# Patient Record
Sex: FEMALE | Race: BLACK OR AFRICAN AMERICAN | NOT HISPANIC OR LATINO | Employment: UNEMPLOYED | URBAN - METROPOLITAN AREA
[De-identification: names, ages, dates, MRNs, and addresses within clinical notes are randomized per-mention and may not be internally consistent; named-entity substitution may affect disease eponyms.]

---

## 2017-08-09 ENCOUNTER — HOSPITAL ENCOUNTER (EMERGENCY)
Facility: HOSPITAL | Age: 37
Discharge: HOME | End: 2017-08-09
Attending: EMERGENCY MEDICINE
Payer: MEDICAID

## 2017-08-09 VITALS
WEIGHT: 190 LBS | SYSTOLIC BLOOD PRESSURE: 139 MMHG | OXYGEN SATURATION: 99 % | HEIGHT: 65 IN | DIASTOLIC BLOOD PRESSURE: 88 MMHG | BODY MASS INDEX: 31.65 KG/M2 | TEMPERATURE: 99 F | HEART RATE: 81 BPM | RESPIRATION RATE: 20 BRPM

## 2017-08-09 DIAGNOSIS — T65.91XA ACCIDENTAL INGESTION OF SUBSTANCE, INITIAL ENCOUNTER: Primary | ICD-10-CM

## 2017-08-09 PROCEDURE — 99283 EMERGENCY DEPT VISIT LOW MDM: CPT

## 2017-08-09 NOTE — ED PROVIDER NOTES
Encounter Date: 2017       History     Chief Complaint   Patient presents with    Medication Reaction     pt accidentally took 1 pill of grandmother's Topomax 200 mg by accident.      Patient accidentally took one of her family members Topamax.      The history is provided by the patient.   Ingestion    She came to the ER via by private vehicle. The current episode started just prior to arrival. Ingested substance: topamax. There are no indicators of a suicidal attempt. Context: accidental. The incident was reported. The incident was witnessed/reported by the patient. Pertinent negatives include no chest pain, no abdominal pain, no nausea, no vomiting, no headaches, no suicidal ideas, no cough and no difficulty breathing.     Review of patient's allergies indicates:   Allergen Reactions    Aspirin Other (See Comments)     Abdominal cramping     Past Medical History:   Diagnosis Date    IBS (irritable bowel syndrome)     Osteoarthritis     Sickle cell trait      Past Surgical History:   Procedure Laterality Date    ANKLE SURGERY      left     SECTION, CLASSIC      x3    CHOLECYSTECTOMY       No family history on file.  Social History   Substance Use Topics    Smoking status: Never Smoker    Smokeless tobacco: Never Used    Alcohol use No     Review of Systems   Respiratory: Negative for cough.    Cardiovascular: Negative for chest pain.   Gastrointestinal: Negative for abdominal pain, nausea and vomiting.   Neurological: Negative for headaches.   Psychiatric/Behavioral: Negative for suicidal ideas.   All other systems reviewed and are negative.      Physical Exam     Initial Vitals [17 0050]   BP Pulse Resp Temp SpO2   139/88 81 20 98.5 °F (36.9 °C) 99 %      MAP       105         Physical Exam    Nursing note and vitals reviewed.  Constitutional: She appears well-developed and well-nourished.   HENT:   Head: Normocephalic and atraumatic.   Eyes: EOM are normal.   Neck: Normal range  of motion. Neck supple.   Cardiovascular: Normal rate, regular rhythm, normal heart sounds and intact distal pulses.   Pulmonary/Chest: Breath sounds normal.   Abdominal: Soft.   Musculoskeletal: Normal range of motion.   Neurological: She is alert and oriented to person, place, and time.   Skin: Skin is warm and dry. Capillary refill takes less than 2 seconds.   Psychiatric: She has a normal mood and affect. Her behavior is normal. Judgment and thought content normal.         ED Course   Procedures  Labs Reviewed - No data to display                            ED Course     Clinical Impression:   The encounter diagnosis was Accidental ingestion of substance, initial encounter.    Disposition:   Disposition: Discharged  Condition: Stable                        Filomena Michele MD  08/09/17 0114

## 2018-02-20 DIAGNOSIS — M54.50 LOW BACK PAIN: Primary | ICD-10-CM

## 2018-05-24 ENCOUNTER — HOSPITAL ENCOUNTER (EMERGENCY)
Facility: HOSPITAL | Age: 38
Discharge: HOME | End: 2018-05-24
Payer: MEDICAID

## 2018-05-24 VITALS
TEMPERATURE: 98 F | BODY MASS INDEX: 31.65 KG/M2 | WEIGHT: 190 LBS | SYSTOLIC BLOOD PRESSURE: 154 MMHG | DIASTOLIC BLOOD PRESSURE: 88 MMHG | OXYGEN SATURATION: 100 % | HEART RATE: 53 BPM | HEIGHT: 65 IN | RESPIRATION RATE: 18 BRPM

## 2018-05-24 DIAGNOSIS — G89.29 CHRONIC BILATERAL LOW BACK PAIN WITHOUT SCIATICA: Primary | ICD-10-CM

## 2018-05-24 DIAGNOSIS — M54.50 CHRONIC BILATERAL LOW BACK PAIN WITHOUT SCIATICA: Primary | ICD-10-CM

## 2018-05-24 PROCEDURE — 25000003 PHARM REV CODE 250: Performed by: PHYSICIAN ASSISTANT

## 2018-05-24 PROCEDURE — 99283 EMERGENCY DEPT VISIT LOW MDM: CPT

## 2018-05-24 RX ORDER — OXYCODONE AND ACETAMINOPHEN 5; 325 MG/1; MG/1
2 TABLET ORAL
Status: COMPLETED | OUTPATIENT
Start: 2018-05-24 | End: 2018-05-24

## 2018-05-24 RX ADMIN — OXYCODONE AND ACETAMINOPHEN 2 TABLET: 5; 325 TABLET ORAL at 03:05

## 2018-05-24 NOTE — ED PROVIDER NOTES
"Encounter Date: 2018       History     Chief Complaint   Patient presents with    Joint Pain     Pt states has arthritis and  joint pains states "it hurts to reach and wipe my butt", states "my back feels crooked."  C/O pain to bilateral hips. Pt states has been seen by PCP and referred to Pain Management, states has not received call back for appt.      Patient presents with joint pain and arthritis pain which she states she has been dealing with "for about 10 years". Patient states she has seen her PCP and received 3 steroid injections in the past 5 months. She states she is currently waiting to hear from a pain management clinic for follow up. She reports pain to her hips and back which is her normal chronic pain. She denies recent injury or paresthesias.           Review of patient's allergies indicates:   Allergen Reactions    Aspirin Other (See Comments)     Abdominal cramping     Past Medical History:   Diagnosis Date    IBS (irritable bowel syndrome)     Osteoarthritis     Sickle cell trait      Past Surgical History:   Procedure Laterality Date    ANKLE SURGERY      left     SECTION, CLASSIC      x3    CHOLECYSTECTOMY       History reviewed. No pertinent family history.  Social History   Substance Use Topics    Smoking status: Never Smoker    Smokeless tobacco: Never Used    Alcohol use No     Review of Systems   Constitutional: Negative for chills and fever.   Respiratory: Negative for cough, chest tightness and shortness of breath.    Cardiovascular: Negative for chest pain.   Gastrointestinal: Negative for abdominal pain, nausea and vomiting.   Musculoskeletal: Positive for back pain and neck pain.   Neurological: Negative for dizziness, syncope, weakness, numbness and headaches.       Physical Exam     Initial Vitals [18 1506]   BP Pulse Resp Temp SpO2   (!) 154/88 (!) 53 18 97.6 °F (36.4 °C) 100 %      MAP       110         Physical Exam    Nursing note and vitals " "reviewed.  Constitutional: She appears well-developed and well-nourished.   HENT:   Head: Normocephalic and atraumatic.   Nose: Nose normal.   Mouth/Throat: Oropharynx is clear and moist.   Eyes: Conjunctivae and EOM are normal. Pupils are equal, round, and reactive to light.   Neck: Normal range of motion. Neck supple.       Cardiovascular: Normal rate, regular rhythm and normal heart sounds.   Pulmonary/Chest: Breath sounds normal. No respiratory distress.   Abdominal: Soft. Bowel sounds are normal. There is no tenderness.   Musculoskeletal: Normal range of motion.        Back:    No vertebral point tenderness noted. TTP to bilateral paraspinal areas.    Neurological: She is alert and oriented to person, place, and time. She has normal strength. No cranial nerve deficit or sensory deficit. GCS eye subscore is 4. GCS verbal subscore is 5. GCS motor subscore is 6.   Lower extremity strength 5/5   strength 5/5  NV intact   Skin: Skin is warm. Capillary refill takes less than 2 seconds.         ED Course   Procedures  Labs Reviewed - No data to display          Medical Decision Making:   Initial Assessment:   Patient presents with joint pain and arthritis pain which she states she has been dealing with "for about 10 years". Patient states she has seen her PCP and received 3 steroid injections in the past 5 months. She states she is currently waiting to hear from a pain management clinic for follow up. She reports pain to her hips and back which is her normal chronic pain. She denies recent injury or paresthesias. On exam, patient has TTP to paraspinal areas bilaterally without vertebral point tenderness noted. Lower extremity strength 5/5 and  strength 5/5. NV intact  Differential Diagnosis:   Chronic pain, back pain, neck pain  ED Management:  Informed patient will give pain medication here. Offered meloxicam and ibuprofen which she states, "doesn't work, I normally take percocet 10". Will give single dose. " Informed patient unable to write narcotics for chronic pain from the ED. Follow up with PCP and pain management. Patient is in NAD with VSS, non toxic in appearance.               Attending Attestation:     Physician Attestation Statement for NP/PA:   I reviewed the chart but I did not personally examine the patient. The face to face encounter was performed by the NP/PA.                     Clinical Impression:   The encounter diagnosis was Chronic bilateral low back pain without sciatica.                           Camille Reeder PA-C  05/24/18 1538       Guzman Siddiqui MD  05/24/18 1549

## 2018-07-03 ENCOUNTER — HOSPITAL ENCOUNTER (EMERGENCY)
Facility: HOSPITAL | Age: 38
Discharge: HOME | End: 2018-07-03
Attending: EMERGENCY MEDICINE
Payer: MEDICAID

## 2018-07-03 VITALS
TEMPERATURE: 98 F | DIASTOLIC BLOOD PRESSURE: 93 MMHG | WEIGHT: 190 LBS | HEIGHT: 65 IN | SYSTOLIC BLOOD PRESSURE: 190 MMHG | HEART RATE: 64 BPM | OXYGEN SATURATION: 97 % | RESPIRATION RATE: 18 BRPM | BODY MASS INDEX: 31.65 KG/M2

## 2018-07-03 DIAGNOSIS — R10.9 ACUTE LEFT FLANK PAIN: Primary | ICD-10-CM

## 2018-07-03 LAB
ALBUMIN SERPL BCP-MCNC: 4.4 G/DL
ALP SERPL-CCNC: 65 U/L
ALT SERPL W/O P-5'-P-CCNC: 16 U/L
ANION GAP SERPL CALC-SCNC: 10 MMOL/L
AST SERPL-CCNC: 16 U/L
B-HCG UR QL: NEGATIVE
BASOPHILS # BLD AUTO: 0.02 K/UL
BASOPHILS NFR BLD: 0.2 %
BILIRUB SERPL-MCNC: 0.2 MG/DL
BILIRUB UR QL STRIP: NEGATIVE
BUN SERPL-MCNC: 9 MG/DL
CALCIUM SERPL-MCNC: 9 MG/DL
CHLORIDE SERPL-SCNC: 101 MMOL/L
CLARITY UR REFRACT.AUTO: CLEAR
CO2 SERPL-SCNC: 27 MMOL/L
COLOR UR AUTO: YELLOW
CREAT SERPL-MCNC: 0.61 MG/DL
DIFFERENTIAL METHOD: ABNORMAL
EOSINOPHIL # BLD AUTO: 0.1 K/UL
EOSINOPHIL NFR BLD: 0.8 %
ERYTHROCYTE [DISTWIDTH] IN BLOOD BY AUTOMATED COUNT: 12.8 %
EST. GFR  (AFRICAN AMERICAN): >60 ML/MIN/1.73 M^2
EST. GFR  (NON AFRICAN AMERICAN): >60 ML/MIN/1.73 M^2
GLUCOSE SERPL-MCNC: 94 MG/DL
GLUCOSE UR QL STRIP: NEGATIVE
HCT VFR BLD AUTO: 36.5 %
HGB BLD-MCNC: 12.5 G/DL
HGB UR QL STRIP: ABNORMAL
KETONES UR QL STRIP: NEGATIVE
LEUKOCYTE ESTERASE UR QL STRIP: NEGATIVE
LYMPHOCYTES # BLD AUTO: 4.3 K/UL
LYMPHOCYTES NFR BLD: 51.6 %
MCH RBC QN AUTO: 27.9 PG
MCHC RBC AUTO-ENTMCNC: 34.2 G/DL
MCV RBC AUTO: 82 FL
MONOCYTES # BLD AUTO: 0.7 K/UL
MONOCYTES NFR BLD: 7.8 %
NEUTROPHILS # BLD AUTO: 3.3 K/UL
NEUTROPHILS NFR BLD: 39.5 %
NITRITE UR QL STRIP: NEGATIVE
PH UR STRIP: 5 [PH] (ref 5–8)
PLATELET # BLD AUTO: 382 K/UL
PMV BLD AUTO: 9.1 FL
POTASSIUM SERPL-SCNC: 3.7 MMOL/L
PROT SERPL-MCNC: 8.1 G/DL
PROT UR QL STRIP: ABNORMAL
RBC # BLD AUTO: 4.48 M/UL
SODIUM SERPL-SCNC: 138 MMOL/L
SP GR UR STRIP: 1.01 (ref 1–1.03)
URN SPEC COLLECT METH UR: ABNORMAL
UROBILINOGEN UR STRIP-ACNC: NEGATIVE EU/DL
WBC # BLD AUTO: 8.38 K/UL

## 2018-07-03 PROCEDURE — 25000003 PHARM REV CODE 250: Performed by: PHYSICIAN ASSISTANT

## 2018-07-03 PROCEDURE — 96375 TX/PRO/DX INJ NEW DRUG ADDON: CPT

## 2018-07-03 PROCEDURE — 96361 HYDRATE IV INFUSION ADD-ON: CPT

## 2018-07-03 PROCEDURE — 99284 EMERGENCY DEPT VISIT MOD MDM: CPT | Mod: 25

## 2018-07-03 PROCEDURE — 96374 THER/PROPH/DIAG INJ IV PUSH: CPT

## 2018-07-03 PROCEDURE — 81025 URINE PREGNANCY TEST: CPT

## 2018-07-03 PROCEDURE — 80053 COMPREHEN METABOLIC PANEL: CPT

## 2018-07-03 PROCEDURE — 85025 COMPLETE CBC W/AUTO DIFF WBC: CPT

## 2018-07-03 PROCEDURE — 63600175 PHARM REV CODE 636 W HCPCS: Performed by: PHYSICIAN ASSISTANT

## 2018-07-03 PROCEDURE — 81003 URINALYSIS AUTO W/O SCOPE: CPT

## 2018-07-03 RX ORDER — ONDANSETRON 2 MG/ML
4 INJECTION INTRAMUSCULAR; INTRAVENOUS
Status: COMPLETED | OUTPATIENT
Start: 2018-07-03 | End: 2018-07-03

## 2018-07-03 RX ORDER — MORPHINE SULFATE 4 MG/ML
2 INJECTION, SOLUTION INTRAMUSCULAR; INTRAVENOUS
Status: COMPLETED | OUTPATIENT
Start: 2018-07-03 | End: 2018-07-03

## 2018-07-03 RX ORDER — KETOROLAC TROMETHAMINE 30 MG/ML
15 INJECTION, SOLUTION INTRAMUSCULAR; INTRAVENOUS
Status: COMPLETED | OUTPATIENT
Start: 2018-07-03 | End: 2018-07-03

## 2018-07-03 RX ORDER — NAPROXEN 500 MG/1
500 TABLET ORAL 2 TIMES DAILY WITH MEALS
Qty: 12 TABLET | Refills: 0 | Status: SHIPPED | OUTPATIENT
Start: 2018-07-03 | End: 2018-09-17

## 2018-07-03 RX ORDER — METHOCARBAMOL 500 MG/1
1000 TABLET, FILM COATED ORAL 3 TIMES DAILY
Qty: 16 TABLET | Refills: 0 | Status: SHIPPED | OUTPATIENT
Start: 2018-07-03 | End: 2018-07-08

## 2018-07-03 RX ADMIN — MORPHINE SULFATE 2 MG: 4 INJECTION INTRAVENOUS at 11:07

## 2018-07-03 RX ADMIN — SODIUM CHLORIDE 1000 ML: 0.9 INJECTION, SOLUTION INTRAVENOUS at 10:07

## 2018-07-03 RX ADMIN — ONDANSETRON 4 MG: 2 INJECTION INTRAMUSCULAR; INTRAVENOUS at 11:07

## 2018-07-03 RX ADMIN — KETOROLAC TROMETHAMINE 15 MG: 30 INJECTION, SOLUTION INTRAMUSCULAR at 11:07

## 2018-07-04 NOTE — ED TRIAGE NOTES
"Pt presents to the ED with c/o left sided flank pain x 2 weeks. Pt was seen by her PCP but was unable to do follow-up labs. Pt states pain is worsening. Pain is rated 10/10, described as "sharp", intermittent. Pt denies hx of kidney stones but reports recent UTI. Pt reports taking tramadol with no relief. Denies urinary problems at present. Last BM was today and normal.   "

## 2018-07-04 NOTE — ED NOTES
Patient resting in bed. Socks and warm blanket provided. Patient updated on labs and CT scan request.

## 2018-07-04 NOTE — ED PROVIDER NOTES
Encounter Date: 7/3/2018       History     Chief Complaint   Patient presents with    Flank Pain     L flank pain that radiates to R side; reports recently diagnosed with UTI; denies N/V/D     38-year-old female presents to emergency department for evaluation of 5 day history of left-sided flank pain.  She reports that the pain has been severe and intermittent since onset.  She reports that it is a throbbing, pulling pain with intermittently sharp, stabbing pains that last for several minutes at a time before resolving.  She states that she was evaluated by her primary care provider Dr. Ortiz on  18 who sent her to get some blood work performed, but she was unable to get that performed.  She states that she was started on Macrobid for possible early urinary tract infection.  She states that over the last 2 days the intermittent flank pain has become worse, becoming a 10/10 pain that lasts for several minutes at a time.  She reports that it resolves without intervention.  She denies headache, fever, chills, body aches, nausea, vomiting or abdominal pain.  No treatment was attempted prior to arrival.           Review of patient's allergies indicates:   Allergen Reactions    Aspirin Other (See Comments)     Abdominal cramping     Past Medical History:   Diagnosis Date    IBS (irritable bowel syndrome)     Osteoarthritis     Sickle cell trait      Past Surgical History:   Procedure Laterality Date    ANKLE SURGERY      left     SECTION, CLASSIC      x3    CHOLECYSTECTOMY       History reviewed. No pertinent family history.  Social History   Substance Use Topics    Smoking status: Never Smoker    Smokeless tobacco: Never Used    Alcohol use No     Review of Systems   Constitutional: Negative for activity change, appetite change and fever.   HENT: Negative for congestion, ear discharge, facial swelling, mouth sores, rhinorrhea, sinus pain, sinus pressure, sore throat, trouble swallowing and  voice change.    Eyes: Negative for photophobia and visual disturbance.   Respiratory: Negative for cough and shortness of breath.    Cardiovascular: Negative for chest pain.   Gastrointestinal: Negative for abdominal pain, constipation, diarrhea, nausea and vomiting.   Genitourinary: Positive for flank pain. Negative for decreased urine volume, dysuria, genital sores, hematuria, pelvic pain, urgency, vaginal bleeding, vaginal discharge and vaginal pain.   Musculoskeletal: Negative for back pain, joint swelling, neck pain and neck stiffness.   Skin: Negative for rash.   Neurological: Negative for dizziness, seizures, syncope, weakness, light-headedness and headaches.       Physical Exam     Initial Vitals [07/03/18 2133]   BP Pulse Resp Temp SpO2   (!) 173/113 70 18 98.1 °F (36.7 °C) 99 %      MAP       --         Physical Exam    Nursing note and vitals reviewed.  Constitutional: She appears well-developed and well-nourished. She is not diaphoretic. No distress.   HENT:   Head: Normocephalic and atraumatic.   Right Ear: External ear normal.   Left Ear: External ear normal.   Nose: Nose normal.   Mouth/Throat: Oropharynx is clear and moist.   Eyes: Conjunctivae and EOM are normal. Pupils are equal, round, and reactive to light.   Neck: Normal range of motion. Neck supple.   Cardiovascular: Normal rate, regular rhythm and normal heart sounds. Exam reveals no gallop and no friction rub.    No murmur heard.  Pulmonary/Chest: Breath sounds normal. No respiratory distress. She has no wheezes. She has no rhonchi. She has no rales. She exhibits no tenderness.   Abdominal: Soft. Bowel sounds are normal. There is no tenderness.   Left-sided CVA tenderness noted.   Musculoskeletal:   No tenderness to palpation noted over the paraspinal muscles of the spinous processes of the cervical or thoracic spine.  Mild tenderness to palpation noted over the  sided paraspinal musculature of the lumbar spine.  No spinous process  tenderness noted.  Full range of motion, sensation and peripheral pulses in the lower extremities bilaterally.  Patient ambulates well without hesitation or gait abnormality.     Lymphadenopathy:     She has no cervical adenopathy.   Neurological: She is alert and oriented to person, place, and time. No cranial nerve deficit.   Skin: Skin is warm and dry.   Psychiatric: She has a normal mood and affect.         ED Course   Procedures  Labs Reviewed   URINALYSIS - Abnormal; Notable for the following:        Result Value    Protein, UA Trace (*)     Occult Blood UA Trace (*)     All other components within normal limits   CBC W/ AUTO DIFFERENTIAL - Abnormal; Notable for the following:     Hematocrit 36.5 (*)     Platelets 382 (*)     MPV 9.1 (*)     Lymph% 51.6 (*)     All other components within normal limits   PREGNANCY TEST, URINE RAPID   COMPREHENSIVE METABOLIC PANEL          Imaging Results          CT Renal Stone Study ABD Pelvis WO (Final result)  Result time 07/03/18 23:12:48    Final result by Guzman Crocker MD (07/03/18 23:12:48)                 Impression:      1. Negative for acute inflammatory process identified pelvis.  2. Probable small cyst superior pole left kidney.  No change since 07/05/2016.  3. 2 nonobstructing calculi within the right kidney.  Negative for obstruction.  4. Moderate stool throughout the colon.  All CT scans at [facility] use dose modulation, iterative reconstruction and/or weight based dosing when appropriate to reduce radiation dose to as low as reasonably achievable.      Electronically signed by: Guzman Crocker MD  Date:    07/03/2018  Time:    23:12             Narrative:    EXAMINATION:  CT RENAL STONE STUDY ABD PELVIS WO    CLINICAL HISTORY:  Flank pain, stone disease suspected;.  Nausea and vomiting.  History of recent sepsis.    TECHNIQUE:  Low dose axial images, sagittal and coronal reformations were obtained from the lung bases to the pubic  symphysis.    COMPARISON:  07/05/2000 60    FINDINGS:  Lung bases are clear.    The liver is normal.  Gallbladder is surgically absent.    The spleen is normal in size and appearance.  The pancreas is normal.  The adrenal glands are normal.  The aorta and IVC are normal.  No retroperitoneal adenopathy.    Subtle low-density focus identified posterior aspect superior pole left kidney likely represents a small cyst, measuring 1.1 cm.  Otherwise the left kidney is normal.  The left ureters normal.  Two punctate calculi identified within the right kidney.  The largest measures 0.4 cm.  Right ureter is normal.  No ureteral calculus present.    Stomach is normal.  The small intestine is normal.  The appendix is normal.  The colon is normal.  Moderate stool present.  The rectum is normal.    The pelvis demonstrates minimally distended, normal appearing bladder.  Uterus is unremarkable.  The adnexal regions are normal.    Regional bones demonstrate mild degenerative changes.  No suspicious osseous lesions.  Abdominal and pelvic wall soft tissues are normal.                                 Medical Decision Making:   Initial Assessment:   38-year-old female presents reevaluation of 5 day history of intermittent left flank pain.  Physical exam reveals a nontoxic-appearing female in no acute distress. patient is afebrile vital signs within normal limits.  Neurological exam reveals an alert and oriented patient.   Abdominal exam reveals a soft abdomen, nontender to palpation.  Mild left-sided CVA tenderness noted.  No peritoneal signs noted.  No tenderness to palpation noted over the paraspinal muscles of the spinous processes of the cervical or thoracic spine.  Mild tenderness to palpation noted over the  sided paraspinal musculature of the lumbar spine.  No spinous process tenderness noted.  Full range of motion, sensation and peripheral pulses in the lower extremities bilaterally.  Patient ambulates well without hesitation or  gait abnormality.    Differential Diagnosis:   CT of the abdomen and pelvis ordered to assess for possible serious etiology including renal calculi, acute diverticulitis and bowel obstruction.  ED Management:  UPT negative.  Patient given saline, Zofran and morphine for symptom control.  Urinalysis reveals no evidence of UTI or gross hematuria.    CBC reveals a acute leukocytosis or anemia.  CMP results within normal limits.  CT of the abdomen and pelvis reveals no evidence of obstructing renal calculi.  2 nonobstructing calculi noted in the right kidney.  Probable small cyst in the superior pole of the left kidney unchanged since previous CT.  No  acute inflammatory process identified in the pelvis.  These findings were discussed at length with the patient verbalizes understanding and agreement course of treatment.     Probable skeletal strain.  Patient given Toradol for symptom control.  I discussed these findings at length with the patient verbalizes understanding and agreement course of treatment.  Instructed the patient to follow-up with her primary care provider for reevaluation and to return to the emergency department immediately for any new or worsening symptoms.  I discussed this patient at length with Dr. Michele who is in agreement with the course of treatment.                      Clinical Impression:   The encounter diagnosis was Acute left flank pain.                             Anitra Kelly PA-C  07/04/18 [id number]

## 2018-07-04 NOTE — DISCHARGE INSTRUCTIONS
The CT of her abdomen and pelvis did not reveal any evidence of kidney stone or other acute findings.  You will be prescribed Robaxin and naproxen for symptom control.  You are advised to follow-up with her primary care provider for reevaluation within 3 days.  You are  instructed to return to the emergency department immediately for any new or worsening symptoms.

## 2018-09-17 ENCOUNTER — HOSPITAL ENCOUNTER (EMERGENCY)
Facility: HOSPITAL | Age: 38
Discharge: HOME | End: 2018-09-17
Attending: FAMILY MEDICINE
Payer: MEDICAID

## 2018-09-17 VITALS
HEIGHT: 65 IN | OXYGEN SATURATION: 100 % | WEIGHT: 191 LBS | HEART RATE: 86 BPM | TEMPERATURE: 98 F | SYSTOLIC BLOOD PRESSURE: 138 MMHG | DIASTOLIC BLOOD PRESSURE: 74 MMHG | BODY MASS INDEX: 31.82 KG/M2 | RESPIRATION RATE: 18 BRPM

## 2018-09-17 DIAGNOSIS — S39.012A LUMBOSACRAL STRAIN, INITIAL ENCOUNTER: Primary | ICD-10-CM

## 2018-09-17 DIAGNOSIS — M54.41 ACUTE BILATERAL LOW BACK PAIN WITH RIGHT-SIDED SCIATICA: ICD-10-CM

## 2018-09-17 PROCEDURE — 99283 EMERGENCY DEPT VISIT LOW MDM: CPT | Mod: 25

## 2018-09-17 PROCEDURE — 25000003 PHARM REV CODE 250: Performed by: NURSE PRACTITIONER

## 2018-09-17 PROCEDURE — 63600175 PHARM REV CODE 636 W HCPCS: Performed by: NURSE PRACTITIONER

## 2018-09-17 PROCEDURE — 96372 THER/PROPH/DIAG INJ SC/IM: CPT

## 2018-09-17 RX ORDER — METHOCARBAMOL 500 MG/1
1000 TABLET, FILM COATED ORAL 3 TIMES DAILY
Qty: 30 TABLET | Refills: 0 | Status: SHIPPED | OUTPATIENT
Start: 2018-09-17 | End: 2018-09-17 | Stop reason: SDUPTHER

## 2018-09-17 RX ORDER — METHOCARBAMOL 500 MG/1
1000 TABLET, FILM COATED ORAL 3 TIMES DAILY
Qty: 30 TABLET | Refills: 0 | Status: SHIPPED | OUTPATIENT
Start: 2018-09-17 | End: 2018-09-22

## 2018-09-17 RX ORDER — DICLOFENAC SODIUM 10 MG/G
2 GEL TOPICAL 2 TIMES DAILY
Qty: 100 G | Refills: 0 | Status: ON HOLD | OUTPATIENT
Start: 2018-09-17 | End: 2019-05-15 | Stop reason: HOSPADM

## 2018-09-17 RX ORDER — HYDROCODONE BITARTRATE AND ACETAMINOPHEN 10; 325 MG/1; MG/1
1 TABLET ORAL
Status: COMPLETED | OUTPATIENT
Start: 2018-09-17 | End: 2018-09-17

## 2018-09-17 RX ORDER — ORPHENADRINE CITRATE 30 MG/ML
60 INJECTION INTRAMUSCULAR; INTRAVENOUS
Status: COMPLETED | OUTPATIENT
Start: 2018-09-17 | End: 2018-09-17

## 2018-09-17 RX ORDER — KETOROLAC TROMETHAMINE 30 MG/ML
60 INJECTION, SOLUTION INTRAMUSCULAR; INTRAVENOUS
Status: COMPLETED | OUTPATIENT
Start: 2018-09-17 | End: 2018-09-17

## 2018-09-17 RX ORDER — HYDROCODONE BITARTRATE AND ACETAMINOPHEN 5; 325 MG/1; MG/1
1 TABLET ORAL EVERY 8 HOURS PRN
Qty: 13 TABLET | Refills: 0 | Status: ON HOLD | OUTPATIENT
Start: 2018-09-17 | End: 2019-05-15

## 2018-09-17 RX ADMIN — HYDROCODONE BITARTRATE AND ACETAMINOPHEN 1 TABLET: 10; 325 TABLET ORAL at 10:09

## 2018-09-17 RX ADMIN — ORPHENADRINE CITRATE 60 MG: 30 INJECTION INTRAMUSCULAR; INTRAVENOUS at 10:09

## 2018-09-17 RX ADMIN — KETOROLAC TROMETHAMINE 60 MG: 30 INJECTION, SOLUTION INTRAMUSCULAR at 10:09

## 2018-09-18 ENCOUNTER — HOSPITAL ENCOUNTER (EMERGENCY)
Facility: HOSPITAL | Age: 38
Discharge: HOME | End: 2018-09-18
Attending: EMERGENCY MEDICINE
Payer: MEDICAID

## 2018-09-18 VITALS
HEIGHT: 65 IN | HEART RATE: 60 BPM | DIASTOLIC BLOOD PRESSURE: 85 MMHG | BODY MASS INDEX: 31.82 KG/M2 | RESPIRATION RATE: 19 BRPM | WEIGHT: 191 LBS | OXYGEN SATURATION: 100 % | SYSTOLIC BLOOD PRESSURE: 163 MMHG | TEMPERATURE: 99 F

## 2018-09-18 DIAGNOSIS — G89.29 CHRONIC BILATERAL LOW BACK PAIN WITH RIGHT-SIDED SCIATICA: Primary | ICD-10-CM

## 2018-09-18 DIAGNOSIS — M54.41 CHRONIC BILATERAL LOW BACK PAIN WITH RIGHT-SIDED SCIATICA: Primary | ICD-10-CM

## 2018-09-18 LAB
B-HCG UR QL: NEGATIVE
CTP QC/QA: YES

## 2018-09-18 PROCEDURE — 96374 THER/PROPH/DIAG INJ IV PUSH: CPT

## 2018-09-18 PROCEDURE — 81025 URINE PREGNANCY TEST: CPT | Performed by: EMERGENCY MEDICINE

## 2018-09-18 PROCEDURE — 96372 THER/PROPH/DIAG INJ SC/IM: CPT | Mod: 59

## 2018-09-18 PROCEDURE — 63600175 PHARM REV CODE 636 W HCPCS: Performed by: NURSE PRACTITIONER

## 2018-09-18 PROCEDURE — 99283 EMERGENCY DEPT VISIT LOW MDM: CPT | Mod: 25

## 2018-09-18 RX ORDER — NAPROXEN 500 MG/1
500 TABLET ORAL 2 TIMES DAILY WITH MEALS
Qty: 20 TABLET | Refills: 0 | OUTPATIENT
Start: 2018-09-18 | End: 2018-10-23

## 2018-09-18 RX ORDER — CYCLOBENZAPRINE HCL 10 MG
10 TABLET ORAL 3 TIMES DAILY PRN
Qty: 15 TABLET | Refills: 0 | Status: SHIPPED | OUTPATIENT
Start: 2018-09-18 | End: 2018-09-23

## 2018-09-18 RX ORDER — DEXAMETHASONE SODIUM PHOSPHATE 4 MG/ML
8 INJECTION, SOLUTION INTRA-ARTICULAR; INTRALESIONAL; INTRAMUSCULAR; INTRAVENOUS; SOFT TISSUE
Status: COMPLETED | OUTPATIENT
Start: 2018-09-18 | End: 2018-09-18

## 2018-09-18 RX ORDER — ORPHENADRINE CITRATE 30 MG/ML
30 INJECTION INTRAMUSCULAR; INTRAVENOUS
Status: COMPLETED | OUTPATIENT
Start: 2018-09-18 | End: 2018-09-18

## 2018-09-18 RX ADMIN — ORPHENADRINE CITRATE 30 MG: 30 INJECTION INTRAMUSCULAR; INTRAVENOUS at 05:09

## 2018-09-18 RX ADMIN — DEXAMETHASONE SODIUM PHOSPHATE 8 MG: 4 INJECTION, SOLUTION INTRAMUSCULAR; INTRAVENOUS at 05:09

## 2018-09-18 NOTE — ED NOTES
"Pt stated, " I have arthritis in my hipps and I suffer with siactic nerver, and what im feeling right now is unbareable.  "

## 2018-09-18 NOTE — ED NOTES
"Pt stated she has not gotten any relief, the pain on her right sight is so severe, plans to go to [facility] ER once discharged if no relief. Kelley MESSINA NP notified. Offered pt a Norco on top on the injections. Pt stated " I will try anything to help relieve the pain".  "

## 2018-09-18 NOTE — ED PROVIDER NOTES
Encounter Date: 9/18/2018       History     Chief Complaint   Patient presents with    Back Pain     Pt to lower back, worse to R side. Has choinic pain issues to same area. States was seen last night for same and was given Toradol but reports pain not improved.     Pt presents for chronic low back pain that radiates to R hip and leg that has worsened over the past few days. Pt denies any trauma. Pt has no numbness, tingling or loss of bowel or bladder. Pt states this is about here fifth flare up this year. Pt was seen at [facility] yesterday, did not fill prescriptions yet. Pharmacy was out of [facility], pt is asking for something different. Pt states she was told by nurse at [facility] ED to come here for injections for pain that would last longer. Explained to pt that we do not do spinal injections and we have similar medications as [facility]. Pt ambulated into ED room.      The history is provided by the patient.   Back Pain    This is a recurrent problem. The current episode started two days ago. The problem occurs every few hours. The problem has been unchanged. The pain is associated with no known injury. The pain is present in the lumbar spine. The quality of the pain is described as stabbing and burning. The pain radiates to the right thigh and right leg. The symptoms are aggravated by certain positions. The pain is the same all the time. Associated symptoms include leg pain (R leg). Pertinent negatives include no fever, no numbness, no abdominal pain, no bowel incontinence, no perianal numbness, no bladder incontinence, no dysuria, no pelvic pain, no paresthesias, no paresis, no tingling and no weakness.     Review of patient's allergies indicates:   Allergen Reactions    Aspirin Other (See Comments)     Abdominal cramping     Past Medical History:   Diagnosis Date    IBS (irritable bowel syndrome)     Osteoarthritis     Sickle cell trait      Past Surgical History:   Procedure Laterality Date     ANKLE SURGERY  2011    left     SECTION, CLASSIC      x3    CHOLECYSTECTOMY       History reviewed. No pertinent family history.  Social History     Tobacco Use    Smoking status: Never Smoker    Smokeless tobacco: Never Used   Substance Use Topics    Alcohol use: No    Drug use: No     Review of Systems   Constitutional: Negative for fever.   Gastrointestinal: Negative for abdominal pain and bowel incontinence.   Genitourinary: Negative for bladder incontinence, difficulty urinating, dysuria and pelvic pain.   Musculoskeletal: Positive for arthralgias (states she has OA) and back pain. Negative for gait problem, joint swelling, myalgias, neck pain and neck stiffness.   Skin: Negative for rash and wound.   Allergic/Immunologic: Negative for immunocompromised state.   Neurological: Negative for tingling, weakness, numbness and paresthesias.   All other systems reviewed and are negative.      Physical Exam     Initial Vitals [18 1601]   BP Pulse Resp Temp SpO2   (!) 163/85 60 19 98.6 °F (37 °C) 100 %      MAP       --         Physical Exam    Nursing note and vitals reviewed.  Constitutional: Vital signs are normal. She appears well-developed. She is cooperative.  Non-toxic appearance. She does not appear ill.   HENT:   Head: Normocephalic and atraumatic.   Nose: Nose normal.   Eyes: Conjunctivae, EOM and lids are normal.   Neck: Trachea normal and full passive range of motion without pain.   Cardiovascular: Normal rate, regular rhythm and normal heart sounds.   Pulses:       Dorsalis pedis pulses are 2+ on the right side, and 2+ on the left side.   Pulmonary/Chest: Effort normal and breath sounds normal.   Abdominal: Soft. Normal appearance and bowel sounds are normal. There is no tenderness.   Musculoskeletal: Normal range of motion.        Right hip: She exhibits tenderness. She exhibits normal range of motion, normal strength, no bony tenderness, no swelling, no crepitus, no deformity and no  laceration.        Right knee: Normal.        Thoracic back: Normal.        Lumbar back: She exhibits tenderness and pain. She exhibits normal range of motion, no bony tenderness, no swelling, no edema, no deformity, no laceration, no spasm and normal pulse.        Right upper leg: She exhibits tenderness (sciatic pain). She exhibits no bony tenderness, no swelling, no edema, no deformity and no laceration.        Left upper leg: Normal.        Right lower leg: Normal.        Legs:       Right foot: Normal.   Neurological: She is alert and oriented to person, place, and time. She has normal strength. She displays no atrophy. No cranial nerve deficit or sensory deficit. She exhibits normal muscle tone. Coordination and gait normal.   Reflex Scores:       Patellar reflexes are 2+ on the right side and 2+ on the left side.  Skin: Skin is warm, dry and intact. Capillary refill takes less than 2 seconds. No rash noted.   Psychiatric: She has a normal mood and affect. Her speech is normal and behavior is normal.         ED Course   Procedures  Labs Reviewed   POCT URINE PREGNANCY          Imaging Results    None          Medical Decision Making:   Initial Assessment:   Chronic low back pain that radiates to R hip and leg that has worsened over the past few days. Pt denies any trauma. Pt denies numbness, tingling, loss of bowel or bladder, no s/s cauda eqina.   Pt was seen at [facility] yesterday, did not fill prescriptions yet. Pharmacy was out of [facility], pt is asking for something different. Pt states she was told by nurse at [facility] ED to come here for injections for pain that would last longer. Explained to pt that we do not do spinal injections and we have similar medications as [facility]. Pt ambulated into ED room, is depressed d/t the chronic pain and has medicaid so finding pain management is difficult. Pt states she has an appt Oct. 12 with pain management    Differential Diagnosis:   Lumbar radiculopathy,  MSK spasm, strain  ED Management:  Chronic lumbar back pain with sciatica,without trauma. Pt given naproxen and flexeril per request d/t pharmacy out of robaxin. Pt given steriod injection in ED today. Pt given copy of low back exercises to relieve sciatica pain. Pt has appt with her PCP Monday for referral, and has pain management appt Oct. 12. Pt encouraged to keep and attend those appointments. Pt to return to ER for any s/s of cauda equina.                      Clinical Impression:   The encounter diagnosis was Chronic bilateral low back pain with right-sided sciatica.                             Rome Paul NP  09/18/18 [id number]

## 2018-09-18 NOTE — ED PROVIDER NOTES
.      This SmartLink is deprecated. Use AVSMEDLIST instead to display the medication list for a patient. eMERGENCY dEPARTMENT eNCOUnter    CHIEF COMPLAINT    Chief Complaint   Patient presents with    Back Pain     Right hip and lower back pain radiating to left hip; pt states she has arthritis and sciatica problems but this pain has gotten much worse in the last 3 days without any recent hx of injury       HPI    Sherri Machado is a 38 y.o. female who presents to the ED with low back pain. States she has had low back pain off unknown over the years.  She states it flares up from time to time.  She states last week she had done her usual housekeeping and Saturday morning the back pain just hip.  She states it goes across her lower back and into both hips.  She states the right sided hip pain is the worst.  She denies injury. She denies numbness tingling or radiation to the legs or feet.  She denies incontinence.  She states it hurts to move or bend.  She states she does have a history of  osteoarthritis and she has an appointment with a rheumatologist in November.    CURRENT MEDICATIONS    No current facility-administered medications on file prior to encounter.      Current Outpatient Medications on File Prior to Encounter   Medication Sig Dispense Refill    [DISCONTINUED] alprazolam (XANAX) 2 MG Tab Take by mouth nightly as needed.      [DISCONTINUED] naproxen (NAPROSYN) 500 MG tablet Take 1 tablet (500 mg total) by mouth 2 (two) times daily with meals. 12 tablet 0         ALLERGIES    Review of patient's allergies indicates:   Allergen Reactions    Aspirin Other (See Comments)     Abdominal cramping       PAST MEDICAL HISTORY  Past Medical History:   Diagnosis Date    IBS (irritable bowel syndrome)     Osteoarthritis     Sickle cell trait        SURGICAL HISTORY    Past Surgical History:   Procedure Laterality Date    ANKLE SURGERY      left     SECTION, CLASSIC      x3    CHOLECYSTECTOMY    "      SOCIAL HISTORY    Social History     Socioeconomic History    Marital status:      Spouse name: None    Number of children: None    Years of education: None    Highest education level: None   Social Needs    Financial resource strain: None    Food insecurity - worry: None    Food insecurity - inability: None    Transportation needs - medical: None    Transportation needs - non-medical: None   Occupational History    None   Tobacco Use    Smoking status: Never Smoker    Smokeless tobacco: Never Used   Substance and Sexual Activity    Alcohol use: No    Drug use: No    Sexual activity: None   Other Topics Concern    None   Social History Narrative    None       FAMILY HISTORY    History reviewed. No pertinent family history.    REVIEW OF SYSTEMS   ROS  Constitutional:  No fever, chills, weight loss or weakness.   Eyes:  No  Photophobia, blurred vision or discharge.   HENT:  No ear pain, nasal congestion or sore throat..  Respiratory:  No cough, shortness of breath or wheezing.   Cardiovascular:  No chest pain, palpitations or swelling.   GI:  No abdominal pain, nausea, vomiting, or diarrhea.  : No dysuria, frequency   Musculoskeletal:  Reports low back pain. Denies neck pain.   Skin:  No reported rashes or infected lesions.   Neurologic:  No reported headache. Denies numbness, tingling or incontinence.  All Systems otherwise negative except as noted in the History of Present Illness.        PHYSICAL EXAM    Reviewed Triage Note  VITAL SIGNS: /85 (BP Location: Left arm, Patient Position: Sitting)   Pulse 88   Temp 98.2 °F (36.8 °C) (Oral)   Resp 20   Ht 5' 5" (1.651 m)   Wt 86.6 kg (191 lb)   LMP 09/03/2018   SpO2 100%   Breastfeeding? No   BMI 31.78 kg/m²    Vitals:    09/17/18 2112   BP: 134/85   Pulse: 88   Resp: 20   Temp: 98.2 °F (36.8 °C)       Physical Exam  Nursing Notes and Vital Signs Reviewed  Constitutional:  Well-developed, well-nourished, nontoxic-appearing, " obese 38-year-old female in NAD.   .  HENT:  Normocephalic, atraumatic. Bilateral external EACs normal. Nose normal, no rhinorrhea. Mouth mucus membranes P & M.   Eyes:  PERRL EOMI. Conjunctiva normal without discharge.   Neck: Normal range of motion. No midline tenderness or vertebral step-off. No stridor. No meningismus. No lymphadenopathy.   Respiratory:  Normal breath sounds bilaterally.  No respiratory distress, retractions, or conversational dyspnea. No wheezing. No rhonchi. No rales.   Cardiovascular:  Normal heart rate. Normal rhythm. No pitting lower extremity edema.   Musculoskeletal:  Lumbosacral no gross deformity.  Pain with bending at waist or rotation of trunk.  No palpable bony deformity. No tenderness to palpation.  Integument:  Warm and dry. No rash. No petechiae  Neurologic:   Alert and Interactive.  Antalgic gait.  No clonus or foot drop.  Psychiatric:  Affect normal. Mood normal.         LABS  Pertinent labs reviewed. (See chart for details)           RADIOLOGY    Imaging Results    None         PROCEDURES    Procedures      EKG         ED COURSE & MEDICAL DECISION MAKING    Pertinent & Imaging studies reviewed. (See chart for details and specific orders.)  38-year-old female with 2 day exacerbation of low back pain that is radiating to both hips.  Denies numbness, tingling or incontinence.  Has been taking Tylenol at home without relief.  Has history of osteoarthritis.  She was given injections of Toradol and Norflex in the ED.  She reported no relief.  She was given Norco 10.  She was given a prescription for Robaxin, Voltaren gel and a very small amount of Norco.  She was advised to follow up with Orthopedic, pain management or Rheumatology.  Return if worsening or concerns.      Medications   ketorolac injection 60 mg (60 mg Intramuscular Given 9/17/18 2206)   orphenadrine injection 60 mg (60 mg Intramuscular Given 9/17/18 2206)           FINAL IMPRESSION    1. Lumbosacral strain, initial  encounter    2. Acute bilateral low back pain with right-sided sciatica        Differential Diagnosis:  Cauda equina, lumbar compression fracture, lumbar radiculitis.    Patient advised to follow-up with PCP for re-check                   Kelley Vides NP  09/17/18 9696

## 2018-10-23 ENCOUNTER — HOSPITAL ENCOUNTER (EMERGENCY)
Facility: HOSPITAL | Age: 38
Discharge: HOME | End: 2018-10-23
Attending: EMERGENCY MEDICINE
Payer: MEDICAID

## 2018-10-23 VITALS
DIASTOLIC BLOOD PRESSURE: 81 MMHG | HEART RATE: 67 BPM | RESPIRATION RATE: 18 BRPM | WEIGHT: 191 LBS | OXYGEN SATURATION: 97 % | HEIGHT: 65 IN | SYSTOLIC BLOOD PRESSURE: 156 MMHG | BODY MASS INDEX: 31.82 KG/M2 | TEMPERATURE: 99 F

## 2018-10-23 DIAGNOSIS — M54.42 CHRONIC LEFT-SIDED LOW BACK PAIN WITH LEFT-SIDED SCIATICA: Primary | ICD-10-CM

## 2018-10-23 DIAGNOSIS — G89.29 CHRONIC LEFT-SIDED LOW BACK PAIN WITH LEFT-SIDED SCIATICA: Primary | ICD-10-CM

## 2018-10-23 PROCEDURE — 99284 EMERGENCY DEPT VISIT MOD MDM: CPT

## 2018-10-23 RX ORDER — NAPROXEN 500 MG/1
500 TABLET ORAL 2 TIMES DAILY WITH MEALS
Qty: 14 TABLET | Refills: 0 | Status: SHIPPED | OUTPATIENT
Start: 2018-10-23 | End: 2018-10-30

## 2018-10-23 RX ORDER — OMEPRAZOLE 20 MG/1
20 CAPSULE, DELAYED RELEASE ORAL DAILY
Qty: 15 CAPSULE | Refills: 0 | Status: ON HOLD | OUTPATIENT
Start: 2018-10-23 | End: 2019-05-15 | Stop reason: HOSPADM

## 2018-10-23 NOTE — ED NOTES
"Pt has chronic lower back pain with sciatica. She states the pain from her lower back is radiating down to bilateral hips and legs. Pt stated, "I know something is wrong with me. I need an MRI ordered because I don't want to go this long and have cancer or be crippled because the doctors won't order an MRI on me. My [facility] doctor told me I have to go to physical therapy and exercise but that has nothing to do with it".    Neuro: AAOx3  HEENT: WDL  Resp: Airway patent, respirations even/unlabored. No SOB noted.  Cardiac: Skin pink/warm/dry, pulses intact. Pt denies any chest pain  Abdomen: Soft, non-tender to palpation, denies N/V/D  : No signs or symptoms of urinary disturbances  Skin: Skin is dry and intact.  "

## 2018-10-23 NOTE — ED NOTES
Dr. Felipe examined patient and explained that see would be given medication for the pain and she would need to follow up with PCP for chronic pain. The patient left without allowing vitals signs to be taken and also without her prescriptions. Dr. Felipe notifed.

## 2018-10-23 NOTE — ED PROVIDER NOTES
Encounter Date: 10/23/2018    SCRIBE #1 NOTE: I, Hermann Mota, am scribing for, and in the presence of,  . I have scribed the entire note.       History     Chief Complaint   Patient presents with    Back Pain     radiation to bilateral hips     Sherri Machado is a 38 y.o. female who  has a past medical history of IBS (irritable bowel syndrome), Osteoarthritis, and Sickle cell trait.    The patient presents to the ED with a chief complaint chronic back pain. She reports that she has sciatic nerve pain for the past 8-10 years, but states that it has recently gotten more painful over the last 3 months. She describes a stabbing pain that has emerged on the right side over the past 5 years, and states the pain prevents her from bending over to tie shoes or walk up steps. Patient denies any recent trauma, injury, or falls, shortness of breath, dysuria/hematuria, fever, chills, palpitations, fever, urinary/bowel incontinence, focal weakness/tingling, or numbness in groin area. Patient states she has been prescribed both pain medication and muscle relaxers without any significant relief, but adds she only takes them occasionally as needed. She states she has an upcoming appointment with an orthopedist next month.      The history is provided by the patient.     Review of patient's allergies indicates:   Allergen Reactions    Aspirin Other (See Comments)     Abdominal cramping     Past Medical History:   Diagnosis Date    IBS (irritable bowel syndrome)     Osteoarthritis     Sickle cell trait      Past Surgical History:   Procedure Laterality Date    ANKLE SURGERY  2011    left     SECTION, CLASSIC      x3    CHOLECYSTECTOMY       No family history on file.  Social History     Tobacco Use    Smoking status: Never Smoker    Smokeless tobacco: Never Used   Substance Use Topics    Alcohol use: No    Drug use: No     Review of Systems   Constitutional: Negative for chills and fever.   HENT: Negative  for sore throat.    Respiratory: Negative for cough and shortness of breath.    Cardiovascular: Negative for chest pain.   Gastrointestinal: Negative for nausea and vomiting.   Genitourinary: Negative for dysuria, frequency and urgency.   Musculoskeletal: Positive for back pain. Negative for neck pain and neck stiffness.   Skin: Negative for rash and wound.   Neurological: Negative for dizziness, syncope, weakness, light-headedness, numbness and headaches.   Hematological: Does not bruise/bleed easily.   Psychiatric/Behavioral: Negative for agitation, behavioral problems and confusion.     Physical Exam     Initial Vitals [10/23/18 1221]   BP Pulse Resp Temp SpO2   (!) 156/81 67 18 98.6 °F (37 °C) 97 %      MAP       --         Physical Exam    Nursing note and vitals reviewed.  Constitutional: She appears well-developed and well-nourished. She is not diaphoretic. No distress.   HENT:   Head: Normocephalic and atraumatic.   Mouth/Throat: Oropharynx is clear and moist.   Eyes: EOM are normal. Pupils are equal, round, and reactive to light.   Neck: No tracheal deviation present.   Cardiovascular: Normal rate, regular rhythm, normal heart sounds and intact distal pulses.   Pulmonary/Chest: Breath sounds normal. No stridor. No respiratory distress. She has no wheezes.   Abdominal: Soft. Bowel sounds are normal. She exhibits no distension and no mass. There is no tenderness.   Musculoskeletal: Normal range of motion. She exhibits tenderness (paraspinus muscle tenderness). She exhibits no edema.   No midline bony tenderness   Neurological: She is alert and oriented to person, place, and time. She has normal strength. No cranial nerve deficit or sensory deficit. Gait normal. GCS eye subscore is 4. GCS verbal subscore is 5. GCS motor subscore is 6.   No focal weakness.  Full strength to bilateral LE.  Peripheral pulses intact   Skin: Skin is warm and dry. Capillary refill takes less than 2 seconds. No pallor.   Psychiatric:  She has a normal mood and affect. Her behavior is normal. Thought content normal.       ED Course   Procedures  Labs Reviewed - No data to display       Imaging Results    None            Medical Decision Making:   Initial Assessment:   This is a 39 yo female, with hx of chronic lower back pain and sciatica, presenting to ED with worsening back pain over the last 3 months. She is scheduled to see orthopedics next week but presents due to worsening pain today. On arrival, vitals unremarkable, exam without weakness; no acute focal neuro deficits or other acute findings. No fever, weakness, sensory deficits, no other red flags of back pain. Patent was counseled extensively on symptomatic and supportive care of chronic back pain and sciatica. Will prescribe scheduled NSAIDS in addition to patient's previously prescribed muscle relaxer and pain medication. Instructed patient follow up with PCP as needed or orthopedics as scheduled.   Differential Diagnosis:   Differential Diagnosis includes, but is not limited to:  Cauda equina syndrome, diskitis/osteomyelitis, epidural/paraspinal abscess, AAA, aortic dissection, post-op/hardware infection, trauma/vertebral fracture, spinal cord injury, disc herniation, spinal stenosis, sciatica, radiculopathy, neoplasm, lumbar muscle strain, muscle spasm, neuropathic pain, UTI/pyelonephritis, nephrolithiasis.    ED Management:  After complete evaluation, including thorough history and physical exam, the patient's symptoms are most likely due to chronic low back pain. There are no concerning features of bilateral weakness, significant new motor/sensory deficits, saddle anesthesia, or bowel/bladder incontinence to suggest acute cauda equina syndrome. On physical exam, there is no focal midline bony tenderness or evidence of significant trauma to suggest acute fracture or hematoma. There is no fever, history of recent surgery, or erythema/fluctuance to suggest acute diskitis, infection, or  abscess. Will provide short course RX of scheduled NSAIDs upon D/C. Discussed symptomatic and supportive care instructions, including use of heating pads, stretching and ROM exercises, improving posture, and slowly resuming activity as tolerated. Counseled on need to follow-up with PCP for further evaluation if symptoms persist, including further imaging as an outpatient if symptoms persist.    Upon re-evaluation, the patient's status has remained stable.  After complete ED evaluation, clinical impression is most consistent with chronic back pain.  At this time, I feel there is no emergent condition requiring further evaluation or admission. I believe the patient is stable for discharge from the ED. The patient and any additional family present were updated with test results, overall clinical impression, and recommended further plan of care. All questions were answered. The patient expressed understanding and agreed with current plan for discharge with PCP/Orthopedics follow-up within 1 week. Strict return precautions were provided, including return/worsening of current symptoms or any other concerns.                         Clinical Impression:     1. Chronic left-sided low back pain with left-sided sciatica            Disposition:   Disposition: Discharged  Condition: Stable      I, Dr. Luis Felipe, personally performed the services described in this documentation. All medical record entries made by the scribe were at my direction and in my presence.  I have reviewed the chart and agree that the record reflects my personal performance and is accurate and complete.     Luis Felipe MD.     Luis Felipe MD  10/29/18 [id number]

## 2019-05-14 ENCOUNTER — HOSPITAL ENCOUNTER (INPATIENT)
Facility: HOSPITAL | Age: 39
LOS: 1 days | Discharge: HOME | DRG: 897 | End: 2019-05-15
Attending: PSYCHIATRY & NEUROLOGY | Admitting: PSYCHIATRY & NEUROLOGY
Payer: MEDICAID

## 2019-05-14 DIAGNOSIS — F32.A DEPRESSION WITH SUICIDAL IDEATION: ICD-10-CM

## 2019-05-14 DIAGNOSIS — F11.23 OPIOID DEPENDENCE WITH WITHDRAWAL: Primary | ICD-10-CM

## 2019-05-14 DIAGNOSIS — R45.851 DEPRESSION WITH SUICIDAL IDEATION: ICD-10-CM

## 2019-05-14 PROCEDURE — 90833 PSYTX W PT W E/M 30 MIN: CPT | Mod: AF,HB,, | Performed by: PSYCHIATRY & NEUROLOGY

## 2019-05-14 PROCEDURE — 11400000 HC PSYCH PRIVATE ROOM

## 2019-05-14 PROCEDURE — 25000003 PHARM REV CODE 250: Performed by: PSYCHIATRY & NEUROLOGY

## 2019-05-14 PROCEDURE — 99233 PR SUBSEQUENT HOSPITAL CARE,LEVL III: ICD-10-PCS | Mod: AF,HB,, | Performed by: PSYCHIATRY & NEUROLOGY

## 2019-05-14 PROCEDURE — 99233 SBSQ HOSP IP/OBS HIGH 50: CPT | Mod: AF,HB,, | Performed by: PSYCHIATRY & NEUROLOGY

## 2019-05-14 PROCEDURE — 90833 PR PSYCHOTHERAPY W/PATIENT W/E&M, 30 MIN (ADD ON): ICD-10-PCS | Mod: AF,HB,, | Performed by: PSYCHIATRY & NEUROLOGY

## 2019-05-14 RX ORDER — FOLIC ACID 1 MG/1
1 TABLET ORAL DAILY
Status: DISCONTINUED | OUTPATIENT
Start: 2019-05-15 | End: 2019-05-15

## 2019-05-14 RX ORDER — DICYCLOMINE HYDROCHLORIDE 10 MG/1
10 CAPSULE ORAL EVERY 6 HOURS PRN
Status: DISCONTINUED | OUTPATIENT
Start: 2019-05-14 | End: 2019-05-15 | Stop reason: HOSPADM

## 2019-05-14 RX ORDER — OLANZAPINE 10 MG/1
10 TABLET ORAL EVERY 4 HOURS PRN
Status: DISCONTINUED | OUTPATIENT
Start: 2019-05-14 | End: 2019-05-15 | Stop reason: HOSPADM

## 2019-05-14 RX ORDER — DIAZEPAM 5 MG/1
5 TABLET ORAL 3 TIMES DAILY
Status: DISCONTINUED | OUTPATIENT
Start: 2019-05-14 | End: 2019-05-15

## 2019-05-14 RX ORDER — MAG HYDROX/ALUMINUM HYD/SIMETH 200-200-20
30 SUSPENSION, ORAL (FINAL DOSE FORM) ORAL EVERY 6 HOURS PRN
Status: DISCONTINUED | OUTPATIENT
Start: 2019-05-14 | End: 2019-05-15 | Stop reason: HOSPADM

## 2019-05-14 RX ORDER — TRAMADOL HYDROCHLORIDE 50 MG/1
50 TABLET ORAL 3 TIMES DAILY
Status: DISCONTINUED | OUTPATIENT
Start: 2019-05-14 | End: 2019-05-15

## 2019-05-14 RX ORDER — DOCUSATE SODIUM 100 MG/1
100 CAPSULE, LIQUID FILLED ORAL DAILY PRN
Status: DISCONTINUED | OUTPATIENT
Start: 2019-05-14 | End: 2019-05-15 | Stop reason: HOSPADM

## 2019-05-14 RX ORDER — CYCLOBENZAPRINE HCL 5 MG
5 TABLET ORAL EVERY 8 HOURS PRN
Status: DISCONTINUED | OUTPATIENT
Start: 2019-05-14 | End: 2019-05-15 | Stop reason: HOSPADM

## 2019-05-14 RX ORDER — ACETAMINOPHEN 325 MG/1
650 TABLET ORAL EVERY 6 HOURS PRN
Status: DISCONTINUED | OUTPATIENT
Start: 2019-05-14 | End: 2019-05-15 | Stop reason: HOSPADM

## 2019-05-14 RX ORDER — OLANZAPINE 10 MG/2ML
10 INJECTION, POWDER, FOR SOLUTION INTRAMUSCULAR EVERY 4 HOURS PRN
Status: DISCONTINUED | OUTPATIENT
Start: 2019-05-14 | End: 2019-05-15 | Stop reason: HOSPADM

## 2019-05-14 RX ORDER — PROMETHAZINE HYDROCHLORIDE 25 MG/1
25 TABLET ORAL EVERY 6 HOURS PRN
Status: DISCONTINUED | OUTPATIENT
Start: 2019-05-14 | End: 2019-05-15 | Stop reason: HOSPADM

## 2019-05-14 RX ORDER — HYDROXYZINE PAMOATE 50 MG/1
50 CAPSULE ORAL EVERY 6 HOURS PRN
Status: DISCONTINUED | OUTPATIENT
Start: 2019-05-14 | End: 2019-05-15 | Stop reason: HOSPADM

## 2019-05-14 RX ORDER — LOPERAMIDE HYDROCHLORIDE 2 MG/1
2 CAPSULE ORAL
Status: DISCONTINUED | OUTPATIENT
Start: 2019-05-14 | End: 2019-05-15 | Stop reason: HOSPADM

## 2019-05-14 RX ADMIN — DICYCLOMINE HYDROCHLORIDE 10 MG: 10 CAPSULE ORAL at 10:05

## 2019-05-14 RX ADMIN — DIAZEPAM 5 MG: 5 TABLET ORAL at 10:05

## 2019-05-14 RX ADMIN — CYCLOBENZAPRINE HYDROCHLORIDE 5 MG: 5 TABLET, FILM COATED ORAL at 10:05

## 2019-05-14 RX ADMIN — HYDROXYZINE PAMOATE 50 MG: 50 CAPSULE ORAL at 10:05

## 2019-05-14 RX ADMIN — LOPERAMIDE HYDROCHLORIDE 2 MG: 2 CAPSULE ORAL at 10:05

## 2019-05-14 RX ADMIN — TRAMADOL HYDROCHLORIDE 50 MG: 50 TABLET, FILM COATED ORAL at 10:05

## 2019-05-15 VITALS
HEIGHT: 65 IN | DIASTOLIC BLOOD PRESSURE: 90 MMHG | SYSTOLIC BLOOD PRESSURE: 141 MMHG | TEMPERATURE: 98 F | RESPIRATION RATE: 18 BRPM | BODY MASS INDEX: 31.72 KG/M2 | HEART RATE: 82 BPM | WEIGHT: 190.38 LBS

## 2019-05-15 PROBLEM — F11.23 OPIOID DEPENDENCE WITH WITHDRAWAL: Status: ACTIVE | Noted: 2019-05-14

## 2019-05-15 PROBLEM — R03.0 ELEVATED BP WITHOUT DIAGNOSIS OF HYPERTENSION: Status: ACTIVE | Noted: 2019-05-15

## 2019-05-15 LAB
CHOLEST SERPL-MCNC: 209 MG/DL (ref 120–199)
CHOLEST/HDLC SERPL: 4.6 {RATIO} (ref 2–5)
ESTIMATED AVG GLUCOSE: 114 MG/DL (ref 68–131)
HBA1C MFR BLD HPLC: 5.6 % (ref 4–5.6)
HDLC SERPL-MCNC: 45 MG/DL (ref 40–75)
HDLC SERPL: 21.5 % (ref 20–50)
LDLC SERPL CALC-MCNC: 138.2 MG/DL (ref 63–159)
NONHDLC SERPL-MCNC: 164 MG/DL
TRIGL SERPL-MCNC: 129 MG/DL (ref 30–150)

## 2019-05-15 PROCEDURE — 99239 PR HOSPITAL DISCHARGE DAY,>30 MIN: ICD-10-PCS | Mod: AF,HB,, | Performed by: PSYCHIATRY & NEUROLOGY

## 2019-05-15 PROCEDURE — 36415 COLL VENOUS BLD VENIPUNCTURE: CPT

## 2019-05-15 PROCEDURE — 80061 LIPID PANEL: CPT

## 2019-05-15 PROCEDURE — 99239 HOSP IP/OBS DSCHRG MGMT >30: CPT | Mod: AF,HB,, | Performed by: PSYCHIATRY & NEUROLOGY

## 2019-05-15 PROCEDURE — 83036 HEMOGLOBIN GLYCOSYLATED A1C: CPT

## 2019-05-15 NOTE — NURSING
Discharge note : patient is cooperative . avs reviewed with patient and she expresses understanding . She denies suicidal , homicidal ideations and is not gravely disabled . Has all belongings . Family will pick her up and bring her home .

## 2019-05-15 NOTE — PLAN OF CARE
"Problem: Adult Behavioral Health Plan of Care  Goal: Rounds/Family Conference  Outcome: Ongoing (interventions implemented as appropriate)  TREATMENT TEAM UPDATE      Chief Complaint:    Patient stated that going through withdrawals from the percocet contributed to her making a statement about wanting to harm herself. Patient stated that the percocet was prescribed to her and she decided on her own to withdrawal. Patient stated that the has three children and would never kill herself; does not understand why she stated that she wanted to kill herself because she has no desire to want to harm herself.       Current:    Patient stated that she does not feel she needs to be in this behavioral health unit. Patient stated she was looking for a place to detoxify and did not know where to go and felt she came to the wrong hospital.     Patient would like help in how to stay off the opiates. Patient implied that she can use her willpower to remain off the opiates after she undergoes a detoxification.     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   Patient does not "want to throw my career away" and worked very hard to initiate detoxifying herself before coming to this behavioral health unit.       Plan:    Patient will be encouraged, while receiving treatment on this behavioral health unit, to attend group psychotherapy and be introduced to the stages of change model.     Patient will be prepared for discharge.       "

## 2019-05-15 NOTE — HPI
39 yo female patient withsickle cell trait, IBS and OA. C/o suicidal thoughts. Admitted to [facility] and medicine consulted for H/P. Bp slightly elevated 140-158/90-92. Not on any bp meds here

## 2019-05-15 NOTE — CONSULTS
[facility] Medicine  Consult Note    Patient Name: Sherri Machado  MRN: [id number]  Admission Date: 2019  Hospital Length of Stay: 1 days  Attending Physician: Junior Burr MD   Primary Care Provider: Sunny Ortiz MD           Patient information was obtained from patient and ER records.     Inpatient consult to [facility] for History and Physical  Consult performed by: Arianna Florez NP  Consult ordered by: Junior Burr MD        Subjective:     Principal Problem: <principal problem not specified>    Chief Complaint: No chief complaint on file.       HPI: 37 yo female patient withsickle cell trait, IBS and OA. C/o suicidal thoughts. Admitted to [facility] and medicine consulted for H/P. Bp slightly elevated 140-158/90-92. Not on any bp meds here    Past Medical History:   Diagnosis Date    IBS (irritable bowel syndrome)     Osteoarthritis     Sickle cell trait        Past Surgical History:   Procedure Laterality Date    ANKLE SURGERY      left     SECTION, CLASSIC      x3    CHOLECYSTECTOMY         Review of patient's allergies indicates:   Allergen Reactions    Aspirin Other (See Comments)     Abdominal cramping       Current Facility-Administered Medications on File Prior to Encounter   Medication    [COMPLETED] ondansetron disintegrating tablet 8 mg    [DISCONTINUED] cloNIDine tablet 0.1 mg    [DISCONTINUED] diphenhydrAMINE capsule 25 mg    [DISCONTINUED] ibuprofen tablet 600 mg     Current Outpatient Medications on File Prior to Encounter   Medication Sig    oxyCODONE-acetaminophen (PERCOCET)  mg per tablet Percocet 10 mg-325 mg tablet   Take 1 tablet every 6 hours by oral route.    diclofenac sodium (VOLTAREN) 1 % Gel Apply 2 g topically 2 (two) times daily. for 10 days    HYDROcodone-acetaminophen (NORCO) 5-325 mg per tablet Take 1 tablet by mouth every 8 (eight) hours as needed for Pain.    omeprazole (PRILOSEC) 20 MG  capsule Take 1 capsule (20 mg total) by mouth once daily. While taking Naproxen for 15 days     Family History     None        Tobacco Use    Smoking status: Never Smoker    Smokeless tobacco: Never Used   Substance and Sexual Activity    Alcohol use: No    Drug use: No    Sexual activity: Not on file     Review of Systems   Constitutional: Negative for chills, fatigue, fever and unexpected weight change.   HENT: Negative for congestion, ear pain, sore throat and trouble swallowing.    Eyes: Negative for pain and visual disturbance.   Respiratory: Negative for cough, chest tightness and shortness of breath.    Cardiovascular: Negative for chest pain, palpitations and leg swelling.   Gastrointestinal: Negative for abdominal distention, abdominal pain, constipation, diarrhea and vomiting.   Genitourinary: Negative for difficulty urinating, dysuria, flank pain, frequency and hematuria.   Musculoskeletal: Negative for back pain, gait problem, joint swelling, neck pain and neck stiffness.   Skin: Negative for rash and wound.   Neurological: Negative for dizziness, seizures, speech difficulty, light-headedness and headaches.     Objective:     Vital Signs (Most Recent):  Temp: 97.8 °F (36.6 °C) (05/15/19 0723)  Pulse: 82 (05/15/19 0723)  Resp: 18 (05/15/19 0723)  BP: (!) 141/90 (05/15/19 0723) Vital Signs (24h Range):  Temp:  [97.5 °F (36.4 °C)-99.4 °F (37.4 °C)] 97.8 °F (36.6 °C)  Pulse:  [80-97] 82  Resp:  [16-18] 18  SpO2:  [100 %] 100 %  BP: (141-170)/(90-92) 141/90     Weight: 86.4 kg (190 lb 5.9 oz)  Body mass index is 31.68 kg/m².    Physical Exam   Constitutional: She is oriented to person, place, and time. She appears well-developed and well-nourished.   HENT:   Head: Normocephalic and atraumatic.   Neck: No thyromegaly present.   Cardiovascular: Normal rate, regular rhythm, normal heart sounds and intact distal pulses.   No murmur heard.  Pulmonary/Chest: Effort normal and breath sounds normal. No  respiratory distress. She has no wheezes. She has no rales.   Abdominal: Soft. Bowel sounds are normal. She exhibits no distension and no mass. There is no tenderness.   Musculoskeletal: Normal range of motion. She exhibits no edema.   Lymphadenopathy:     She has no cervical adenopathy.   Neurological: She is alert and oriented to person, place, and time.     Neuro: Cranial nerves:  CN II Visual fields full to confrontation.   CN III, IV, VI Pupils are equal, round, and reactive to light.  CN III: no palsy  Nystagmus: none   Diplopia: none  Ophthalmoparesis: none  CN V Facial sensation intact.   CN VII Facial expression full, symmetric.   CN VIII normal.   CN IX normal.   CN X normal.   CN XI normal.   CN XII normal.         Skin: Skin is warm and dry. No erythema.   Vitals reviewed.      Significant Labs:   UPT  Results for orders placed or performed during the hospital encounter of 09/18/18   POCT urine pregnancy   Result Value Ref Range    POC Preg Test, Ur Negative Negative     Acceptable Yes      U/A  Results for orders placed or performed during the hospital encounter of 07/03/18   Urinalysis Clean Catch   Result Value Ref Range    Specimen UA Urine, Clean Catch     Color, UA Yellow Yellow, Straw, Lizeth    Appearance, UA Clear Clear    pH, UA 5.0 5.0 - 8.0    Specific Gravity, UA 1.015 1.005 - 1.030    Protein, UA Trace (A) Negative    Glucose, UA Negative Negative    Ketones, UA Negative Negative    Bilirubin (UA) Negative Negative    Occult Blood UA Trace (A) Negative    Nitrite, UA Negative Negative    Urobilinogen, UA Negative <2.0 EU/dL    Leukocytes, UA Negative Negative     UDS  Results for orders placed or performed during the hospital encounter of 05/14/19   Drug screen panel, emergency   Result Value Ref Range    Benzodiazepines Negative     Methadone metabolites Negative     Cocaine (Metab.) Negative     Opiate Scrn, Ur Negative     Barbiturate Screen, Ur Negative     Amphetamine  Screen, Ur Negative     THC Negative     Phencyclidine Negative     Creatinine, Random Ur 63.8 15.0 - 325.0 mg/dL    Toxicology Information SEE COMMENT      CBC  Results for orders placed or performed during the hospital encounter of 05/14/19   CBC auto differential   Result Value Ref Range    WBC 5.77 3.90 - 12.70 K/uL    RBC 4.56 4.00 - 5.40 M/uL    Hemoglobin 12.8 12.0 - 16.0 g/dL    Hematocrit 37.5 37.0 - 48.5 %    Mean Corpuscular Volume 82 82 - 98 fL    Mean Corpuscular Hemoglobin 28.1 27.0 - 31.0 pg    Mean Corpuscular Hemoglobin Conc 34.1 32.0 - 36.0 g/dL    RDW 13.0 11.5 - 14.5 %    Platelets 370 (H) 150 - 350 K/uL    MPV 9.2 9.2 - 12.9 fL    Gran # (ANC) 3.3 1.8 - 7.7 K/uL    Lymph # 2.1 1.0 - 4.8 K/uL    Mono # 0.3 0.3 - 1.0 K/uL    Eos # 0.0 0.0 - 0.5 K/uL    Baso # 0.03 0.00 - 0.20 K/uL    Gran% 56.4 38.0 - 73.0 %    Lymph% 36.9 18.0 - 48.0 %    Mono% 5.7 4.0 - 15.0 %    Eosinophil% 0.5 0.0 - 8.0 %    Basophil% 0.5 0.0 - 1.9 %    Differential Method Automated      CMP  Results for orders placed or performed during the hospital encounter of 05/14/19   Comprehensive metabolic panel   Result Value Ref Range    Sodium 143 136 - 145 mmol/L    Potassium 4.1 3.5 - 5.1 mmol/L    Chloride 106 95 - 110 mmol/L    CO2 27 23 - 29 mmol/L    Glucose 110 70 - 110 mg/dL    BUN, Bld 10 7 - 17 mg/dL    Creatinine 0.62 0.50 - 1.40 mg/dL    Calcium 9.8 8.7 - 10.5 mg/dL    Total Protein 8.5 (H) 6.0 - 8.4 g/dL    Albumin 4.6 3.5 - 5.2 g/dL    Total Bilirubin 0.3 0.1 - 1.0 mg/dL    Alkaline Phosphatase 65 38 - 126 U/L    AST 18 15 - 46 U/L    ALT 18 10 - 44 U/L    Anion Gap 10 8 - 16 mmol/L    eGFR if African American >60.0 >60 mL/min/1.73 m^2    eGFR if non African American >60.0 >60 mL/min/1.73 m^2     TSH  Results for orders placed or performed during the hospital encounter of 05/14/19   TSH   Result Value Ref Range    TSH 0.209 (L) 0.400 - 4.000 uIU/mL     ETOH  Results for orders placed or performed during the hospital  encounter of 05/14/19   Ethanol   Result Value Ref Range    Alcohol, Medical, Serum <10 <10 mg/dL       Acetaminophen  Results for orders placed or performed during the hospital encounter of 05/14/19   Acetaminophen level   Result Value Ref Range    Acetaminophen (Tylenol), Serum <10.0 10.0 - 20.0 ug/mL             Assessment/Plan:     Elevated BP without diagnosis of hypertension  Watch bp, change diet to low NA      Depression with suicidal ideation  Further orders per psych        VTE Risk Mitigation (From admission, onward)    None              Thank you for your consult. I will sign off. Please contact us if you have any additional questions.    Arianna Florez NP  Department of Hospital Medicine   [facility]

## 2019-05-15 NOTE — PSYCH
"Collateral Contact Note      Patient's  was contacted and his name is Melissa Machado. He was reached at [phone]. Patient was brought to the hospital because she was "simply not feeling good." Patient was going through withdrawals from the Percocet.     Patient has no history of violence in the past and no suicide attempts.     Patient would never harm herself or  stated.       Patient's  stated he will  the patient when she gets discharged today.   "

## 2019-05-15 NOTE — H&P
"PSYCHIATRY INPATIENT ADMISSION NOTE - H & P      5/15/2019 8:13 AM   Sherri Machado   1980   [id number]           DATE OF ADMISSION: 5/14/2019 10:11 PM    SITE: [facility]    CURRENT LEGAL STATUS: PEC and/or CEC      HISTORY    CHIEF COMPLAINT   Sherri Machado is a 38 y.o. female with no past psychiatric history, currently admitted to the inpatient unit with the following chief complaint: depression/SI/opiate withdrawal, "I'm not a mental patient.. I just need help detoxing."    HPI   (Elements: Location, Quality, Severity, Duration, Timing, Content, Modifying Factors, Associated Signs & Symptoms)    The patient was seen and examined. The chart was reviewed.    The patient presented to the ER on 5/14/19 with complaints of depression, SI and substance use/withdrawal. Per the ER and staff notes:  -Pt to ed with c/o of feeling depressed. Pt reports does want to harm self but does not have a plan. Pt reports has been really depressed due to being addicted to percocet. Pt also reports having withdrawal symptoms of chills and GI issues due to not taking the drug. Pt instructed significant other to take to ed to get resources and help. Pt currently crying but cooperating. Pt has nothing in room that could inflict self harm.  -Pt stated that she feels that she needs to drug rehab to deal with withdrawals and not BHU  -Patient is a 38-year-old  female past medical history of depression presenting with complaint of suicidal ideation.  She states she feels like she is at the end of her rope because she has become addicted to Percocet and feels like she is letting her family down.  Patient told her  she was going to kill herself.  She denies a plan, homicidal ideation, audiovisual hallucinations.  -" I am not suicidal,  I am  , have 3 children and have a good job. I just wanted to get help with my withdrawals." Pt.. States she has been taking opiates for the past 4 to 5 yrs due to Arthritis and " "Sciatica pain. Pt. Ran out last night , last dose at approx. 1800.and is tired of depending on them. Current symptoms, diarrhea, body, joint pain, sweats, restlessness, " I feel my bones are aching"   Reassurance and support provided.  Notified of w/d sxs and received orders. Pt. Denies psych hx., this is pts. First psych admission. Mood and affect approp. No acute problems noted at this time.    The patient was medically cleared and admitted to the U.     The patient reports that she has no psychiatric symptoms and denied all psychiatric symptoms as documented below, aside form mild opiate withdrawal symptoms. She reports that she had been prescribed opiates for her sciatic pain, and gradually developed physiological dependence. As she became aware of this, she decided to stop using them (last used about 2-3 days ago). She developed withdrawal symptoms. Yesterday, she was trying to get into a rehab/detox center, got upset secondary to symptoms of withdrawal with depressive symptoms.     She reports that she may have said that she felt like she would die from the withdrawals; however, she denied that she was ever suicidal. "I was never suicidal.. I would never kill myself.. I love my  and kids.. I have a good job.. I came to get help, not to kill myself."     Denied Symptoms of Depression: no diminished mood or loss of interest/anhedonia; no irritability, diminished energy, change in sleep, change in appetite, diminished concentration or cognition or indecisiveness, PMA/R, excessive guilt or hopelessness or worthlessness, or suicidal ideations    Denied changes in Sleep: no trouble with initiation, maintenance, or early morning awakening with inability to return to sleep    Denied Suicidal/Homicidal ideations: no active/passive ideations, organized plans, future intentions; she is future oriented and goal directed; she is hopeful; she plans to spend time with her kids and resume work.     Denied Symptoms " of psychosis: no hallucinations, delusions, disorganized thinking, disorganized behavior or abnormal motor behavior, or negative symptoms (diminshed emotional expression, avolition, anhedonia, alogia, asociality     Denied Symptoms of oscar or hypomania: no elevated, expansive, or irritable mood with no increased energy or activity; with no inflated self-esteem or grandiosity, decreased need for sleep, increased rate of speech, FOI or racing thoughts, distractibility, increased goal directed activity or PMA, or risky/disinhibited behavior    Denied Symptoms of CYN: no excessive anxiety/worry/fear, with no restlessness, fatigue, poor concentration, irritability, muscle tension, or sleep disturbance    Denied Symptoms of Panic Disorder: no recurrent panic attacks; without agoraphobia    Denied Symptoms of PTSD: no h/o trauma; no re-experiencing/intrusive symptoms, avoidant behavior, negative alterations in cognition or mood, or hyperarousal symptoms;  without dissociative symptoms     Denied Symptoms of OCD: no obsessions or compulsions     Denied Symptoms of Eating Disorders: no anorexia, bulimia or binging    +Substance Use: denied intoxication; +withdrawal, +tolerance; denied the following symptoms: no used in larger amounts or duration than intended, unsuccessful attempts to limit or quit, increased time engaging in or seeking out, cravings or strong desire to use, failure to fulfill obligations, negative consequences in social/interpersonal/occupational,/recreational areas, use in dangerous situations, or medical or psychological consequences   +Mild opiate withdrawal- pt reports decreasing symptoms of myalgias, diarrhea and malaise- pt currently on day 3/4 of withdrawals; she does not want rehab or outpatient treatment or inpatient detox.     PSYCHOTHERAPY ADD-ON +[id number]   30 (16-37*) minutes    Time: 16 minutes  Participants: Met with patient    Therapeutic Intervention Type: behavior modifying psychotherapy,  supportive psychotherapy  Why chosen therapy is appropriate versus another modality: relevant to diagnosis, patient responds to this modality, evidence based practice    Target symptoms: depression, anxiety , substance abuse  Primary focus: substance use  Psychotherapeutic techniques: supportive and motivational techniques; psycho-education     Outcome monitoring methods: self-report, observation    Patient's response to intervention:  The patient's response to intervention is accepting.    Progress toward goals:  The patient's progress toward goals is fair .            PAST PSYCHIATRIC HISTORY  Previous Psychiatric Hospitalizations: denied   Previous SI/HI: denied  Previous Suicide Attempts: denied   Previous Medication Trials: denied  Psychiatric Care (current & past): denied  History of Psychotherapy: denied  History of Violence: denied      SUBSTANCE ABUSE HISTORY   Tobacco: denied  Alcohol: denied  Illicit Substances: denied  Misuse of Prescription Medications: narcotics  Detoxes: denied  Rehabs: denied  12 Step Meetings: deneid  Periods of Sobriety: n/a  Withdrawal: denied        PAST MEDICAL & SURGICAL HISTORY   Past Medical History:   Diagnosis Date    IBS (irritable bowel syndrome)     Osteoarthritis     Sickle cell trait      Past Surgical History:   Procedure Laterality Date    ANKLE SURGERY  2011    left     SECTION, CLASSIC      x3    CHOLECYSTECTOMY           CURRENT MEDICATION REGIMEN   Home Meds:   Prior to Admission medications    Medication Sig Start Date End Date Taking? Authorizing Provider   oxyCODONE-acetaminophen (PERCOCET)  mg per tablet Percocet 10 mg-325 mg tablet   Take 1 tablet every 6 hours by oral route.   Yes Historical Provider, MD   diclofenac sodium (VOLTAREN) 1 % Gel Apply 2 g topically 2 (two) times daily. for 10 days 18  Kelley Vides, PAMELLA   HYDROcodone-acetaminophen (NORCO) 5-325 mg per tablet Take 1 tablet by mouth every 8 (eight) hours as  needed for Pain. 9/17/18   Kelley Vides NP   omeprazole (PRILOSEC) 20 MG capsule Take 1 capsule (20 mg total) by mouth once daily. While taking Naproxen for 15 days 10/23/18 11/7/18  Luis Felipe MD         OTC Meds: none    Scheduled Meds:    diazePAM  5 mg Oral TID    folic acid  1 mg Oral Daily    multivitamin  1 tablet Oral Daily    traMADol  50 mg Oral TID      PRN Meds: acetaminophen, aluminum-magnesium hydroxide-simethicone, cyclobenzaprine, dicyclomine, docusate sodium, hydrOXYzine pamoate, loperamide, OLANZapine **AND** OLANZapine, promethazine   Psychotherapeutics (From admission, onward)    Start     Stop Route Frequency Ordered    05/14/19 2300  diazePAM tablet 5 mg      -- Oral 3 times daily 05/14/19 2249 05/14/19 2231  OLANZapine tablet 10 mg  (Olanzapine)      -- Oral Every 4 hours PRN 05/14/19 2231 05/14/19 2231  OLANZapine injection 10 mg  (Olanzapine)      -- IM Every 4 hours PRN 05/14/19 2231            ALLERGIES   Review of patient's allergies indicates:   Allergen Reactions    Aspirin Other (See Comments)     Abdominal cramping         NEUROLOGIC HISTORY  Seizures: denied   Head trauma: denied       FAMILY PSYCHIATRIC HISTORY   History reviewed. No pertinent family history.         SOCIAL HISTORY  Developmental/Childhood: met milestines  History of Physical/Sexual Abuse: denied  Education: graduated HS, with several certifications    Employment: employed as a    Financial: stable   Relationship Status/Sexual Orientation:   Children: 3   Housing Status: lives with family    Sabianist: Confucianist   History: denied   Recreational Activities: time with family  Access to Gun: denied       LEGAL HISTORY   Past Charges/Incarcerations: once for trespassing   Pending Charges: denied      ROS  General ROS: negative  Ophthalmic ROS: negative  ENT ROS: negative  Allergy and Immunology ROS: negative  Hematological and Lymphatic ROS: negative  Endocrine ROS:  negative  Respiratory ROS: no cough, shortness of breath, or wheezing  Cardiovascular ROS: no chest pain or dyspnea on exertion  Gastrointestinal ROS: no abdominal pain, change in bowel habits, or black or bloody stools  Genito-Urinary ROS: no dysuria, trouble voiding, or hematuria  Musculoskeletal ROS: negative  Neurological ROS: no TIA or stroke symptoms  Dermatological ROS: negative      EXAMINATION      PHYSICAL EXAM  Reviewed note/exam by Dr. Alfaor from 5/14/19 at 4:51 PM    VITALS   Vitals:    05/15/19 0723   BP: (!) 141/90   Pulse: 82   Resp: 18   Temp: 97.8 °F (36.6 °C)      Body mass index is 31.68 kg/m².      PAIN  0/10  Subjective report of pain matches objective signs and symptoms: Yes      LABORATORY DATA   Recent Results (from the past 72 hour(s))   CBC auto differential    Collection Time: 05/14/19  1:32 PM   Result Value Ref Range    WBC 5.77 3.90 - 12.70 K/uL    RBC 4.56 4.00 - 5.40 M/uL    Hemoglobin 12.8 12.0 - 16.0 g/dL    Hematocrit 37.5 37.0 - 48.5 %    Mean Corpuscular Volume 82 82 - 98 fL    Mean Corpuscular Hemoglobin 28.1 27.0 - 31.0 pg    Mean Corpuscular Hemoglobin Conc 34.1 32.0 - 36.0 g/dL    RDW 13.0 11.5 - 14.5 %    Platelets 370 (H) 150 - 350 K/uL    MPV 9.2 9.2 - 12.9 fL    Gran # (ANC) 3.3 1.8 - 7.7 K/uL    Lymph # 2.1 1.0 - 4.8 K/uL    Mono # 0.3 0.3 - 1.0 K/uL    Eos # 0.0 0.0 - 0.5 K/uL    Baso # 0.03 0.00 - 0.20 K/uL    Gran% 56.4 38.0 - 73.0 %    Lymph% 36.9 18.0 - 48.0 %    Mono% 5.7 4.0 - 15.0 %    Eosinophil% 0.5 0.0 - 8.0 %    Basophil% 0.5 0.0 - 1.9 %    Differential Method Automated    Comprehensive metabolic panel    Collection Time: 05/14/19  1:32 PM   Result Value Ref Range    Sodium 143 136 - 145 mmol/L    Potassium 4.1 3.5 - 5.1 mmol/L    Chloride 106 95 - 110 mmol/L    CO2 27 23 - 29 mmol/L    Glucose 110 70 - 110 mg/dL    BUN, Bld 10 7 - 17 mg/dL    Creatinine 0.62 0.50 - 1.40 mg/dL    Calcium 9.8 8.7 - 10.5 mg/dL    Total Protein 8.5 (H) 6.0 - 8.4 g/dL     Albumin 4.6 3.5 - 5.2 g/dL    Total Bilirubin 0.3 0.1 - 1.0 mg/dL    Alkaline Phosphatase 65 38 - 126 U/L    AST 18 15 - 46 U/L    ALT 18 10 - 44 U/L    Anion Gap 10 8 - 16 mmol/L    eGFR if African American >60.0 >60 mL/min/1.73 m^2    eGFR if non African American >60.0 >60 mL/min/1.73 m^2   TSH    Collection Time: 05/14/19  1:32 PM   Result Value Ref Range    TSH 0.209 (L) 0.400 - 4.000 uIU/mL   Ethanol    Collection Time: 05/14/19  1:32 PM   Result Value Ref Range    Alcohol, Medical, Serum <10 <10 mg/dL   Acetaminophen level    Collection Time: 05/14/19  1:32 PM   Result Value Ref Range    Acetaminophen (Tylenol), Serum <10.0 10.0 - 20.0 ug/mL   T4, free    Collection Time: 05/14/19  1:32 PM   Result Value Ref Range    Free T4 0.96 0.71 - 1.51 ng/dL   Urinalysis, Reflex to Urine Culture Urine, Clean Catch    Collection Time: 05/14/19  1:54 PM   Result Value Ref Range    Specimen UA Urine, Clean Catch     Color, UA Yellow Yellow, Straw, Lizeth    Appearance, UA Clear Clear    pH, UA 8.0 5.0 - 8.0    Specific Gravity, UA 1.015 1.005 - 1.030    Protein, UA Negative Negative    Glucose, UA Negative Negative    Ketones, UA Negative Negative    Bilirubin (UA) Negative Negative    Occult Blood UA Negative Negative    Nitrite, UA Negative Negative    Urobilinogen, UA Negative <2.0 EU/dL    Leukocytes, UA Negative Negative   Drug screen panel, emergency    Collection Time: 05/14/19  1:54 PM   Result Value Ref Range    Benzodiazepines Negative     Methadone metabolites Negative     Cocaine (Metab.) Negative     Opiate Scrn, Ur Negative     Barbiturate Screen, Ur Negative     Amphetamine Screen, Ur Negative     THC Negative     Phencyclidine Negative     Creatinine, Random Ur 63.8 15.0 - 325.0 mg/dL    Toxicology Information SEE COMMENT    UPT (Pregnancy, urine rapid)    Collection Time: 05/14/19  1:54 PM   Result Value Ref Range    Preg Test, Ur Negative    Lipid panel    Collection Time: 05/15/19  6:19 AM   Result Value  "Ref Range    Cholesterol 209 (H) 120 - 199 mg/dL    Triglycerides 129 30 - 150 mg/dL    HDL 45 40 - 75 mg/dL    LDL Cholesterol 138.2 63.0 - 159.0 mg/dL    Hdl/Cholesterol Ratio 21.5 20.0 - 50.0 %    Total Cholesterol/HDL Ratio 4.6 2.0 - 5.0    Non-HDL Cholesterol 164 mg/dL      No results found for: PHENYTOIN, PHENOBARB, VALPROATE, CBMZ        CONSTITUTIONAL  General Appearance: AAF, in casual attire; NAD    MUSCULOSKELETAL  Muscle Strength and Tone:  normal  Abnormal Involuntary Movements:  none  Gait and Station:  normal; non-ataxic    PSYCHIATRIC   Level of Consciousness: awake, alert  Orientation: p/p/t/s  Grooming:  adequate to circumstances  Psychomotor Behavior: no PMA/R  Speech: nl r/t/v/s  Language:  English fluent  Mood: "fine.. I'm ready to go home with my family"  Affect: full range, euthymic  Thought Process:  linear and organized  Associations:  intact; no ALEXANDRO  Thought Content:  denied AVH/delusions; denied HI/SI  Memory:  intact to recent and remote events  Attention:  intact to conversation; not distractible   Fund of Knowledge:  age and education appropriate  Estimate if Intelligence:  average based on work/education history, vocabulary and mental status exam  Insight:  good- seeks help, understands/accepts illness  Judgment:   good- no bx issues, compliant and cooperative        PSYCHOSOCIAL      PSYCHOSOCIAL STRESSORS   addiction    FUNCTIONING RELATIONSHIPS   good support system and good relationship with spouse or significant other      STRENGTHS AND LIABILITIES   Strength: Patient accepts guidance/feedback, Strength: Patient is expressive/articulate., Liability: chronic pain; Liability: opiate dependence      Is the patient aware of the biomedical complications associated with substance abuse and mental illness? yes    Does the patient have an Advance Directive for Mental Health treatment? no  (If yes, inform patient to bring copy.)        ASSESSMENT     IMPRESSION   Opiate Induced mood " Disorder- resolved    Opiate withdrawal  Opiate Dependence (physiological)    Low TSH  Chronic Pain    MEDICAL DECISION MAKING        PROBLEM LIST AND MANAGEMENT PLANS; PRESCRIPTION DRUG MANAGEMENT  Compliance: yes  Side Effects: no  Regimen Adjustments:     Mood: pt counseled; symptoms have resolved and were secondary to opiate withdrawal; refer for outpatient therapy    Opiate withdrawal: symptoms are mild and improving; she does not want inpatient detox and has the capacity to decline treatment; pt counseled of r/b and declined treatment    Opiate Dependence (physiological): pt counseled; refer outpatient therapy- pt declined treatment     Low TSH: pt counseled; recheck with PCP as an outpatient    Chronic Pain: pt counseled; f/u with PCP as outpatient    DIAGNOSTIC TESTING  Labs reviewed with patient     Disposition:  -SW assisting with aftercare planning and collateral  -Pt is stable for discharge home with outpatient follow up care; she does not want rehab  -Pt does not meet criteria for inpatient treatment under a PEC and/or CEC as she is not currently a danger to self, a danger to others, or grave disabled. She does not want inpatient treatment and does not meet criteria for involuntary admission; will discharge per her request.     Junior Burr MD  Psychiatry

## 2019-05-15 NOTE — PROGRESS NOTES
05/15/19 1035   [facility] Group Therapy   Group Name Therapeutic Recreation   Specific Interventions Coping Skills Training   Participation Level None   Patient seeing the nurse practitioner, after returned to her assigned room to take a wipe off and preparing for discharge with the counselor.

## 2019-05-15 NOTE — NURSING
"Patient arrived to [facility] per gume with hospital security and [facility] staff. Patient is alert, calm, cooperative, ambulatory. Personal property inventoried and placed in appropriate area. Patient's notification of rights, mental health advocacy information, unit rules, schedules, policies and general information reviewed with patient. Skin Assessment done per protocol. Handouts given and paperwork signed.    " I am not suicidal,  I am  , have 3 children and have a good job. I just wanted to get help with my withdrawals." Pt.. States she has been taking opiates for the past 4 to 5 yrs due to Arthritis and Sciatica pain. Pt. Ran out last night , last dose at approx. 1800.and is tired of depending on them. Current symptoms, diarrhea, body, joint pain, sweats, restlessness, " I feel my bones are aching"   Reassurance and support provided.  Notified of w/d sxs and received orders. Pt. Denies psych hx., this is pts. First psych admission. Mood and affect approp. No acute problems noted at this time.  "

## 2019-05-15 NOTE — PLAN OF CARE
Problem: Adult Behavioral Health Plan of Care  Goal: Plan of Care Review  Outcome: Ongoing (interventions implemented as appropriate)  Pt has slept 4 hours so far.  NAD.  Resp even & unlabored.  Pathways clear and bed is low.  Q 15 minute safety checks ongoing.  All precautions maintained.

## 2019-05-15 NOTE — DISCHARGE SUMMARY
"Discharge Summary  Psychiatry    Admit Date: 5/14/2019    Discharge Date and Time:  05/15/2019 11:11 AM    Attending Physician: Junior Burr MD     Discharge Provider: Junior Burr MD    Reason for Admission:  depression/SI/opiate withdrawal, "I'm not a mental patient.. I just need help detoxing."        History of Present Illness:   The patient presented to the ER on 5/14/19 with complaints of depression, SI and substance use/withdrawal. Per the ER and staff notes:  -Pt to ed with c/o of feeling depressed. Pt reports does want to harm self but does not have a plan. Pt reports has been really depressed due to being addicted to percocet. Pt also reports having withdrawal symptoms of chills and GI issues due to not taking the drug. Pt instructed significant other to take to ed to get resources and help. Pt currently crying but cooperating. Pt has nothing in room that could inflict self harm.  -Pt stated that she feels that she needs to drug rehab to deal with withdrawals and not [facility]  -Patient is a 38-year-old  female past medical history of depression presenting with complaint of suicidal ideation.  She states she feels like she is at the end of her rope because she has become addicted to Percocet and feels like she is letting her family down.  Patient told her  she was going to kill herself.  She denies a plan, homicidal ideation, audiovisual hallucinations.  -" I am not suicidal,  I am  , have 3 children and have a good job. I just wanted to get help with my withdrawals." Pt.. States she has been taking opiates for the past 4 to 5 yrs due to Arthritis and Sciatica pain. Pt. Ran out last night , last dose at approx. 1800.and is tired of depending on them. Current symptoms, diarrhea, body, joint pain, sweats, restlessness, " I feel my bones are aching"   Reassurance and support provided.  Notified of w/d sxs and received orders. Pt. Denies psych hx., this is pts. First psych " "admission. Mood and affect approp. No acute problems noted at this time.     The patient was medically cleared and admitted to the [facility].      The patient reports that she has no psychiatric symptoms and denied all psychiatric symptoms as documented below, aside form mild opiate withdrawal symptoms. She reports that she had been prescribed opiates for her sciatic pain, and gradually developed physiological dependence. As she became aware of this, she decided to stop using them (last used about 2-3 days ago). She developed withdrawal symptoms. Yesterday, she was trying to get into a rehab/detox center, got upset secondary to symptoms of withdrawal with depressive symptoms.      She reports that she may have said that she felt like she would die from the withdrawals; however, she denied that she was ever suicidal. "I was never suicidal.. I would never kill myself.. I love my  and kids.. I have a good job.. I came to get help, not to kill myself."      Denied Symptoms of Depression: no diminished mood or loss of interest/anhedonia; no irritability, diminished energy, change in sleep, change in appetite, diminished concentration or cognition or indecisiveness, PMA/R, excessive guilt or hopelessness or worthlessness, or suicidal ideations     Denied changes in Sleep: no trouble with initiation, maintenance, or early morning awakening with inability to return to sleep     Denied Suicidal/Homicidal ideations: no active/passive ideations, organized plans, future intentions; she is future oriented and goal directed; she is hopeful; she plans to spend time with her kids and resume work.      Denied Symptoms of psychosis: no hallucinations, delusions, disorganized thinking, disorganized behavior or abnormal motor behavior, or negative symptoms (diminshed emotional expression, avolition, anhedonia, alogia, asociality      Denied Symptoms of oscar or hypomania: no elevated, expansive, or irritable mood with no increased " energy or activity; with no inflated self-esteem or grandiosity, decreased need for sleep, increased rate of speech, FOI or racing thoughts, distractibility, increased goal directed activity or PMA, or risky/disinhibited behavior     Denied Symptoms of CYN: no excessive anxiety/worry/fear, with no restlessness, fatigue, poor concentration, irritability, muscle tension, or sleep disturbance     Denied Symptoms of Panic Disorder: no recurrent panic attacks; without agoraphobia     Denied Symptoms of PTSD: no h/o trauma; no re-experiencing/intrusive symptoms, avoidant behavior, negative alterations in cognition or mood, or hyperarousal symptoms;  without dissociative symptoms      Denied Symptoms of OCD: no obsessions or compulsions      Denied Symptoms of Eating Disorders: no anorexia, bulimia or binging     +Substance Use: denied intoxication; +withdrawal, +tolerance; denied the following symptoms: no used in larger amounts or duration than intended, unsuccessful attempts to limit or quit, increased time engaging in or seeking out, cravings or strong desire to use, failure to fulfill obligations, negative consequences in social/interpersonal/occupational,/recreational areas, use in dangerous situations, or medical or psychological consequences   +Mild opiate withdrawal- pt reports decreasing symptoms of myalgias, diarrhea and malaise- pt currently on day 3/4 of withdrawals; she does not want rehab or outpatient treatment or inpatient detox.         Procedures Performed: * No surgery found *    Hospital Course (synopsis of major diagnoses, care, treatment, and services provided during the course of the hospital stay):   The patient was stabilized and discharged on the following medications:  None    Mood: pt counseled; symptoms have resolved and were secondary to opiate withdrawal; refer for outpatient therapy     Opiate withdrawal: symptoms are mild and improving; she does not want inpatient detox and has the  "capacity to decline treatment; pt counseled of r/b and declined treatment     Opiate Dependence (physiological): pt counseled; refer outpatient therapy- pt declined treatment      Low TSH: pt counseled; recheck with PCP as an outpatient     Chronic Pain: pt counseled; f/u with PCP as outpatient    The patient was compliant with treatment. The patient denied any side effects.     Denied all psychiatric symptoms as above.    Collateral form her  reports: Patient's  was contacted and his name is Melissa Machado. He was reached at [phone]. Patient was brought to the hospital because she was "simply not feeling good." Patient was going through withdrawals from the Percocet. Patient has no history of violence in the past and no suicide attempts. Patient would never harm herself or  stated. Patient's  stated he will  the patient when she gets discharged today.     The patient declined inpatient treatment for opiate withdrawals.     Discussed diagnosis, risks and benefits of proposed treatment vs alternative treatments vs no treatment, and potential side effects of these treatments.  The patient expresses understanding of the above and displays the capacity to agree or disagree with this treatment given said understanding.  Patient currently reports that the risks outweigh the benefits and would NOT like to pursue treatment at this time.    MSE: stated age, casually dressed, well groomed.  No psychomotor agitation or retardation.  No abnormal involuntary movements.  Gait normal.  Speech normal, conversational.  Language fluent English. Mood fine.  Affect normal range, pleasant, euthymic.  Thought process linear.  Associations intact.  Denies suicidal or homicidal ideation.  Denies auditory hallucinations, paranoid ideation, ideas of reference.  Memory intact.  Attention intact.  Fund of knowledge intact.  Insight intact.  Judgment intact.  Alert and oriented to person, place, " time.      Tobacco Usage:  Is patient a smoker? No  Does patient want prescription for Tobacco Cessation? No  Does patient want counseling for Tobacco Cessation? No    If patient would like to quit, then over the counter nicotine patch could be used. The patient could also follow up with his PCP or psychiatric provider for other alternatives.     Final Diagnoses:    Principal Problem: Opiate Induced mood Disorder- resolved       Secondary Diagnoses:   Opiate withdrawal  Opiate Dependence (physiological)     Low TSH  Chronic Pain    Labs:  Admission on 05/14/2019   Component Date Value Ref Range Status    Cholesterol 05/15/2019 209* 120 - 199 mg/dL Final    Triglycerides 05/15/2019 129  30 - 150 mg/dL Final    HDL 05/15/2019 45  40 - 75 mg/dL Final    LDL Cholesterol 05/15/2019 138.2  63.0 - 159.0 mg/dL Final    Hdl/Cholesterol Ratio 05/15/2019 21.5  20.0 - 50.0 % Final    Total Cholesterol/HDL Ratio 05/15/2019 4.6  2.0 - 5.0 Final    Non-HDL Cholesterol 05/15/2019 164  mg/dL Final   Admission on 05/14/2019, Discharged on 05/14/2019   Component Date Value Ref Range Status    WBC 05/14/2019 5.77  3.90 - 12.70 K/uL Final    RBC 05/14/2019 4.56  4.00 - 5.40 M/uL Final    Hemoglobin 05/14/2019 12.8  12.0 - 16.0 g/dL Final    Hematocrit 05/14/2019 37.5  37.0 - 48.5 % Final    Mean Corpuscular Volume 05/14/2019 82  82 - 98 fL Final    Mean Corpuscular Hemoglobin 05/14/2019 28.1  27.0 - 31.0 pg Final    Mean Corpuscular Hemoglobin Conc 05/14/2019 34.1  32.0 - 36.0 g/dL Final    RDW 05/14/2019 13.0  11.5 - 14.5 % Final    Platelets 05/14/2019 370* 150 - 350 K/uL Final    MPV 05/14/2019 9.2  9.2 - 12.9 fL Final    Gran # (ANC) 05/14/2019 3.3  1.8 - 7.7 K/uL Final    Lymph # 05/14/2019 2.1  1.0 - 4.8 K/uL Final    Mono # 05/14/2019 0.3  0.3 - 1.0 K/uL Final    Eos # 05/14/2019 0.0  0.0 - 0.5 K/uL Final    Baso # 05/14/2019 0.03  0.00 - 0.20 K/uL Final    Gran% 05/14/2019 56.4  38.0 - 73.0 % Final     Lymph% 05/14/2019 36.9  18.0 - 48.0 % Final    Mono% 05/14/2019 5.7  4.0 - 15.0 % Final    Eosinophil% 05/14/2019 0.5  0.0 - 8.0 % Final    Basophil% 05/14/2019 0.5  0.0 - 1.9 % Final    Differential Method 05/14/2019 Automated   Final    Sodium 05/14/2019 143  136 - 145 mmol/L Final    Potassium 05/14/2019 4.1  3.5 - 5.1 mmol/L Final    Chloride 05/14/2019 106  95 - 110 mmol/L Final    CO2 05/14/2019 27  23 - 29 mmol/L Final    Glucose 05/14/2019 110  70 - 110 mg/dL Final    BUN, Bld 05/14/2019 10  7 - 17 mg/dL Final    Creatinine 05/14/2019 0.62  0.50 - 1.40 mg/dL Final    Calcium 05/14/2019 9.8  8.7 - 10.5 mg/dL Final    Total Protein 05/14/2019 8.5* 6.0 - 8.4 g/dL Final    Albumin 05/14/2019 4.6  3.5 - 5.2 g/dL Final    Total Bilirubin 05/14/2019 0.3  0.1 - 1.0 mg/dL Final    Alkaline Phosphatase 05/14/2019 65  38 - 126 U/L Final    AST 05/14/2019 18  15 - 46 U/L Final    ALT 05/14/2019 18  10 - 44 U/L Final    Anion Gap 05/14/2019 10  8 - 16 mmol/L Final    eGFR if African American 05/14/2019 >60.0  >60 mL/min/1.73 m^2 Final    eGFR if non African American 05/14/2019 >60.0  >60 mL/min/1.73 m^2 Final    TSH 05/14/2019 0.209* 0.400 - 4.000 uIU/mL Final    Specimen UA 05/14/2019 Urine, Clean Catch   Final    Color, UA 05/14/2019 Yellow  Yellow, Straw, Lizeth Final    Appearance, UA 05/14/2019 Clear  Clear Final    pH, UA 05/14/2019 8.0  5.0 - 8.0 Final    Specific Gravity, UA 05/14/2019 1.015  1.005 - 1.030 Final    Protein, UA 05/14/2019 Negative  Negative Final    Glucose, UA 05/14/2019 Negative  Negative Final    Ketones, UA 05/14/2019 Negative  Negative Final    Bilirubin (UA) 05/14/2019 Negative  Negative Final    Occult Blood UA 05/14/2019 Negative  Negative Final    Nitrite, UA 05/14/2019 Negative  Negative Final    Urobilinogen, UA 05/14/2019 Negative  <2.0 EU/dL Final    Leukocytes, UA 05/14/2019 Negative  Negative Final    Benzodiazepines 05/14/2019 Negative   Final     Methadone metabolites 05/14/2019 Negative   Final    Cocaine (Metab.) 05/14/2019 Negative   Final    Opiate Scrn, Ur 05/14/2019 Negative   Final    Barbiturate Screen, Ur 05/14/2019 Negative   Final    Amphetamine Screen, Ur 05/14/2019 Negative   Final    THC 05/14/2019 Negative   Final    Phencyclidine 05/14/2019 Negative   Final    Creatinine, Random Ur 05/14/2019 63.8  15.0 - 325.0 mg/dL Final    Toxicology Information 05/14/2019 SEE COMMENT   Final    Alcohol, Medical, Serum 05/14/2019 <10  <10 mg/dL Final    Acetaminophen (Tylenol), Serum 05/14/2019 <10.0  10.0 - 20.0 ug/mL Final    Preg Test, Ur 05/14/2019 Negative   Final    Free T4 05/14/2019 0.96  0.71 - 1.51 ng/dL Final         Discharged Condition: stable and improved; not currently a danger to self/others or gravely disabled    Disposition: Home or Self Care    Is patient being discharged on multiple neuroleptics? No    Follow Up/Patient Instructions:     Medications:  Reconciled Home Medications:      Medication List      STOP taking these medications    diclofenac sodium 1 % Gel  Commonly known as:  VOLTAREN     HYDROcodone-acetaminophen 5-325 mg per tablet  Commonly known as:  NORCO     omeprazole 20 MG capsule  Commonly known as:  PRILOSEC     PERCOCET  mg per tablet  Generic drug:  oxyCODONE-acetaminophen          No discharge procedures on file.  Follow-up Information     Please follow up.    Why:  psych medications reviewed with patient and list sent to next level of care. smoking cessation to be done at the mental health clinic.               [place] [facility] [place]- see SW/discharge notes      Diet: regular     Activity as tolerated    Total time spent discharging patient: 32 minutes    Junior Burr MD  Psychiatry

## 2019-05-15 NOTE — SUBJECTIVE & OBJECTIVE
Past Medical History:   Diagnosis Date    IBS (irritable bowel syndrome)     Osteoarthritis     Sickle cell trait        Past Surgical History:   Procedure Laterality Date    ANKLE SURGERY      left     SECTION, CLASSIC      x3    CHOLECYSTECTOMY         Review of patient's allergies indicates:   Allergen Reactions    Aspirin Other (See Comments)     Abdominal cramping       Current Facility-Administered Medications on File Prior to Encounter   Medication    [COMPLETED] ondansetron disintegrating tablet 8 mg    [DISCONTINUED] cloNIDine tablet 0.1 mg    [DISCONTINUED] diphenhydrAMINE capsule 25 mg    [DISCONTINUED] ibuprofen tablet 600 mg     Current Outpatient Medications on File Prior to Encounter   Medication Sig    oxyCODONE-acetaminophen (PERCOCET)  mg per tablet Percocet 10 mg-325 mg tablet   Take 1 tablet every 6 hours by oral route.    diclofenac sodium (VOLTAREN) 1 % Gel Apply 2 g topically 2 (two) times daily. for 10 days    HYDROcodone-acetaminophen (NORCO) 5-325 mg per tablet Take 1 tablet by mouth every 8 (eight) hours as needed for Pain.    omeprazole (PRILOSEC) 20 MG capsule Take 1 capsule (20 mg total) by mouth once daily. While taking Naproxen for 15 days     Family History     None        Tobacco Use    Smoking status: Never Smoker    Smokeless tobacco: Never Used   Substance and Sexual Activity    Alcohol use: No    Drug use: No    Sexual activity: Not on file     Review of Systems   Constitutional: Negative for chills, fatigue, fever and unexpected weight change.   HENT: Negative for congestion, ear pain, sore throat and trouble swallowing.    Eyes: Negative for pain and visual disturbance.   Respiratory: Negative for cough, chest tightness and shortness of breath.    Cardiovascular: Negative for chest pain, palpitations and leg swelling.   Gastrointestinal: Negative for abdominal distention, abdominal pain, constipation, diarrhea and vomiting.    Genitourinary: Negative for difficulty urinating, dysuria, flank pain, frequency and hematuria.   Musculoskeletal: Negative for back pain, gait problem, joint swelling, neck pain and neck stiffness.   Skin: Negative for rash and wound.   Neurological: Negative for dizziness, seizures, speech difficulty, light-headedness and headaches.     Objective:     Vital Signs (Most Recent):  Temp: 97.8 °F (36.6 °C) (05/15/19 0723)  Pulse: 82 (05/15/19 0723)  Resp: 18 (05/15/19 0723)  BP: (!) 141/90 (05/15/19 0723) Vital Signs (24h Range):  Temp:  [97.5 °F (36.4 °C)-99.4 °F (37.4 °C)] 97.8 °F (36.6 °C)  Pulse:  [80-97] 82  Resp:  [16-18] 18  SpO2:  [100 %] 100 %  BP: (141-170)/(90-92) 141/90     Weight: 86.4 kg (190 lb 5.9 oz)  Body mass index is 31.68 kg/m².    Physical Exam   Constitutional: She is oriented to person, place, and time. She appears well-developed and well-nourished.   HENT:   Head: Normocephalic and atraumatic.   Neck: No thyromegaly present.   Cardiovascular: Normal rate, regular rhythm, normal heart sounds and intact distal pulses.   No murmur heard.  Pulmonary/Chest: Effort normal and breath sounds normal. No respiratory distress. She has no wheezes. She has no rales.   Abdominal: Soft. Bowel sounds are normal. She exhibits no distension and no mass. There is no tenderness.   Musculoskeletal: Normal range of motion. She exhibits no edema.   Lymphadenopathy:     She has no cervical adenopathy.   Neurological: She is alert and oriented to person, place, and time.     Neuro: Cranial nerves:  CN II Visual fields full to confrontation.   CN III, IV, VI Pupils are equal, round, and reactive to light.  CN III: no palsy  Nystagmus: none   Diplopia: none  Ophthalmoparesis: none  CN V Facial sensation intact.   CN VII Facial expression full, symmetric.   CN VIII normal.   CN IX normal.   CN X normal.   CN XI normal.   CN XII normal.         Skin: Skin is warm and dry. No erythema.   Vitals reviewed.      Significant  Labs:   UPT  Results for orders placed or performed during the hospital encounter of 09/18/18   POCT urine pregnancy   Result Value Ref Range    POC Preg Test, Ur Negative Negative     Acceptable Yes      U/A  Results for orders placed or performed during the hospital encounter of 07/03/18   Urinalysis Clean Catch   Result Value Ref Range    Specimen UA Urine, Clean Catch     Color, UA Yellow Yellow, Straw, Lizeth    Appearance, UA Clear Clear    pH, UA 5.0 5.0 - 8.0    Specific Gravity, UA 1.015 1.005 - 1.030    Protein, UA Trace (A) Negative    Glucose, UA Negative Negative    Ketones, UA Negative Negative    Bilirubin (UA) Negative Negative    Occult Blood UA Trace (A) Negative    Nitrite, UA Negative Negative    Urobilinogen, UA Negative <2.0 EU/dL    Leukocytes, UA Negative Negative     UDS  Results for orders placed or performed during the hospital encounter of 05/14/19   Drug screen panel, emergency   Result Value Ref Range    Benzodiazepines Negative     Methadone metabolites Negative     Cocaine (Metab.) Negative     Opiate Scrn, Ur Negative     Barbiturate Screen, Ur Negative     Amphetamine Screen, Ur Negative     THC Negative     Phencyclidine Negative     Creatinine, Random Ur 63.8 15.0 - 325.0 mg/dL    Toxicology Information SEE COMMENT      CBC  Results for orders placed or performed during the hospital encounter of 05/14/19   CBC auto differential   Result Value Ref Range    WBC 5.77 3.90 - 12.70 K/uL    RBC 4.56 4.00 - 5.40 M/uL    Hemoglobin 12.8 12.0 - 16.0 g/dL    Hematocrit 37.5 37.0 - 48.5 %    Mean Corpuscular Volume 82 82 - 98 fL    Mean Corpuscular Hemoglobin 28.1 27.0 - 31.0 pg    Mean Corpuscular Hemoglobin Conc 34.1 32.0 - 36.0 g/dL    RDW 13.0 11.5 - 14.5 %    Platelets 370 (H) 150 - 350 K/uL    MPV 9.2 9.2 - 12.9 fL    Gran # (ANC) 3.3 1.8 - 7.7 K/uL    Lymph # 2.1 1.0 - 4.8 K/uL    Mono # 0.3 0.3 - 1.0 K/uL    Eos # 0.0 0.0 - 0.5 K/uL    Baso # 0.03 0.00 - 0.20 K/uL     Gran% 56.4 38.0 - 73.0 %    Lymph% 36.9 18.0 - 48.0 %    Mono% 5.7 4.0 - 15.0 %    Eosinophil% 0.5 0.0 - 8.0 %    Basophil% 0.5 0.0 - 1.9 %    Differential Method Automated      CMP  Results for orders placed or performed during the hospital encounter of 05/14/19   Comprehensive metabolic panel   Result Value Ref Range    Sodium 143 136 - 145 mmol/L    Potassium 4.1 3.5 - 5.1 mmol/L    Chloride 106 95 - 110 mmol/L    CO2 27 23 - 29 mmol/L    Glucose 110 70 - 110 mg/dL    BUN, Bld 10 7 - 17 mg/dL    Creatinine 0.62 0.50 - 1.40 mg/dL    Calcium 9.8 8.7 - 10.5 mg/dL    Total Protein 8.5 (H) 6.0 - 8.4 g/dL    Albumin 4.6 3.5 - 5.2 g/dL    Total Bilirubin 0.3 0.1 - 1.0 mg/dL    Alkaline Phosphatase 65 38 - 126 U/L    AST 18 15 - 46 U/L    ALT 18 10 - 44 U/L    Anion Gap 10 8 - 16 mmol/L    eGFR if African American >60.0 >60 mL/min/1.73 m^2    eGFR if non African American >60.0 >60 mL/min/1.73 m^2     TSH  Results for orders placed or performed during the hospital encounter of 05/14/19   TSH   Result Value Ref Range    TSH 0.209 (L) 0.400 - 4.000 uIU/mL     ETOH  Results for orders placed or performed during the hospital encounter of 05/14/19   Ethanol   Result Value Ref Range    Alcohol, Medical, Serum <10 <10 mg/dL       Acetaminophen  Results for orders placed or performed during the hospital encounter of 05/14/19   Acetaminophen level   Result Value Ref Range    Acetaminophen (Tylenol), Serum <10.0 10.0 - 20.0 ug/mL

## 2019-05-15 NOTE — PSYCH
Discharge Note:      Patient was given the following information:      Date, Time and Place for her next appointment:      Monday, May 20, 2019   For 1:15 pm  [address] [address] [phone]      Patient thanked this counselor for making the arrangements for her; no safety plan was needed at this time.

## 2019-05-15 NOTE — PLAN OF CARE
Problem: Adult Behavioral Health Plan of Care  Goal: Plan of Care Review  Outcome: Ongoing (interventions implemented as appropriate)  Shift note : patient is cooperative . She is being discharged today as long as the collateral information obtained is good . She is eating all meals and is taking all ordered medications .

## 2020-01-18 ENCOUNTER — HOSPITAL ENCOUNTER (EMERGENCY)
Facility: HOSPITAL | Age: 40
Discharge: HOME | End: 2020-01-18
Attending: EMERGENCY MEDICINE
Payer: MEDICAID

## 2020-01-18 VITALS
SYSTOLIC BLOOD PRESSURE: 140 MMHG | HEIGHT: 65 IN | HEART RATE: 67 BPM | OXYGEN SATURATION: 100 % | TEMPERATURE: 99 F | DIASTOLIC BLOOD PRESSURE: 83 MMHG | BODY MASS INDEX: 31.65 KG/M2 | WEIGHT: 190 LBS | RESPIRATION RATE: 18 BRPM

## 2020-01-18 DIAGNOSIS — S46.812A STRAIN OF LEFT TRAPEZIUS MUSCLE, INITIAL ENCOUNTER: Primary | ICD-10-CM

## 2020-01-18 DIAGNOSIS — R03.0 ELEVATED BLOOD PRESSURE READING: ICD-10-CM

## 2020-01-18 PROCEDURE — 25000003 PHARM REV CODE 250: Performed by: EMERGENCY MEDICINE

## 2020-01-18 PROCEDURE — 99284 EMERGENCY DEPT VISIT MOD MDM: CPT | Mod: 25

## 2020-01-18 PROCEDURE — 93010 EKG 12-LEAD: ICD-10-PCS | Mod: ,,, | Performed by: INTERNAL MEDICINE

## 2020-01-18 PROCEDURE — S0020 INJECTION, BUPIVICAINE HYDRO: HCPCS | Performed by: EMERGENCY MEDICINE

## 2020-01-18 PROCEDURE — 93010 ELECTROCARDIOGRAM REPORT: CPT | Mod: ,,, | Performed by: INTERNAL MEDICINE

## 2020-01-18 PROCEDURE — 63600175 PHARM REV CODE 636 W HCPCS: Performed by: EMERGENCY MEDICINE

## 2020-01-18 PROCEDURE — 93005 ELECTROCARDIOGRAM TRACING: CPT

## 2020-01-18 PROCEDURE — 96372 THER/PROPH/DIAG INJ SC/IM: CPT

## 2020-01-18 RX ORDER — BUPIVACAINE HYDROCHLORIDE 5 MG/ML
5 INJECTION, SOLUTION EPIDURAL; INTRACAUDAL
Status: COMPLETED | OUTPATIENT
Start: 2020-01-18 | End: 2020-01-18

## 2020-01-18 RX ORDER — KETOROLAC TROMETHAMINE 30 MG/ML
15 INJECTION, SOLUTION INTRAMUSCULAR; INTRAVENOUS
Status: COMPLETED | OUTPATIENT
Start: 2020-01-18 | End: 2020-01-18

## 2020-01-18 RX ORDER — LIDOCAINE HYDROCHLORIDE 20 MG/ML
5 INJECTION, SOLUTION INTRAVENOUS
Status: COMPLETED | OUTPATIENT
Start: 2020-01-18 | End: 2020-01-18

## 2020-01-18 RX ADMIN — KETOROLAC TROMETHAMINE 15 MG: 30 INJECTION, SOLUTION INTRAMUSCULAR at 04:01

## 2020-01-18 RX ADMIN — BUPIVACAINE HYDROCHLORIDE 25 MG: 5 INJECTION, SOLUTION EPIDURAL; INTRACAUDAL; PERINEURAL at 05:01

## 2020-01-18 RX ADMIN — LIDOCAINE HYDROCHLORIDE 5 ML: 20 INJECTION, SOLUTION EPIDURAL; INFILTRATION; INTRACAUDAL; PERINEURAL at 05:01

## 2020-01-18 NOTE — ED PROVIDER NOTES
Sort note: Ihsia N Carter nontoxic/afebrile 39 y.o.  presented to the ED with c/o left sided neck pain that radiates to left shoulder.  Blurred vision.  BP elevated- no pmhx of HTN.     Patient seen and medically screened by Nurse practitioner in Sort process due to ED crowding.  Appropriate tests and/or medications ordered.  Care transferred to an alternate provider when patient was placed in an Exam Room from the [facility] for physical exam, additional orders, and disposition. 3:06 PM. PETER Conrad, ECTOR  01/18/20 1508

## 2020-01-18 NOTE — ED NOTES
Pt c/o neck pain that radiates to left side of neck that began 3 hours ago while driving. Pt states left arm feels weak. Bilateral  strength equal and strong.

## 2020-01-18 NOTE — ED PROVIDER NOTES
"Encounter Date: 2020    SCRIBE #1 NOTE: I, Pamela Farfan, am scribing for, and in the presence of,  Dr. Frye . I have scribed the entire note.       History     Chief Complaint   Patient presents with    Neck Pain     Pt having stabbing pains to left side of neck that radiates to left shoulder. Also having numbness at the site of the pain.     Blurred Vision     Pt states she also has blurred vision while she was driving.       Pt is a 39 y.o. female w/h/o osteoarthritis, who presents for left sided neck pain that started 2 hours prior to arrival. Pt reports sudden onset of symptoms while driving this afternoon. Pain is described as a sharp stabbing sensation, which she now reports is dull and feels similar to something "sitting on her neck".  Pain starts at the left side of the back of her neck and radiates to the left top side of her shoulder. Sx's are aggravated with movement. Pt admits to taking Percocet two hours prior to arrival with no relief. She endorses associated blurry vision, and numbness/tingling to the left hand that is brief lasts a few seconds. No weakness or change in color or temperature of skin. Denies any CP, SOB, cough, abdominal pain, nausea, emesis, ataxia, change in speech / cognition / hearing, recent cold symptoms, recent trauma or injury.     The history is provided by the patient.     Review of patient's allergies indicates:   Allergen Reactions    Aspirin Other (See Comments)     Abdominal cramping     Past Medical History:   Diagnosis Date    IBS (irritable bowel syndrome)     Osteoarthritis     Sickle cell trait      Past Surgical History:   Procedure Laterality Date    ANKLE SURGERY  2011    left     SECTION, CLASSIC      x3    CHOLECYSTECTOMY       No family history on file.  Social History     Tobacco Use    Smoking status: Never Smoker    Smokeless tobacco: Never Used   Substance Use Topics    Alcohol use: No    Drug use: No     Review of " Systems  General: No fever.  No chills.  Eyes: No visual changes.  Head: No headache.    Integument: No rashes or lesions.  Chest: No shortness of breath.  Cardiovascular: No chest pain.  Abdomen: No abdominal pain.  No nausea or vomiting.  Urinary: No abnormal urination.  Neurologic: No focal weakness.  No numbness.  Hematologic: No easy bruising.  Endocrine: No excessive thirst or urination.   Musculoskeletal: Positive for neck pain.   Physical Exam     Initial Vitals [01/18/20 1504]   BP Pulse Resp Temp SpO2   (!) 173/106 61 20 99 °F (37.2 °C) 100 %      MAP       --         Physical Exam  Appearance: No acute distress.  HEENT: Normocephalic. Atraumatic. No conjunctival injection. EOMI. PERRL. Oropharynx clear.    Neck: No LN. No carotid bruit. +TTP and spasm of left kareen mm    Chest: Non-tender. Clear to auscultation bilaterally.  Good air movement.  No wheezes.  No rhonchi.  Cardiovascular: Regular rate and rhythm. No murmurs. No gallops. No rubs. +2 radial/DP/DT pulses bilaterally.  Abdomen: Soft. Not distended. Nontender. No guarding. No rebound. No Masses  Musculoskeletal: Good range of motion all joints. No deformities.    Neurologic: Alert and oriented x 3.  Equal strength in upper and lower extremities bilaterally. Normal sensation. No facial droop. Normal speech. Neg Kernigs and Brudzinski.   Psych:  Appropriate, conversant.    Integumentary: No rashes seen.  Good turgor.  No abrasions.  No ecchymoses.     ED Course   Procedures  Labs Reviewed - No data to display       Imaging Results    None          Medical Decision Making:   Clinical Tests:   Lab Tests: Ordered and Reviewed                                 Clinical Impression:       ICD-10-CM ICD-9-CM   1. Strain of left trapezius muscle, initial encounter S46.812A 840.8   2. Elevated blood pressure reading R03.0 796.2     38 yo female presents for sudden onset left neck pain while driving. Reports few seconds of tingling of left hand, otherwise no  neuro symptoms. No trauma. No fever, or other red flags. Exam shows VSS, afeb. Neg Kernigs and Brudzinski. Neuro exam non-focal . +TTP left trap w/spasm. Trigger point injection with relief.     Discussed results, diagnosis, and treatment plan with pt; advised close follow-up with PCP. Reviewed strict return precautions. Pt confirms understanding and ability to comply.     I, Dr. Tiffany Frye, personally performed the services described in this documentation. All medical record entries made by the scribe were at my direction and in my presence.  I have reviewed the chart and agree that the record reflects my personal performance and is accurate and complete. Tiffany Frye MD.  3:20 PM 02/04/2020                          Tiffany Frye MD  02/04/20 152

## 2021-01-05 ENCOUNTER — HOSPITAL ENCOUNTER (EMERGENCY)
Facility: HOSPITAL | Age: 41
Discharge: HOME | End: 2021-01-05
Attending: EMERGENCY MEDICINE
Payer: MEDICAID

## 2021-01-05 VITALS
SYSTOLIC BLOOD PRESSURE: 188 MMHG | WEIGHT: 195 LBS | TEMPERATURE: 98 F | BODY MASS INDEX: 29.55 KG/M2 | HEIGHT: 68 IN | RESPIRATION RATE: 17 BRPM | OXYGEN SATURATION: 100 % | DIASTOLIC BLOOD PRESSURE: 110 MMHG | HEART RATE: 84 BPM

## 2021-01-05 DIAGNOSIS — M79.10 MYALGIA: ICD-10-CM

## 2021-01-05 DIAGNOSIS — R59.1 LYMPHADENOPATHY: ICD-10-CM

## 2021-01-05 DIAGNOSIS — B34.9 VIRAL SYNDROME: Primary | ICD-10-CM

## 2021-01-05 DIAGNOSIS — R03.0 ELEVATED BLOOD PRESSURE READING: ICD-10-CM

## 2021-01-05 LAB
GROUP A STREP, MOLECULAR: NEGATIVE
INFLUENZA A, MOLECULAR: NEGATIVE
INFLUENZA B, MOLECULAR: NEGATIVE
SARS-COV-2 RDRP RESP QL NAA+PROBE: NEGATIVE
SPECIMEN SOURCE: NORMAL

## 2021-01-05 PROCEDURE — 87651 STREP A DNA AMP PROBE: CPT | Mod: ER

## 2021-01-05 PROCEDURE — 96372 THER/PROPH/DIAG INJ SC/IM: CPT | Mod: ER

## 2021-01-05 PROCEDURE — 87502 INFLUENZA DNA AMP PROBE: CPT | Mod: ER

## 2021-01-05 PROCEDURE — U0002 COVID-19 LAB TEST NON-CDC: HCPCS | Mod: ER

## 2021-01-05 PROCEDURE — 63600175 PHARM REV CODE 636 W HCPCS: Mod: ER | Performed by: PHYSICIAN ASSISTANT

## 2021-01-05 PROCEDURE — 99284 EMERGENCY DEPT VISIT MOD MDM: CPT | Mod: 25,ER

## 2021-01-05 RX ORDER — KETOROLAC TROMETHAMINE 30 MG/ML
30 INJECTION, SOLUTION INTRAMUSCULAR; INTRAVENOUS
Status: COMPLETED | OUTPATIENT
Start: 2021-01-05 | End: 2021-01-05

## 2021-01-05 RX ADMIN — KETOROLAC TROMETHAMINE 30 MG: 30 INJECTION, SOLUTION INTRAMUSCULAR at 06:01

## 2021-03-24 ENCOUNTER — HOSPITAL ENCOUNTER (EMERGENCY)
Facility: HOSPITAL | Age: 41
Discharge: HOME | End: 2021-03-24
Attending: EMERGENCY MEDICINE
Payer: MEDICAID

## 2021-03-24 VITALS
HEART RATE: 68 BPM | HEIGHT: 65 IN | DIASTOLIC BLOOD PRESSURE: 94 MMHG | RESPIRATION RATE: 18 BRPM | OXYGEN SATURATION: 100 % | BODY MASS INDEX: 32.49 KG/M2 | WEIGHT: 195 LBS | SYSTOLIC BLOOD PRESSURE: 152 MMHG | TEMPERATURE: 99 F

## 2021-03-24 DIAGNOSIS — S63.502A SPRAIN OF LEFT WRIST, INITIAL ENCOUNTER: Primary | ICD-10-CM

## 2021-03-24 DIAGNOSIS — M25.532 WRIST PAIN, ACUTE, LEFT: ICD-10-CM

## 2021-03-24 PROCEDURE — 99283 EMERGENCY DEPT VISIT LOW MDM: CPT | Mod: 25

## 2021-05-04 ENCOUNTER — PATIENT MESSAGE (OUTPATIENT)
Dept: RESEARCH | Facility: HOSPITAL | Age: 41
End: 2021-05-04

## 2021-12-22 ENCOUNTER — HOSPITAL ENCOUNTER (EMERGENCY)
Facility: HOSPITAL | Age: 41
Discharge: HOME | End: 2021-12-22
Attending: EMERGENCY MEDICINE
Payer: MEDICAID

## 2021-12-22 VITALS
TEMPERATURE: 98 F | WEIGHT: 195 LBS | DIASTOLIC BLOOD PRESSURE: 84 MMHG | BODY MASS INDEX: 32.49 KG/M2 | HEIGHT: 65 IN | RESPIRATION RATE: 18 BRPM | HEART RATE: 72 BPM | OXYGEN SATURATION: 98 % | SYSTOLIC BLOOD PRESSURE: 153 MMHG

## 2021-12-22 DIAGNOSIS — Z20.822 CLOSE EXPOSURE TO COVID-19 VIRUS: Primary | ICD-10-CM

## 2021-12-22 LAB — SARS-COV-2 RDRP RESP QL NAA+PROBE: NEGATIVE

## 2021-12-22 PROCEDURE — 99283 EMERGENCY DEPT VISIT LOW MDM: CPT | Mod: ER

## 2021-12-22 PROCEDURE — U0002 COVID-19 LAB TEST NON-CDC: HCPCS | Mod: ER | Performed by: EMERGENCY MEDICINE

## 2021-12-22 RX ORDER — ONDANSETRON 4 MG/1
4 TABLET, ORALLY DISINTEGRATING ORAL EVERY 6 HOURS PRN
Qty: 30 TABLET | Refills: 0 | Status: SHIPPED | OUTPATIENT
Start: 2021-12-22 | End: 2023-06-18

## 2021-12-22 NOTE — ED NOTES
LOC: The patient is awake, alert and aware of environment with an appropriate affect, the patient is oriented x 3 and speaking appropriately.     Psych: Patient is calm and cooperative with good eye contact.    APPEARANCE: Patient is clean and non toxic appearing    NEUROLOGIC:  TAYLER, Follows commands without difficulty. Speech is clear. No neuro deficits observed.    HEENT: Denies HEENT complaint or injury, moist mucus membranes     RESPIRATORY: Airway is open and patent, respirations are spontaneous; patient has a normal effort and rate. Bilateral breath sounds are clear. Pink nailbeds.     CARDIAC: Patient has a normal rate and rhythm, no peripheral edema noted, capillary refill < 2 seconds. PULSES are symmetrical in all extremities    GI/ : Soft and non tender to palpation, no distention noted. Reports nausea and diarrhea.    MUSCULOSKELETAL:  Normal range of motion noted. Moves all extremities well, No swelling, deformity or tenderness noted.    SKIN: The skin is warm, dry and intact. Patient has normal skin turgor and moist mucus membranes, no rashes or lesions. No Breakdown noted.

## 2021-12-22 NOTE — ED PROVIDER NOTES
Encounter Date: 2021       History     Chief Complaint   Patient presents with    COVID-19 Concerns     Reports daughter tested positive for covid. Wants to be tested. Reports nausea and diarrhea that started yesterday.     HPI: Sherri Machado, a 41 y.o. female  has a past medical history of IBS (irritable bowel syndrome), Osteoarthritis, and Sickle cell trait.     She presents to the ED evaluation after covid exposure from daughter.  Received 1st dose of covid vaccine.  Attests to nausea and diarrhea w/o abdominal pain that started yesterday.  No fevers.  She is otherwise healthy and denies h/o abdominal surgeries.        The history is provided by the patient.     Review of patient's allergies indicates:   Allergen Reactions    Aspirin Other (See Comments)     Abdominal cramping     Past Medical History:   Diagnosis Date    IBS (irritable bowel syndrome)     Osteoarthritis     Sickle cell trait      Past Surgical History:   Procedure Laterality Date    ANKLE SURGERY      left     SECTION, CLASSIC      x3    CHOLECYSTECTOMY       History reviewed. No pertinent family history.  Social History     Tobacco Use    Smoking status: Never Smoker    Smokeless tobacco: Never Used   Substance Use Topics    Alcohol use: No    Drug use: No     Review of Systems   Constitutional: Negative for fever.   HENT: Negative for congestion.    Respiratory: Negative for cough and shortness of breath.    Gastrointestinal: Positive for diarrhea and nausea. Negative for abdominal pain and vomiting.   Neurological: Negative for weakness.   All other systems reviewed and are negative.      Physical Exam     Initial Vitals [21 1505]   BP Pulse Resp Temp SpO2   (!) 153/84 72 18 98 °F (36.7 °C) 98 %      MAP       --         Physical Exam    Nursing note and vitals reviewed.  Constitutional: She appears well-developed and well-nourished. She is not diaphoretic. No distress.   HENT:   Head: Normocephalic and  atraumatic.   Right Ear: External ear normal.   Left Ear: External ear normal.   Nose: Nose normal.   Eyes: Conjunctivae and EOM are normal.   Neck:   Normal range of motion.  Cardiovascular: Normal rate and regular rhythm.   Pulmonary/Chest: Breath sounds normal. No respiratory distress. She has no wheezes. She has no rhonchi. She has no rales.   Abdominal: Abdomen is soft. Bowel sounds are normal. She exhibits no distension. There is no abdominal tenderness. There is no rebound and no guarding.   Musculoskeletal:         General: Normal range of motion.      Cervical back: Normal range of motion.     Neurological: She is alert and oriented to person, place, and time.   Skin: Skin is warm. Capillary refill takes less than 2 seconds. No rash noted.   Psychiatric: She has a normal mood and affect. Thought content normal.         ED Course   Procedures  Labs Reviewed   SARS-COV-2 RNA AMPLIFICATION, QUAL    Narrative:     Is the patient symptomatic?->Yes          Imaging Results    None          Medications - No data to display  Medical Decision Making:   Initial Assessment:   Covid exposure, diarrhea  ED Management:  Covid negative.  Vital signs do not indicate sepsis, hypoxia nor respiratory distress, and in my professional opinion the patient is well enough for discharge home. The patient was provided with discharge instructions on self-care and how to quarantine at home. I reinforced this advice and the dangers to family and public with failure to comply. We will proceed with symptomatic treatment. The patient was also given a return to work note, if applicable. Return precautions discussed with the patient. The patient expressed understanding to my instructions.                         Clinical Impression:   Final diagnoses:  [Z20.822] Close exposure to COVID-19 virus (Primary)          ED Disposition Condition    Discharge Stable        ED Prescriptions     Medication Sig Dispense Start Date End Date Auth.  Provider    ondansetron (ZOFRAN-ODT) 4 MG TbDL Take 1 tablet (4 mg total) by mouth every 6 (six) hours as needed (nausea/vomiting). 30 tablet 12/22/2021  Pamela Soares PA-C        Follow-up Information     Follow up With Specialties Details Why Contact Info    Sunny Ortiz MD Gastroenterology   [id number] [facility] [address]  [phone]             Pamela Soares PA-C  12/22/21 1602

## 2023-06-18 ENCOUNTER — HOSPITAL ENCOUNTER (EMERGENCY)
Facility: HOSPITAL | Age: 43
Discharge: HOME | End: 2023-06-18
Attending: EMERGENCY MEDICINE
Payer: MEDICAID

## 2023-06-18 VITALS
RESPIRATION RATE: 16 BRPM | HEART RATE: 87 BPM | SYSTOLIC BLOOD PRESSURE: 148 MMHG | BODY MASS INDEX: 33.32 KG/M2 | DIASTOLIC BLOOD PRESSURE: 74 MMHG | HEIGHT: 65 IN | OXYGEN SATURATION: 99 % | WEIGHT: 200 LBS | TEMPERATURE: 98 F

## 2023-06-18 DIAGNOSIS — N20.1 CALCULUS OF URETEROVESICAL JUNCTION (UVJ): Primary | ICD-10-CM

## 2023-06-18 DIAGNOSIS — R11.2 NAUSEA AND VOMITING, UNSPECIFIED VOMITING TYPE: ICD-10-CM

## 2023-06-18 DIAGNOSIS — N20.0 KIDNEY STONE ON RIGHT SIDE: ICD-10-CM

## 2023-06-18 DIAGNOSIS — R10.31 RLQ ABDOMINAL PAIN: ICD-10-CM

## 2023-06-18 LAB
ALBUMIN SERPL BCP-MCNC: 4.1 G/DL (ref 3.5–5.2)
ALP SERPL-CCNC: 68 U/L (ref 55–135)
ALT SERPL W/O P-5'-P-CCNC: 17 U/L (ref 10–44)
ANION GAP SERPL CALC-SCNC: 8 MMOL/L (ref 8–16)
AST SERPL-CCNC: 16 U/L (ref 10–40)
BASOPHILS # BLD AUTO: 0.06 K/UL (ref 0–0.2)
BASOPHILS NFR BLD: 0.5 % (ref 0–1.9)
BILIRUB SERPL-MCNC: 0.3 MG/DL (ref 0.1–1)
BILIRUB UR QL STRIP: NEGATIVE
BUN SERPL-MCNC: 12 MG/DL (ref 6–20)
CALCIUM SERPL-MCNC: 9.8 MG/DL (ref 8.7–10.5)
CHLORIDE SERPL-SCNC: 103 MMOL/L (ref 95–110)
CLARITY UR: CLEAR
CO2 SERPL-SCNC: 27 MMOL/L (ref 23–29)
COLOR UR: YELLOW
CREAT SERPL-MCNC: 1.2 MG/DL (ref 0.5–1.4)
DIFFERENTIAL METHOD: ABNORMAL
EOSINOPHIL # BLD AUTO: 0.1 K/UL (ref 0–0.5)
EOSINOPHIL NFR BLD: 1.2 % (ref 0–8)
ERYTHROCYTE [DISTWIDTH] IN BLOOD BY AUTOMATED COUNT: 12.7 % (ref 11.5–14.5)
EST. GFR  (NO RACE VARIABLE): 57 ML/MIN/1.73 M^2
GLUCOSE SERPL-MCNC: 118 MG/DL (ref 70–110)
GLUCOSE UR QL STRIP: NEGATIVE
HCT VFR BLD AUTO: 37.8 % (ref 37–48.5)
HGB BLD-MCNC: 12.5 G/DL (ref 12–16)
HGB UR QL STRIP: NEGATIVE
IMM GRANULOCYTES # BLD AUTO: 0.04 K/UL (ref 0–0.04)
IMM GRANULOCYTES NFR BLD AUTO: 0.3 % (ref 0–0.5)
KETONES UR QL STRIP: NEGATIVE
LEUKOCYTE ESTERASE UR QL STRIP: NEGATIVE
LIPASE SERPL-CCNC: 17 U/L (ref 4–60)
LYMPHOCYTES # BLD AUTO: 3.4 K/UL (ref 1–4.8)
LYMPHOCYTES NFR BLD: 28.5 % (ref 18–48)
MCH RBC QN AUTO: 27.8 PG (ref 27–31)
MCHC RBC AUTO-ENTMCNC: 33.1 G/DL (ref 32–36)
MCV RBC AUTO: 84 FL (ref 82–98)
MONOCYTES # BLD AUTO: 0.3 K/UL (ref 0.3–1)
MONOCYTES NFR BLD: 2.4 % (ref 4–15)
NEUTROPHILS # BLD AUTO: 8 K/UL (ref 1.8–7.7)
NEUTROPHILS NFR BLD: 67.1 % (ref 38–73)
NITRITE UR QL STRIP: NEGATIVE
NRBC BLD-RTO: 0 /100 WBC
PH UR STRIP: 6 [PH] (ref 5–8)
PLATELET # BLD AUTO: 387 K/UL (ref 150–450)
PMV BLD AUTO: 9.3 FL (ref 9.2–12.9)
POTASSIUM SERPL-SCNC: 3.9 MMOL/L (ref 3.5–5.1)
PROT SERPL-MCNC: 8.2 G/DL (ref 6–8.4)
PROT UR QL STRIP: ABNORMAL
RBC # BLD AUTO: 4.49 M/UL (ref 4–5.4)
SODIUM SERPL-SCNC: 138 MMOL/L (ref 136–145)
SP GR UR STRIP: 1.02 (ref 1–1.03)
URN SPEC COLLECT METH UR: ABNORMAL
UROBILINOGEN UR STRIP-ACNC: NEGATIVE EU/DL
WBC # BLD AUTO: 11.88 K/UL (ref 3.9–12.7)

## 2023-06-18 PROCEDURE — 96374 THER/PROPH/DIAG INJ IV PUSH: CPT

## 2023-06-18 PROCEDURE — 80053 COMPREHEN METABOLIC PANEL: CPT

## 2023-06-18 PROCEDURE — 96375 TX/PRO/DX INJ NEW DRUG ADDON: CPT

## 2023-06-18 PROCEDURE — 83690 ASSAY OF LIPASE: CPT

## 2023-06-18 PROCEDURE — 85025 COMPLETE CBC W/AUTO DIFF WBC: CPT

## 2023-06-18 PROCEDURE — 81003 URINALYSIS AUTO W/O SCOPE: CPT

## 2023-06-18 PROCEDURE — 25000003 PHARM REV CODE 250

## 2023-06-18 PROCEDURE — 25500020 PHARM REV CODE 255: Performed by: EMERGENCY MEDICINE

## 2023-06-18 PROCEDURE — 63600175 PHARM REV CODE 636 W HCPCS

## 2023-06-18 PROCEDURE — 96361 HYDRATE IV INFUSION ADD-ON: CPT

## 2023-06-18 PROCEDURE — 99285 EMERGENCY DEPT VISIT HI MDM: CPT | Mod: 25

## 2023-06-18 RX ORDER — METOCLOPRAMIDE 10 MG/1
10 TABLET ORAL EVERY 6 HOURS PRN
Qty: 20 TABLET | Refills: 0 | Status: ON HOLD | OUTPATIENT
Start: 2023-06-18 | End: 2023-06-24 | Stop reason: HOSPADM

## 2023-06-18 RX ORDER — METOCLOPRAMIDE HYDROCHLORIDE 5 MG/ML
10 INJECTION INTRAMUSCULAR; INTRAVENOUS
Status: COMPLETED | OUTPATIENT
Start: 2023-06-18 | End: 2023-06-18

## 2023-06-18 RX ORDER — KETOROLAC TROMETHAMINE 10 MG/1
10 TABLET, FILM COATED ORAL EVERY 6 HOURS
Qty: 20 TABLET | Refills: 0 | Status: SHIPPED | OUTPATIENT
Start: 2023-06-18 | End: 2023-06-18 | Stop reason: SDUPTHER

## 2023-06-18 RX ORDER — OXYCODONE AND ACETAMINOPHEN 10; 325 MG/1; MG/1
1 TABLET ORAL EVERY 6 HOURS PRN
Qty: 12 TABLET | Refills: 0 | Status: ON HOLD | OUTPATIENT
Start: 2023-06-18 | End: 2023-06-24 | Stop reason: HOSPADM

## 2023-06-18 RX ORDER — KETOROLAC TROMETHAMINE 10 MG/1
10 TABLET, FILM COATED ORAL EVERY 6 HOURS PRN
Qty: 20 TABLET | Refills: 0 | Status: ON HOLD | OUTPATIENT
Start: 2023-06-18 | End: 2023-06-24 | Stop reason: HOSPADM

## 2023-06-18 RX ORDER — FLUCONAZOLE 200 MG/1
200 TABLET ORAL DAILY
COMMUNITY
Start: 2023-06-14 | End: 2023-06-30

## 2023-06-18 RX ORDER — TAMSULOSIN HYDROCHLORIDE 0.4 MG/1
0.4 CAPSULE ORAL
Status: COMPLETED | OUTPATIENT
Start: 2023-06-18 | End: 2023-06-18

## 2023-06-18 RX ORDER — METOCLOPRAMIDE 10 MG/1
10 TABLET ORAL EVERY 6 HOURS PRN
Qty: 20 TABLET | Refills: 0 | Status: SHIPPED | OUTPATIENT
Start: 2023-06-18 | End: 2023-06-18 | Stop reason: SDUPTHER

## 2023-06-18 RX ORDER — HYDROCODONE BITARTRATE AND ACETAMINOPHEN 5; 325 MG/1; MG/1
1 TABLET ORAL EVERY 6 HOURS PRN
Qty: 16 TABLET | Refills: 0 | Status: SHIPPED | OUTPATIENT
Start: 2023-06-18 | End: 2023-06-18 | Stop reason: CLARIF

## 2023-06-18 RX ORDER — TAMSULOSIN HYDROCHLORIDE 0.4 MG/1
0.4 CAPSULE ORAL DAILY
Qty: 15 CAPSULE | Refills: 0 | Status: SHIPPED | OUTPATIENT
Start: 2023-06-18 | End: 2023-07-12

## 2023-06-18 RX ORDER — MORPHINE SULFATE 4 MG/ML
4 INJECTION, SOLUTION INTRAMUSCULAR; INTRAVENOUS
Status: COMPLETED | OUTPATIENT
Start: 2023-06-18 | End: 2023-06-18

## 2023-06-18 RX ORDER — TAMSULOSIN HYDROCHLORIDE 0.4 MG/1
0.4 CAPSULE ORAL DAILY
Qty: 15 CAPSULE | Refills: 0 | Status: SHIPPED | OUTPATIENT
Start: 2023-06-18 | End: 2023-06-18 | Stop reason: SDUPTHER

## 2023-06-18 RX ORDER — PREDNISONE 20 MG/1
60 TABLET ORAL
COMMUNITY
Start: 2023-01-19 | End: 2023-06-18

## 2023-06-18 RX ORDER — ONDANSETRON 2 MG/ML
4 INJECTION INTRAMUSCULAR; INTRAVENOUS
Status: COMPLETED | OUTPATIENT
Start: 2023-06-18 | End: 2023-06-18

## 2023-06-18 RX ORDER — KETOROLAC TROMETHAMINE 30 MG/ML
15 INJECTION, SOLUTION INTRAMUSCULAR; INTRAVENOUS
Status: COMPLETED | OUTPATIENT
Start: 2023-06-18 | End: 2023-06-18

## 2023-06-18 RX ORDER — HYDROMORPHONE HYDROCHLORIDE 1 MG/ML
1 INJECTION, SOLUTION INTRAMUSCULAR; INTRAVENOUS; SUBCUTANEOUS
Status: COMPLETED | OUTPATIENT
Start: 2023-06-18 | End: 2023-06-18

## 2023-06-18 RX ADMIN — IOHEXOL 100 ML: 350 INJECTION, SOLUTION INTRAVENOUS at 07:06

## 2023-06-18 RX ADMIN — MORPHINE SULFATE 4 MG: 4 INJECTION INTRAVENOUS at 06:06

## 2023-06-18 RX ADMIN — HYDROMORPHONE HYDROCHLORIDE 1 MG: 1 INJECTION, SOLUTION INTRAMUSCULAR; INTRAVENOUS; SUBCUTANEOUS at 07:06

## 2023-06-18 RX ADMIN — METOCLOPRAMIDE 10 MG: 5 INJECTION, SOLUTION INTRAMUSCULAR; INTRAVENOUS at 07:06

## 2023-06-18 RX ADMIN — TAMSULOSIN HYDROCHLORIDE 0.4 MG: 0.4 CAPSULE ORAL at 08:06

## 2023-06-18 RX ADMIN — ONDANSETRON 4 MG: 2 INJECTION INTRAMUSCULAR; INTRAVENOUS at 06:06

## 2023-06-18 RX ADMIN — KETOROLAC TROMETHAMINE 15 MG: 30 INJECTION INTRAMUSCULAR; INTRAVENOUS at 08:06

## 2023-06-18 RX ADMIN — SODIUM CHLORIDE 1000 ML: 9 INJECTION, SOLUTION INTRAVENOUS at 06:06

## 2023-06-18 NOTE — ED PROVIDER NOTES
Encounter Date: 2023       History     Chief Complaint   Patient presents with    Abdominal Pain     Lower right quadrant abdominal pain that started approx 1 hour ago. Is feeling faint. Is nauseated but has not vomited. Denies diarrhea.      43 year old female with PMHx IBS, OA, sickle cell trait, presents to the ED with RLQ abdominal pain that started about an hour prior to arrival. Pain 10/10, constant, radiates to back, patient more comfortable with legs bent to chest. Not maximum at onset. Gradual worsening of symptoms. Nausea, one episode of nonbloody vomiting. No diarrhea. Urinary frequency, no dysuria or hematuria. No medications tried for pain. No fever, CP, SOB.  History 3  sections and cholecystectomy.    The history is provided by the patient.   Review of patient's allergies indicates:   Allergen Reactions    Aspirin Other (See Comments)     Abdominal cramping     Past Medical History:   Diagnosis Date    IBS (irritable bowel syndrome)     Osteoarthritis     Sickle cell trait      Past Surgical History:   Procedure Laterality Date    ANKLE SURGERY      left     SECTION, CLASSIC      x3    CHOLECYSTECTOMY       History reviewed. No pertinent family history.  Social History     Tobacco Use    Smoking status: Never    Smokeless tobacco: Never   Substance Use Topics    Alcohol use: No    Drug use: No     Review of Systems   Constitutional:  Negative for chills and fever.   Respiratory:  Negative for shortness of breath.    Cardiovascular:  Negative for chest pain.   Gastrointestinal:  Positive for abdominal pain, nausea and vomiting. Negative for diarrhea.   Genitourinary:  Negative for dysuria, hematuria and vaginal bleeding.   Musculoskeletal:  Positive for back pain.   Skin:  Negative for color change.   Psychiatric/Behavioral:  Negative for agitation and confusion.      Physical Exam     Initial Vitals [23 1758]   BP Pulse Resp Temp SpO2   (!) 197/98 62 16 97.8 °F (36.6 °C)  100 %      MAP       --         Physical Exam    Nursing note and vitals reviewed.  Constitutional: She appears well-developed and well-nourished. She is not diaphoretic.  Non-toxic appearance. She appears ill. She appears distressed.   HENT:   Head: Normocephalic and atraumatic.   Right Ear: Hearing and external ear normal.   Left Ear: Hearing and external ear normal.   Mouth/Throat: Oropharynx is clear and moist.   Eyes: EOM are normal.   Neck: Neck supple.   Normal range of motion.  Cardiovascular:  Normal rate and regular rhythm.           Pulmonary/Chest: Breath sounds normal. No respiratory distress. She has no wheezes.   Abdominal: Abdomen is soft. She exhibits no distension. There is abdominal tenderness in the right lower quadrant. There is guarding.   Musculoskeletal:         General: Normal range of motion.      Cervical back: Normal range of motion and neck supple. Normal range of motion.     Neurological: She is alert and oriented to person, place, and time. GCS score is 15. GCS eye subscore is 4. GCS verbal subscore is 5. GCS motor subscore is 6.   Skin: Skin is warm and dry.   Psychiatric: She has a normal mood and affect. Her behavior is normal. Judgment and thought content normal.       ED Course   Procedures  Labs Reviewed   CBC W/ AUTO DIFFERENTIAL - Abnormal; Notable for the following components:       Result Value    Gran # (ANC) 8.0 (*)     Mono % 2.4 (*)     All other components within normal limits   COMPREHENSIVE METABOLIC PANEL - Abnormal; Notable for the following components:    Glucose 118 (*)     eGFR 57 (*)     All other components within normal limits   URINALYSIS, REFLEX TO URINE CULTURE - Abnormal; Notable for the following components:    Protein, UA Trace (*)     All other components within normal limits    Narrative:     Specimen Source->Urine   LIPASE          Imaging Results              CT Abdomen Pelvis With Contrast (Final result)  Result time 06/18/23 19:55:23      Final  result by Jaylon Rucker DO (06/18/23 19:55:23)                   Impression:      1. Moderate right hydroureteronephrosis secondary to a 5 mm calculus at the UVJ.  2. Additional nonobstructing right renal calculus.      Electronically signed by: Jaylon Rucker  Date:    06/18/2023  Time:    19:55               Narrative:    EXAMINATION:  CT ABDOMEN PELVIS WITH CONTRAST    CLINICAL HISTORY:  RLQ abdominal pain (Age >= 14y);    TECHNIQUE:  Axial CT images with sagittal and coronal reformats were obtained of the abdomen and pelvis from the hemidiaphragms through the symphysis pubis after the administration of 100mL Omnipaque 350.    COMPARISON:  CT of the abdomen and pelvis from 07/03/2018.    FINDINGS:  Lung Bases: Clear.    Heart: Heart size is normal.  No pericardial effusion.    Liver: The liver is enlarged.  No focal hepatic lesions are seen.  The portal vasculature is patent.    Biliary tract: Stable dilation of the common bile duct, likely related to cholecystectomy changes.  There is no intrahepatic biliary ductal dilation.    Gallbladder: Surgically absent.    Pancreas: Normal. No pancreatic ductal dilatation.    Spleen: Normal size without focal lesion.    Adrenals: Unremarkable.    Kidneys and urinary collecting systems: There is a 5 mm calculus at the right ureterovesicular junction resulting in moderate hydroureteronephrosis and right perinephric edema and fat stranding.  Additional right-sided nonobstructing renal calculus measures 4 mm.  The left kidney is unremarkable without hydronephrosis or nephrolithiasis.    Lymph nodes: None enlarged.    Stomach and bowel: The stomach is normal.  Loops of small and large bowel are normal in caliber without evidence for inflammation or obstruction.  Moderate volume of stool noted.  The appendix is normal.    Peritoneum and mesentery: No ascites or free intraperitoneal air.  No abdominal fluid collection.    Vasculature: No aneurysm or significant  atherosclerosis.    Urinary bladder: No wall thickening.    Reproductive organs: The uterus and adnexae are unremarkable.  There is trace free fluid in pelvis which may be physiologic or reactive.    Body wall: No abnormality.    Musculoskeletal: No aggressive osseous lesion.  There are multilevel degenerative changes of the visualized spine.                                       Medications   ketorolac injection 15 mg (has no administration in time range)   tamsulosin 24 hr capsule 0.4 mg (has no administration in time range)   sodium chloride 0.9% bolus 1,000 mL 1,000 mL (1,000 mLs Intravenous New Bag 6/18/23 1852)   ondansetron injection 4 mg (4 mg Intravenous Given 6/18/23 1851)   morphine injection 4 mg (4 mg Intravenous Given 6/18/23 1850)   iohexoL (OMNIPAQUE 350) injection 100 mL (100 mLs Intravenous Given 6/18/23 1935)   HYDROmorphone injection 1 mg (1 mg Intravenous Given 6/18/23 1952)   metoclopramide HCl injection 10 mg (10 mg Intravenous Given 6/18/23 1952)     Medical Decision Making:   Initial Assessment:   43 year old female. One hour of RLQ pain with N/V. 10/10 pain. Initial concern for appendicitis.   CBC, CMP, Lipase, IVF, zofran, morphine, CT abd  Differential Diagnosis:   Appendicitis, cholecystitis, cholangitis, pancreatitis, hepatitis, abdominal aortic aneurysm, diabetic ketoacidosis, bowel obstruction, intra-abdominal perforation, intra-abdominal infection vs. abscess, nephrolithiasis, pyelonephritis, urinary tract infection, electrolyte and/or metabolic abnormality, testicular/ovarian torsion             ED Course as of 06/18/23 2018   Sun Jun 18, 2023 1921 Comprehensive metabolic panel(!)  No significant abnormalities.  [CS]   1921 Lipase: 17  WNL [CS]   1927 WBC: 11.88    No leukocytosis [CS]   1927 Hemoglobin: 12.5 [CS]   1927 Hematocrit: 37.8 [CS]   1931 Pt to CT now. No improvement of pain.  [CS]   1940 Urinalysis, Reflex to Urine Culture Urine, Clean Catch(!)  Trace protein,  otherwise WNL. No evidence of infection.  [CS]   1948 Patient vomiting. Reglan ordered. [CS]   1958 CT Abdomen Pelvis With Contrast  1. Moderate right hydroureteronephrosis secondary to a 5 mm calculus at the UVJ.  2. Additional nonobstructing right renal calculus. [CS]   2008 Discussed results with patient. Patient still nauseated and experiencing 8/10 pain. No evidence of infected stone. Patient now admitting urinary frequency. Dr. Stewart will assume care from this point at end of my shift. Goal at this time to get patient's pain and nausea under control for outpatient management.  [CS]      ED Course User Index  [CS] Jania Garces PA-C                   Clinical Impression:   Final diagnoses:  [R10.31] RLQ abdominal pain  [R11.2] Nausea and vomiting, unspecified vomiting type  [N20.0] Kidney stone on right side (Primary)               Jania Garces PA-C  06/18/23 2018

## 2023-06-18 NOTE — ED NOTES
Pt informed cannot eat or drink; lights dimmed for calming; Pt provided warm blankets, SR x 2, spouse at bs, monitoring devices in place, call light remains in reach and instructed how to use.

## 2023-06-19 NOTE — PHARMACY MED REC
"    [facility]           Pharmacy  Admission Medication History     The home medication history was taken by Aydee Marcelino.      Medication history obtained from Medications listed below were obtained from: Patient/family.Patient states she is not taking any medications    Based on information gathered for medication list, you may go to "Admission" then "Reconcile Home Medications" tabs to review and/or act upon those items.     The home medication list has been updated by the Pharmacy department.   Please read ALL comments highlighted in yellow.   Please address this information as you see fit.    Feel free to contact us if you have any questions or require assistance.    The medications listed below were removed from the home medication list.  Please reorder if appropriate:    Patient reports NOT TAKING the following medication(s):  Prednisone 20mg  Zofran odt 4mg      No current facility-administered medications on file prior to encounter.     Current Outpatient Medications on File Prior to Encounter   Medication Sig Dispense Refill    fluconazole (DIFLUCAN) 200 MG Tab Take by mouth.      oxyCODONE-acetaminophen (PERCOCET)  mg per tablet Take 1 tablet by mouth every 4 (four) hours as needed for Pain.         Please address this information as you see fit.  Feel free to contact us if you have any questions or require assistance.    Aydee Marcelino  [phone]              .        "

## 2023-06-19 NOTE — DISCHARGE INSTRUCTIONS
A referral has been placed to urology. They will call you with an appointment time. You can also call the number on your discharge paperwork to schedule an appointment. You may return to the ER at any time for any new or concerning symptoms, worsening condition, or failure to improve, especially if you develop fever, severe pain, vomiting >3 times.    Our goal in the ER is to always give you outstanding care and exceptional service. You may receive a survey by mail or email in the next week about your experience in our ED. We would greatly appreciate you completing and returning the survey. Your feedback provides us with a way to recognize our staff who give very good care and it helps us learn how to improve when your experience was below our aspiration of excellence.     Sincerely,     Jania Garces PA-C  Emergency Room Physician Assistant  [facility]

## 2023-06-20 ENCOUNTER — NURSE TRIAGE (OUTPATIENT)
Dept: ADMINISTRATIVE | Facility: CLINIC | Age: 43
End: 2023-06-20
Payer: MEDICAID

## 2023-06-20 NOTE — TELEPHONE ENCOUNTER
Patient recently diagnosed with kidney stones in the ED. Currently c/o weakness, poor fluid intake, vomiting, headaches, and pain. Reports fever but did not measure. Vomited once today and is afraid to increase fluids due to the potential of vomiting. Urinated x 3 today. Her pain is controlled with Toradol and percocet. Also taking Reglan and Flomax to help with symptoms. Advised per protocol to continue to monitor symptoms at home. Encouraged patient to schedule f/u with urology. Will also send urology a message to reach out to the patient. Advised the patient to call back with any further questions or if symptoms worsen.        Reason for Disposition   Kidney stones, questions about    Additional Information   Negative: Shock suspected (e.g., cold/pale/clammy skin, too weak to stand, low BP, rapid pulse)   Negative: Sounds like a life-threatening emergency to the triager   Negative: [1] Unable to urinate (or only a few drops) > 4 hours AND [2] bladder feels very full (e.g., palpable bladder or strong urge to urinate)   Negative: [1] SEVERE pain (e.g., excruciating, scale 8-10) AND [2] not improved after pain medicine   Negative: SEVERE vomiting   Negative: Fever > 103 F (39.4 C)   Negative: Severe chills (i.e., feeling extremely cold WITH shaking chills)   Negative: Patient sounds very sick or weak to the triager   Negative: [1] MODERATE pain (e.g., interferes with normal activities) AND [2] constant AND [3] lasting longer than 4 hours AND [4] NOT improved with pain medicine   Negative: Passing blood or large blood clots (i.e., size > a dime)  (Exception: Pink or tea-colored urine, flecks or small strands of blood.)   Negative: Fever > 100.4 F (38.0 C)   Negative: [1] Caller has URGENT question (includes prescribed medication questions) AND [2] triager unable to answer question   Negative: SEVERE burning or pain with passing urine (urination)   Negative: [1] MODERATE pain (e.g., interferes with normal activities)  AND [2] pain comes and goes AND [3] present > 24 hours   Negative: Pregnant   Negative: Ureteral stent accidentally came out   Negative: [1] Caller has NON-URGENT question (includes prescribed medication questions) AND [2] triager unable to answer   Negative: Stone has not passed after 4 weeks   Negative: [1] Blood in urine (red or tea-colored) AND [2] lasts > 3 days  (Exception: Has a ureteral stent in place.)   Negative: [1] Pain persists AND [2] stone passed > 3 days ago   Negative: [1] MILD pain (e.g., does not interfere with normal activities) AND [2] pain comes and goes (cramps) AND [3] present > 7 days   Negative: [1] Kidney stone diagnosed by doctor (or NP/PA) AND [2] normal symptoms (e.g., nausea, pain) AND [3] no complications   Negative: [1] Recent ureteral STENT for kidney stone AND [2] normal post-op symptoms (e.g., blood in urine, nausea, pain) after ureteroscopy or ureteral STENT placement   Negative: [1] Recent shock wave LITHOTRIPSY for kidney stone AND [2] normal post-op symptoms (e.g., blood in urine, nausea, pain)    Protocols used: Kidney Stone Follow-up Call-A-

## 2023-06-21 ENCOUNTER — HOSPITAL ENCOUNTER (INPATIENT)
Facility: HOSPITAL | Age: 43
LOS: 3 days | Discharge: HOME | DRG: 661 | End: 2023-06-24
Attending: EMERGENCY MEDICINE | Admitting: STUDENT IN AN ORGANIZED HEALTH CARE EDUCATION/TRAINING PROGRAM
Payer: MEDICAID

## 2023-06-21 DIAGNOSIS — N20.1 CALCULUS OF URETEROVESICAL JUNCTION (UVJ): ICD-10-CM

## 2023-06-21 DIAGNOSIS — N20.0 NEPHROLITHIASIS: Primary | ICD-10-CM

## 2023-06-21 DIAGNOSIS — N39.0 URINARY TRACT INFECTION WITHOUT HEMATURIA, SITE UNSPECIFIED: ICD-10-CM

## 2023-06-21 DIAGNOSIS — N17.9 AKI (ACUTE KIDNEY INJURY): ICD-10-CM

## 2023-06-21 DIAGNOSIS — R10.9 FLANK PAIN: ICD-10-CM

## 2023-06-21 DIAGNOSIS — E80.6 HYPERBILIRUBINEMIA: ICD-10-CM

## 2023-06-21 PROBLEM — N12 PYELONEPHRITIS: Status: ACTIVE | Noted: 2023-06-21

## 2023-06-21 LAB
ALBUMIN SERPL BCP-MCNC: 2.9 G/DL (ref 3.5–5.2)
ALP SERPL-CCNC: 138 U/L (ref 55–135)
ALT SERPL W/O P-5'-P-CCNC: 29 U/L (ref 10–44)
ANION GAP SERPL CALC-SCNC: 15 MMOL/L (ref 8–16)
AST SERPL-CCNC: 38 U/L (ref 10–40)
B-HCG UR QL: NEGATIVE
BACTERIA #/AREA URNS HPF: ABNORMAL /HPF
BASOPHILS # BLD AUTO: 0.08 K/UL (ref 0–0.2)
BASOPHILS NFR BLD: 1 % (ref 0–1.9)
BILIRUB DIRECT SERPL-MCNC: 0.6 MG/DL (ref 0.1–0.3)
BILIRUB SERPL-MCNC: 1.1 MG/DL (ref 0.1–1)
BILIRUB UR QL STRIP: NEGATIVE
BUN SERPL-MCNC: 33 MG/DL (ref 6–20)
CALCIUM SERPL-MCNC: 10 MG/DL (ref 8.7–10.5)
CHLORIDE SERPL-SCNC: 95 MMOL/L (ref 95–110)
CK SERPL-CCNC: 44 U/L (ref 20–180)
CLARITY UR: ABNORMAL
CO2 SERPL-SCNC: 21 MMOL/L (ref 23–29)
COLOR UR: YELLOW
CREAT SERPL-MCNC: 2.1 MG/DL (ref 0.5–1.4)
CREAT UR-MCNC: 83.9 MG/DL (ref 15–325)
CTP QC/QA: YES
DIFFERENTIAL METHOD: ABNORMAL
EOSINOPHIL # BLD AUTO: 0 K/UL (ref 0–0.5)
EOSINOPHIL NFR BLD: 0.1 % (ref 0–8)
ERYTHROCYTE [DISTWIDTH] IN BLOOD BY AUTOMATED COUNT: 13.4 % (ref 11.5–14.5)
EST. GFR  (NO RACE VARIABLE): 29 ML/MIN/1.73 M^2
GLUCOSE SERPL-MCNC: 103 MG/DL (ref 70–110)
GLUCOSE UR QL STRIP: NEGATIVE
HCT VFR BLD AUTO: 37.2 % (ref 37–48.5)
HGB BLD-MCNC: 12.6 G/DL (ref 12–16)
HGB UR QL STRIP: ABNORMAL
HYALINE CASTS #/AREA URNS LPF: 0 /LPF
IMM GRANULOCYTES # BLD AUTO: 0.02 K/UL (ref 0–0.04)
IMM GRANULOCYTES NFR BLD AUTO: 0.2 % (ref 0–0.5)
KETONES UR QL STRIP: NEGATIVE
LACTATE SERPL-SCNC: 1.9 MMOL/L (ref 0.5–2.2)
LEUKOCYTE ESTERASE UR QL STRIP: ABNORMAL
LIPASE SERPL-CCNC: 7 U/L (ref 4–60)
LYMPHOCYTES # BLD AUTO: 0.6 K/UL (ref 1–4.8)
LYMPHOCYTES NFR BLD: 7.5 % (ref 18–48)
MCH RBC QN AUTO: 27.2 PG (ref 27–31)
MCHC RBC AUTO-ENTMCNC: 33.9 G/DL (ref 32–36)
MCV RBC AUTO: 80 FL (ref 82–98)
MICROSCOPIC COMMENT: ABNORMAL
MONOCYTES # BLD AUTO: 0.2 K/UL (ref 0.3–1)
MONOCYTES NFR BLD: 2.6 % (ref 4–15)
NEUTROPHILS # BLD AUTO: 7.3 K/UL (ref 1.8–7.7)
NEUTROPHILS NFR BLD: 88.6 % (ref 38–73)
NITRITE UR QL STRIP: NEGATIVE
NRBC BLD-RTO: 0 /100 WBC
PH UR STRIP: 6 [PH] (ref 5–8)
PLATELET # BLD AUTO: 202 K/UL (ref 150–450)
PLATELET BLD QL SMEAR: ABNORMAL
PMV BLD AUTO: 9.4 FL (ref 9.2–12.9)
POCT GLUCOSE: 97 MG/DL (ref 70–110)
POCT GLUCOSE: 99 MG/DL (ref 70–110)
POTASSIUM SERPL-SCNC: 3.5 MMOL/L (ref 3.5–5.1)
PROT SERPL-MCNC: 8.5 G/DL (ref 6–8.4)
PROT UR QL STRIP: ABNORMAL
RBC # BLD AUTO: 4.64 M/UL (ref 4–5.4)
RBC #/AREA URNS HPF: 5 /HPF (ref 0–4)
SODIUM SERPL-SCNC: 131 MMOL/L (ref 136–145)
SODIUM UR-SCNC: 24 MMOL/L (ref 20–250)
SP GR UR STRIP: 1.01 (ref 1–1.03)
SQUAMOUS #/AREA URNS HPF: 5 /HPF
UNIDENT CRYS URNS QL MICRO: 13
URN SPEC COLLECT METH UR: ABNORMAL
UROBILINOGEN UR STRIP-ACNC: ABNORMAL EU/DL
WBC # BLD AUTO: 8.22 K/UL (ref 3.9–12.7)
WBC #/AREA URNS HPF: 32 /HPF (ref 0–5)
WBC CLUMPS URNS QL MICRO: ABNORMAL

## 2023-06-21 PROCEDURE — 80053 COMPREHEN METABOLIC PANEL: CPT | Performed by: NURSE PRACTITIONER

## 2023-06-21 PROCEDURE — 25000003 PHARM REV CODE 250: Performed by: STUDENT IN AN ORGANIZED HEALTH CARE EDUCATION/TRAINING PROGRAM

## 2023-06-21 PROCEDURE — 83690 ASSAY OF LIPASE: CPT | Performed by: STUDENT IN AN ORGANIZED HEALTH CARE EDUCATION/TRAINING PROGRAM

## 2023-06-21 PROCEDURE — 83605 ASSAY OF LACTIC ACID: CPT | Performed by: NURSE PRACTITIONER

## 2023-06-21 PROCEDURE — 81025 URINE PREGNANCY TEST: CPT | Performed by: NURSE PRACTITIONER

## 2023-06-21 PROCEDURE — 99285 EMERGENCY DEPT VISIT HI MDM: CPT | Mod: 25

## 2023-06-21 PROCEDURE — 63600175 PHARM REV CODE 636 W HCPCS: Performed by: STUDENT IN AN ORGANIZED HEALTH CARE EDUCATION/TRAINING PROGRAM

## 2023-06-21 PROCEDURE — 81000 URINALYSIS NONAUTO W/SCOPE: CPT | Performed by: NURSE PRACTITIONER

## 2023-06-21 PROCEDURE — 11000001 HC ACUTE MED/SURG PRIVATE ROOM

## 2023-06-21 PROCEDURE — 36415 COLL VENOUS BLD VENIPUNCTURE: CPT | Performed by: STUDENT IN AN ORGANIZED HEALTH CARE EDUCATION/TRAINING PROGRAM

## 2023-06-21 PROCEDURE — 87186 SC STD MICRODIL/AGAR DIL: CPT | Performed by: NURSE PRACTITIONER

## 2023-06-21 PROCEDURE — 85025 COMPLETE CBC W/AUTO DIFF WBC: CPT | Performed by: NURSE PRACTITIONER

## 2023-06-21 PROCEDURE — 96365 THER/PROPH/DIAG IV INF INIT: CPT

## 2023-06-21 PROCEDURE — 82550 ASSAY OF CK (CPK): CPT | Performed by: STUDENT IN AN ORGANIZED HEALTH CARE EDUCATION/TRAINING PROGRAM

## 2023-06-21 PROCEDURE — 84300 ASSAY OF URINE SODIUM: CPT | Performed by: STUDENT IN AN ORGANIZED HEALTH CARE EDUCATION/TRAINING PROGRAM

## 2023-06-21 PROCEDURE — 25000003 PHARM REV CODE 250: Performed by: NURSE PRACTITIONER

## 2023-06-21 PROCEDURE — 87088 URINE BACTERIA CULTURE: CPT | Performed by: NURSE PRACTITIONER

## 2023-06-21 PROCEDURE — 87077 CULTURE AEROBIC IDENTIFY: CPT | Performed by: NURSE PRACTITIONER

## 2023-06-21 PROCEDURE — 96375 TX/PRO/DX INJ NEW DRUG ADDON: CPT

## 2023-06-21 PROCEDURE — 87086 URINE CULTURE/COLONY COUNT: CPT | Performed by: NURSE PRACTITIONER

## 2023-06-21 PROCEDURE — 63600175 PHARM REV CODE 636 W HCPCS: Performed by: NURSE PRACTITIONER

## 2023-06-21 PROCEDURE — 82248 BILIRUBIN DIRECT: CPT | Performed by: NURSE PRACTITIONER

## 2023-06-21 PROCEDURE — 82570 ASSAY OF URINE CREATININE: CPT | Performed by: STUDENT IN AN ORGANIZED HEALTH CARE EDUCATION/TRAINING PROGRAM

## 2023-06-21 RX ORDER — FLUCONAZOLE 200 MG/1
TABLET ORAL
COMMUNITY
Start: 2021-08-12 | End: 2023-06-21 | Stop reason: SDUPTHER

## 2023-06-21 RX ORDER — HYDROCODONE BITARTRATE AND ACETAMINOPHEN 5; 325 MG/1; MG/1
1 TABLET ORAL EVERY 6 HOURS PRN
COMMUNITY
Start: 2023-06-19 | End: 2023-06-21

## 2023-06-21 RX ORDER — HYDROMORPHONE HYDROCHLORIDE 1 MG/ML
1 INJECTION, SOLUTION INTRAMUSCULAR; INTRAVENOUS; SUBCUTANEOUS EVERY 6 HOURS PRN
Status: DISCONTINUED | OUTPATIENT
Start: 2023-06-21 | End: 2023-06-22

## 2023-06-21 RX ORDER — MULTIVIT-MIN/IRON FUM/FOLIC AC 18MG-0.4MG
1 TABLET ORAL DAILY
COMMUNITY

## 2023-06-21 RX ORDER — DEXTROSE 40 %
15 GEL (GRAM) ORAL
Status: DISCONTINUED | OUTPATIENT
Start: 2023-06-21 | End: 2023-06-23

## 2023-06-21 RX ORDER — SODIUM CHLORIDE, SODIUM LACTATE, POTASSIUM CHLORIDE, CALCIUM CHLORIDE 600; 310; 30; 20 MG/100ML; MG/100ML; MG/100ML; MG/100ML
INJECTION, SOLUTION INTRAVENOUS CONTINUOUS
Status: ACTIVE | OUTPATIENT
Start: 2023-06-21 | End: 2023-06-22

## 2023-06-21 RX ORDER — SODIUM CHLORIDE 0.9 % (FLUSH) 0.9 %
5 SYRINGE (ML) INJECTION
Status: DISCONTINUED | OUTPATIENT
Start: 2023-06-21 | End: 2023-06-24 | Stop reason: HOSPADM

## 2023-06-21 RX ORDER — DEXTROSE 40 %
30 GEL (GRAM) ORAL
Status: DISCONTINUED | OUTPATIENT
Start: 2023-06-21 | End: 2023-06-23

## 2023-06-21 RX ORDER — INSULIN ASPART 100 [IU]/ML
1-10 INJECTION, SOLUTION INTRAVENOUS; SUBCUTANEOUS
Status: DISCONTINUED | OUTPATIENT
Start: 2023-06-21 | End: 2023-06-23

## 2023-06-21 RX ORDER — ONDANSETRON 8 MG/1
8 TABLET, ORALLY DISINTEGRATING ORAL EVERY 8 HOURS PRN
Status: DISCONTINUED | OUTPATIENT
Start: 2023-06-21 | End: 2023-06-24 | Stop reason: HOSPADM

## 2023-06-21 RX ORDER — ONDANSETRON 2 MG/ML
4 INJECTION INTRAMUSCULAR; INTRAVENOUS
Status: COMPLETED | OUTPATIENT
Start: 2023-06-21 | End: 2023-06-21

## 2023-06-21 RX ORDER — MORPHINE SULFATE 4 MG/ML
4 INJECTION, SOLUTION INTRAMUSCULAR; INTRAVENOUS
Status: COMPLETED | OUTPATIENT
Start: 2023-06-21 | End: 2023-06-21

## 2023-06-21 RX ORDER — GLUCAGON 1 MG
1 KIT INJECTION
Status: DISCONTINUED | OUTPATIENT
Start: 2023-06-21 | End: 2023-06-23

## 2023-06-21 RX ORDER — TAMSULOSIN HYDROCHLORIDE 0.4 MG/1
0.4 CAPSULE ORAL DAILY
Status: DISCONTINUED | OUTPATIENT
Start: 2023-06-22 | End: 2023-06-24 | Stop reason: HOSPADM

## 2023-06-21 RX ORDER — ACETAMINOPHEN 325 MG/1
650 TABLET ORAL EVERY 8 HOURS PRN
Status: DISCONTINUED | OUTPATIENT
Start: 2023-06-21 | End: 2023-06-23

## 2023-06-21 RX ORDER — NALOXONE HCL 0.4 MG/ML
0.02 VIAL (ML) INJECTION
Status: DISCONTINUED | OUTPATIENT
Start: 2023-06-21 | End: 2023-06-24 | Stop reason: HOSPADM

## 2023-06-21 RX ADMIN — HYDROMORPHONE HYDROCHLORIDE 1 MG: 1 INJECTION, SOLUTION INTRAMUSCULAR; INTRAVENOUS; SUBCUTANEOUS at 05:06

## 2023-06-21 RX ADMIN — SODIUM CHLORIDE, POTASSIUM CHLORIDE, SODIUM LACTATE AND CALCIUM CHLORIDE: 600; 310; 30; 20 INJECTION, SOLUTION INTRAVENOUS at 06:06

## 2023-06-21 RX ADMIN — CEFTRIAXONE SODIUM 2 G: 2 INJECTION, POWDER, FOR SOLUTION INTRAMUSCULAR; INTRAVENOUS at 03:06

## 2023-06-21 RX ADMIN — SODIUM CHLORIDE 1000 ML: 9 INJECTION, SOLUTION INTRAVENOUS at 02:06

## 2023-06-21 RX ADMIN — ONDANSETRON 4 MG: 2 INJECTION INTRAMUSCULAR; INTRAVENOUS at 02:06

## 2023-06-21 RX ADMIN — ONDANSETRON 8 MG: 8 TABLET, ORALLY DISINTEGRATING ORAL at 05:06

## 2023-06-21 RX ADMIN — MORPHINE SULFATE 4 MG: 4 INJECTION INTRAVENOUS at 02:06

## 2023-06-21 NOTE — ASSESSMENT & PLAN NOTE
In setting of vomiting/minimal PO intake, can't rule out pyelonephritis.    - S/p 1L bolus, continue maintenance fluids for now  - Avoid nephrotoxins  - F/u RICK studies  - F/u CT abd/pelvis

## 2023-06-21 NOTE — ASSESSMENT & PLAN NOTE
Noted on previous CT in setting of worsening abdominal pain and N/V with worsening RICK and UTI.    - Urology consulted, reccs appreciated  - NPO for procedure likely in AM  - Maintenance fluids for now  - F/u repeat CT abd/pelvis without contrast  - Continue flomax  - Rocephin for UTI

## 2023-06-21 NOTE — ASSESSMENT & PLAN NOTE
In setting of UVJ stone, pathological urine with 1+ leukocytes, 2+ blood. Can't rule out pyelo at this time.    - Continue IV rocephin  - F/u urine culture  - F/u CT abd/pelvis

## 2023-06-21 NOTE — NURSING
Patient received from the ED. Pt oriented to room and call light. Instructed to use call light for assistance. Safety maintained. Bed alarm set. Instructed to use call light for assistance. Wctm.

## 2023-06-21 NOTE — ED PROVIDER NOTES
Encounter Date: 2023       History     Chief Complaint   Patient presents with    Flank Pain     Pt dx with kidney stone on    pt complains of increased pain and nausea and no relief from rx meds      43 yr old female presents to the ER with reports of right flank pain, nausea and worsening of pain. Pt was seen here on  with dx of a 5 mm calculus at the right ureterovesicular junction resulting in moderate hydroureteronephrosis and right perinephric edema and fat stranding.  Additional right-sided nonobstructing renal calculus measures 4 mm. Pt states she cannot tolerate the pain at home and has been having subjective temp. PMH of IBS, osteoarthritis and sickle cell trait.     The history is provided by the patient and a relative. No  was used.   Review of patient's allergies indicates:   Allergen Reactions    Aspirin Other (See Comments)     Abdominal cramping     Past Medical History:   Diagnosis Date    IBS (irritable bowel syndrome)     Osteoarthritis     Sickle cell trait      Past Surgical History:   Procedure Laterality Date    ANKLE SURGERY      left     SECTION, CLASSIC      x3    CHOLECYSTECTOMY       History reviewed. No pertinent family history.  Social History     Tobacco Use    Smoking status: Never    Smokeless tobacco: Never   Substance Use Topics    Alcohol use: No    Drug use: No     Review of Systems   Gastrointestinal:  Positive for nausea and vomiting.   Genitourinary:  Positive for flank pain.   All other systems reviewed and are negative.    Physical Exam     Initial Vitals [23 1345]   BP Pulse Resp Temp SpO2   125/70 (!) 120 20 98.6 °F (37 °C) 99 %      MAP       --         Physical Exam    Constitutional: She appears well-developed and well-nourished.  Non-toxic appearance. She has a sickly appearance.   HENT:   Head: Normocephalic.   Right Ear: Hearing and tympanic membrane normal.   Left Ear: Hearing and tympanic membrane normal.   Nose:  Nose normal.   Mouth/Throat: Oropharynx is clear and moist.   Eyes: Lids are normal. Pupils are equal, round, and reactive to light.   Neck:   Normal range of motion.  Cardiovascular:    Tachycardia present.         Pulmonary/Chest: Breath sounds normal. No respiratory distress. She has no wheezes. She has no rhonchi.   Abdominal: Abdomen is soft. There is no abdominal tenderness.   There is right CVA tenderness.  Musculoskeletal:         General: Normal range of motion.      Cervical back: Normal range of motion. No rigidity.     Neurological: She is alert and oriented to person, place, and time.   Skin: Skin is warm and dry. No rash noted.   Psychiatric: She has a normal mood and affect. Her behavior is normal. Judgment and thought content normal.       ED Course   Procedures  Labs Reviewed   CBC W/ AUTO DIFFERENTIAL - Abnormal; Notable for the following components:       Result Value    MCV 80 (*)     Lymph # 0.6 (*)     Mono # 0.2 (*)     Gran % 88.6 (*)     Lymph % 7.5 (*)     Mono % 2.6 (*)     All other components within normal limits   COMPREHENSIVE METABOLIC PANEL - Abnormal; Notable for the following components:    Sodium 131 (*)     CO2 21 (*)     BUN 33 (*)     Creatinine 2.1 (*)     Total Protein 8.5 (*)     Albumin 2.9 (*)     Total Bilirubin 1.1 (*)     Alkaline Phosphatase 138 (*)     eGFR 29 (*)     All other components within normal limits   URINALYSIS, REFLEX TO URINE CULTURE - Abnormal; Notable for the following components:    Appearance, UA Cloudy (*)     Protein, UA 2+ (*)     Occult Blood UA 2+ (*)     Urobilinogen, UA 2.0-3.0 (*)     Leukocytes, UA 1+ (*)     All other components within normal limits    Narrative:     Specimen Source->Urine   URINALYSIS MICROSCOPIC - Abnormal; Notable for the following components:    RBC, UA 5 (*)     WBC, UA 32 (*)     WBC Clumps, UA Few (*)     Bacteria Moderate (*)     All other components within normal limits    Narrative:     Specimen Source->Urine    BILIRUBIN, DIRECT - Abnormal; Notable for the following components:    Bilirubin, Direct 0.6 (*)     All other components within normal limits   CULTURE, URINE   LACTIC ACID, PLASMA   BILIRUBIN, DIRECT   CREATININE, URINE, RANDOM   SODIUM, URINE, RANDOM   POCT URINE PREGNANCY   POCT GLUCOSE, HAND-HELD DEVICE          Imaging Results               CT Abdomen Pelvis  Without Contrast (Final result)  Result time 06/21/23 17:21:47      Final result by Jose Chritsensen MD (06/21/23 17:21:47)                   Impression:      Moderate to high grade obstruction with collimation of contrast within the ureter and Pilar calyceal system of the swollen right kidney with 5 mm stone remaining impacted at the UVJ.    This report was flagged in Epic as abnormal.      Electronically signed by: Jose Christensen  Date:    06/21/2023  Time:    17:21               Narrative:    EXAMINATION:  CT ABDOMEN PELVIS WITHOUT CONTRAST    CLINICAL HISTORY:  Flank pain, kidney stone suspected;UTI, recurrent/complicated (Female);    TECHNIQUE:  Low dose axial images, sagittal and coronal reformations were obtained from the lung bases to the pubic symphysis.  Oral contrast was not administered.    COMPARISON:  06/18/2023    FINDINGS:  There is persistent collimation of contrast from the previous CT in the dilated Pilar calyceal system and ureter down to the 5 mm stone appearing to be impacted at the UVJ.  The nonobstructing calculus in the lower pole of the left kidney is stable.    Nonobstructing 2 mm calculus in the left kidney is noted.  No lower tract or obstructing stone on the left urinary tract is evident.    The uterus, loops of bowel, liver, spleen, pancreas, adrenal glands appear stable.    The aorta and vena cava appear stable.  There is atelectasis and small effusion developing in the right lung base                                       Medications   sodium chloride 0.9% flush 5 mL (has no administration in time range)    acetaminophen tablet 650 mg (has no administration in time range)   naloxone 0.4 mg/mL injection 0.02 mg (has no administration in time range)   insulin aspart U-100 pen 1-10 Units (has no administration in time range)   glucagon (human recombinant) injection 1 mg (has no administration in time range)   dextrose 10% bolus 125 mL 125 mL (has no administration in time range)   dextrose 10% bolus 250 mL 250 mL (has no administration in time range)   dextrose 40 % gel 15,000 mg (has no administration in time range)   dextrose 40 % gel 30,000 mg (has no administration in time range)   ondansetron disintegrating tablet 8 mg (8 mg Oral Given 6/21/23 1701)   tamsulosin 24 hr capsule 0.4 mg (has no administration in time range)   lactated ringers infusion (has no administration in time range)   cefTRIAXone (ROCEPHIN) 1 g in dextrose 5 % in water (D5W) 5 % 100 mL IVPB (MB+) (has no administration in time range)   HYDROmorphone injection 1 mg (1 mg Intravenous Given 6/21/23 1700)   sodium chloride 0.9% bolus 1,000 mL 1,000 mL (0 mLs Intravenous Stopped 6/21/23 1531)   morphine injection 4 mg (4 mg Intravenous Given 6/21/23 1441)   ondansetron injection 4 mg (4 mg Intravenous Given 6/21/23 1441)   cefTRIAXone (ROCEPHIN) 2 g in dextrose 5 % in water (D5W) 5 % 100 mL IVPB (MB+) (0 g Intravenous Stopped 6/21/23 1531)     Medical Decision Making:   Differential Diagnosis:   Differential Diagnosis includes, but is not limited to:  AAA, aortic dissection, SBO/volvulus, intussusception, ileus, appendicitis, cholecystitis, hepatitis, nephrolithiasis, pancreatitis, IBD/IBS, biliary colic, GERD, PUD, constipation, UTI/pyelonephritis, musculoskeletal pain.      Clinical Tests:   Lab Tests: Ordered and Reviewed  Radiological Study: Ordered           ED Course as of 06/21/23 1758   Wed Jun 21, 2023   1436    Kidneys and urinary collecting systems: There is a 5 mm calculus at the right ureterovesicular junction resulting in moderate  hydroureteronephrosis and right perinephric edema and fat stranding.  Additional right-sided nonobstructing renal calculus measures 4 mm.  The left kidney is unremarkable without hydronephrosis or nephrolithiasis. [DT]   1443   CBC has been reviewed with no elevation in the white count.  Urinary analysis and CMP are pending. [DT]   1447 Spoke with Dr Blevins. Planning for admission with possible procedure today or tomorrow.  [DT]   1449 On line with [facility] for admission.  [DT]   1501 CMP reviewed. Elevation in Kidney function noted at 33/2.1 [DT]      ED Course User Index  [DT] Savannah Cordon NP                 Clinical Impression:   Final diagnoses:  [N20.0] Nephrolithiasis (Primary)  [N17.9] RICK (acute kidney injury)        ED Disposition Condition    Observation                 Savannah Cordon NP  06/21/23 3659

## 2023-06-21 NOTE — PHARMACY MED REC
"  Admission Medication History     The home medication history was taken by Yelitza Mathews CPhT.    Medication history obtained from, Patient Verified    You may go to "Admission" then "Reconcile Home Medications" tabs to review and/or act upon these items.     The home medication list has been updated by the Pharmacy department.   Please read ALL comments highlighted in yellow.   Please address this information as you see fit.    Feel free to contact us if you have any questions or require assistance.      The medications listed below were removed from the home medication list.  Please reorder if appropriate:  Patient reports no longer taking the following medication(s):  Osteo-Bi-Flex  Norco 5-325 mg        Yelitza Mathews CPhT.  Ext [phone]             .        "

## 2023-06-21 NOTE — ASSESSMENT & PLAN NOTE
Interval development between ED visits (3 days). Alk phos elevated as well. Diffuse abdominal pain on exam.     - F/u lipase  - F/u direct bili  - F/u CT abd/pelvis

## 2023-06-21 NOTE — HPI
44 yo F with PMHx with history depression, opioid dependence who presented to ED due to 3 days worsening abdominal pain, nausea/vomiting, minimal PO intake. Patient initially presented to ED on 6/18 with RLQ pain, UA without findings of infection. Patient did have RICK (baseline 0.6 -> 1.2). CT abdomen/pelvis demonstrated a 5 mm calculus at the right UVJ. Patient was discharged home with conservative management. Patient provided urinary strainer but per the patient there was no passage of stone.    On current presentation, patient afebrile but tachycardic. Repeat UA this time now with 1+ leukocytes, 2+ blood. WBC WNL. CMP concern both for worsening RICK (1.2 ->2.1) as well as elevated t.bili of 1.1, alk phos 138. Lactate WNL. Patient started on IV rocephin, given 1L NS bolus, 4 mg IV morphine. Urology consulted, tentative plan to stent patient pending repeat CT imaging of stone. Patient admitted to [facility] for medical management.

## 2023-06-21 NOTE — ED TRIAGE NOTES
C/o right flank pain since Sunday. Seen here Sunday for similar c/o and diagnosed with kidney stones. Reports n/v/d. Denies fever or hematuria.

## 2023-06-21 NOTE — SUBJECTIVE & OBJECTIVE
Past Medical History:   Diagnosis Date    IBS (irritable bowel syndrome)     Osteoarthritis     Sickle cell trait        Past Surgical History:   Procedure Laterality Date    ANKLE SURGERY      left     SECTION, CLASSIC      x3    CHOLECYSTECTOMY         Review of patient's allergies indicates:   Allergen Reactions    Aspirin Other (See Comments)     Abdominal cramping       No current facility-administered medications on file prior to encounter.     Current Outpatient Medications on File Prior to Encounter   Medication Sig    fluconazole (DIFLUCAN) 200 MG Tab Take 200 mg by mouth once daily.    ketorolac (TORADOL) 10 mg tablet Take 1 tablet (10 mg total) by mouth every 6 (six) hours as needed for Pain.    metoclopramide HCl (REGLAN) 10 MG tablet Take 1 tablet (10 mg total) by mouth every 6 (six) hours as needed (for nausea).    multivit-min-iron fum-folic ac (ONE-A-DAY WOMEN'S COMPLETE) 18 mg iron- 400 mcg Tab Take 1 tablet by mouth once daily.    oxyCODONE-acetaminophen (PERCOCET)  mg per tablet Take 1 tablet by mouth every 6 (six) hours as needed for Pain.    tamsulosin (FLOMAX) 0.4 mg Cap Take 1 capsule (0.4 mg total) by mouth once daily. for 15 days    [DISCONTINUED] fluconazole (DIFLUCAN) 200 MG Tab Take one tablet by mouth stat and repeat in 7 days    [DISCONTINUED] glucosamine/chondr roca A sod (OSTEO BI-FLEX ORAL) Take by mouth.    [DISCONTINUED] HYDROcodone-acetaminophen (NORCO) 5-325 mg per tablet Take 1 tablet by mouth every 6 (six) hours as needed.    [DISCONTINUED] oxyCODONE-acetaminophen (PERCOCET)  mg per tablet Take 1 tablet by mouth every 4 (four) hours as needed for Pain.     Family History    None       Tobacco Use    Smoking status: Never    Smokeless tobacco: Never   Substance and Sexual Activity    Alcohol use: No    Drug use: No    Sexual activity: Not on file     Review of Systems   Constitutional:  Negative for chills and fever.   HENT:  Negative for congestion,  rhinorrhea and sore throat.    Eyes:  Negative for visual disturbance.   Respiratory:  Negative for cough and shortness of breath.    Cardiovascular:  Negative for chest pain.   Gastrointestinal:  Positive for abdominal pain, nausea and vomiting. Negative for abdominal distention and diarrhea.   Genitourinary:  Positive for dysuria. Negative for decreased urine volume.   Musculoskeletal:  Negative for arthralgias and myalgias.   Skin:  Negative for rash.   Neurological:  Negative for dizziness, weakness, numbness and headaches.   Psychiatric/Behavioral:  Negative for dysphoric mood. The patient is not nervous/anxious.    Objective:     Vital Signs (Most Recent):  Temp: 99 °F (37.2 °C) (06/21/23 1546)  Pulse: 104 (06/21/23 1546)  Resp: 16 (06/21/23 1546)  BP: 132/78 (06/21/23 1546)  SpO2: 96 % (06/21/23 1546) Vital Signs (24h Range):  Temp:  [98.6 °F (37 °C)-99 °F (37.2 °C)] 99 °F (37.2 °C)  Pulse:  [104-120] 104  Resp:  [16-20] 16  SpO2:  [96 %-99 %] 96 %  BP: (125-132)/(70-80) 132/78     Weight: 90.7 kg (200 lb)  Body mass index is 33.28 kg/m².     Physical Exam  Constitutional:       General: She is not in acute distress.     Appearance: Normal appearance. She is well-developed. She is not diaphoretic.   HENT:      Head: Normocephalic and atraumatic.      Mouth/Throat:      Mouth: Mucous membranes are moist.      Pharynx: Oropharynx is clear.   Cardiovascular:      Rate and Rhythm: Normal rate and regular rhythm.      Pulses: Normal pulses.   Pulmonary:      Effort: Pulmonary effort is normal. No respiratory distress.      Breath sounds: Normal breath sounds. No wheezing.   Abdominal:      General: Abdomen is flat. Bowel sounds are normal. There is no distension.      Palpations: Abdomen is soft.      Tenderness: There is abdominal tenderness. There is right CVA tenderness. There is no left CVA tenderness.   Musculoskeletal:         General: Normal range of motion.      Cervical back: Normal range of motion and  neck supple.   Skin:     General: Skin is warm and dry.      Capillary Refill: Capillary refill takes less than 2 seconds.      Findings: No rash.   Neurological:      General: No focal deficit present.      Mental Status: She is alert and oriented to person, place, and time.   Psychiatric:         Mood and Affect: Mood normal.         Behavior: Behavior normal.              Significant Labs: CBC:   Recent Labs   Lab 06/21/23  1408   WBC 8.22   HGB 12.6   HCT 37.2        CMP:   Recent Labs   Lab 06/21/23  1408   *   K 3.5   CL 95   CO2 21*      BUN 33*   CREATININE 2.1*   CALCIUM 10.0   PROT 8.5*   ALBUMIN 2.9*   BILITOT 1.1*   ALKPHOS 138*   AST 38   ALT 29   ANIONGAP 15       Significant Imaging: I have reviewed all pertinent imaging results/findings within the past 24 hours.

## 2023-06-21 NOTE — FIRST PROVIDER EVALUATION
Emergency Department TeleTriage Encounter Note      CHIEF COMPLAINT    Chief Complaint   Patient presents with    Flank Pain     Pt dx with kidney stone on   6/19 pt complains of increased pain and nausea and no relief from rx meds        VITAL SIGNS   Initial Vitals [06/21/23 1345]   BP Pulse Resp Temp SpO2   125/70 (!) 120 20 98.6 °F (37 °C) 99 %      MAP       --            ALLERGIES    Review of patient's allergies indicates:   Allergen Reactions    Aspirin Other (See Comments)     Abdominal cramping       PROVIDER TRIAGE NOTE  This is a teletriage evaluation of a 43 y.o. female presenting to the ED with c/o abdominal pain - recent diagnosis of kidney stone. Has not been able to drink and having recurring pain. Limited physical exam via telehealth: The patient is awake, alert, answering questions appropriately and is not in respiratory distress. Initial orders will be placed and care will be transferred to an alternate provider when patient is roomed for a full evaluation. Any additional orders and the final disposition will be determined by that provider.         ORDERS  Labs Reviewed   CBC W/ AUTO DIFFERENTIAL   COMPREHENSIVE METABOLIC PANEL   URINALYSIS, REFLEX TO URINE CULTURE       ED Orders (720h ago, onward)      Start Ordered     Status Ordering Provider    06/21/23 1400 06/21/23 1349  sodium chloride 0.9% bolus 1,000 mL 1,000 mL  ED 1 Time         Ordered MELITON CHI    06/21/23 1350 06/21/23 1349  Saline lock IV  Once         Ordered MELITON CHI    06/21/23 1350 06/21/23 1349  CBC auto differential  STAT         Ordered MELITON CHI    06/21/23 1350 06/21/23 1349  Comprehensive metabolic panel  STAT         Ordered MELITON CHI    06/21/23 1350 06/21/23 1349  Urinalysis, Reflex to Urine Culture Urine, Clean Catch  STAT         Ordered MELITON CHI    06/21/23 1350 06/21/23 1349  POCT urine pregnancy  Once         Ordered MELITON CHI              Virtual Visit Note: The  provider triage portion of this emergency department evaluation and documentation was performed via [employer], a HIPAA-compliant telemedicine application, in concert with a tele-presenter in the room. A face to face patient evaluation with one of my colleagues will occur once the patient is placed in an emergency department room.      DISCLAIMER: This note was prepared with [employer] voice recognition transcription software. Garbled syntax, mangled pronouns, and other bizarre constructions may be attributed to that software system.

## 2023-06-21 NOTE — H&P
[facility] Medicine  History & Physical    Patient Name: Sherri Machado  MRN: [id number]  Patient Class: OP- Observation  Admission Date: 2023  Attending Physician: Tomasa Orantes MD   Primary Care Provider: Modesto Waldron MD         Patient information was obtained from patient and ER records.     Subjective:     Principal Problem:Calculus of ureterovesical junction (UVJ)    Chief Complaint:   Chief Complaint   Patient presents with    Flank Pain     Pt dx with kidney stone on    pt complains of increased pain and nausea and no relief from rx meds         HPI: 44 yo F with PMHx with history depression, opioid dependence who presented to ED due to 3 days worsening abdominal pain, nausea/vomiting, minimal PO intake. Patient initially presented to ED on  with RLQ pain, UA without findings of infection. Patient did have RICK (baseline 0.6 -> 1.2). CT abdomen/pelvis demonstrated a 5 mm calculus at the right UVJ. Patient was discharged home with conservative management. Patient provided urinary strainer but per the patient there was no passage of stone.    On current presentation, patient afebrile but tachycardic. Repeat UA this time now with 1+ leukocytes, 2+ blood. WBC WNL. CMP concern both for worsening RICK (1.2 ->2.1) as well as elevated t.bili of 1.1, alk phos 138. Lactate WNL. Patient started on IV rocephin, given 1L NS bolus, 4 mg IV morphine. Urology consulted, tentative plan to stent patient pending repeat CT imaging of stone. Patient admitted to [facility] for medical management.      Past Medical History:   Diagnosis Date    IBS (irritable bowel syndrome)     Osteoarthritis     Sickle cell trait        Past Surgical History:   Procedure Laterality Date    ANKLE SURGERY      left     SECTION, CLASSIC      x3    CHOLECYSTECTOMY         Review of patient's allergies indicates:   Allergen Reactions    Aspirin Other (See Comments)     Abdominal  cramping       No current facility-administered medications on file prior to encounter.     Current Outpatient Medications on File Prior to Encounter   Medication Sig    fluconazole (DIFLUCAN) 200 MG Tab Take 200 mg by mouth once daily.    ketorolac (TORADOL) 10 mg tablet Take 1 tablet (10 mg total) by mouth every 6 (six) hours as needed for Pain.    metoclopramide HCl (REGLAN) 10 MG tablet Take 1 tablet (10 mg total) by mouth every 6 (six) hours as needed (for nausea).    multivit-min-iron fum-folic ac (ONE-A-DAY WOMEN'S COMPLETE) 18 mg iron- 400 mcg Tab Take 1 tablet by mouth once daily.    oxyCODONE-acetaminophen (PERCOCET)  mg per tablet Take 1 tablet by mouth every 6 (six) hours as needed for Pain.    tamsulosin (FLOMAX) 0.4 mg Cap Take 1 capsule (0.4 mg total) by mouth once daily. for 15 days    [DISCONTINUED] fluconazole (DIFLUCAN) 200 MG Tab Take one tablet by mouth stat and repeat in 7 days    [DISCONTINUED] glucosamine/chondr roca A sod (OSTEO BI-FLEX ORAL) Take by mouth.    [DISCONTINUED] HYDROcodone-acetaminophen (NORCO) 5-325 mg per tablet Take 1 tablet by mouth every 6 (six) hours as needed.    [DISCONTINUED] oxyCODONE-acetaminophen (PERCOCET)  mg per tablet Take 1 tablet by mouth every 4 (four) hours as needed for Pain.     Family History    None       Tobacco Use    Smoking status: Never    Smokeless tobacco: Never   Substance and Sexual Activity    Alcohol use: No    Drug use: No    Sexual activity: Not on file     Review of Systems   Constitutional:  Negative for chills and fever.   HENT:  Negative for congestion, rhinorrhea and sore throat.    Eyes:  Negative for visual disturbance.   Respiratory:  Negative for cough and shortness of breath.    Cardiovascular:  Negative for chest pain.   Gastrointestinal:  Positive for abdominal pain, nausea and vomiting. Negative for abdominal distention and diarrhea.   Genitourinary:  Positive for dysuria. Negative for decreased urine  volume.   Musculoskeletal:  Negative for arthralgias and myalgias.   Skin:  Negative for rash.   Neurological:  Negative for dizziness, weakness, numbness and headaches.   Psychiatric/Behavioral:  Negative for dysphoric mood. The patient is not nervous/anxious.    Objective:     Vital Signs (Most Recent):  Temp: 99 °F (37.2 °C) (06/21/23 1546)  Pulse: 104 (06/21/23 1546)  Resp: 16 (06/21/23 1546)  BP: 132/78 (06/21/23 1546)  SpO2: 96 % (06/21/23 1546) Vital Signs (24h Range):  Temp:  [98.6 °F (37 °C)-99 °F (37.2 °C)] 99 °F (37.2 °C)  Pulse:  [104-120] 104  Resp:  [16-20] 16  SpO2:  [96 %-99 %] 96 %  BP: (125-132)/(70-80) 132/78     Weight: 90.7 kg (200 lb)  Body mass index is 33.28 kg/m².     Physical Exam  Constitutional:       General: She is not in acute distress.     Appearance: Normal appearance. She is well-developed. She is not diaphoretic.   HENT:      Head: Normocephalic and atraumatic.      Mouth/Throat:      Mouth: Mucous membranes are moist.      Pharynx: Oropharynx is clear.   Cardiovascular:      Rate and Rhythm: Normal rate and regular rhythm.      Pulses: Normal pulses.   Pulmonary:      Effort: Pulmonary effort is normal. No respiratory distress.      Breath sounds: Normal breath sounds. No wheezing.   Abdominal:      General: Abdomen is flat. Bowel sounds are normal. There is no distension.      Palpations: Abdomen is soft.      Tenderness: There is abdominal tenderness. There is right CVA tenderness. There is no left CVA tenderness.   Musculoskeletal:         General: Normal range of motion.      Cervical back: Normal range of motion and neck supple.   Skin:     General: Skin is warm and dry.      Capillary Refill: Capillary refill takes less than 2 seconds.      Findings: No rash.   Neurological:      General: No focal deficit present.      Mental Status: She is alert and oriented to person, place, and time.   Psychiatric:         Mood and Affect: Mood normal.         Behavior: Behavior normal.               Significant Labs: CBC:   Recent Labs   Lab 06/21/23  1408   WBC 8.22   HGB 12.6   HCT 37.2        CMP:   Recent Labs   Lab 06/21/23  1408   *   K 3.5   CL 95   CO2 21*      BUN 33*   CREATININE 2.1*   CALCIUM 10.0   PROT 8.5*   ALBUMIN 2.9*   BILITOT 1.1*   ALKPHOS 138*   AST 38   ALT 29   ANIONGAP 15       Significant Imaging: I have reviewed all pertinent imaging results/findings within the past 24 hours.    Assessment/Plan:     * Calculus of ureterovesical junction (UVJ)  Noted on previous CT in setting of worsening abdominal pain and N/V with worsening RICK and UTI.    - Urology consulted, reccs appreciated  - NPO for procedure likely in AM  - Maintenance fluids for now  - F/u repeat CT abd/pelvis without contrast  - Continue flomax  - Rocephin for UTI    UTI (urinary tract infection)  In setting of UVJ stone, pathological urine with 1+ leukocytes, 2+ blood. Can't rule out pyelo at this time.    - Continue IV rocephin  - F/u urine culture  - F/u CT abd/pelvis      RICK (acute kidney injury)  In setting of vomiting/minimal PO intake, can't rule out pyelonephritis.    - S/p 1L bolus, continue maintenance fluids for now  - Avoid nephrotoxins  - F/u RICK studies  - F/u CT abd/pelvis        Hyperbilirubinemia  Interval development between ED visits (3 days). Alk phos elevated as well. Diffuse abdominal pain on exam.     - F/u lipase  - F/u direct bili  - F/u CT abd/pelvis        VTE Risk Mitigation (From admission, onward)         Ordered     IP VTE HIGH RISK PATIENT  Once         06/21/23 1454     Place sequential compression device  Until discontinued         06/21/23 1454                     Maik Cunningham MD  Department of Hospital Medicine  [facility] - Emergency Dept

## 2023-06-22 ENCOUNTER — ANESTHESIA EVENT (OUTPATIENT)
Dept: SURGERY | Facility: HOSPITAL | Age: 43
DRG: 661 | End: 2023-06-22
Payer: MEDICAID

## 2023-06-22 ENCOUNTER — ANESTHESIA (OUTPATIENT)
Dept: SURGERY | Facility: HOSPITAL | Age: 43
DRG: 661 | End: 2023-06-22
Payer: MEDICAID

## 2023-06-22 DIAGNOSIS — N13.2 HYDRONEPHROSIS WITH URINARY OBSTRUCTION DUE TO URETERAL CALCULUS: Primary | ICD-10-CM

## 2023-06-22 DIAGNOSIS — N20.0 NEPHROLITHIASIS: ICD-10-CM

## 2023-06-22 DIAGNOSIS — R10.9 FLANK PAIN: ICD-10-CM

## 2023-06-22 DIAGNOSIS — N13.2 HYDRONEPHROSIS CONCURRENT WITH AND DUE TO CALCULI OF KIDNEY AND URETER: ICD-10-CM

## 2023-06-22 DIAGNOSIS — N39.0 URINARY TRACT INFECTION WITHOUT HEMATURIA, SITE UNSPECIFIED: ICD-10-CM

## 2023-06-22 LAB
ALBUMIN SERPL BCP-MCNC: 2.2 G/DL (ref 3.5–5.2)
ALBUMIN SERPL BCP-MCNC: 2.2 G/DL (ref 3.5–5.2)
ALP SERPL-CCNC: 102 U/L (ref 55–135)
ALP SERPL-CCNC: 105 U/L (ref 55–135)
ALT SERPL W/O P-5'-P-CCNC: 20 U/L (ref 10–44)
ALT SERPL W/O P-5'-P-CCNC: 33 U/L (ref 10–44)
ANION GAP SERPL CALC-SCNC: 11 MMOL/L (ref 8–16)
ANION GAP SERPL CALC-SCNC: 12 MMOL/L (ref 8–16)
AST SERPL-CCNC: 31 U/L (ref 10–40)
AST SERPL-CCNC: 64 U/L (ref 10–40)
BASOPHILS NFR BLD: 0 % (ref 0–1.9)
BILIRUB SERPL-MCNC: 0.7 MG/DL (ref 0.1–1)
BILIRUB SERPL-MCNC: 1 MG/DL (ref 0.1–1)
BUN SERPL-MCNC: 34 MG/DL (ref 6–20)
BUN SERPL-MCNC: 34 MG/DL (ref 6–20)
CALCIUM SERPL-MCNC: 8.9 MG/DL (ref 8.7–10.5)
CALCIUM SERPL-MCNC: 9.2 MG/DL (ref 8.7–10.5)
CHLORIDE SERPL-SCNC: 100 MMOL/L (ref 95–110)
CHLORIDE SERPL-SCNC: 100 MMOL/L (ref 95–110)
CO2 SERPL-SCNC: 20 MMOL/L (ref 23–29)
CO2 SERPL-SCNC: 20 MMOL/L (ref 23–29)
CREAT SERPL-MCNC: 2.4 MG/DL (ref 0.5–1.4)
CREAT SERPL-MCNC: 2.4 MG/DL (ref 0.5–1.4)
DIFFERENTIAL METHOD: ABNORMAL
DOHLE BOD BLD QL SMEAR: PRESENT
EOSINOPHIL NFR BLD: 0 % (ref 0–8)
ERYTHROCYTE [DISTWIDTH] IN BLOOD BY AUTOMATED COUNT: 13.2 % (ref 11.5–14.5)
EST. GFR  (NO RACE VARIABLE): 25 ML/MIN/1.73 M^2
EST. GFR  (NO RACE VARIABLE): 25 ML/MIN/1.73 M^2
ESTIMATED AVG GLUCOSE: 117 MG/DL (ref 68–131)
GIANT PLATELETS BLD QL SMEAR: PRESENT
GLUCOSE SERPL-MCNC: 148 MG/DL (ref 70–110)
GLUCOSE SERPL-MCNC: 86 MG/DL (ref 70–110)
HBA1C MFR BLD: 5.7 % (ref 4–5.6)
HCT VFR BLD AUTO: 29.9 % (ref 37–48.5)
HGB BLD-MCNC: 10.5 G/DL (ref 12–16)
IMM GRANULOCYTES # BLD AUTO: ABNORMAL K/UL (ref 0–0.04)
IMM GRANULOCYTES NFR BLD AUTO: ABNORMAL % (ref 0–0.5)
LYMPHOCYTES NFR BLD: 25 % (ref 18–48)
MAGNESIUM SERPL-MCNC: 1.9 MG/DL (ref 1.6–2.6)
MCH RBC QN AUTO: 27.7 PG (ref 27–31)
MCHC RBC AUTO-ENTMCNC: 35.1 G/DL (ref 32–36)
MCV RBC AUTO: 79 FL (ref 82–98)
MONOCYTES NFR BLD: 1 % (ref 4–15)
NEUTROPHILS NFR BLD: 60 % (ref 38–73)
NEUTS BAND NFR BLD MANUAL: 14 %
NRBC BLD-RTO: 1 /100 WBC
PHOSPHATE SERPL-MCNC: 3.2 MG/DL (ref 2.7–4.5)
PLATELET # BLD AUTO: 179 K/UL (ref 150–450)
PLATELET BLD QL SMEAR: ABNORMAL
PMV BLD AUTO: 9.8 FL (ref 9.2–12.9)
POCT GLUCOSE: 89 MG/DL (ref 70–110)
POTASSIUM SERPL-SCNC: 3.5 MMOL/L (ref 3.5–5.1)
POTASSIUM SERPL-SCNC: 3.8 MMOL/L (ref 3.5–5.1)
PROT SERPL-MCNC: 6.6 G/DL (ref 6–8.4)
PROT SERPL-MCNC: 7.1 G/DL (ref 6–8.4)
RBC # BLD AUTO: 3.79 M/UL (ref 4–5.4)
SODIUM SERPL-SCNC: 131 MMOL/L (ref 136–145)
SODIUM SERPL-SCNC: 132 MMOL/L (ref 136–145)
WBC # BLD AUTO: 7 K/UL (ref 3.9–12.7)

## 2023-06-22 PROCEDURE — C1769 GUIDE WIRE: HCPCS | Performed by: STUDENT IN AN ORGANIZED HEALTH CARE EDUCATION/TRAINING PROGRAM

## 2023-06-22 PROCEDURE — 80053 COMPREHEN METABOLIC PANEL: CPT | Performed by: STUDENT IN AN ORGANIZED HEALTH CARE EDUCATION/TRAINING PROGRAM

## 2023-06-22 PROCEDURE — 74420 PR  X-RAY RETROGRADE PYELOGRAM: ICD-10-PCS | Mod: 26,,, | Performed by: STUDENT IN AN ORGANIZED HEALTH CARE EDUCATION/TRAINING PROGRAM

## 2023-06-22 PROCEDURE — D9220A PRA ANESTHESIA: ICD-10-PCS | Mod: ANES,,, | Performed by: ANESTHESIOLOGY

## 2023-06-22 PROCEDURE — D9220A PRA ANESTHESIA: Mod: ANES,,, | Performed by: ANESTHESIOLOGY

## 2023-06-22 PROCEDURE — 52332 CYSTOSCOPY AND TREATMENT: CPT | Mod: RT,,, | Performed by: STUDENT IN AN ORGANIZED HEALTH CARE EDUCATION/TRAINING PROGRAM

## 2023-06-22 PROCEDURE — 25000003 PHARM REV CODE 250: Performed by: NURSE ANESTHETIST, CERTIFIED REGISTERED

## 2023-06-22 PROCEDURE — 71000033 HC RECOVERY, INTIAL HOUR: Performed by: STUDENT IN AN ORGANIZED HEALTH CARE EDUCATION/TRAINING PROGRAM

## 2023-06-22 PROCEDURE — 63600175 PHARM REV CODE 636 W HCPCS: Performed by: NURSE ANESTHETIST, CERTIFIED REGISTERED

## 2023-06-22 PROCEDURE — 83735 ASSAY OF MAGNESIUM: CPT | Performed by: STUDENT IN AN ORGANIZED HEALTH CARE EDUCATION/TRAINING PROGRAM

## 2023-06-22 PROCEDURE — 36000706: Performed by: STUDENT IN AN ORGANIZED HEALTH CARE EDUCATION/TRAINING PROGRAM

## 2023-06-22 PROCEDURE — 25000003 PHARM REV CODE 250: Performed by: ANESTHESIOLOGY

## 2023-06-22 PROCEDURE — 36415 COLL VENOUS BLD VENIPUNCTURE: CPT | Performed by: STUDENT IN AN ORGANIZED HEALTH CARE EDUCATION/TRAINING PROGRAM

## 2023-06-22 PROCEDURE — 25000003 PHARM REV CODE 250: Performed by: STUDENT IN AN ORGANIZED HEALTH CARE EDUCATION/TRAINING PROGRAM

## 2023-06-22 PROCEDURE — 85025 COMPLETE CBC W/AUTO DIFF WBC: CPT | Performed by: STUDENT IN AN ORGANIZED HEALTH CARE EDUCATION/TRAINING PROGRAM

## 2023-06-22 PROCEDURE — 87040 BLOOD CULTURE FOR BACTERIA: CPT

## 2023-06-22 PROCEDURE — 37000008 HC ANESTHESIA 1ST 15 MINUTES: Performed by: STUDENT IN AN ORGANIZED HEALTH CARE EDUCATION/TRAINING PROGRAM

## 2023-06-22 PROCEDURE — 25500020 PHARM REV CODE 255: Performed by: STUDENT IN AN ORGANIZED HEALTH CARE EDUCATION/TRAINING PROGRAM

## 2023-06-22 PROCEDURE — 63600175 PHARM REV CODE 636 W HCPCS: Performed by: STUDENT IN AN ORGANIZED HEALTH CARE EDUCATION/TRAINING PROGRAM

## 2023-06-22 PROCEDURE — 99223 1ST HOSP IP/OBS HIGH 75: CPT | Mod: 25,,, | Performed by: STUDENT IN AN ORGANIZED HEALTH CARE EDUCATION/TRAINING PROGRAM

## 2023-06-22 PROCEDURE — 83036 HEMOGLOBIN GLYCOSYLATED A1C: CPT | Performed by: STUDENT IN AN ORGANIZED HEALTH CARE EDUCATION/TRAINING PROGRAM

## 2023-06-22 PROCEDURE — 84100 ASSAY OF PHOSPHORUS: CPT | Performed by: STUDENT IN AN ORGANIZED HEALTH CARE EDUCATION/TRAINING PROGRAM

## 2023-06-22 PROCEDURE — 52332 PR CYSTOSCOPY,INSERT URETERAL STENT: ICD-10-PCS | Mod: RT,,, | Performed by: STUDENT IN AN ORGANIZED HEALTH CARE EDUCATION/TRAINING PROGRAM

## 2023-06-22 PROCEDURE — C2617 STENT, NON-COR, TEM W/O DEL: HCPCS | Performed by: STUDENT IN AN ORGANIZED HEALTH CARE EDUCATION/TRAINING PROGRAM

## 2023-06-22 PROCEDURE — 11000001 HC ACUTE MED/SURG PRIVATE ROOM

## 2023-06-22 PROCEDURE — 37000009 HC ANESTHESIA EA ADD 15 MINS: Performed by: STUDENT IN AN ORGANIZED HEALTH CARE EDUCATION/TRAINING PROGRAM

## 2023-06-22 PROCEDURE — D9220A PRA ANESTHESIA: Mod: CRNA,,, | Performed by: NURSE ANESTHETIST, CERTIFIED REGISTERED

## 2023-06-22 PROCEDURE — 99223 PR INITIAL HOSPITAL CARE,LEVL III: ICD-10-PCS | Mod: 25,,, | Performed by: STUDENT IN AN ORGANIZED HEALTH CARE EDUCATION/TRAINING PROGRAM

## 2023-06-22 PROCEDURE — C1758 CATHETER, URETERAL: HCPCS | Performed by: STUDENT IN AN ORGANIZED HEALTH CARE EDUCATION/TRAINING PROGRAM

## 2023-06-22 PROCEDURE — 36000707: Performed by: STUDENT IN AN ORGANIZED HEALTH CARE EDUCATION/TRAINING PROGRAM

## 2023-06-22 PROCEDURE — D9220A PRA ANESTHESIA: ICD-10-PCS | Mod: CRNA,,, | Performed by: NURSE ANESTHETIST, CERTIFIED REGISTERED

## 2023-06-22 PROCEDURE — 74420 UROGRAPHY RTRGR +-KUB: CPT | Mod: 26,,, | Performed by: STUDENT IN AN ORGANIZED HEALTH CARE EDUCATION/TRAINING PROGRAM

## 2023-06-22 DEVICE — IMPLANTABLE DEVICE: Type: IMPLANTABLE DEVICE | Site: URETER | Status: FUNCTIONAL

## 2023-06-22 RX ORDER — SUCCINYLCHOLINE CHLORIDE 20 MG/ML
INJECTION INTRAMUSCULAR; INTRAVENOUS
Status: DISCONTINUED | OUTPATIENT
Start: 2023-06-22 | End: 2023-06-22

## 2023-06-22 RX ORDER — ONDANSETRON 2 MG/ML
INJECTION INTRAMUSCULAR; INTRAVENOUS
Status: DISCONTINUED | OUTPATIENT
Start: 2023-06-22 | End: 2023-06-22

## 2023-06-22 RX ORDER — FENTANYL CITRATE 50 UG/ML
25 INJECTION, SOLUTION INTRAMUSCULAR; INTRAVENOUS EVERY 5 MIN PRN
Status: DISCONTINUED | OUTPATIENT
Start: 2023-06-22 | End: 2023-06-22

## 2023-06-22 RX ORDER — DROPERIDOL 2.5 MG/ML
0.62 INJECTION, SOLUTION INTRAMUSCULAR; INTRAVENOUS ONCE AS NEEDED
Status: DISCONTINUED | OUTPATIENT
Start: 2023-06-22 | End: 2023-06-22

## 2023-06-22 RX ORDER — SODIUM CHLORIDE 0.9 % (FLUSH) 0.9 %
10 SYRINGE (ML) INJECTION
Status: DISCONTINUED | OUTPATIENT
Start: 2023-06-22 | End: 2023-06-22

## 2023-06-22 RX ORDER — HYDROCODONE BITARTRATE AND ACETAMINOPHEN 5; 325 MG/1; MG/1
1 TABLET ORAL EVERY 6 HOURS PRN
Status: DISCONTINUED | OUTPATIENT
Start: 2023-06-22 | End: 2023-06-23

## 2023-06-22 RX ORDER — HYDROCODONE BITARTRATE AND ACETAMINOPHEN 5; 325 MG/1; MG/1
1 TABLET ORAL
Status: DISCONTINUED | OUTPATIENT
Start: 2023-06-22 | End: 2023-06-22

## 2023-06-22 RX ORDER — ONDANSETRON 2 MG/ML
4 INJECTION INTRAMUSCULAR; INTRAVENOUS DAILY PRN
Status: DISCONTINUED | OUTPATIENT
Start: 2023-06-22 | End: 2023-06-22

## 2023-06-22 RX ORDER — SODIUM CHLORIDE, SODIUM LACTATE, POTASSIUM CHLORIDE, CALCIUM CHLORIDE 600; 310; 30; 20 MG/100ML; MG/100ML; MG/100ML; MG/100ML
INJECTION, SOLUTION INTRAVENOUS CONTINUOUS
Status: ACTIVE | OUTPATIENT
Start: 2023-06-22 | End: 2023-06-22

## 2023-06-22 RX ORDER — ROCURONIUM BROMIDE 10 MG/ML
INJECTION, SOLUTION INTRAVENOUS
Status: DISCONTINUED | OUTPATIENT
Start: 2023-06-22 | End: 2023-06-22

## 2023-06-22 RX ORDER — PROPOFOL 10 MG/ML
VIAL (ML) INTRAVENOUS
Status: DISCONTINUED | OUTPATIENT
Start: 2023-06-22 | End: 2023-06-22

## 2023-06-22 RX ORDER — SCOLOPAMINE TRANSDERMAL SYSTEM 1 MG/1
1 PATCH, EXTENDED RELEASE TRANSDERMAL
Status: DISCONTINUED | OUTPATIENT
Start: 2023-06-22 | End: 2023-06-24 | Stop reason: HOSPADM

## 2023-06-22 RX ORDER — SODIUM CHLORIDE 9 MG/ML
INJECTION, SOLUTION INTRAVENOUS CONTINUOUS
Status: CANCELLED | OUTPATIENT
Start: 2023-06-22

## 2023-06-22 RX ORDER — LIDOCAINE HYDROCHLORIDE 20 MG/ML
INJECTION, SOLUTION EPIDURAL; INFILTRATION; INTRACAUDAL; PERINEURAL
Status: DISCONTINUED | OUTPATIENT
Start: 2023-06-22 | End: 2023-06-22

## 2023-06-22 RX ORDER — HYDROMORPHONE HYDROCHLORIDE 1 MG/ML
0.5 INJECTION, SOLUTION INTRAMUSCULAR; INTRAVENOUS; SUBCUTANEOUS EVERY 6 HOURS PRN
Status: DISCONTINUED | OUTPATIENT
Start: 2023-06-22 | End: 2023-06-23

## 2023-06-22 RX ORDER — MIDAZOLAM HYDROCHLORIDE 1 MG/ML
INJECTION INTRAMUSCULAR; INTRAVENOUS
Status: DISCONTINUED | OUTPATIENT
Start: 2023-06-22 | End: 2023-06-22

## 2023-06-22 RX ORDER — SODIUM CHLORIDE 9 MG/ML
INJECTION, SOLUTION INTRAVENOUS CONTINUOUS
Status: DISCONTINUED | OUTPATIENT
Start: 2023-06-22 | End: 2023-06-22

## 2023-06-22 RX ORDER — FENTANYL CITRATE 50 UG/ML
INJECTION, SOLUTION INTRAMUSCULAR; INTRAVENOUS
Status: DISCONTINUED | OUTPATIENT
Start: 2023-06-22 | End: 2023-06-22

## 2023-06-22 RX ORDER — DEXAMETHASONE SODIUM PHOSPHATE 4 MG/ML
INJECTION, SOLUTION INTRA-ARTICULAR; INTRALESIONAL; INTRAMUSCULAR; INTRAVENOUS; SOFT TISSUE
Status: DISCONTINUED | OUTPATIENT
Start: 2023-06-22 | End: 2023-06-22

## 2023-06-22 RX ORDER — CEFAZOLIN SODIUM 1 G/3ML
INJECTION, POWDER, FOR SOLUTION INTRAMUSCULAR; INTRAVENOUS
Status: DISCONTINUED | OUTPATIENT
Start: 2023-06-22 | End: 2023-06-22

## 2023-06-22 RX ORDER — CEFAZOLIN SODIUM 2 G/50ML
2 SOLUTION INTRAVENOUS
Status: CANCELLED | OUTPATIENT
Start: 2023-06-22

## 2023-06-22 RX ADMIN — CEFTRIAXONE 1 G: 1 INJECTION, POWDER, FOR SOLUTION INTRAMUSCULAR; INTRAVENOUS at 02:06

## 2023-06-22 RX ADMIN — SODIUM CHLORIDE, SODIUM LACTATE, POTASSIUM CHLORIDE, AND CALCIUM CHLORIDE: .6; .31; .03; .02 INJECTION, SOLUTION INTRAVENOUS at 10:06

## 2023-06-22 RX ADMIN — DEXAMETHASONE SODIUM PHOSPHATE 8 MG: 4 INJECTION, SOLUTION INTRA-ARTICULAR; INTRALESIONAL; INTRAMUSCULAR; INTRAVENOUS; SOFT TISSUE at 11:06

## 2023-06-22 RX ADMIN — SODIUM CHLORIDE, POTASSIUM CHLORIDE, SODIUM LACTATE AND CALCIUM CHLORIDE: 600; 310; 30; 20 INJECTION, SOLUTION INTRAVENOUS at 07:06

## 2023-06-22 RX ADMIN — ROCURONIUM BROMIDE 20 MG: 10 INJECTION, SOLUTION INTRAVENOUS at 11:06

## 2023-06-22 RX ADMIN — HYDROMORPHONE HYDROCHLORIDE 0.5 MG: 1 INJECTION, SOLUTION INTRAMUSCULAR; INTRAVENOUS; SUBCUTANEOUS at 08:06

## 2023-06-22 RX ADMIN — LIDOCAINE HYDROCHLORIDE 100 MG: 20 INJECTION, SOLUTION EPIDURAL; INFILTRATION; INTRACAUDAL; PERINEURAL at 10:06

## 2023-06-22 RX ADMIN — HYDROMORPHONE HYDROCHLORIDE 1 MG: 1 INJECTION, SOLUTION INTRAMUSCULAR; INTRAVENOUS; SUBCUTANEOUS at 05:06

## 2023-06-22 RX ADMIN — SCOPALAMINE 1 PATCH: 1 PATCH, EXTENDED RELEASE TRANSDERMAL at 10:06

## 2023-06-22 RX ADMIN — ACETAMINOPHEN 650 MG: 325 TABLET ORAL at 03:06

## 2023-06-22 RX ADMIN — ROCURONIUM BROMIDE 10 MG: 10 INJECTION, SOLUTION INTRAVENOUS at 10:06

## 2023-06-22 RX ADMIN — CEFAZOLIN 2 G: 330 INJECTION, POWDER, FOR SOLUTION INTRAMUSCULAR; INTRAVENOUS at 10:06

## 2023-06-22 RX ADMIN — SUGAMMADEX 200 MG: 100 INJECTION, SOLUTION INTRAVENOUS at 11:06

## 2023-06-22 RX ADMIN — ONDANSETRON 8 MG: 8 TABLET, ORALLY DISINTEGRATING ORAL at 05:06

## 2023-06-22 RX ADMIN — SUCCINYLCHOLINE CHLORIDE 160 MG: 20 INJECTION, SOLUTION INTRAMUSCULAR; INTRAVENOUS at 10:06

## 2023-06-22 RX ADMIN — HYDROMORPHONE HYDROCHLORIDE 1 MG: 1 INJECTION, SOLUTION INTRAMUSCULAR; INTRAVENOUS; SUBCUTANEOUS at 02:06

## 2023-06-22 RX ADMIN — SODIUM CHLORIDE, POTASSIUM CHLORIDE, SODIUM LACTATE AND CALCIUM CHLORIDE: 600; 310; 30; 20 INJECTION, SOLUTION INTRAVENOUS at 12:06

## 2023-06-22 RX ADMIN — HYDROCODONE BITARTRATE AND ACETAMINOPHEN 1 TABLET: 5; 325 TABLET ORAL at 11:06

## 2023-06-22 RX ADMIN — PROPOFOL 150 MG: 10 INJECTION, EMULSION INTRAVENOUS at 10:06

## 2023-06-22 RX ADMIN — TAMSULOSIN HYDROCHLORIDE 0.4 MG: 0.4 CAPSULE ORAL at 02:06

## 2023-06-22 RX ADMIN — FENTANYL CITRATE 100 MCG: 50 INJECTION, SOLUTION INTRAMUSCULAR; INTRAVENOUS at 10:06

## 2023-06-22 RX ADMIN — HYDROCODONE BITARTRATE AND ACETAMINOPHEN 1 TABLET: 5; 325 TABLET ORAL at 05:06

## 2023-06-22 RX ADMIN — MIDAZOLAM HYDROCHLORIDE 2 MG: 1 INJECTION, SOLUTION INTRAMUSCULAR; INTRAVENOUS at 10:06

## 2023-06-22 RX ADMIN — ONDANSETRON 8 MG: 2 INJECTION, SOLUTION INTRAMUSCULAR; INTRAVENOUS at 11:06

## 2023-06-22 NOTE — PROGRESS NOTES
Tentatively booked right ureteroscopy for 7/5 to save pt a spot. Will discuss with her tomorrow as she is getting ready to go to [place] for a job.

## 2023-06-22 NOTE — SUBJECTIVE & OBJECTIVE
Interval History: Patient s/p stent and saldana placement after significant pus noted during urology procedure today. Stone not visualized. Tentative plan for ureteroscopy on 7/5.    Review of Systems   Constitutional:  Negative for chills and fever.   HENT:  Negative for congestion, rhinorrhea and sore throat.    Eyes:  Negative for visual disturbance.   Respiratory:  Negative for cough and shortness of breath.    Cardiovascular:  Negative for chest pain.   Gastrointestinal:  Positive for abdominal pain, nausea and vomiting. Negative for abdominal distention and diarrhea.   Genitourinary:  Positive for dysuria. Negative for decreased urine volume.   Musculoskeletal:  Negative for arthralgias and myalgias.   Skin:  Negative for rash.   Neurological:  Negative for dizziness, weakness, numbness and headaches.   Psychiatric/Behavioral:  Negative for dysphoric mood. The patient is not nervous/anxious.      Objective:     Vital Signs (Most Recent):  Temp: 97.7 °F (36.5 °C) (06/22/23 1543)  Pulse: 77 (06/22/23 1543)  Resp: 18 (06/22/23 1543)  BP: (!) 139/95 (06/22/23 1543)  SpO2: 100 % (06/22/23 1543) Vital Signs (24h Range):  Temp:  [97.7 °F (36.5 °C)-102.7 °F (39.3 °C)] 97.7 °F (36.5 °C)  Pulse:  [] 77  Resp:  [14-27] 18  SpO2:  [93 %-100 %] 100 %  BP: (109-150)/(66-97) 139/95     Weight: 92.6 kg (204 lb 2.3 oz)  Body mass index is 33.97 kg/m².    Intake/Output Summary (Last 24 hours) at 6/22/2023 1550  Last data filed at 6/22/2023 0954  Gross per 24 hour   Intake 1701.73 ml   Output 950 ml   Net 751.73 ml        Physical Exam  Constitutional:       General: She is not in acute distress.     Appearance: Normal appearance. She is well-developed. She is not diaphoretic.   HENT:      Head: Normocephalic and atraumatic.      Mouth/Throat:      Mouth: Mucous membranes are moist.      Pharynx: Oropharynx is clear.   Cardiovascular:      Rate and Rhythm: Normal rate and regular rhythm.      Pulses: Normal pulses.    Pulmonary:      Effort: Pulmonary effort is normal. No respiratory distress.      Breath sounds: Normal breath sounds. No wheezing.   Abdominal:      General: Abdomen is flat. Bowel sounds are normal. There is no distension.      Palpations: Abdomen is soft.      Tenderness: There is abdominal tenderness. There is right CVA tenderness. There is no left CVA tenderness.   Musculoskeletal:         General: Normal range of motion.      Cervical back: Normal range of motion and neck supple.   Skin:     General: Skin is warm and dry.      Capillary Refill: Capillary refill takes less than 2 seconds.      Findings: No rash.   Neurological:      General: No focal deficit present.      Mental Status: She is alert and oriented to person, place, and time.   Psychiatric:         Mood and Affect: Mood normal.         Behavior: Behavior normal.         Significant Labs: CBC:   Recent Labs   Lab 06/21/23  1408 06/22/23  0724   WBC 8.22 7.00   HGB 12.6 10.5*   HCT 37.2 29.9*    179     CMP:   Recent Labs   Lab 06/21/23  1408 06/22/23  0724   * 132*   K 3.5 3.5   CL 95 100   CO2 21* 20*    86   BUN 33* 34*   CREATININE 2.1* 2.4*   CALCIUM 10.0 8.9   PROT 8.5* 6.6   ALBUMIN 2.9* 2.2*   BILITOT 1.1* 1.0   ALKPHOS 138* 105   AST 38 31   ALT 29 20   ANIONGAP 15 12       Significant Imaging: I have reviewed all pertinent imaging results/findings within the past 24 hours.

## 2023-06-22 NOTE — ANESTHESIA POSTPROCEDURE EVALUATION
Anesthesia Post Evaluation    Patient: Sherri Machado    Procedure(s) Performed: Procedure(s) (LRB):  PYELOGRAM, RETROGRADE (Right)  CYSTOSCOPY (Right)    Final Anesthesia Type: general      Patient location during evaluation: PACU  Patient participation: Yes- Able to Participate  Level of consciousness: awake and alert and oriented  Post-procedure vital signs: reviewed and stable  Pain management: adequate  Airway patency: patent    PONV status at discharge: No PONV  Anesthetic complications: no      Cardiovascular status: blood pressure returned to baseline  Respiratory status: unassisted  Hydration status: euvolemic  Follow-up not needed.          Vitals Value Taken Time   /82 06/22/23 1235   Temp 36.5 °C (97.7 °F) 06/22/23 1235   Pulse 88 06/22/23 1238   Resp 27 06/22/23 1238   SpO2 93 % 06/22/23 1238   Vitals shown include unvalidated device data.      No case tracking events are documented in the log.      Pain/Tita Score: Pain Rating Prior to Med Admin: 6 (6/22/2023  7:55 AM)  Pain Rating Post Med Admin: 5 (6/22/2023  7:55 AM)  Tita Score: 10 (6/22/2023 12:35 PM)

## 2023-06-22 NOTE — PLAN OF CARE
Pt is AAOx4. Pt given medications as ordered per MAR. IVFs infusing. IV antibiotics given as scheduled. PRN Dilaudid given for pain. Townsend catheter in place, CDI. Advanced to regular diet. Safety maintained. Bed alarm set. Instructed to use call light for assistance. Will continue to monitor.         Problem: Adult Inpatient Plan of Care  Goal: Optimal Comfort and Wellbeing  Outcome: Ongoing, Progressing     Problem: Renal Function Impairment (Acute Kidney Injury/Impairment)  Goal: Effective Renal Function  Outcome: Ongoing, Progressing     Problem: Fall Injury Risk  Goal: Absence of Fall and Fall-Related Injury  Outcome: Ongoing, Progressing     Problem: Pain Acute  Goal: Acceptable Pain Control and Functional Ability  Outcome: Ongoing, Progressing

## 2023-06-22 NOTE — ANESTHESIA PROCEDURE NOTES
Intubation    Date/Time: 6/22/2023 10:58 AM  Performed by: Elena Armstrong CRNA  Authorized by: Adam Pavon II, MD     Intubation:     Induction:  Rapid sequence induction    Intubated:  Postinduction    Mask Ventilation:  Easy mask    Attempts:  1    Attempted By:  Student    Method of Intubation:  Video laryngoscopy    Blade:  Cam 3    Laryngeal View Grade: Grade I - full view of cords      Difficult Airway Encountered?: No      Complications:  None    Airway Device:  Oral endotracheal tube    Airway Device Size:  7.0    Style/Cuff Inflation:  Cuffed (inflated to minimal occlusive pressure)    Placement Verified By:  Capnometry    Complicating Factors:  None    Findings Post-Intubation:  BS equal bilateral

## 2023-06-22 NOTE — OP NOTE
OPERATIVE DICTATION  DATE OF OPERATION: 06/22/2023    SERVICE: Urology    SURGEONS:  1. Kaylie Blevins MD    ANESTHESIA:  Anesthesiologist: Adam Pavon II, MD  CRNA: Elena Armstrong CRNA    STAFF:  Circulator: Jeane Rush RN; Erica Abel RN  Scrub Person: Radha Mcnair CST  Technician: Atilio Kelley, RT    ANESTHESIA: General    PREOPERATIVE DIAGNOSIS: Pre-Op Diagnosis Codes:     * Nephrolithiasis [N20.0]     * Flank pain [R10.9]     * Urinary tract infection without hematuria, site unspecified [N39.0]    POSTOPERATIVE DIAGNOSIS: Post-Op Diagnosis Codes:     * Nephrolithiasis [N20.0]     * Flank pain [R10.9]     * Urinary tract infection without hematuria, site unspecified [N39.0]    PROCEDURES:   1. Cystoscopy, placement of right ureteral stent(s) 6 Slovak x 26cm JJ ureteral stent OFF A STRING   2. Cystoscopy, right retrograde pyelogram  3. Fluoroscopy < 1 hour  4. Interpretation of fluoroscopic images  5. 16 Fr Saldana catheter placement by MD      COMPLICATIONS: * No complications entered in OR log *    DRAINS: none    TUBES: 16 Belarusian urethral saldana inflated with 10cc    IMPLANTS:   Implant Name Type Inv. Item Serial No.  Lot No. LRB No. Used Action   STENT URETERAL 26CM X 6FR - [id number]  STENT URETERAL 26CM X 6FR  [employer] [id number] Right 1 Implanted       FLUIDS: see anesthesia record     ESTIMATED BLOOD LOSS: * No values recorded between 6/22/2023 11:22 AM and 6/22/2023 11:55 AM *    FINDINGS:   1. Guidewire initially would not pass due to impacted ureteral stone. Used a 5 Belarusian open ended catheter and then the guidewire was able to be passed beyond the stone. Right proximal hydroureteronephrosis noted on light retrograde pyelogram that was performed. Overt thick pus was noted to drain with the guidewire passage and the ureteral stone was not visible. Saldana placed to aid in drainage of thick pus.     SPECIMEN(S):   * No specimens in log *    CONDITION: stable    INDICATIONS FOR  THE PROCEDURE:  See H&P    PROCEDURE IN DETAIL:        After appropriate informed consent was obtained, the patient was taken to the operating room and placed in the supine position. After induction of General, she was placed in the dorsal lithotomy position. she was prepped and draped in the usual sterile fashion.     Thereafter a WHO timeout was performed and the procedure was initiated. The procedure began by inserting a 21 Polish rigid cystoscope into the bladder via the urethra. The findings in the urethra included no abnormalities.  The bladder was systematically inspected and the findings included no abnormalities, slightly cloudy urine noted in the bladder. The stone was not visible in the ureter - therefore could not address due to infection and pus noted later.     Attention was then turned towards the right ureteral orifice. It was cannulated with a Motion guidewire. Initially the wire would not advance at the level of the right distal ureteral stone. I backloaded a 5 British open ended catheter and was ultimately able to guide the wire past the stone and the wire was then advanced and noted to coil in the right renal pelvis fluoroscopically. Almost immediately I noted the drainage of thick pus alongside the guidewire.     A right retrograde pyelogram was performed to confirm that the guidewire was in the correct location. A 5 British open ended catheter was advanced over the guidewire and the guidewire was removed leaving the 5 British open ended catheter in place. Dilute isovue was injected through the 5 British open ended catheter to perform the right retrograde pyelogram - only performed a light retrograde due to the presence of pus. The findings demonstrated that the guidewire was in the right upper pole - confirmation guidewire was within the collecting system.     The guidewire was replaced through the 5 British open ended catheter and the 5 British open ended catheter was removed.     A 6 Polish x 26 cm  JJ ureteral stent OFF A STRING was placed over the guidewire. It was loaded over the guidewire and then the pusher was loaded second to position the stent in place. The radiopaque marker was positioned over the pubic bone and the guidewire was removed noting a good proximal coil formation fluoroscopically in the right renal pelvis as well as a distal coil in the bladder. At this point more thick pus was draining therefore I made the decision to place a saldana catheter.     A saldana catheter was inserted into the bladder and inflated with 10cc of sterile saline to drain the bladder of all fluid contents.      This concluded the procedure.  All surgical counts were correct.     ATTENDING ATTESTATION  I was present and scrubbed for the entire duration  of the procedure.      CASE DURATION:  * Missing case tracking time(s) *    DISPOSITION:   The patient tolerated the procedure well. she was extubated, and taken to post-anesthesia care unit in satisfactory condition.  Saldana can be removed per primary team once pus improves. Will need to see me in the office so we can obtain another urine culture to document resolution of this infection. Will aim for definitive stone surgery on 7/5/23.    Kaylie Blevins MD

## 2023-06-22 NOTE — PLAN OF CARE
CM met with pt - lives with 3 minor children (ages 4, 11 and 15)  - pt's ex  is with children at this time.   Pt states she has family available to assist at d/c - she has transportation to home at discharge    No pcp at this time.     6/22 s/p Cystoscopy and pyelogram - stent placed   dx:  UTI, Kidney Stones        06/22/23 1801   Discharge Assessment   Assessment Type Discharge Planning Assessment   Confirmed/corrected address, phone number and insurance Yes   Confirmed Demographics Correct on Facesheet   Source of Information patient;health record   Communicated RUDOLPH with patient/caregiver Yes   Reason For Admission Kidney stones, UTI   People in Home child(sunil), dependent  (ages 11, 15 and 4 - ex  is with children at this time)   Do you expect to return to your current living situation? Yes   Do you have help at home or someone to help you manage your care at home? Yes   Who are your caregiver(s) and their phone number(s)? mark will assist   Prior to hospitilization cognitive status: Alert/Oriented   Current cognitive status: Alert/Oriented   Equipment Currently Used at Home none   Patient currently being followed by outpatient case management? No   Do you currently have service(s) that help you manage your care at home? No   How do you get to doctors appointments? car, drives self   Are you on dialysis? No   Do you take coumadin? No   Discharge Plan A Home;Home with family   DME Needed Upon Discharge  none   Discharge Plan discussed with: Patient

## 2023-06-22 NOTE — PLAN OF CARE
Pt AAOx4. PRN dilaudid given for pain. No complaints of N/V. Medications administered per MAR. IVFs infusing. On RA. NPO since midnight. Blood glucose monitored. Bed alarm set, side rails raised, and call light in reach.

## 2023-06-22 NOTE — H&P (VIEW-ONLY)
[facility] Surg  Urology  Consult Note    Patient Name: Sherri Machado  MRN: [id number]  Admission Date: 2023  Hospital Length of Stay: 1 days  Code Status: Full Code   Attending Provider: Tomasa Orantes MD   Consulting Provider: Kaylie Blevins MD  Primary Care Physician: Modesto Waldron MD  Principal Problem:Calculus of ureterovesical junction (UVJ)    Inpatient consult to Urology  Consult performed by: Kaylie Blevins MD  Consult ordered by: Savannah Cordon NP        Subjective:     HPI: 43 y.o. female presented with right flank pain and was diagnosed with a 5mm right ureterovesical junction stone / UVU on 23. Patient was offered admission but asked to be discharged and was provided meds for MET. She re-presented back to ER on 23 with worsening right flank pain and nausea. WBCs, Cr stable, urinalysis suggestive of a UTI. Admitted for pain control, antibiotics, NPO for cysto, right stent procedure today.     Past Medical History:   Diagnosis Date    IBS (irritable bowel syndrome)     Osteoarthritis     Sickle cell trait        Past Surgical History:   Procedure Laterality Date    ANKLE SURGERY      left     SECTION, CLASSIC      x3    CHOLECYSTECTOMY         Review of patient's allergies indicates:   Allergen Reactions    Aspirin Other (See Comments)     Abdominal cramping       Family History    None         Tobacco Use    Smoking status: Never    Smokeless tobacco: Never   Substance and Sexual Activity    Alcohol use: No    Drug use: No    Sexual activity: Not on file       Review of Systems   Constitutional:  Negative for diaphoresis.   HENT:  Negative for facial swelling.    Eyes:  Negative for visual disturbance.   Respiratory:  Negative for shortness of breath.    Cardiovascular:  Negative for chest pain.   Gastrointestinal:  Negative for abdominal distention.   Musculoskeletal:  Negative for gait problem.   Skin:  Negative for color change.   Neurological:  Negative for speech  difficulty.   Hematological:  Does not bruise/bleed easily.   Psychiatric/Behavioral:  Negative for agitation and behavioral problems.      Objective:     Temp:  [97.8 °F (36.6 °C)-102.7 °F (39.3 °C)] 97.8 °F (36.6 °C)  Pulse:  [] 92  Resp:  [16-20] 20  SpO2:  [96 %-99 %] 97 %  BP: (125-145)/(70-85) 131/78     Body mass index is 33.97 kg/m².           Lines/Drains/Airways       Peripheral Intravenous Line  Duration                  Peripheral IV - Single Lumen 06/21/23 1436 18 G Right Antecubital <1 day                    Physical Exam  Constitutional:       Appearance: She is well-developed.   HENT:      Head: Normocephalic and atraumatic.   Eyes:      Pupils: Pupils are equal, round, and reactive to light.   Pulmonary:      Effort: Pulmonary effort is normal. No respiratory distress.   Abdominal:      General: There is no distension.      Palpations: Abdomen is soft.   Musculoskeletal:         General: Normal range of motion.      Cervical back: Normal range of motion and neck supple.   Skin:     General: Skin is warm and dry.   Neurological:      Mental Status: She is alert and oriented to person, place, and time.   Psychiatric:         Behavior: Behavior normal.       Significant Labs:  BMP:  Recent Labs   Lab 06/18/23  1850 06/21/23  1408    131*   K 3.9 3.5    95   CO2 27 21*   BUN 12 33*   CREATININE 1.2 2.1*   CALCIUM 9.8 10.0       CBC:  Recent Labs   Lab 06/18/23  1850 06/21/23  1408 06/22/23  0724   WBC 11.88 8.22 7.00   HGB 12.5 12.6 10.5*   HCT 37.8 37.2 29.9*    202 179       All pertinent labs results from the past 24 hours have been reviewed.    Significant Imaging:  All pertinent imaging results/findings from the past 24 hours have been reviewed.    Assessment and Plan:     43 y.o. female with right 5mm ureterovesical junction stone, hydronephrosis, flank pain, and UTI on urinalysis.    Plan:  NPO, IV fluids and antibiotics per primary team.  Offered cystoscopy, right  ureteral stent placement, right retrograde pyelogram. Counseled on the low possibility of holmium laser lithotripsy, stone basketing only if stone is clearly visible in the bladder. She is understanding and agreeable. Will try to leave stent on a string if able to address stone. She is aware of the possibility of a nephrostomy tube as well if no wire or stent is able to be passed/placed.    The risks included but were not limited to pain, infection (urinary tract infection), bleeding (hematuria), ureteral or urethral stricture,  injury to the urethra, bladder, ureter, need for additional treatments, inability or incomplete removal of kidney stones, pain, and discomfort related to the stent were discussed in depth with the patient.  The patient understands that if I am unable to pass the cameras up to the level of the stone or if visibility is poor, then I will only place a ureteral stent and pursue a staged procedure.     The ureteral stent was discussed at length. The patient will need to have it removed and the time period in which it should remain indwelling is to be determined after surgery. If left indwelling, the sequelae include pain, infection, lower urinary tract symptoms, development of calcifications on the ureteral stent, worsening kidney function, and complete loss of kidney function.     The patient voiced understanding and all questions have been answered and informed consents were signed.      Active Diagnoses:    Diagnosis Date Noted POA    PRINCIPAL PROBLEM:  Calculus of ureterovesical junction (UVJ) [N20.1] 06/21/2023 Yes    RICK (acute kidney injury) [N17.9] 06/21/2023 Yes    Hyperbilirubinemia [E80.6] 06/21/2023 Yes    UTI (urinary tract infection) [N39.0] 06/21/2023 Yes      Problems Resolved During this Admission:       VTE Risk Mitigation (From admission, onward)           Ordered     IP VTE HIGH RISK PATIENT  Once         06/21/23 1454     Place sequential compression device  Until  discontinued         06/21/23 7271                    Thank you for your consult.     Kaylie Blevins MD  Urology  [facility]

## 2023-06-22 NOTE — ASSESSMENT & PLAN NOTE
In setting of vomiting/minimal PO intake, stone. Creatinine 2.1 on arrival, 2.4 the next day.     - Pre-renal FeNa, will continue maintenance fluids for now  - Avoid nephrotoxins

## 2023-06-22 NOTE — TRANSFER OF CARE
"Anesthesia Transfer of Care Note    Patient: Sherri Machado    Procedure(s) Performed: Procedure(s) (LRB):  PYELOGRAM, RETROGRADE (Right)  CYSTOSCOPY (Right)    Patient location: PACU    Anesthesia Type: general    Transport from OR: Transported from OR on 6-10 L/min O2 by face mask with adequate spontaneous ventilation    Post pain: adequate analgesia    Post assessment: no apparent anesthetic complications    Post vital signs: stable    Level of consciousness: awake    Nausea/Vomiting: no nausea/vomiting    Complications: none    Transfer of care protocol was followed      Last vitals:   Visit Vitals  /76 (BP Location: Left arm, Patient Position: Lying)   Pulse 94   Temp 37.1 °C (98.8 °F) (Temporal)   Resp 14   Ht 5' 5" (1.651 m)   Wt 92.6 kg (204 lb 2.3 oz)   LMP 06/07/2023 (Exact Date)   SpO2 99%   Breastfeeding No   BMI 33.97 kg/m²     "

## 2023-06-22 NOTE — PROGRESS NOTES
[facility] Medicine  Progress Note    Patient Name: Sherri Machado  MRN: [id number]  Patient Class: IP- Inpatient   Admission Date: 6/21/2023  Length of Stay: 1 days  Attending Physician: Tomasa Orantes MD  Primary Care Provider: Modesto Waldron MD        Subjective:     Principal Problem:Calculus of ureterovesical junction (UVJ)        HPI:  44 yo F with PMHx with history depression, opioid dependence who presented to ED due to 3 days worsening abdominal pain, nausea/vomiting, minimal PO intake. Patient initially presented to ED on 6/18 with RLQ pain, UA without findings of infection. Patient did have RICK (baseline 0.6 -> 1.2). CT abdomen/pelvis demonstrated a 5 mm calculus at the right UVJ. Patient was discharged home with conservative management. Patient provided urinary strainer but per the patient there was no passage of stone.    On current presentation, patient afebrile but tachycardic. Repeat UA this time now with 1+ leukocytes, 2+ blood. WBC WNL. CMP concern both for worsening RICK (1.2 ->2.1) as well as elevated t.bili of 1.1, alk phos 138. Lactate WNL. Patient started on IV rocephin, given 1L NS bolus, 4 mg IV morphine. Urology consulted, tentative plan to stent patient pending repeat CT imaging of stone. Patient admitted to [facility] for medical management.      Overview/Hospital Course:  No notes on file    Interval History: Patient s/p stent and saldana placement after significant pus noted during urology procedure today. Stone not visualized. Tentative plan for ureteroscopy on 7/5.    Review of Systems   Constitutional:  Negative for chills and fever.   HENT:  Negative for congestion, rhinorrhea and sore throat.    Eyes:  Negative for visual disturbance.   Respiratory:  Negative for cough and shortness of breath.    Cardiovascular:  Negative for chest pain.   Gastrointestinal:  Positive for abdominal pain, nausea and vomiting. Negative for abdominal distention and diarrhea.    Genitourinary:  Positive for dysuria. Negative for decreased urine volume.   Musculoskeletal:  Negative for arthralgias and myalgias.   Skin:  Negative for rash.   Neurological:  Negative for dizziness, weakness, numbness and headaches.   Psychiatric/Behavioral:  Negative for dysphoric mood. The patient is not nervous/anxious.      Objective:     Vital Signs (Most Recent):  Temp: 97.7 °F (36.5 °C) (06/22/23 1543)  Pulse: 77 (06/22/23 1543)  Resp: 18 (06/22/23 1543)  BP: (!) 139/95 (06/22/23 1543)  SpO2: 100 % (06/22/23 1543) Vital Signs (24h Range):  Temp:  [97.7 °F (36.5 °C)-102.7 °F (39.3 °C)] 97.7 °F (36.5 °C)  Pulse:  [] 77  Resp:  [14-27] 18  SpO2:  [93 %-100 %] 100 %  BP: (109-150)/(66-97) 139/95     Weight: 92.6 kg (204 lb 2.3 oz)  Body mass index is 33.97 kg/m².    Intake/Output Summary (Last 24 hours) at 6/22/2023 1550  Last data filed at 6/22/2023 0954  Gross per 24 hour   Intake 1701.73 ml   Output 950 ml   Net 751.73 ml        Physical Exam  Constitutional:       General: She is not in acute distress.     Appearance: Normal appearance. She is well-developed. She is not diaphoretic.   HENT:      Head: Normocephalic and atraumatic.      Mouth/Throat:      Mouth: Mucous membranes are moist.      Pharynx: Oropharynx is clear.   Cardiovascular:      Rate and Rhythm: Normal rate and regular rhythm.      Pulses: Normal pulses.   Pulmonary:      Effort: Pulmonary effort is normal. No respiratory distress.      Breath sounds: Normal breath sounds. No wheezing.   Abdominal:      General: Abdomen is flat. Bowel sounds are normal. There is no distension.      Palpations: Abdomen is soft.      Tenderness: There is abdominal tenderness. There is right CVA tenderness. There is no left CVA tenderness.   Musculoskeletal:         General: Normal range of motion.      Cervical back: Normal range of motion and neck supple.   Skin:     General: Skin is warm and dry.      Capillary Refill: Capillary refill takes less  than 2 seconds.      Findings: No rash.   Neurological:      General: No focal deficit present.      Mental Status: She is alert and oriented to person, place, and time.   Psychiatric:         Mood and Affect: Mood normal.         Behavior: Behavior normal.         Significant Labs: CBC:   Recent Labs   Lab 06/21/23  1408 06/22/23  0724   WBC 8.22 7.00   HGB 12.6 10.5*   HCT 37.2 29.9*    179     CMP:   Recent Labs   Lab 06/21/23  1408 06/22/23  0724   * 132*   K 3.5 3.5   CL 95 100   CO2 21* 20*    86   BUN 33* 34*   CREATININE 2.1* 2.4*   CALCIUM 10.0 8.9   PROT 8.5* 6.6   ALBUMIN 2.9* 2.2*   BILITOT 1.1* 1.0   ALKPHOS 138* 105   AST 38 31   ALT 29 20   ANIONGAP 15 12       Significant Imaging: I have reviewed all pertinent imaging results/findings within the past 24 hours.      Assessment/Plan:      * Calculus of ureterovesical junction (UVJ)  Noted on previous CT in setting of worsening abdominal pain and N/V with worsening RICK and UTI. S/p cystoscopy on 6/22 with abundant purulence, stent place, saldana left in place for drainage.    - Urology reccs continue to be appreciated  - Maintenance fluids for now (following RICK)  - Continue flomax  - Rocephin for UTI    UTI (urinary tract infection)  In setting of UVJ stone, pathological urine with 1+ leukocytes, 2+ blood.    - Continue IV rocephin  - F/u urine culture    RICK (acute kidney injury)  In setting of vomiting/minimal PO intake, stone. Creatinine 2.1 on arrival, 2.4 the next day.     - Pre-renal FeNa, will continue maintenance fluids for now  - Avoid nephrotoxins          Hyperbilirubinemia  S/p cholecystectomy in 2009. Interval development of increased biliary profile between ED visits (3 days). Alk phos elevated as well. Diffuse abdominal pain on exam. Lipase negative.    - Alk phos/bili trended down  - US abd mild prominence of the extrahepatic duct at 6-7 mm  - Will recommend outpatient GI/surgery follow up      VTE Risk Mitigation  (From admission, onward)         Ordered     IP VTE HIGH RISK PATIENT  Once         06/21/23 1454     Place sequential compression device  Until discontinued         06/21/23 1454                Discharge Planning   RUDOLPH:      Code Status: Full Code   Is the patient medically ready for discharge?:     Reason for patient still in hospital (select all that apply): Treatment and Consult recommendations             Maik Cunningham MD  Department of Hospital Medicine   [facility]

## 2023-06-22 NOTE — ASSESSMENT & PLAN NOTE
Noted on previous CT in setting of worsening abdominal pain and N/V with worsening RICK and UTI. S/p cystoscopy on 6/22 with abundant purulence, stent place, saldana left in place for drainage.    - Urology reccs continue to be appreciated  - Maintenance fluids for now (following RICK)  - Continue flomax  - Rocephin for UTI

## 2023-06-22 NOTE — ASSESSMENT & PLAN NOTE
In setting of UVJ stone, pathological urine with 1+ leukocytes, 2+ blood.    - Continue IV rocephin  - F/u urine culture

## 2023-06-22 NOTE — NURSING
Per call from U/S tech, pt needs to be NPO at midnight and U/S of abd will be completed in AM. MD notified.

## 2023-06-22 NOTE — PROGRESS NOTES
NURSE PROACTIVE ROUNDING NOTE       Time of Visit: Chart review    Admit Date: 2023  LOS: 1  Code Status: Full Code   Date of Visit: 2023  : 1980  Age: 43 y.o.  Sex: female  Race: Black or   Bed: K529/K529 A:   MRN: [id number]  Was the patient discharged from an ICU this admission? No   Was the patient discharged from a PACU within last 24 hours? No   Did the patient receive conscious sedation/general anesthesia in last 24 hours? No   Was the patient in the ED within the past 24 hours? Yes   Was the patient on NIPPV within the past 24 hours? No   Attending Physician: Tomasa Orantes MD  Primary Service: Family Medicine   Time spent at the bedside: < 15 min    SITUATION    Notified by  n/a  Reason for alert: Chart review    Diagnosis: Calculus of ureterovesical junction (UVJ)   has a past medical history of IBS (irritable bowel syndrome), Osteoarthritis, and Sickle cell trait.    Last Vitals:  Temp: 99.7 °F (37.6 °C) ( 0500)  Pulse: 113 ( 0346)  Resp: 17 ( 0505)  BP: 145/85 ( 0346)  SpO2: 96 % ( 0346)    24 Hour Vitals Range:  Temp:  [97.8 °F (36.6 °C)-102.7 °F (39.3 °C)]   Pulse:  []   Resp:  [16-20]   BP: (125-145)/(70-85)   SpO2:  [96 %-99 %]     Clinical Issues:  Calculus of ureterovesical junction    ASSESSMENT/INTERVENTIONS    Vital signs and labs reviewed.    RECOMMENDATIONS  Continue with current plan of care.    Discussed plan of care with  n/a    PROVIDER ESCALATION    Physician escalation: No    Orders received and case discussed with NA.    Disposition:Remain in room 529    FOLLOW UP    Call back the Rapid Response NurseKev at [phone] for additional questions or concerns.

## 2023-06-22 NOTE — CONSULTS
[facility] Surg  Urology  Consult Note    Patient Name: Sherri Machado  MRN: [id number]  Admission Date: 2023  Hospital Length of Stay: 1 days  Code Status: Full Code   Attending Provider: Tomasa Orantes MD   Consulting Provider: Kaylie Blevins MD  Primary Care Physician: Modesto Waldron MD  Principal Problem:Calculus of ureterovesical junction (UVJ)    Inpatient consult to Urology  Consult performed by: Kaylie Blevins MD  Consult ordered by: Savannah Cordon NP        Subjective:     HPI: 43 y.o. female presented with right flank pain and was diagnosed with a 5mm right ureterovesical junction stone / UVU on 23. Patient was offered admission but asked to be discharged and was provided meds for MET. She re-presented back to ER on 23 with worsening right flank pain and nausea. WBCs, Cr stable, urinalysis suggestive of a UTI. Admitted for pain control, antibiotics, NPO for cysto, right stent procedure today.     Past Medical History:   Diagnosis Date    IBS (irritable bowel syndrome)     Osteoarthritis     Sickle cell trait        Past Surgical History:   Procedure Laterality Date    ANKLE SURGERY      left     SECTION, CLASSIC      x3    CHOLECYSTECTOMY         Review of patient's allergies indicates:   Allergen Reactions    Aspirin Other (See Comments)     Abdominal cramping       Family History    None         Tobacco Use    Smoking status: Never    Smokeless tobacco: Never   Substance and Sexual Activity    Alcohol use: No    Drug use: No    Sexual activity: Not on file       Review of Systems   Constitutional:  Negative for diaphoresis.   HENT:  Negative for facial swelling.    Eyes:  Negative for visual disturbance.   Respiratory:  Negative for shortness of breath.    Cardiovascular:  Negative for chest pain.   Gastrointestinal:  Negative for abdominal distention.   Musculoskeletal:  Negative for gait problem.   Skin:  Negative for color change.   Neurological:  Negative for speech  difficulty.   Hematological:  Does not bruise/bleed easily.   Psychiatric/Behavioral:  Negative for agitation and behavioral problems.      Objective:     Temp:  [97.8 °F (36.6 °C)-102.7 °F (39.3 °C)] 97.8 °F (36.6 °C)  Pulse:  [] 92  Resp:  [16-20] 20  SpO2:  [96 %-99 %] 97 %  BP: (125-145)/(70-85) 131/78     Body mass index is 33.97 kg/m².           Lines/Drains/Airways       Peripheral Intravenous Line  Duration                  Peripheral IV - Single Lumen 06/21/23 1436 18 G Right Antecubital <1 day                    Physical Exam  Constitutional:       Appearance: She is well-developed.   HENT:      Head: Normocephalic and atraumatic.   Eyes:      Pupils: Pupils are equal, round, and reactive to light.   Pulmonary:      Effort: Pulmonary effort is normal. No respiratory distress.   Abdominal:      General: There is no distension.      Palpations: Abdomen is soft.   Musculoskeletal:         General: Normal range of motion.      Cervical back: Normal range of motion and neck supple.   Skin:     General: Skin is warm and dry.   Neurological:      Mental Status: She is alert and oriented to person, place, and time.   Psychiatric:         Behavior: Behavior normal.       Significant Labs:  BMP:  Recent Labs   Lab 06/18/23  1850 06/21/23  1408    131*   K 3.9 3.5    95   CO2 27 21*   BUN 12 33*   CREATININE 1.2 2.1*   CALCIUM 9.8 10.0       CBC:  Recent Labs   Lab 06/18/23  1850 06/21/23  1408 06/22/23  0724   WBC 11.88 8.22 7.00   HGB 12.5 12.6 10.5*   HCT 37.8 37.2 29.9*    202 179       All pertinent labs results from the past 24 hours have been reviewed.    Significant Imaging:  All pertinent imaging results/findings from the past 24 hours have been reviewed.    Assessment and Plan:     43 y.o. female with right 5mm ureterovesical junction stone, hydronephrosis, flank pain, and UTI on urinalysis.    Plan:  NPO, IV fluids and antibiotics per primary team.  Offered cystoscopy, right  ureteral stent placement, right retrograde pyelogram. Counseled on the low possibility of holmium laser lithotripsy, stone basketing only if stone is clearly visible in the bladder. She is understanding and agreeable. Will try to leave stent on a string if able to address stone. She is aware of the possibility of a nephrostomy tube as well if no wire or stent is able to be passed/placed.    The risks included but were not limited to pain, infection (urinary tract infection), bleeding (hematuria), ureteral or urethral stricture,  injury to the urethra, bladder, ureter, need for additional treatments, inability or incomplete removal of kidney stones, pain, and discomfort related to the stent were discussed in depth with the patient.  The patient understands that if I am unable to pass the cameras up to the level of the stone or if visibility is poor, then I will only place a ureteral stent and pursue a staged procedure.     The ureteral stent was discussed at length. The patient will need to have it removed and the time period in which it should remain indwelling is to be determined after surgery. If left indwelling, the sequelae include pain, infection, lower urinary tract symptoms, development of calcifications on the ureteral stent, worsening kidney function, and complete loss of kidney function.     The patient voiced understanding and all questions have been answered and informed consents were signed.      Active Diagnoses:    Diagnosis Date Noted POA    PRINCIPAL PROBLEM:  Calculus of ureterovesical junction (UVJ) [N20.1] 06/21/2023 Yes    RICK (acute kidney injury) [N17.9] 06/21/2023 Yes    Hyperbilirubinemia [E80.6] 06/21/2023 Yes    UTI (urinary tract infection) [N39.0] 06/21/2023 Yes      Problems Resolved During this Admission:       VTE Risk Mitigation (From admission, onward)           Ordered     IP VTE HIGH RISK PATIENT  Once         06/21/23 1454     Place sequential compression device  Until  discontinued         06/21/23 8637                    Thank you for your consult.     Kaylie Blevins MD  Urology  [facility]

## 2023-06-22 NOTE — INTERVAL H&P NOTE
The patient has been examined and the H&P has been reviewed:    I concur with the findings and no changes have occurred since H&P was written.    Surgery risks, benefits and alternative options discussed and understood by patient/family.          Active Hospital Problems    Diagnosis  POA    *Calculus of ureterovesical junction (UVJ) [N20.1]  Yes    RICK (acute kidney injury) [N17.9]  Yes    Hyperbilirubinemia [E80.6]  Yes    UTI (urinary tract infection) [N39.0]  Yes      Resolved Hospital Problems   No resolved problems to display.

## 2023-06-22 NOTE — ANESTHESIA PREPROCEDURE EVALUATION
[facility]  Anesthesia Pre-Operative Evaluation         Patient Name: Sherri Machado  YOB: 1980  MRN: [id number]    SUBJECTIVE:     2023    Procedure(s) (LRB):  CYSTOURETEROSCOPY, WITH HOLMIUM LASER LITHOTRIPSY OF URETERAL CALCULUS AND STENT INSERTION (Right)    Sherri Machado is a 43 y.o. female here for Procedure(s) (LRB):  CYSTOURETEROSCOPY, WITH HOLMIUM LASER LITHOTRIPSY OF URETERAL CALCULUS AND STENT INSERTION (Right)    Drips:    lactated ringers 150 mL/hr at 23 0733       Patient Active Problem List   Diagnosis    Opioid dependence with withdrawal    Elevated BP without diagnosis of hypertension    Depression with suicidal ideation    Calculus of ureterovesical junction (UVJ)    RICK (acute kidney injury)    Pyelonephritis    Hyperbilirubinemia    UTI (urinary tract infection)       Review of patient's allergies indicates:   Allergen Reactions    Aspirin Other (See Comments)     Abdominal cramping       No current facility-administered medications on file prior to encounter.     Current Outpatient Medications on File Prior to Encounter   Medication Sig Dispense Refill    fluconazole (DIFLUCAN) 200 MG Tab Take 200 mg by mouth once daily.      ketorolac (TORADOL) 10 mg tablet Take 1 tablet (10 mg total) by mouth every 6 (six) hours as needed for Pain. 20 tablet 0    metoclopramide HCl (REGLAN) 10 MG tablet Take 1 tablet (10 mg total) by mouth every 6 (six) hours as needed (for nausea). 20 tablet 0    multivit-min-iron fum-folic ac (ONE-A-DAY WOMEN'S COMPLETE) 18 mg iron- 400 mcg Tab Take 1 tablet by mouth once daily.      oxyCODONE-acetaminophen (PERCOCET)  mg per tablet Take 1 tablet by mouth every 6 (six) hours as needed for Pain. 12 tablet 0    tamsulosin (FLOMAX) 0.4 mg Cap Take 1 capsule (0.4 mg total) by mouth once daily. for 15 days 15 capsule 0       Past Surgical History:   Procedure Laterality Date    ANKLE SURGERY      left      SECTION, CLASSIC      x3    CHOLECYSTECTOMY         Social History     Socioeconomic History    Marital status:    Tobacco Use    Smoking status: Never    Smokeless tobacco: Never   Substance and Sexual Activity    Alcohol use: No    Drug use: No         OBJECTIVE:     Vital Signs Range (Last 24H):  Temp:  [36.6 °C (97.8 °F)-39.3 °C (102.7 °F)] 36.6 °C (97.8 °F)  Pulse:  [] 92  Resp:  [16-20] 20  SpO2:  [96 %-99 %] 97 %  BP: (125-145)/(70-85) 131/78    Significant Labs:  Lab Results   Component Value Date    WBC 7.00 06/22/2023    HGB 10.5 (L) 06/22/2023    HCT 29.9 (L) 06/22/2023     06/22/2023    CHOL 209 (H) 05/15/2019    TRIG 129 05/15/2019    HDL 45 05/15/2019    ALT 20 06/22/2023    AST 31 06/22/2023     (L) 06/22/2023    K 3.5 06/22/2023     06/22/2023    CREATININE 2.4 (H) 06/22/2023    BUN 34 (H) 06/22/2023    CO2 20 (L) 06/22/2023    TSH 0.209 (L) 05/14/2019    INR 1.1 02/26/2014    HGBA1C 5.7 (H) 06/22/2023       Diagnostic Studies:    EKG:   Results for orders placed or performed during the hospital encounter of 01/18/20   EKG 12-lead    Collection Time: 01/18/20  3:10 PM    Narrative    Test Reason : R03.0,    Vent. Rate : 059 BPM     Atrial Rate : 059 BPM     P-R Int : 160 ms          QRS Dur : 082 ms      QT Int : 448 ms       P-R-T Axes : 039 069 046 degrees     QTc Int : 443 ms    Sinus bradycardia  Nonspecific T wave abnormality  Abnormal ECG  When compared with ECG of 14-MAY-2019 13:27,  No significant change was found  Confirmed by Leonel Mayberry MD ([id number]) on 1/20/2020 12:50:52 PM    Referred By: [email or web address]   SELF           Confirmed By:Leonel Mayberry MD       2D ECHO:  TTE:  No results found for this or any previous visit.  No results found for this or any previous visit.    WENDY:  No results found for this or any previous visit.       Pre-op Assessment    I have reviewed the Patient Summary Reports.     I have reviewed the Nursing Notes. I have reviewed  the NPO Status.   I have reviewed the Medications.     Review of Systems  Anesthesia Hx:  No problems with previous Anesthesia  Denies Family Hx of Anesthesia complications.   Denies Personal Hx of Anesthesia complications.   Social:  Non-Smoker    Hematology/Oncology:        Hematology Comments: Sickle cell trait   Cardiovascular:   Exercise tolerance: good Denies Hypertension.  Denies Valvular problems/Murmurs.  Denies CAD.    Denies Dysrhythmias.   Denies Angina.    Pulmonary:   Denies COPD.  Denies Asthma.  Denies Shortness of breath.  Denies Recent URI.  Denies Sleep Apnea.    Renal/:   Denies Chronic Renal Disease.     Hepatic/GI:   Bowel prep: IBS  Denies GERD. Denies Liver Disease.    Musculoskeletal:   Arthritis     Neurological:   Denies TIA. Denies Seizures.    Endocrine:   Denies Diabetes. Denies Hypothyroidism.    Psych:   Denies Psychiatric History.          Physical Exam  General: Well nourished, Cooperative, Alert and Oriented    Airway:  Mallampati: III / II  Mouth Opening: Normal  TM Distance: Normal  Tongue: Normal  Neck ROM: Normal ROM    Dental:  Intact    Chest/Lungs:  Clear to auscultation, Normal Respiratory Rate    Heart:  Rate: Normal  Rhythm: Regular Rhythm  Sounds: Normal        Anesthesia Plan  Type of Anesthesia, risks & benefits discussed:    Anesthesia Type: Gen ETT  Intra-op Monitoring Plan: Standard ASA Monitors  Post Op Pain Control Plan: multimodal analgesia  Induction:  IV  Airway Plan: Direct and Video  Informed Consent: Informed consent signed with the Patient and all parties understand the risks and agree with anesthesia plan.  All questions answered.   ASA Score: 2  Day of Surgery Review of History & Physical: H&P Update referred to the surgeon/provider.    Ready For Surgery From Anesthesia Perspective.     .

## 2023-06-22 NOTE — NURSING
NURSE PROACTIVE ROUNDING NOTE       Time of Chart Reviews: 0745 & 1040    Admit Date: 2023  LOS: 1  Code Status: Full Code   Date of Visit: 2023  : 1980  Age: 43 y.o.  Sex: female  Race: Black or   Bed: K529/K529 A  MRN: [id number]  Was the patient discharged from an ICU this admission? No   Was the patient discharged from a PACU within last 24 hours? No   Did the patient receive conscious sedation/general anesthesia in last 24 hours? No   Was the patient in the ED within the past 24 hours? Yes   Was the patient on NIPPV within the past 24 hours? No   Attending Physician: Tomasa Orantes MD  Primary Service: Family Medicine   Time spent in chart: < 15 min    SITUATION    Notified by previous RRN during handoff  Reason for alert: Febrile overnight, kidney stone    Diagnosis: Calculus of ureterovesical junction (UVJ)   has a past medical history of IBS (irritable bowel syndrome), Osteoarthritis, and Sickle cell trait.    Last Vitals:  Temp: 97.8 °F (36.6 °C) (731)  Pulse: 92 (731)  Resp: 20 (731)  BP: 131/78 (731)  SpO2: 97 % (731)    24 Hour Vitals Range:  Temp:  [97.8 °F (36.6 °C)-102.7 °F (39.3 °C)]   Pulse:  []   Resp:  [16-20]   BP: (125-145)/(70-85)   SpO2:  [96 %-99 %]     Clinical Issues:  Febrile    ASSESSMENT/INTERVENTIONS  - Chart reviewed including lab results, vital signs, and progress notes  - Urology consulted with plans for stent placement today per MD note  - T 97.8F at 0730  - Abdominal US performed as ordered    RECOMMENDATIONS  - Continue to monitor.  Able to intervene if needed.    PROVIDER ESCALATION    Physician escalation: No    Disposition:Remain in room 529    FOLLOW UP    Call back the Rapid Response NurseDebbie at [phone] for additional questions or concerns.

## 2023-06-23 PROBLEM — N20.0 NEPHROLITHIASIS: Status: ACTIVE | Noted: 2023-06-23

## 2023-06-23 PROBLEM — R10.9 FLANK PAIN: Status: ACTIVE | Noted: 2023-06-23

## 2023-06-23 LAB
ALBUMIN SERPL BCP-MCNC: 2.2 G/DL (ref 3.5–5.2)
ALP SERPL-CCNC: 112 U/L (ref 55–135)
ALT SERPL W/O P-5'-P-CCNC: 35 U/L (ref 10–44)
ANION GAP SERPL CALC-SCNC: 11 MMOL/L (ref 8–16)
ANISOCYTOSIS BLD QL SMEAR: SLIGHT
AST SERPL-CCNC: 69 U/L (ref 10–40)
BACTERIA UR CULT: ABNORMAL
BASOPHILS # BLD AUTO: ABNORMAL K/UL (ref 0–0.2)
BASOPHILS NFR BLD: 0 % (ref 0–1.9)
BILIRUB SERPL-MCNC: 0.5 MG/DL (ref 0.1–1)
BUN SERPL-MCNC: 45 MG/DL (ref 6–20)
BURR CELLS BLD QL SMEAR: ABNORMAL
CALCIUM SERPL-MCNC: 9.4 MG/DL (ref 8.7–10.5)
CHLORIDE SERPL-SCNC: 99 MMOL/L (ref 95–110)
CO2 SERPL-SCNC: 23 MMOL/L (ref 23–29)
CREAT SERPL-MCNC: 2.4 MG/DL (ref 0.5–1.4)
DACRYOCYTES BLD QL SMEAR: ABNORMAL
DIFFERENTIAL METHOD: ABNORMAL
DOHLE BOD BLD QL SMEAR: PRESENT
EOSINOPHIL # BLD AUTO: ABNORMAL K/UL (ref 0–0.5)
EOSINOPHIL NFR BLD: 0 % (ref 0–8)
ERYTHROCYTE [DISTWIDTH] IN BLOOD BY AUTOMATED COUNT: 13.5 % (ref 11.5–14.5)
EST. GFR  (NO RACE VARIABLE): 25 ML/MIN/1.73 M^2
GLUCOSE SERPL-MCNC: 125 MG/DL (ref 70–110)
HCT VFR BLD AUTO: 33.2 % (ref 37–48.5)
HGB BLD-MCNC: 11.6 G/DL (ref 12–16)
IMM GRANULOCYTES # BLD AUTO: ABNORMAL K/UL (ref 0–0.04)
IMM GRANULOCYTES NFR BLD AUTO: ABNORMAL % (ref 0–0.5)
LYMPHOCYTES # BLD AUTO: ABNORMAL K/UL (ref 1–4.8)
LYMPHOCYTES NFR BLD: 10 % (ref 18–48)
MAGNESIUM SERPL-MCNC: 2.4 MG/DL (ref 1.6–2.6)
MCH RBC QN AUTO: 27.1 PG (ref 27–31)
MCHC RBC AUTO-ENTMCNC: 34.9 G/DL (ref 32–36)
MCV RBC AUTO: 78 FL (ref 82–98)
MONOCYTES # BLD AUTO: ABNORMAL K/UL (ref 0.3–1)
MONOCYTES NFR BLD: 7 % (ref 4–15)
NEUTROPHILS NFR BLD: 77 % (ref 38–73)
NEUTS BAND NFR BLD MANUAL: 6 %
NRBC BLD-RTO: 0 /100 WBC
PHOSPHATE SERPL-MCNC: 3.2 MG/DL (ref 2.7–4.5)
PLATELET # BLD AUTO: 245 K/UL (ref 150–450)
PLATELET BLD QL SMEAR: ABNORMAL
PMV BLD AUTO: 10 FL (ref 9.2–12.9)
POIKILOCYTOSIS BLD QL SMEAR: SLIGHT
POTASSIUM SERPL-SCNC: 3.8 MMOL/L (ref 3.5–5.1)
PROT SERPL-MCNC: 7.3 G/DL (ref 6–8.4)
RBC # BLD AUTO: 4.28 M/UL (ref 4–5.4)
SODIUM SERPL-SCNC: 133 MMOL/L (ref 136–145)
WBC # BLD AUTO: 11.06 K/UL (ref 3.9–12.7)

## 2023-06-23 PROCEDURE — 85027 COMPLETE CBC AUTOMATED: CPT | Performed by: STUDENT IN AN ORGANIZED HEALTH CARE EDUCATION/TRAINING PROGRAM

## 2023-06-23 PROCEDURE — 99233 PR SUBSEQUENT HOSPITAL CARE,LEVL III: ICD-10-PCS | Mod: ,,, | Performed by: STUDENT IN AN ORGANIZED HEALTH CARE EDUCATION/TRAINING PROGRAM

## 2023-06-23 PROCEDURE — 11000001 HC ACUTE MED/SURG PRIVATE ROOM

## 2023-06-23 PROCEDURE — 99233 SBSQ HOSP IP/OBS HIGH 50: CPT | Mod: ,,, | Performed by: STUDENT IN AN ORGANIZED HEALTH CARE EDUCATION/TRAINING PROGRAM

## 2023-06-23 PROCEDURE — 83735 ASSAY OF MAGNESIUM: CPT | Performed by: STUDENT IN AN ORGANIZED HEALTH CARE EDUCATION/TRAINING PROGRAM

## 2023-06-23 PROCEDURE — 25000003 PHARM REV CODE 250: Performed by: STUDENT IN AN ORGANIZED HEALTH CARE EDUCATION/TRAINING PROGRAM

## 2023-06-23 PROCEDURE — 63600175 PHARM REV CODE 636 W HCPCS: Performed by: STUDENT IN AN ORGANIZED HEALTH CARE EDUCATION/TRAINING PROGRAM

## 2023-06-23 PROCEDURE — 80053 COMPREHEN METABOLIC PANEL: CPT | Performed by: STUDENT IN AN ORGANIZED HEALTH CARE EDUCATION/TRAINING PROGRAM

## 2023-06-23 PROCEDURE — 84100 ASSAY OF PHOSPHORUS: CPT | Performed by: STUDENT IN AN ORGANIZED HEALTH CARE EDUCATION/TRAINING PROGRAM

## 2023-06-23 PROCEDURE — 36415 COLL VENOUS BLD VENIPUNCTURE: CPT | Performed by: STUDENT IN AN ORGANIZED HEALTH CARE EDUCATION/TRAINING PROGRAM

## 2023-06-23 PROCEDURE — 85007 BL SMEAR W/DIFF WBC COUNT: CPT | Performed by: STUDENT IN AN ORGANIZED HEALTH CARE EDUCATION/TRAINING PROGRAM

## 2023-06-23 RX ORDER — SODIUM CHLORIDE, SODIUM LACTATE, POTASSIUM CHLORIDE, CALCIUM CHLORIDE 600; 310; 30; 20 MG/100ML; MG/100ML; MG/100ML; MG/100ML
INJECTION, SOLUTION INTRAVENOUS CONTINUOUS
Status: ACTIVE | OUTPATIENT
Start: 2023-06-23 | End: 2023-06-23

## 2023-06-23 RX ORDER — HYDROCODONE BITARTRATE AND ACETAMINOPHEN 5; 325 MG/1; MG/1
1 TABLET ORAL EVERY 4 HOURS PRN
Status: DISCONTINUED | OUTPATIENT
Start: 2023-06-23 | End: 2023-06-23

## 2023-06-23 RX ORDER — POLYETHYLENE GLYCOL 3350 17 G/17G
17 POWDER, FOR SOLUTION ORAL 2 TIMES DAILY
Status: DISCONTINUED | OUTPATIENT
Start: 2023-06-23 | End: 2023-06-24 | Stop reason: HOSPADM

## 2023-06-23 RX ORDER — HYDROCODONE BITARTRATE AND ACETAMINOPHEN 7.5; 325 MG/1; MG/1
1 TABLET ORAL EVERY 6 HOURS
Status: DISCONTINUED | OUTPATIENT
Start: 2023-06-23 | End: 2023-06-24 | Stop reason: HOSPADM

## 2023-06-23 RX ORDER — SENNOSIDES 8.6 MG/1
8.6 TABLET ORAL 2 TIMES DAILY
Status: DISCONTINUED | OUTPATIENT
Start: 2023-06-23 | End: 2023-06-24 | Stop reason: HOSPADM

## 2023-06-23 RX ORDER — ACETAMINOPHEN 325 MG/1
650 TABLET ORAL EVERY 8 HOURS
Status: DISCONTINUED | OUTPATIENT
Start: 2023-06-23 | End: 2023-06-24 | Stop reason: HOSPADM

## 2023-06-23 RX ADMIN — HYDROCODONE BITARTRATE AND ACETAMINOPHEN 1 TABLET: 5; 325 TABLET ORAL at 05:06

## 2023-06-23 RX ADMIN — CEFTRIAXONE 1 G: 1 INJECTION, POWDER, FOR SOLUTION INTRAMUSCULAR; INTRAVENOUS at 03:06

## 2023-06-23 RX ADMIN — HYDROCODONE BITARTRATE AND ACETAMINOPHEN 1 TABLET: 7.5; 325 TABLET ORAL at 11:06

## 2023-06-23 RX ADMIN — SENNOSIDES 8.6 MG: 8.6 TABLET, FILM COATED ORAL at 01:06

## 2023-06-23 RX ADMIN — POLYETHYLENE GLYCOL 3350 17 G: 17 POWDER, FOR SOLUTION ORAL at 01:06

## 2023-06-23 RX ADMIN — SENNOSIDES 8.6 MG: 8.6 TABLET, FILM COATED ORAL at 09:06

## 2023-06-23 RX ADMIN — TAMSULOSIN HYDROCHLORIDE 0.4 MG: 0.4 CAPSULE ORAL at 09:06

## 2023-06-23 RX ADMIN — HYDROCODONE BITARTRATE AND ACETAMINOPHEN 1 TABLET: 7.5; 325 TABLET ORAL at 12:06

## 2023-06-23 RX ADMIN — ACETAMINOPHEN 650 MG: 325 TABLET ORAL at 09:06

## 2023-06-23 RX ADMIN — HYDROMORPHONE HYDROCHLORIDE 0.5 MG: 1 INJECTION, SOLUTION INTRAMUSCULAR; INTRAVENOUS; SUBCUTANEOUS at 02:06

## 2023-06-23 RX ADMIN — SODIUM CHLORIDE, POTASSIUM CHLORIDE, SODIUM LACTATE AND CALCIUM CHLORIDE: 600; 310; 30; 20 INJECTION, SOLUTION INTRAVENOUS at 12:06

## 2023-06-23 RX ADMIN — HYDROCODONE BITARTRATE AND ACETAMINOPHEN 1 TABLET: 7.5; 325 TABLET ORAL at 05:06

## 2023-06-23 RX ADMIN — HYDROMORPHONE HYDROCHLORIDE 0.5 MG: 1 INJECTION, SOLUTION INTRAMUSCULAR; INTRAVENOUS; SUBCUTANEOUS at 09:06

## 2023-06-23 RX ADMIN — ACETAMINOPHEN 650 MG: 325 TABLET ORAL at 01:06

## 2023-06-23 RX ADMIN — POLYETHYLENE GLYCOL 3350 17 G: 17 POWDER, FOR SOLUTION ORAL at 09:06

## 2023-06-23 NOTE — ASSESSMENT & PLAN NOTE
Noted on previous CT in setting of worsening abdominal pain and N/V with worsening RICK and UTI. S/p cystoscopy on 6/22 with abundant purulence, stent place, saldana left in place for drainage. 6/23 saldana removed, plan for outpatient follow up on 7/5 for attempted definitive stone removal.    - Urology reccs continue to be appreciated  - Maintenance fluids for now (following RICK)  - Continue flomax  - Rocephin for UTI

## 2023-06-23 NOTE — PROGRESS NOTES
Future Appointments   Date Time Provider Department Center   6/30/2023  1:00 PM Kaylie Blevins MD [facility] UROLOGY [place]

## 2023-06-23 NOTE — ASSESSMENT & PLAN NOTE
In setting of UVJ stone, pathological urine with 1+ leukocytes, 2+ blood.    - Continue IV rocephin  - Urine culture 100,000 GNR's, susceptibility pending

## 2023-06-23 NOTE — PROGRESS NOTES
[facility] Medicine  Progress Note    Patient Name: Sherri Machado  MRN: [id number]  Patient Class: IP- Inpatient   Admission Date: 6/21/2023  Length of Stay: 2 days  Attending Physician: Tomasa Orantes MD  Primary Care Provider: Modesto Waldron MD        Subjective:     Principal Problem:Calculus of ureterovesical junction (UVJ)        HPI:  42 yo F with PMHx with history depression, opioid dependence who presented to ED due to 3 days worsening abdominal pain, nausea/vomiting, minimal PO intake. Patient initially presented to ED on 6/18 with RLQ pain, UA without findings of infection. Patient did have RICK (baseline 0.6 -> 1.2). CT abdomen/pelvis demonstrated a 5 mm calculus at the right UVJ. Patient was discharged home with conservative management. Patient provided urinary strainer but per the patient there was no passage of stone.    On current presentation, patient afebrile but tachycardic. Repeat UA this time now with 1+ leukocytes, 2+ blood. WBC WNL. CMP concern both for worsening RICK (1.2 ->2.1) as well as elevated t.bili of 1.1, alk phos 138. Lactate WNL. Patient started on IV rocephin, given 1L NS bolus, 4 mg IV morphine. Urology consulted, tentative plan to stent patient pending repeat CT imaging of stone. Patient admitted to [facility] for medical management.      Overview/Hospital Course:  No notes on file    Interval History: NAEON, saldana removed today as was no longer draining pus. Pain well controlled overnight. RICK remains unchanged - pending renal US.    Review of Systems   Constitutional:  Negative for chills and fever.   HENT:  Negative for congestion, rhinorrhea and sore throat.    Eyes:  Negative for visual disturbance.   Respiratory:  Negative for cough and shortness of breath.    Cardiovascular:  Negative for chest pain.   Gastrointestinal:  Positive for abdominal pain. Negative for diarrhea, nausea and vomiting.   Genitourinary:  Negative for decreased urine volume and  dysuria.   Musculoskeletal:  Negative for arthralgias and myalgias.   Skin:  Negative for rash.   Neurological:  Negative for dizziness, weakness, numbness and headaches.   Psychiatric/Behavioral:  Negative for dysphoric mood. The patient is not nervous/anxious.      Objective:     Vital Signs (Most Recent):  Temp: 97.8 °F (36.6 °C) (06/23/23 0757)  Pulse: 60 (06/23/23 0757)  Resp: 19 (06/23/23 0903)  BP: 134/80 (06/23/23 0757)  SpO2: 100 % (06/23/23 0757) Vital Signs (24h Range):  Temp:  [96.8 °F (36 °C)-98.5 °F (36.9 °C)] 97.8 °F (36.6 °C)  Pulse:  [53-90] 60  Resp:  [16-27] 19  SpO2:  [93 %-100 %] 100 %  BP: (117-153)/(72-97) 134/80     Weight: 92.6 kg (204 lb 2.3 oz)  Body mass index is 33.97 kg/m².    Intake/Output Summary (Last 24 hours) at 6/23/2023 1216  Last data filed at 6/23/2023 0800  Gross per 24 hour   Intake 1847.58 ml   Output 2275 ml   Net -427.42 ml         Physical Exam  Constitutional:       General: She is not in acute distress.     Appearance: Normal appearance. She is well-developed. She is not diaphoretic.   Cardiovascular:      Rate and Rhythm: Normal rate and regular rhythm.      Pulses: Normal pulses.   Pulmonary:      Effort: Pulmonary effort is normal. No respiratory distress.      Breath sounds: Normal breath sounds. No wheezing.   Abdominal:      General: Abdomen is flat. Bowel sounds are normal. There is no distension.      Palpations: Abdomen is soft.   Musculoskeletal:         General: No signs of injury. Normal range of motion.      Cervical back: Normal range of motion. No rigidity.   Skin:     General: Skin is warm and dry.      Capillary Refill: Capillary refill takes less than 2 seconds.      Coloration: Skin is not pale.      Findings: No rash.   Neurological:      General: No focal deficit present.      Mental Status: She is alert and oriented to person, place, and time.   Psychiatric:         Mood and Affect: Mood normal.         Behavior: Behavior normal.            Significant Labs: CBC:   Recent Labs   Lab 06/21/23  1408 06/22/23  0724 06/23/23  0812   WBC 8.22 7.00 11.06   HGB 12.6 10.5* 11.6*   HCT 37.2 29.9* 33.2*    179 245     CMP:   Recent Labs   Lab 06/22/23  0724 06/22/23  2020 06/23/23  0812   * 131* 133*   K 3.5 3.8 3.8    100 99   CO2 20* 20* 23   GLU 86 148* 125*   BUN 34* 34* 45*   CREATININE 2.4* 2.4* 2.4*   CALCIUM 8.9 9.2 9.4   PROT 6.6 7.1 7.3   ALBUMIN 2.2* 2.2* 2.2*   BILITOT 1.0 0.7 0.5   ALKPHOS 105 102 112   AST 31 64* 69*   ALT 20 33 35   ANIONGAP 12 11 11       Significant Imaging: I have reviewed all pertinent imaging results/findings within the past 24 hours.      Assessment/Plan:      * Calculus of ureterovesical junction (UVJ)  Noted on previous CT in setting of worsening abdominal pain and N/V with worsening RICK and UTI. S/p cystoscopy on 6/22 with abundant purulence, stent place, saldana left in place for drainage. 6/23 saldana removed, plan for outpatient follow up on 7/5 for attempted definitive stone removal.    - Urology reccs continue to be appreciated  - Maintenance fluids for now (following RICK)  - Continue flomax  - Rocephin for UTI  - Current scheduled (deescalated) pain regimen: q8h 625 mg tylenol, q6h 7.5 mg norco     UTI (urinary tract infection)  In setting of UVJ stone, pathological urine with 1+ leukocytes, 2+ blood.    - Continue IV rocephin  - Urine culture 100,000 GNR's, susceptibility pending    RICK (acute kidney injury)  In setting of vomiting/minimal PO intake, stone. Creatinine 2.1 on arrival, now 2.4 for past 2 days.    - Pre-renal FeNa, will continue maintenance fluids for now  - Likely in setting of poor PO and possible unilateral stone obstruction, but will get renal US and consider nephro if no improvement in next day or two  - Avoid nephrotoxins          Hyperbilirubinemia  S/p cholecystectomy in 2009. Interval development of increased biliary profile between ED visits (3 days). Alk phos elevated as  well. Diffuse abdominal pain on exam. Lipase negative.    - Alk phos/bili trended down  - US abd mild prominence of the extrahepatic duct at 6-7 mm  - Will recommend outpatient GI/surgery follow up      VTE Risk Mitigation (From admission, onward)         Ordered     IP VTE HIGH RISK PATIENT  Once         06/21/23 1454     Place sequential compression device  Until discontinued         06/21/23 1454                Discharge Planning   RUDOLPH:      Code Status: Full Code   Is the patient medically ready for discharge?:     Reason for patient still in hospital (select all that apply): Patient trending condition and Laboratory test  Discharge Plan A: Home, Home with family            Maik Cunningham MD  Department of Hospital Medicine   [place] [facility]

## 2023-06-23 NOTE — ASSESSMENT & PLAN NOTE
S/p cholecystectomy in 2009. Interval development of increased biliary profile between ED visits (3 days). Alk phos elevated as well. Diffuse abdominal pain on exam. Lipase negative.    - Alk phos/bili trended down  - US abd mild prominence of the extrahepatic duct at 6-7 mm  - Will recommend outpatient GI/surgery follow up

## 2023-06-23 NOTE — ASSESSMENT & PLAN NOTE
In setting of vomiting/minimal PO intake, stone. Creatinine 2.1 on arrival, now 2.4 for past 2 days.    - Pre-renal FeNa, will continue maintenance fluids for now  - Likely in setting of poor PO and possible unilateral stone obstruction, but will get renal US and consider nephro if no improvement in next day or two  - Avoid nephrotoxins

## 2023-06-23 NOTE — SUBJECTIVE & OBJECTIVE
Interval History: shana MARX removed today as was no longer draining pus. Pain well controlled overnight. RICK remains unchanged - pending renal US.    Review of Systems   Constitutional:  Negative for chills and fever.   HENT:  Negative for congestion, rhinorrhea and sore throat.    Eyes:  Negative for visual disturbance.   Respiratory:  Negative for cough and shortness of breath.    Cardiovascular:  Negative for chest pain.   Gastrointestinal:  Positive for abdominal pain. Negative for diarrhea, nausea and vomiting.   Genitourinary:  Negative for decreased urine volume and dysuria.   Musculoskeletal:  Negative for arthralgias and myalgias.   Skin:  Negative for rash.   Neurological:  Negative for dizziness, weakness, numbness and headaches.   Psychiatric/Behavioral:  Negative for dysphoric mood. The patient is not nervous/anxious.      Objective:     Vital Signs (Most Recent):  Temp: 97.8 °F (36.6 °C) (06/23/23 0757)  Pulse: 60 (06/23/23 0757)  Resp: 19 (06/23/23 0903)  BP: 134/80 (06/23/23 0757)  SpO2: 100 % (06/23/23 0757) Vital Signs (24h Range):  Temp:  [96.8 °F (36 °C)-98.5 °F (36.9 °C)] 97.8 °F (36.6 °C)  Pulse:  [53-90] 60  Resp:  [16-27] 19  SpO2:  [93 %-100 %] 100 %  BP: (117-153)/(72-97) 134/80     Weight: 92.6 kg (204 lb 2.3 oz)  Body mass index is 33.97 kg/m².    Intake/Output Summary (Last 24 hours) at 6/23/2023 1216  Last data filed at 6/23/2023 0800  Gross per 24 hour   Intake 1847.58 ml   Output 2275 ml   Net -427.42 ml         Physical Exam  Constitutional:       General: She is not in acute distress.     Appearance: Normal appearance. She is well-developed. She is not diaphoretic.   Cardiovascular:      Rate and Rhythm: Normal rate and regular rhythm.      Pulses: Normal pulses.   Pulmonary:      Effort: Pulmonary effort is normal. No respiratory distress.      Breath sounds: Normal breath sounds. No wheezing.   Abdominal:      General: Abdomen is flat. Bowel sounds are normal. There is no  distension.      Palpations: Abdomen is soft.   Musculoskeletal:         General: No signs of injury. Normal range of motion.      Cervical back: Normal range of motion. No rigidity.   Skin:     General: Skin is warm and dry.      Capillary Refill: Capillary refill takes less than 2 seconds.      Coloration: Skin is not pale.      Findings: No rash.   Neurological:      General: No focal deficit present.      Mental Status: She is alert and oriented to person, place, and time.   Psychiatric:         Mood and Affect: Mood normal.         Behavior: Behavior normal.           Significant Labs: CBC:   Recent Labs   Lab 06/21/23  1408 06/22/23  0724 06/23/23  0812   WBC 8.22 7.00 11.06   HGB 12.6 10.5* 11.6*   HCT 37.2 29.9* 33.2*    179 245     CMP:   Recent Labs   Lab 06/22/23  0724 06/22/23  2020 06/23/23  0812   * 131* 133*   K 3.5 3.8 3.8    100 99   CO2 20* 20* 23   GLU 86 148* 125*   BUN 34* 34* 45*   CREATININE 2.4* 2.4* 2.4*   CALCIUM 8.9 9.2 9.4   PROT 6.6 7.1 7.3   ALBUMIN 2.2* 2.2* 2.2*   BILITOT 1.0 0.7 0.5   ALKPHOS 105 102 112   AST 31 64* 69*   ALT 20 33 35   ANIONGAP 12 11 11       Significant Imaging: I have reviewed all pertinent imaging results/findings within the past 24 hours.

## 2023-06-23 NOTE — PROGRESS NOTES
[facility]  Urology Progress Note    Problems:   Patient Active Problem List   Diagnosis    Opioid dependence with withdrawal    Elevated BP without diagnosis of hypertension    Depression with suicidal ideation    Calculus of ureterovesical junction (UVJ)    RICK (acute kidney injury)    Pyelonephritis    Hyperbilirubinemia    UTI (urinary tract infection)       Subjective   NAEO. Pt feels much improved, some minor stent bother. Townsend removed this AM, pt hydrating well with water    Meds:   acetaminophen  650 mg Oral Q8H    cefTRIAXone (ROCEPHIN) IVPB  1 g Intravenous Q24H    HYDROcodone-acetaminophen  1 tablet Oral Q6H    scopolamine  1 patch Transdermal Q3 Days    tamsulosin  0.4 mg Oral Daily      lactated ringers       naloxone, ondansetron, sodium chloride 0.9%    Temp:  [96.8 °F (36 °C)-98.5 °F (36.9 °C)] 97.8 °F (36.6 °C)  Pulse:  [53-90] 64  Resp:  [16-27] 18  SpO2:  [93 %-100 %] 100 %  BP: (117-153)/(72-97) 121/72    I/O last 3 completed shifts:  In: 3429.3 [P.O.:480; I.V.:2849.8; IV Piggyback:99.6]  Out: 2875 [Urine:2875]    General:  Alert, cooperative, no distress, appears stated age   Head:  Normocephalic, without obvious abnormality, atraumatic   Eyes:  PERRL, conjunctiva/corneas clear   Lungs:   Respirations unlabored    Heart:  Warm and well perfused   Abdomen:   abdomen is soft without significant tenderness, masses, organomegaly or guarding   : defer exam   Drains: none   Extremities: Extremities normal, atraumatic, no cyanosis or edema   Skin: Skin color, texture, turgor normal, no rashes or lesions   Psych: Appropriate   Neurologic: Non-focal     Recent Results (from the past 24 hour(s))   Blood culture    Collection Time: 06/22/23  1:52 PM    Specimen: Antecubital, Right Hand; Blood   Result Value Ref Range    Blood Culture, Routine No Growth to date    Blood culture    Collection Time: 06/22/23  1:52 PM    Specimen: Blood   Result Value Ref Range    Blood Culture, Routine  No Growth to date    Comprehensive metabolic panel    Collection Time: 06/22/23  8:20 PM   Result Value Ref Range    Sodium 131 (L) 136 - 145 mmol/L    Potassium 3.8 3.5 - 5.1 mmol/L    Chloride 100 95 - 110 mmol/L    CO2 20 (L) 23 - 29 mmol/L    Glucose 148 (H) 70 - 110 mg/dL    BUN 34 (H) 6 - 20 mg/dL    Creatinine 2.4 (H) 0.5 - 1.4 mg/dL    Calcium 9.2 8.7 - 10.5 mg/dL    Total Protein 7.1 6.0 - 8.4 g/dL    Albumin 2.2 (L) 3.5 - 5.2 g/dL    Total Bilirubin 0.7 0.1 - 1.0 mg/dL    Alkaline Phosphatase 102 55 - 135 U/L    AST 64 (H) 10 - 40 U/L    ALT 33 10 - 44 U/L    eGFR 25 (A) >60 mL/min/1.73 m^2    Anion Gap 11 8 - 16 mmol/L   Comprehensive metabolic panel    Collection Time: 06/23/23  8:12 AM   Result Value Ref Range    Sodium 133 (L) 136 - 145 mmol/L    Potassium 3.8 3.5 - 5.1 mmol/L    Chloride 99 95 - 110 mmol/L    CO2 23 23 - 29 mmol/L    Glucose 125 (H) 70 - 110 mg/dL    BUN 45 (H) 6 - 20 mg/dL    Creatinine 2.4 (H) 0.5 - 1.4 mg/dL    Calcium 9.4 8.7 - 10.5 mg/dL    Total Protein 7.3 6.0 - 8.4 g/dL    Albumin 2.2 (L) 3.5 - 5.2 g/dL    Total Bilirubin 0.5 0.1 - 1.0 mg/dL    Alkaline Phosphatase 112 55 - 135 U/L    AST 69 (H) 10 - 40 U/L    ALT 35 10 - 44 U/L    eGFR 25 (A) >60 mL/min/1.73 m^2    Anion Gap 11 8 - 16 mmol/L   CBC auto differential    Collection Time: 06/23/23  8:12 AM   Result Value Ref Range    WBC 11.06 3.90 - 12.70 K/uL    RBC 4.28 4.00 - 5.40 M/uL    Hemoglobin 11.6 (L) 12.0 - 16.0 g/dL    Hematocrit 33.2 (L) 37.0 - 48.5 %    MCV 78 (L) 82 - 98 fL    MCH 27.1 27.0 - 31.0 pg    MCHC 34.9 32.0 - 36.0 g/dL    RDW 13.5 11.5 - 14.5 %    Platelets 245 150 - 450 K/uL    MPV 10.0 9.2 - 12.9 fL    Immature Granulocytes CANCELED 0.0 - 0.5 %    Immature Grans (Abs) CANCELED 0.00 - 0.04 K/uL    Lymph # CANCELED 1.0 - 4.8 K/uL    Mono # CANCELED 0.3 - 1.0 K/uL    Eos # CANCELED 0.0 - 0.5 K/uL    Baso # CANCELED 0.00 - 0.20 K/uL    nRBC 0 0 /100 WBC    Gran % 77.0 (H) 38.0 - 73.0 %    Lymph % 10.0 (L)  18.0 - 48.0 %    Mono % 7.0 4.0 - 15.0 %    Eosinophil % 0.0 0.0 - 8.0 %    Basophil % 0.0 0.0 - 1.9 %    Bands 6.0 %    Platelet Estimate Appears normal     Aniso Slight     Poik Slight     Tear Drop Cells Occasional     Mondamin Cells Occasional     Dohle Bodies Present     Differential Method Manual    Phosphorus    Collection Time: 06/23/23  8:12 AM   Result Value Ref Range    Phosphorus 3.2 2.7 - 4.5 mg/dL   Magnesium    Collection Time: 06/23/23  8:12 AM   Result Value Ref Range    Magnesium 2.4 1.6 - 2.6 mg/dL       Assessment   43 y.o. female with right 5mm UVJ stone, hydroureteronephrosis, UTI, s/p cysto right stent 6/22/23    Plan   Agree with continued PO and IV hydration per primary.  Agree with renally dosed IV antibiotics until patient's urine culture results with sensitivities.  Primary team would like to keep pt in until Cr stabilizes and stops trending upwards.  From my standpoint when primary feels pt is stable for discharge can discharge with flomax 0.4mg once daily, culture specific antibiotics for 2 weeks to treat her UTI and cover her leading up to the surgery, pain medications (I usually offer percocet and toradol however with her renal function would avoid toradol).  I will have my staff overbook my clinic for next week Friday 6/30 for a clinic visit and I have tentatively scheduled her for kidney stone surgery 7/5.

## 2023-06-23 NOTE — PLAN OF CARE
Chart reviewed - case discussed in am MDR      pt - lives with 3 minor children (ages 4, 11 and 15)  - pt's ex  is with children at this time.   Pt states she has family available to assist at d/c - she has transportation to home at discharge    No pcp at this time.      6/22 s/p Cystoscopy and pyelogram - stent placed   dx:  UTI, Kidney Stones     Future Appointments   Date Time Provider Department Center   6/30/2023  1:00 PM Kaylie Blevins MD [facility] UROLOGY [facility] CM will contact pt [facility] FP f/u apt         06/23/23 0594   Post-Acute Status   Post-Acute Authorization Other   Other Status No Post-Acute Service Needs   Hospital Resources/Appts/Education Provided Appointments scheduled and added to AVS   Discharge Delays   (pending medical stability)   Discharge Plan   Discharge Plan A Home;Home with family

## 2023-06-23 NOTE — NURSING
NURSE PROACTIVE ROUNDING NOTE       Time of Chart Review: 0720 & 1125    Admit Date: 2023  LOS: 2  Code Status: Full Code   Date of Visit: 2023  : 1980  Age: 43 y.o.  Sex: female  Race: Black or   Bed: K529/K529 A  MRN: [id number]  Was the patient discharged from an ICU this admission? No   Was the patient discharged from a PACU within last 24 hours? Yes   Did the patient receive conscious sedation/general anesthesia in last 24 hours? No   Was the patient in the ED within the past 24 hours? No   Was the patient on NIPPV within the past 24 hours? No   Attending Physician: Tomasa Orantes MD  Primary Service: Family Medicine   Time spent in chart: < 15 min    SITUATION    Notified by previous RRN during handoff    Diagnosis: Calculus of ureterovesical junction (UVJ)   has a past medical history of IBS (irritable bowel syndrome), Osteoarthritis, and Sickle cell trait.    Last Vitals:  Temp: 97.8 °F (36.6 °C) ( 075)  Pulse: 60 ( 075)  Resp: 19 ( 0903)  BP: 134/80 ( 075)  SpO2: 100 % (757)    24 Hour Vitals Range:  Temp:  [96.8 °F (36 °C)-98.8 °F (37.1 °C)]   Pulse:  [53-96]   Resp:  [14-27]   BP: (109-153)/(66-97)   SpO2:  [93 %-100 %]     ASSESSMENT/INTERVENTIONS  - Chart reviewed including lab results, vital signs, and progress notes  - Patient s/p right ureteral stent   - H/H stable  - Afebrile x 24H  - Antibiotics for +UTI    RECOMMENDATIONS  - Continue to monitor.  No acute issues at this time.  Able to intervene if needed.    PROVIDER ESCALATION    Physician escalation: No    Disposition:Remain in room 529    FOLLOW UP    Call back the Rapid Response NurseDebbie at [phone] for additional questions or concerns.

## 2023-06-24 VITALS
OXYGEN SATURATION: 100 % | SYSTOLIC BLOOD PRESSURE: 126 MMHG | RESPIRATION RATE: 17 BRPM | HEART RATE: 68 BPM | DIASTOLIC BLOOD PRESSURE: 82 MMHG | TEMPERATURE: 98 F | HEIGHT: 65 IN | WEIGHT: 204.13 LBS | BODY MASS INDEX: 34.01 KG/M2

## 2023-06-24 LAB
ALBUMIN SERPL BCP-MCNC: 2.1 G/DL (ref 3.5–5.2)
ALP SERPL-CCNC: 103 U/L (ref 55–135)
ALT SERPL W/O P-5'-P-CCNC: 20 U/L (ref 10–44)
ANION GAP SERPL CALC-SCNC: 11 MMOL/L (ref 8–16)
AST SERPL-CCNC: 32 U/L (ref 10–40)
BASOPHILS # BLD AUTO: 0.05 K/UL (ref 0–0.2)
BASOPHILS NFR BLD: 0.3 % (ref 0–1.9)
BILIRUB SERPL-MCNC: 0.5 MG/DL (ref 0.1–1)
BUN SERPL-MCNC: 53 MG/DL (ref 6–20)
CALCIUM SERPL-MCNC: 8.7 MG/DL (ref 8.7–10.5)
CHLORIDE SERPL-SCNC: 102 MMOL/L (ref 95–110)
CO2 SERPL-SCNC: 21 MMOL/L (ref 23–29)
CREAT SERPL-MCNC: 2 MG/DL (ref 0.5–1.4)
DIFFERENTIAL METHOD: ABNORMAL
EOSINOPHIL # BLD AUTO: 0 K/UL (ref 0–0.5)
EOSINOPHIL NFR BLD: 0 % (ref 0–8)
ERYTHROCYTE [DISTWIDTH] IN BLOOD BY AUTOMATED COUNT: 13.5 % (ref 11.5–14.5)
EST. GFR  (NO RACE VARIABLE): 31 ML/MIN/1.73 M^2
GLUCOSE SERPL-MCNC: 108 MG/DL (ref 70–110)
HCT VFR BLD AUTO: 33.1 % (ref 37–48.5)
HGB BLD-MCNC: 11.7 G/DL (ref 12–16)
IMM GRANULOCYTES # BLD AUTO: 0.22 K/UL (ref 0–0.04)
IMM GRANULOCYTES NFR BLD AUTO: 1.5 % (ref 0–0.5)
LYMPHOCYTES # BLD AUTO: 1.4 K/UL (ref 1–4.8)
LYMPHOCYTES NFR BLD: 10 % (ref 18–48)
MAGNESIUM SERPL-MCNC: 2.3 MG/DL (ref 1.6–2.6)
MCH RBC QN AUTO: 27.3 PG (ref 27–31)
MCHC RBC AUTO-ENTMCNC: 35.3 G/DL (ref 32–36)
MCV RBC AUTO: 77 FL (ref 82–98)
MONOCYTES # BLD AUTO: 1.1 K/UL (ref 0.3–1)
MONOCYTES NFR BLD: 7.7 % (ref 4–15)
NEUTROPHILS # BLD AUTO: 11.6 K/UL (ref 1.8–7.7)
NEUTROPHILS NFR BLD: 80.5 % (ref 38–73)
NRBC BLD-RTO: 0 /100 WBC
PHOSPHATE SERPL-MCNC: 4.2 MG/DL (ref 2.7–4.5)
PLATELET # BLD AUTO: 249 K/UL (ref 150–450)
PMV BLD AUTO: 10.1 FL (ref 9.2–12.9)
POTASSIUM SERPL-SCNC: 3.7 MMOL/L (ref 3.5–5.1)
PROT SERPL-MCNC: 6.5 G/DL (ref 6–8.4)
RBC # BLD AUTO: 4.28 M/UL (ref 4–5.4)
SODIUM SERPL-SCNC: 134 MMOL/L (ref 136–145)
WBC # BLD AUTO: 14.45 K/UL (ref 3.9–12.7)

## 2023-06-24 PROCEDURE — 36415 COLL VENOUS BLD VENIPUNCTURE: CPT | Performed by: STUDENT IN AN ORGANIZED HEALTH CARE EDUCATION/TRAINING PROGRAM

## 2023-06-24 PROCEDURE — 85025 COMPLETE CBC W/AUTO DIFF WBC: CPT | Performed by: STUDENT IN AN ORGANIZED HEALTH CARE EDUCATION/TRAINING PROGRAM

## 2023-06-24 PROCEDURE — 63600175 PHARM REV CODE 636 W HCPCS: Performed by: STUDENT IN AN ORGANIZED HEALTH CARE EDUCATION/TRAINING PROGRAM

## 2023-06-24 PROCEDURE — 84100 ASSAY OF PHOSPHORUS: CPT | Performed by: STUDENT IN AN ORGANIZED HEALTH CARE EDUCATION/TRAINING PROGRAM

## 2023-06-24 PROCEDURE — 80053 COMPREHEN METABOLIC PANEL: CPT | Performed by: STUDENT IN AN ORGANIZED HEALTH CARE EDUCATION/TRAINING PROGRAM

## 2023-06-24 PROCEDURE — 25000003 PHARM REV CODE 250: Performed by: STUDENT IN AN ORGANIZED HEALTH CARE EDUCATION/TRAINING PROGRAM

## 2023-06-24 PROCEDURE — 83735 ASSAY OF MAGNESIUM: CPT | Performed by: STUDENT IN AN ORGANIZED HEALTH CARE EDUCATION/TRAINING PROGRAM

## 2023-06-24 RX ORDER — HYDROCODONE BITARTRATE AND ACETAMINOPHEN 7.5; 325 MG/1; MG/1
1 TABLET ORAL EVERY 8 HOURS PRN
Qty: 13 TABLET | Refills: 0 | Status: ON HOLD | OUTPATIENT
Start: 2023-06-24 | End: 2023-07-10 | Stop reason: HOSPADM

## 2023-06-24 RX ORDER — SODIUM CHLORIDE, SODIUM LACTATE, POTASSIUM CHLORIDE, CALCIUM CHLORIDE 600; 310; 30; 20 MG/100ML; MG/100ML; MG/100ML; MG/100ML
INJECTION, SOLUTION INTRAVENOUS CONTINUOUS
Status: DISCONTINUED | OUTPATIENT
Start: 2023-06-24 | End: 2023-06-24 | Stop reason: HOSPADM

## 2023-06-24 RX ORDER — AMOXICILLIN AND CLAVULANATE POTASSIUM 875; 125 MG/1; MG/1
1 TABLET, FILM COATED ORAL EVERY 12 HOURS
Qty: 20 TABLET | Refills: 0 | Status: ON HOLD | OUTPATIENT
Start: 2023-06-24 | End: 2023-07-10 | Stop reason: HOSPADM

## 2023-06-24 RX ADMIN — POLYETHYLENE GLYCOL 3350 17 G: 17 POWDER, FOR SOLUTION ORAL at 08:06

## 2023-06-24 RX ADMIN — SODIUM CHLORIDE, POTASSIUM CHLORIDE, SODIUM LACTATE AND CALCIUM CHLORIDE: 600; 310; 30; 20 INJECTION, SOLUTION INTRAVENOUS at 08:06

## 2023-06-24 RX ADMIN — ACETAMINOPHEN 650 MG: 325 TABLET ORAL at 05:06

## 2023-06-24 RX ADMIN — SENNOSIDES 8.6 MG: 8.6 TABLET, FILM COATED ORAL at 08:06

## 2023-06-24 RX ADMIN — TAMSULOSIN HYDROCHLORIDE 0.4 MG: 0.4 CAPSULE ORAL at 08:06

## 2023-06-24 RX ADMIN — HYDROCODONE BITARTRATE AND ACETAMINOPHEN 1 TABLET: 7.5; 325 TABLET ORAL at 12:06

## 2023-06-24 RX ADMIN — HYDROCODONE BITARTRATE AND ACETAMINOPHEN 1 TABLET: 7.5; 325 TABLET ORAL at 05:06

## 2023-06-24 NOTE — PLAN OF CARE
"   06/24/23 1215   Final Note   Assessment Type Final Discharge Note   Anticipated Discharge Disposition Home   What phone number can be called within the next 1-3 days to see how you are doing after discharge? [phone]   Post-Acute Status   Discharge Delays None known at this time      spoke with pt to discuss discharge plan to home. Pt informed Sw that she had family at beside and would transport her home at D/C. No additional needs at this time. Follow up appointments scheduled below.       Per previous CM: "CM will contact pt [facility] FP f/u apt  "    Future Appointments   Date Time Provider Department Center   6/30/2023  1:00 PM Kaylie Blevins MD [facility] UROLOGY [facility]       "

## 2023-06-24 NOTE — SUBJECTIVE & OBJECTIVE
Interval History: No adverse events overnight.     Review of Systems   Constitutional:  Negative for chills and fever.   HENT:  Negative for congestion, rhinorrhea and sore throat.    Eyes:  Negative for visual disturbance.   Respiratory:  Negative for cough and shortness of breath.    Cardiovascular:  Negative for chest pain.   Gastrointestinal:  Negative for abdominal pain, diarrhea, nausea and vomiting.   Genitourinary:  Negative for decreased urine volume and dysuria.   Musculoskeletal:  Negative for arthralgias and myalgias.   Skin:  Negative for rash.   Neurological:  Negative for dizziness, weakness, numbness and headaches.   Psychiatric/Behavioral:  Negative for dysphoric mood. The patient is not nervous/anxious.    Objective:     Vital Signs (Most Recent):  Temp: 97.7 °F (36.5 °C) (06/24/23 0741)  Pulse: 67 (06/24/23 0741)  Resp: 16 (06/24/23 0741)  BP: (!) 150/84 (06/24/23 0741)  SpO2: 100 % (06/24/23 0741) Vital Signs (24h Range):  Temp:  [97.5 °F (36.4 °C)-98.1 °F (36.7 °C)] 97.7 °F (36.5 °C)  Pulse:  [45-67] 67  Resp:  [16-19] 16  SpO2:  [97 %-100 %] 100 %  BP: (121-150)/(71-84) 150/84     Weight: 92.6 kg (204 lb 2.3 oz)  Body mass index is 33.97 kg/m².    Intake/Output Summary (Last 24 hours) at 6/24/2023 0905  Last data filed at 6/24/2023 0831  Gross per 24 hour   Intake 1191.43 ml   Output 3400 ml   Net -2208.57 ml         Physical Exam  Constitutional:       General: She is not in acute distress.     Appearance: Normal appearance. She is well-developed. She is not diaphoretic.   HENT:      Head: Normocephalic.      Right Ear: External ear normal.      Left Ear: External ear normal.      Nose: Nose normal.      Mouth/Throat:      Pharynx: Oropharynx is clear.   Eyes:      Extraocular Movements: Extraocular movements intact.   Cardiovascular:      Rate and Rhythm: Normal rate and regular rhythm.      Pulses: Normal pulses.   Pulmonary:      Effort: Pulmonary effort is normal. No respiratory distress.       Breath sounds: Normal breath sounds. No wheezing.   Abdominal:      General: Abdomen is flat. Bowel sounds are normal. There is no distension.      Palpations: Abdomen is soft.   Musculoskeletal:         General: No signs of injury. Normal range of motion.      Cervical back: Normal range of motion. No rigidity.   Skin:     General: Skin is warm and dry.      Capillary Refill: Capillary refill takes less than 2 seconds.      Coloration: Skin is not pale.      Findings: No rash.   Neurological:      General: No focal deficit present.      Mental Status: She is alert and oriented to person, place, and time.   Psychiatric:         Mood and Affect: Mood normal.         Behavior: Behavior normal.           Significant Labs: All pertinent labs within the past 24 hours have been reviewed.  CBC:   Recent Labs   Lab 06/23/23  0812 06/24/23  0700   WBC 11.06 14.45*   HGB 11.6* 11.7*   HCT 33.2* 33.1*    249     CMP:   Recent Labs   Lab 06/22/23  2020 06/23/23  0812 06/24/23  0700   * 133* 134*   K 3.8 3.8 3.7    99 102   CO2 20* 23 21*   * 125* 108   BUN 34* 45* 53*   CREATININE 2.4* 2.4* 2.0*   CALCIUM 9.2 9.4 8.7   PROT 7.1 7.3 6.5   ALBUMIN 2.2* 2.2* 2.1*   BILITOT 0.7 0.5 0.5   ALKPHOS 102 112 103   AST 64* 69* 32   ALT 33 35 20   ANIONGAP 11 11 11       Significant Imaging: I have reviewed all pertinent imaging results/findings within the past 24 hours.

## 2023-06-24 NOTE — NURSING
RAPID RESPONSE NURSE PROACTIVE ROUNDING NOTE       Left AC 20g PIV placed Via US.     FOLLOW UP    Call back the Rapid Response NurseShanti at [phone] for additional questions or concerns.

## 2023-06-24 NOTE — PLAN OF CARE
VN reviewed discharge instructions with pt. Using teach back method.  AVS printed and handed to pt by bedside nurse.  Reviewed follow-up appointments, medications, diet, and importance of medication compliance.  Reviewed home care instructions, treatment plan, self-management, and when to seek medical attention.  Allowed time for questions.  All questions answered.  Patient verbalized complete understanding of discharge instructions and voices no concerns.     Discharge instructions complete.  Printed rx given to pt.   Transport/wheelchair requested.  Bedside nurse notified.

## 2023-06-24 NOTE — PROGRESS NOTES
[facility] Medicine  Progress Note    Patient Name: Sherri Machado  MRN: [id number]  Patient Class: IP- Inpatient   Admission Date: 6/21/2023  Length of Stay: 3 days  Attending Physician: Bridget Randle MD  Primary Care Provider: Modesto Waldron MD        Subjective:     Principal Problem:Calculus of ureterovesical junction (UVJ)        HPI:  44 yo F with PMHx with history depression, opioid dependence who presented to ED due to 3 days worsening abdominal pain, nausea/vomiting, minimal PO intake. Patient initially presented to ED on 6/18 with RLQ pain, UA without findings of infection. Patient did have RICK (baseline 0.6 -> 1.2). CT abdomen/pelvis demonstrated a 5 mm calculus at the right UVJ. Patient was discharged home with conservative management. Patient provided urinary strainer but per the patient there was no passage of stone.    On current presentation, patient afebrile but tachycardic. Repeat UA this time now with 1+ leukocytes, 2+ blood. WBC WNL. CMP concern both for worsening RICK (1.2 ->2.1) as well as elevated t.bili of 1.1, alk phos 138. Lactate WNL. Patient started on IV rocephin, given 1L NS bolus, 4 mg IV morphine. Urology consulted, tentative plan to stent patient pending repeat CT imaging of stone. Patient admitted to [facility] for medical management.      Overview/Hospital Course:  No notes on file    Interval History: No adverse events overnight.     Review of Systems   Constitutional:  Negative for chills and fever.   HENT:  Negative for congestion, rhinorrhea and sore throat.    Eyes:  Negative for visual disturbance.   Respiratory:  Negative for cough and shortness of breath.    Cardiovascular:  Negative for chest pain.   Gastrointestinal:  Negative for abdominal pain, diarrhea, nausea and vomiting.   Genitourinary:  Negative for decreased urine volume and dysuria.   Musculoskeletal:  Negative for arthralgias and myalgias.   Skin:  Negative for rash.   Neurological:   Negative for dizziness, weakness, numbness and headaches.   Psychiatric/Behavioral:  Negative for dysphoric mood. The patient is not nervous/anxious.    Objective:     Vital Signs (Most Recent):  Temp: 97.7 °F (36.5 °C) (06/24/23 0741)  Pulse: 67 (06/24/23 0741)  Resp: 16 (06/24/23 0741)  BP: (!) 150/84 (06/24/23 0741)  SpO2: 100 % (06/24/23 0741) Vital Signs (24h Range):  Temp:  [97.5 °F (36.4 °C)-98.1 °F (36.7 °C)] 97.7 °F (36.5 °C)  Pulse:  [45-67] 67  Resp:  [16-19] 16  SpO2:  [97 %-100 %] 100 %  BP: (121-150)/(71-84) 150/84     Weight: 92.6 kg (204 lb 2.3 oz)  Body mass index is 33.97 kg/m².    Intake/Output Summary (Last 24 hours) at 6/24/2023 0905  Last data filed at 6/24/2023 0831  Gross per 24 hour   Intake 1191.43 ml   Output 3400 ml   Net -2208.57 ml         Physical Exam  Constitutional:       General: She is not in acute distress.     Appearance: Normal appearance. She is well-developed. She is not diaphoretic.   HENT:      Head: Normocephalic.      Right Ear: External ear normal.      Left Ear: External ear normal.      Nose: Nose normal.      Mouth/Throat:      Pharynx: Oropharynx is clear.   Eyes:      Extraocular Movements: Extraocular movements intact.   Cardiovascular:      Rate and Rhythm: Normal rate and regular rhythm.      Pulses: Normal pulses.   Pulmonary:      Effort: Pulmonary effort is normal. No respiratory distress.      Breath sounds: Normal breath sounds. No wheezing.   Abdominal:      General: Abdomen is flat. Bowel sounds are normal. There is no distension.      Palpations: Abdomen is soft.   Musculoskeletal:         General: No signs of injury. Normal range of motion.      Cervical back: Normal range of motion. No rigidity.   Skin:     General: Skin is warm and dry.      Capillary Refill: Capillary refill takes less than 2 seconds.      Coloration: Skin is not pale.      Findings: No rash.   Neurological:      General: No focal deficit present.      Mental Status: She is alert and  oriented to person, place, and time.   Psychiatric:         Mood and Affect: Mood normal.         Behavior: Behavior normal.           Significant Labs: All pertinent labs within the past 24 hours have been reviewed.  CBC:   Recent Labs   Lab 06/23/23  0812 06/24/23  0700   WBC 11.06 14.45*   HGB 11.6* 11.7*   HCT 33.2* 33.1*    249     CMP:   Recent Labs   Lab 06/22/23 2020 06/23/23  0812 06/24/23  0700   * 133* 134*   K 3.8 3.8 3.7    99 102   CO2 20* 23 21*   * 125* 108   BUN 34* 45* 53*   CREATININE 2.4* 2.4* 2.0*   CALCIUM 9.2 9.4 8.7   PROT 7.1 7.3 6.5   ALBUMIN 2.2* 2.2* 2.1*   BILITOT 0.7 0.5 0.5   ALKPHOS 102 112 103   AST 64* 69* 32   ALT 33 35 20   ANIONGAP 11 11 11       Significant Imaging: I have reviewed all pertinent imaging results/findings within the past 24 hours.      Assessment/Plan:      * Calculus of ureterovesical junction (UVJ)  Noted on previous CT in setting of worsening abdominal pain and N/V with worsening RICK and UTI. S/p cystoscopy on 6/22 with abundant purulence, stent place, saldana left in place for drainage. 6/23 saldana removed, plan for outpatient follow up on 7/5 for attempted definitive stone removal.    - Urology reccs continue to be appreciated  - Maintenance fluids for now (following RICK)  - Continue flomax  - Rocephin for UTI    UTI (urinary tract infection)  In setting of UVJ stone, pathological urine with 1+ leukocytes, 2+ blood.    - Continue IV rocephin  - Urine culture 100,000 GNR's, susceptibility pending    Hyperbilirubinemia  S/p cholecystectomy in 2009. Interval development of increased biliary profile between ED visits (3 days). Alk phos elevated as well. Diffuse abdominal pain on exam. Lipase negative.    - Alk phos/bili trended down  - US abd mild prominence of the extrahepatic duct at 6-7 mm  - Will recommend outpatient GI/surgery follow up    RICK (acute kidney injury)  In setting of vomiting/minimal PO intake, stone. Creatinine 2.1 on  arrival, now 2.4 for past 2 days.    - Pre-renal FeNa, will continue maintenance fluids for now  - Likely in setting of poor PO and possible unilateral stone obstruction, but will get renal US and consider nephro if no improvement in next day or two  - Avoid nephrotoxins            VTE Risk Mitigation (From admission, onward)         Ordered     IP VTE HIGH RISK PATIENT  Once         06/21/23 1454     Place sequential compression device  Until discontinued         06/21/23 1454                Discharge Planning   RUDOLPH:      Code Status: Full Code   Is the patient medically ready for discharge?:     Reason for patient still in hospital (select all that apply): Pending disposition  Discharge Plan A: Home, Home with family   Discharge Delays:  (pending medical stability)            ________________________  Brennen Schmid MD  [facility] PGY-1

## 2023-06-25 NOTE — DISCHARGE SUMMARY
[facility] Medicine  Discharge Summary      Patient Name: Sherri Machado  MRN: [id number]  OLESYA: [id number]  Patient Class: IP- Inpatient  Admission Date: 6/21/2023  Hospital Length of Stay: 3 days  Discharge Date and Time:  06/24/2023 11:11 AM  Attending Physician: No att. providers found   Discharging Provider: Maik Cunningham MD  Primary Care Provider: Modesto Waldron MD    Primary Care Team: Networked reference to record PCT     HPI:   44 yo F with PMHx with history depression, opioid dependence who presented to ED due to 3 days worsening abdominal pain, nausea/vomiting, minimal PO intake. Patient initially presented to ED on 6/18 with RLQ pain, UA without findings of infection. Patient did have RICK (baseline 0.6 -> 1.2). CT abdomen/pelvis demonstrated a 5 mm calculus at the right UVJ. Patient was discharged home with conservative management. Patient provided urinary strainer but per the patient there was no passage of stone.    On current presentation, patient afebrile but tachycardic. Repeat UA this time now with 1+ leukocytes, 2+ blood. WBC WNL. CMP concern both for worsening RICK (1.2 ->2.1) as well as elevated t.bili of 1.1, alk phos 138. Lactate WNL. Patient started on IV rocephin, given 1L NS bolus, 4 mg IV morphine. Urology consulted, tentative plan to stent patient pending repeat CT imaging of stone. Patient admitted to [facility] for medical management.      Procedure(s) (LRB):  PYELOGRAM, RETROGRADE (Right)  CYSTOSCOPY (Right)  INSERTION, STENT, URETER (Right)      Hospital Course:   Patient admitted for management of R ureteral stone with concern for infection. Patient started on empiric IV abx. Urology consulted, performed cystoscopy with stent placement, noted copious pus. Patient with saldana x1 day after procedure which was removed without complication. Patient remained afebrile after ED. Patient initially with RICK due to poor intake and vomiting which trended down at time of  discharge. Pain was controlled with IV and PO medication, discharged with additional 4 days of TID PRN 7.5 norco. At time of discharge, patient feeling better, plan to follow up on 6/30 for outpatient follow up with urology,  continue 10 day course of ds augmentin for treatment of purulent stone blockage. Patient agreeable to discharge plan, verbalized understanding of plan, all patient questions answered.       Goals of Care Treatment Preferences:  Code Status: Full Code      Consults:   Consults (From admission, onward)          Status Ordering Provider     Inpatient consult to Urology  Once        Provider:  Kaylie Blevins MD    Completed ALTON SEWELL            No new Assessment & Plan notes have been filed under this hospital service since the last note was generated.  Service: Hospital Medicine    Final Active Diagnoses:    Diagnosis Date Noted POA    PRINCIPAL PROBLEM:  Calculus of ureterovesical junction (UVJ) [N20.1] 06/21/2023 Yes    UTI (urinary tract infection) [N39.0] 06/21/2023 Yes    RICK (acute kidney injury) [N17.9] 06/21/2023 Yes    Nephrolithiasis [N20.0] 06/23/2023 Yes    Flank pain [R10.9] 06/23/2023 Yes    Hyperbilirubinemia [E80.6] 06/21/2023 Yes      Problems Resolved During this Admission:       Discharged Condition: stable    Disposition: Home or Self Care    Follow Up:   Follow-up Information       Kaylie Blevins MD Follow up on 6/30/2023.    Specialty: Urology  Why: 1:00 pm  Contact information:  [address]  [address]  [facility] [address]  [phone]               [facility] Follow up.    Specialty: Family Medicine  Why: YOU WILL BE CONTACTED WITH A PCP F/U APT  Contact information:  [address], [address]  [facility] [address] [phone]  Additional information:  Please park in [address] C or D and use [address]. Take Medical Office Bldg. elevators.                         Patient Instructions:      Diet Adult Regular     Notify your  health care provider if you experience any of the following:  temperature >100.4     Notify your health care provider if you experience any of the following:  persistent nausea and vomiting or diarrhea     Notify your health care provider if you experience any of the following:  severe uncontrolled pain     Notify your health care provider if you experience any of the following:  persistent dizziness, light-headedness, or visual disturbances     Notify your health care provider if you experience any of the following:  increased confusion or weakness     Notify your health care provider if you experience any of the following:  redness, tenderness, or signs of infection (pain, swelling, redness, odor or green/yellow discharge around incision site)     Activity as tolerated       Significant Diagnostic Studies:    Pending Diagnostic Studies:       None           Medications:  Reconciled Home Medications:      Medication List        START taking these medications      amoxicillin-clavulanate 875-125mg 875-125 mg per tablet  Commonly known as: AUGMENTIN  Take 1 tablet by mouth every 12 (twelve) hours. for 10 days     HYDROcodone-acetaminophen 7.5-325 mg per tablet  Commonly known as: NORCO  Take 1 tablet by mouth every 8 (eight) hours as needed for Pain.            CONTINUE taking these medications      fluconazole 200 MG Tab  Commonly known as: DIFLUCAN  Take 200 mg by mouth once daily.     ONE-A-DAY WOMEN'S COMPLETE 18 mg iron- 400 mcg Tab  Generic drug: multivit-min-iron fum-folic ac  Take 1 tablet by mouth once daily.     tamsulosin 0.4 mg Cap  Commonly known as: FLOMAX  Take 1 capsule (0.4 mg total) by mouth once daily. for 15 days            STOP taking these medications      ketorolac 10 mg tablet  Commonly known as: TORADOL     metoclopramide HCl 10 MG tablet  Commonly known as: REGLAN     oxyCODONE-acetaminophen  mg per tablet  Commonly known as: PERCOCET              Indwelling Lines/Drains at time of  discharge:   Lines/Drains/Airways       None                   Time spent on the discharge of patient: 35 minutes         Maik Cunningham MD  Department of Hospital Medicine  [facility]

## 2023-06-25 NOTE — HOSPITAL COURSE
Patient admitted for management of R ureteral stone with concern for infection. Patient started on empiric IV abx. Urology consulted, performed cystoscopy with stent placement, noted copious pus. Patient with saldana x1 day after procedure which was removed without complication. Patient remained afebrile after ED. Patient initially with RICK due to poor intake and vomiting which trended down at time of discharge. Pain was controlled with IV and PO medication, discharged with additional 4 days of TID PRN 7.5 norco. At time of discharge, patient feeling better, plan to follow up on 6/30 for outpatient follow up with urology,  continue 10 day course of ds augmentin for treatment of purulent stone blockage. Patient agreeable to discharge plan, verbalized understanding of plan, all patient questions answered.

## 2023-06-27 LAB
BACTERIA BLD CULT: NORMAL
BACTERIA BLD CULT: NORMAL

## 2023-06-30 ENCOUNTER — OFFICE VISIT (OUTPATIENT)
Dept: UROLOGY | Facility: CLINIC | Age: 43
End: 2023-06-30
Payer: MEDICAID

## 2023-06-30 ENCOUNTER — HOSPITAL ENCOUNTER (INPATIENT)
Facility: HOSPITAL | Age: 43
LOS: 10 days | Discharge: HOME | DRG: 690 | End: 2023-07-10
Attending: EMERGENCY MEDICINE | Admitting: STUDENT IN AN ORGANIZED HEALTH CARE EDUCATION/TRAINING PROGRAM
Payer: MEDICAID

## 2023-06-30 VITALS — SYSTOLIC BLOOD PRESSURE: 150 MMHG | DIASTOLIC BLOOD PRESSURE: 91 MMHG | TEMPERATURE: 99 F | HEART RATE: 97 BPM

## 2023-06-30 DIAGNOSIS — R10.9 FLANK PAIN: Primary | ICD-10-CM

## 2023-06-30 DIAGNOSIS — R10.9 FLANK PAIN: ICD-10-CM

## 2023-06-30 DIAGNOSIS — N12 PYELONEPHRITIS: Chronic | ICD-10-CM

## 2023-06-30 DIAGNOSIS — A41.9 SEPSIS: ICD-10-CM

## 2023-06-30 DIAGNOSIS — E87.6 HYPOKALEMIA: ICD-10-CM

## 2023-06-30 DIAGNOSIS — E05.90 HYPERTHYROIDISM, SUBCLINICAL: ICD-10-CM

## 2023-06-30 DIAGNOSIS — N20.0 NEPHROLITHIASIS: ICD-10-CM

## 2023-06-30 DIAGNOSIS — D50.9 IRON DEFICIENCY ANEMIA, UNSPECIFIED IRON DEFICIENCY ANEMIA TYPE: ICD-10-CM

## 2023-06-30 DIAGNOSIS — N15.1 RENAL ABSCESS: ICD-10-CM

## 2023-06-30 DIAGNOSIS — N20.0 NEPHROLITHIASIS: Primary | ICD-10-CM

## 2023-06-30 DIAGNOSIS — R03.0 ELEVATED BP WITHOUT DIAGNOSIS OF HYPERTENSION: ICD-10-CM

## 2023-06-30 DIAGNOSIS — N13.2 HYDRONEPHROSIS WITH URINARY OBSTRUCTION DUE TO URETERAL CALCULUS: ICD-10-CM

## 2023-06-30 DIAGNOSIS — N30.00 ACUTE CYSTITIS WITHOUT HEMATURIA: ICD-10-CM

## 2023-06-30 DIAGNOSIS — N39.0 URINARY TRACT INFECTION WITHOUT HEMATURIA, SITE UNSPECIFIED: ICD-10-CM

## 2023-06-30 DIAGNOSIS — R53.81 PHYSICAL DECONDITIONING: ICD-10-CM

## 2023-06-30 LAB
ALBUMIN SERPL BCP-MCNC: 2.8 G/DL (ref 3.5–5.2)
ALP SERPL-CCNC: 75 U/L (ref 55–135)
ALT SERPL W/O P-5'-P-CCNC: 28 U/L (ref 10–44)
ANION GAP SERPL CALC-SCNC: 10 MMOL/L (ref 8–16)
ANISOCYTOSIS BLD QL SMEAR: SLIGHT
AST SERPL-CCNC: 29 U/L (ref 10–40)
B-HCG UR QL: NEGATIVE
BACTERIA #/AREA URNS AUTO: ABNORMAL /HPF
BACTERIA #/AREA URNS HPF: ABNORMAL /HPF
BASOPHILS # BLD AUTO: ABNORMAL K/UL (ref 0–0.2)
BASOPHILS NFR BLD: 0 % (ref 0–1.9)
BILIRUB SERPL-MCNC: 0.5 MG/DL (ref 0.1–1)
BILIRUB SERPL-MCNC: ABNORMAL MG/DL
BILIRUB UR QL STRIP: NEGATIVE
BILIRUB UR QL STRIP: NEGATIVE
BLOOD URINE, POC: 50
BUN SERPL-MCNC: 7 MG/DL (ref 6–20)
CALCIUM SERPL-MCNC: 9 MG/DL (ref 8.7–10.5)
CHLORIDE SERPL-SCNC: 103 MMOL/L (ref 95–110)
CLARITY UR REFRACT.AUTO: ABNORMAL
CLARITY UR: CLEAR
CLARITY, POC UA: CLEAR
CO2 SERPL-SCNC: 24 MMOL/L (ref 23–29)
COLOR UR AUTO: YELLOW
COLOR UR: YELLOW
COLOR, POC UA: YELLOW
CREAT SERPL-MCNC: 1 MG/DL (ref 0.5–1.4)
CTP QC/QA: YES
DIFFERENTIAL METHOD: ABNORMAL
EOSINOPHIL # BLD AUTO: ABNORMAL K/UL (ref 0–0.5)
EOSINOPHIL NFR BLD: 0 % (ref 0–8)
ERYTHROCYTE [DISTWIDTH] IN BLOOD BY AUTOMATED COUNT: 14.7 % (ref 11.5–14.5)
EST. GFR  (NO RACE VARIABLE): >60 ML/MIN/1.73 M^2
GLUCOSE SERPL-MCNC: 101 MG/DL (ref 70–110)
GLUCOSE UR QL STRIP: ABNORMAL
GLUCOSE UR QL STRIP: NEGATIVE
GLUCOSE UR QL STRIP: NEGATIVE
HCT VFR BLD AUTO: 27.6 % (ref 37–48.5)
HGB BLD-MCNC: 9.1 G/DL (ref 12–16)
HGB UR QL STRIP: ABNORMAL
HGB UR QL STRIP: ABNORMAL
HYALINE CASTS #/AREA URNS LPF: 0 /LPF
HYALINE CASTS UR QL AUTO: 1 /LPF
IMM GRANULOCYTES # BLD AUTO: ABNORMAL K/UL (ref 0–0.04)
IMM GRANULOCYTES NFR BLD AUTO: ABNORMAL % (ref 0–0.5)
KETONES UR QL STRIP: ABNORMAL
KETONES UR QL STRIP: NEGATIVE
KETONES UR QL STRIP: NEGATIVE
LACTATE SERPL-SCNC: 1.3 MMOL/L (ref 0.5–2.2)
LEUKOCYTE ESTERASE UR QL STRIP: ABNORMAL
LEUKOCYTE ESTERASE UR QL STRIP: ABNORMAL
LEUKOCYTE ESTERASE URINE, POC: ABNORMAL
LYMPHOCYTES # BLD AUTO: ABNORMAL K/UL (ref 1–4.8)
LYMPHOCYTES NFR BLD: 13 % (ref 18–48)
MCH RBC QN AUTO: 26.5 PG (ref 27–31)
MCHC RBC AUTO-ENTMCNC: 33 G/DL (ref 32–36)
MCV RBC AUTO: 81 FL (ref 82–98)
METAMYELOCYTES NFR BLD MANUAL: 2 %
MICROSCOPIC COMMENT: ABNORMAL
MICROSCOPIC COMMENT: ABNORMAL
MONOCYTES # BLD AUTO: ABNORMAL K/UL (ref 0.3–1)
MONOCYTES NFR BLD: 4 % (ref 4–15)
NEUTROPHILS NFR BLD: 79 % (ref 38–73)
NEUTS BAND NFR BLD MANUAL: 2 %
NITRITE UR QL STRIP: NEGATIVE
NITRITE UR QL STRIP: NEGATIVE
NITRITE, POC UA: ABNORMAL
NRBC BLD-RTO: 0 /100 WBC
PH UR STRIP: 6 [PH] (ref 5–8)
PH UR STRIP: 6 [PH] (ref 5–8)
PH, POC UA: 5
PLATELET # BLD AUTO: 477 K/UL (ref 150–450)
PLATELET BLD QL SMEAR: ABNORMAL
PMV BLD AUTO: 8.8 FL (ref 9.2–12.9)
POLYCHROMASIA BLD QL SMEAR: ABNORMAL
POTASSIUM SERPL-SCNC: 3.3 MMOL/L (ref 3.5–5.1)
PROT SERPL-MCNC: 8.4 G/DL (ref 6–8.4)
PROT UR QL STRIP: ABNORMAL
PROT UR QL STRIP: ABNORMAL
PROTEIN, POC: 30
RBC # BLD AUTO: 3.43 M/UL (ref 4–5.4)
RBC #/AREA URNS AUTO: 16 /HPF (ref 0–4)
RBC #/AREA URNS HPF: 5 /HPF (ref 0–4)
SODIUM SERPL-SCNC: 137 MMOL/L (ref 136–145)
SP GR UR STRIP: 1.01 (ref 1–1.03)
SP GR UR STRIP: 1.01 (ref 1–1.03)
SPECIFIC GRAVITY, POC UA: 1.01
SQUAMOUS #/AREA URNS AUTO: 5 /HPF
SQUAMOUS #/AREA URNS HPF: 0 /HPF
UNIDENT CRYS URNS QL MICRO: 2
URN SPEC COLLECT METH UR: ABNORMAL
URN SPEC COLLECT METH UR: ABNORMAL
UROBILINOGEN UR STRIP-ACNC: NEGATIVE EU/DL
UROBILINOGEN, POC UA: ABNORMAL
WBC # BLD AUTO: 16.35 K/UL (ref 3.9–12.7)
WBC #/AREA URNS AUTO: 38 /HPF (ref 0–5)
WBC #/AREA URNS HPF: 29 /HPF (ref 0–5)
WBC CLUMPS UR QL AUTO: ABNORMAL
YEAST URNS QL MICRO: ABNORMAL

## 2023-06-30 PROCEDURE — 25000003 PHARM REV CODE 250

## 2023-06-30 PROCEDURE — 85007 BL SMEAR W/DIFF WBC COUNT: CPT | Performed by: NURSE PRACTITIONER

## 2023-06-30 PROCEDURE — 87086 URINE CULTURE/COLONY COUNT: CPT | Mod: 59 | Performed by: STUDENT IN AN ORGANIZED HEALTH CARE EDUCATION/TRAINING PROGRAM

## 2023-06-30 PROCEDURE — 99285 EMERGENCY DEPT VISIT HI MDM: CPT | Mod: 25,27

## 2023-06-30 PROCEDURE — 87040 BLOOD CULTURE FOR BACTERIA: CPT | Mod: 59 | Performed by: NURSE PRACTITIONER

## 2023-06-30 PROCEDURE — 93005 ELECTROCARDIOGRAM TRACING: CPT

## 2023-06-30 PROCEDURE — 1159F PR MEDICATION LIST DOCUMENTED IN MEDICAL RECORD: ICD-10-PCS | Mod: CPTII,,, | Performed by: STUDENT IN AN ORGANIZED HEALTH CARE EDUCATION/TRAINING PROGRAM

## 2023-06-30 PROCEDURE — 1160F RVW MEDS BY RX/DR IN RCRD: CPT | Mod: CPTII,,, | Performed by: STUDENT IN AN ORGANIZED HEALTH CARE EDUCATION/TRAINING PROGRAM

## 2023-06-30 PROCEDURE — 1160F PR REVIEW ALL MEDS BY PRESCRIBER/CLIN PHARMACIST DOCUMENTED: ICD-10-PCS | Mod: CPTII,,, | Performed by: STUDENT IN AN ORGANIZED HEALTH CARE EDUCATION/TRAINING PROGRAM

## 2023-06-30 PROCEDURE — 1159F MED LIST DOCD IN RCRD: CPT | Mod: CPTII,,, | Performed by: STUDENT IN AN ORGANIZED HEALTH CARE EDUCATION/TRAINING PROGRAM

## 2023-06-30 PROCEDURE — 1111F PR DISCHARGE MEDS RECONCILED W/ CURRENT OUTPATIENT MED LIST: ICD-10-PCS | Mod: CPTII,,, | Performed by: STUDENT IN AN ORGANIZED HEALTH CARE EDUCATION/TRAINING PROGRAM

## 2023-06-30 PROCEDURE — 25000003 PHARM REV CODE 250: Performed by: NURSE PRACTITIONER

## 2023-06-30 PROCEDURE — 81002 URINALYSIS NONAUTO W/O SCOPE: CPT | Mod: PBBFAC,PO,59 | Performed by: STUDENT IN AN ORGANIZED HEALTH CARE EDUCATION/TRAINING PROGRAM

## 2023-06-30 PROCEDURE — 3080F PR MOST RECENT DIASTOLIC BLOOD PRESSURE >= 90 MM HG: ICD-10-PCS | Mod: CPTII,,, | Performed by: STUDENT IN AN ORGANIZED HEALTH CARE EDUCATION/TRAINING PROGRAM

## 2023-06-30 PROCEDURE — 99999 PR PBB SHADOW E&M-EST. PATIENT-LVL III: ICD-10-PCS | Mod: PBBFAC,,, | Performed by: STUDENT IN AN ORGANIZED HEALTH CARE EDUCATION/TRAINING PROGRAM

## 2023-06-30 PROCEDURE — 81002 URINALYSIS NONAUTO W/O SCOPE: CPT | Mod: PBBFAC,PO | Performed by: STUDENT IN AN ORGANIZED HEALTH CARE EDUCATION/TRAINING PROGRAM

## 2023-06-30 PROCEDURE — 1111F DSCHRG MED/CURRENT MED MERGE: CPT | Mod: CPTII,,, | Performed by: STUDENT IN AN ORGANIZED HEALTH CARE EDUCATION/TRAINING PROGRAM

## 2023-06-30 PROCEDURE — 99999 PR PBB SHADOW E&M-EST. PATIENT-LVL III: CPT | Mod: PBBFAC,,, | Performed by: STUDENT IN AN ORGANIZED HEALTH CARE EDUCATION/TRAINING PROGRAM

## 2023-06-30 PROCEDURE — 81025 URINE PREGNANCY TEST: CPT | Performed by: NURSE PRACTITIONER

## 2023-06-30 PROCEDURE — 99499 UNLISTED E&M SERVICE: CPT | Mod: ,,, | Performed by: STUDENT IN AN ORGANIZED HEALTH CARE EDUCATION/TRAINING PROGRAM

## 2023-06-30 PROCEDURE — 93010 ELECTROCARDIOGRAM REPORT: CPT | Mod: ,,, | Performed by: INTERNAL MEDICINE

## 2023-06-30 PROCEDURE — 85027 COMPLETE CBC AUTOMATED: CPT | Performed by: NURSE PRACTITIONER

## 2023-06-30 PROCEDURE — 96365 THER/PROPH/DIAG IV INF INIT: CPT

## 2023-06-30 PROCEDURE — 99215 PR OFFICE/OUTPT VISIT, EST, LEVL V, 40-54 MIN: ICD-10-PCS | Mod: S$PBB,,, | Performed by: STUDENT IN AN ORGANIZED HEALTH CARE EDUCATION/TRAINING PROGRAM

## 2023-06-30 PROCEDURE — 87086 URINE CULTURE/COLONY COUNT: CPT | Performed by: NURSE PRACTITIONER

## 2023-06-30 PROCEDURE — 63600175 PHARM REV CODE 636 W HCPCS: Performed by: NURSE PRACTITIONER

## 2023-06-30 PROCEDURE — 63600175 PHARM REV CODE 636 W HCPCS

## 2023-06-30 PROCEDURE — 99499 NO LOS: ICD-10-PCS | Mod: ,,, | Performed by: STUDENT IN AN ORGANIZED HEALTH CARE EDUCATION/TRAINING PROGRAM

## 2023-06-30 PROCEDURE — 11000001 HC ACUTE MED/SURG PRIVATE ROOM

## 2023-06-30 PROCEDURE — 81001 URINALYSIS AUTO W/SCOPE: CPT | Performed by: STUDENT IN AN ORGANIZED HEALTH CARE EDUCATION/TRAINING PROGRAM

## 2023-06-30 PROCEDURE — 80053 COMPREHEN METABOLIC PANEL: CPT | Performed by: NURSE PRACTITIONER

## 2023-06-30 PROCEDURE — 3044F PR MOST RECENT HEMOGLOBIN A1C LEVEL <7.0%: ICD-10-PCS | Mod: CPTII,,, | Performed by: STUDENT IN AN ORGANIZED HEALTH CARE EDUCATION/TRAINING PROGRAM

## 2023-06-30 PROCEDURE — 3044F HG A1C LEVEL LT 7.0%: CPT | Mod: CPTII,,, | Performed by: STUDENT IN AN ORGANIZED HEALTH CARE EDUCATION/TRAINING PROGRAM

## 2023-06-30 PROCEDURE — 83605 ASSAY OF LACTIC ACID: CPT | Performed by: NURSE PRACTITIONER

## 2023-06-30 PROCEDURE — 3077F PR MOST RECENT SYSTOLIC BLOOD PRESSURE >= 140 MM HG: ICD-10-PCS | Mod: CPTII,,, | Performed by: STUDENT IN AN ORGANIZED HEALTH CARE EDUCATION/TRAINING PROGRAM

## 2023-06-30 PROCEDURE — 99215 OFFICE O/P EST HI 40 MIN: CPT | Mod: S$PBB,,, | Performed by: STUDENT IN AN ORGANIZED HEALTH CARE EDUCATION/TRAINING PROGRAM

## 2023-06-30 PROCEDURE — 3077F SYST BP >= 140 MM HG: CPT | Mod: CPTII,,, | Performed by: STUDENT IN AN ORGANIZED HEALTH CARE EDUCATION/TRAINING PROGRAM

## 2023-06-30 PROCEDURE — 3080F DIAST BP >= 90 MM HG: CPT | Mod: CPTII,,, | Performed by: STUDENT IN AN ORGANIZED HEALTH CARE EDUCATION/TRAINING PROGRAM

## 2023-06-30 PROCEDURE — 96375 TX/PRO/DX INJ NEW DRUG ADDON: CPT

## 2023-06-30 PROCEDURE — 93010 EKG 12-LEAD: ICD-10-PCS | Mod: ,,, | Performed by: INTERNAL MEDICINE

## 2023-06-30 PROCEDURE — 99213 OFFICE O/P EST LOW 20 MIN: CPT | Mod: PBBFAC,PO,25 | Performed by: STUDENT IN AN ORGANIZED HEALTH CARE EDUCATION/TRAINING PROGRAM

## 2023-06-30 PROCEDURE — 96361 HYDRATE IV INFUSION ADD-ON: CPT

## 2023-06-30 PROCEDURE — 81000 URINALYSIS NONAUTO W/SCOPE: CPT | Performed by: NURSE PRACTITIONER

## 2023-06-30 RX ORDER — HYDROCODONE BITARTRATE AND ACETAMINOPHEN 7.5; 325 MG/1; MG/1
1 TABLET ORAL EVERY 8 HOURS PRN
Status: DISCONTINUED | OUTPATIENT
Start: 2023-06-30 | End: 2023-07-01

## 2023-06-30 RX ORDER — SODIUM CHLORIDE, SODIUM LACTATE, POTASSIUM CHLORIDE, CALCIUM CHLORIDE 600; 310; 30; 20 MG/100ML; MG/100ML; MG/100ML; MG/100ML
INJECTION, SOLUTION INTRAVENOUS CONTINUOUS
Status: ACTIVE | OUTPATIENT
Start: 2023-06-30 | End: 2023-07-01

## 2023-06-30 RX ORDER — POTASSIUM CHLORIDE 20 MEQ/1
20 TABLET, EXTENDED RELEASE ORAL
Status: COMPLETED | OUTPATIENT
Start: 2023-06-30 | End: 2023-06-30

## 2023-06-30 RX ORDER — SODIUM CHLORIDE 0.9 % (FLUSH) 0.9 %
5 SYRINGE (ML) INJECTION
Status: DISCONTINUED | OUTPATIENT
Start: 2023-06-30 | End: 2023-07-10 | Stop reason: HOSPADM

## 2023-06-30 RX ORDER — NALOXONE HCL 0.4 MG/ML
0.02 VIAL (ML) INJECTION
Status: DISCONTINUED | OUTPATIENT
Start: 2023-06-30 | End: 2023-07-10 | Stop reason: HOSPADM

## 2023-06-30 RX ORDER — MORPHINE SULFATE 4 MG/ML
4 INJECTION, SOLUTION INTRAMUSCULAR; INTRAVENOUS
Status: COMPLETED | OUTPATIENT
Start: 2023-06-30 | End: 2023-06-30

## 2023-06-30 RX ORDER — ONDANSETRON 2 MG/ML
4 INJECTION INTRAMUSCULAR; INTRAVENOUS
Status: COMPLETED | OUTPATIENT
Start: 2023-06-30 | End: 2023-06-30

## 2023-06-30 RX ORDER — TAMSULOSIN HYDROCHLORIDE 0.4 MG/1
0.4 CAPSULE ORAL DAILY
Status: DISCONTINUED | OUTPATIENT
Start: 2023-07-01 | End: 2023-07-07

## 2023-06-30 RX ORDER — OXYCODONE AND ACETAMINOPHEN 10; 325 MG/1; MG/1
1 TABLET ORAL EVERY 4 HOURS PRN
Status: ON HOLD | COMMUNITY
End: 2023-07-10 | Stop reason: SDUPTHER

## 2023-06-30 RX ORDER — METOCLOPRAMIDE 10 MG/1
10 TABLET ORAL 4 TIMES DAILY
COMMUNITY
End: 2023-07-25

## 2023-06-30 RX ORDER — AMOXICILLIN 250 MG
1 CAPSULE ORAL 2 TIMES DAILY PRN
Status: DISCONTINUED | OUTPATIENT
Start: 2023-06-30 | End: 2023-07-10 | Stop reason: HOSPADM

## 2023-06-30 RX ORDER — ENOXAPARIN SODIUM 100 MG/ML
40 INJECTION SUBCUTANEOUS EVERY 24 HOURS
Status: DISCONTINUED | OUTPATIENT
Start: 2023-06-30 | End: 2023-07-10 | Stop reason: HOSPADM

## 2023-06-30 RX ORDER — ONDANSETRON 2 MG/ML
4 INJECTION INTRAMUSCULAR; INTRAVENOUS EVERY 8 HOURS PRN
Status: DISCONTINUED | OUTPATIENT
Start: 2023-06-30 | End: 2023-07-10 | Stop reason: HOSPADM

## 2023-06-30 RX ORDER — SODIUM CHLORIDE, SODIUM LACTATE, POTASSIUM CHLORIDE, CALCIUM CHLORIDE 600; 310; 30; 20 MG/100ML; MG/100ML; MG/100ML; MG/100ML
INJECTION, SOLUTION INTRAVENOUS CONTINUOUS
Status: DISCONTINUED | OUTPATIENT
Start: 2023-06-30 | End: 2023-06-30

## 2023-06-30 RX ORDER — TALC
6 POWDER (GRAM) TOPICAL NIGHTLY PRN
Status: DISCONTINUED | OUTPATIENT
Start: 2023-06-30 | End: 2023-07-10 | Stop reason: HOSPADM

## 2023-06-30 RX ORDER — ACETAMINOPHEN 325 MG/1
650 TABLET ORAL EVERY 4 HOURS PRN
Status: DISCONTINUED | OUTPATIENT
Start: 2023-06-30 | End: 2023-07-08

## 2023-06-30 RX ADMIN — POTASSIUM CHLORIDE 20 MEQ: 1500 TABLET, EXTENDED RELEASE ORAL at 03:06

## 2023-06-30 RX ADMIN — ENOXAPARIN SODIUM 40 MG: 40 INJECTION SUBCUTANEOUS at 06:06

## 2023-06-30 RX ADMIN — MORPHINE SULFATE 4 MG: 4 INJECTION INTRAVENOUS at 02:06

## 2023-06-30 RX ADMIN — ONDANSETRON 4 MG: 2 INJECTION INTRAMUSCULAR; INTRAVENOUS at 02:06

## 2023-06-30 RX ADMIN — HYDROCODONE BITARTRATE AND ACETAMINOPHEN 1 TABLET: 7.5; 325 TABLET ORAL at 06:06

## 2023-06-30 RX ADMIN — SODIUM CHLORIDE 1710 ML: 9 INJECTION, SOLUTION INTRAVENOUS at 02:06

## 2023-06-30 RX ADMIN — ACETAMINOPHEN 650 MG: 325 TABLET ORAL at 07:06

## 2023-06-30 RX ADMIN — ONDANSETRON 4 MG: 2 INJECTION INTRAMUSCULAR; INTRAVENOUS at 10:06

## 2023-06-30 RX ADMIN — SODIUM CHLORIDE, POTASSIUM CHLORIDE, SODIUM LACTATE AND CALCIUM CHLORIDE: 600; 310; 30; 20 INJECTION, SOLUTION INTRAVENOUS at 08:06

## 2023-06-30 RX ADMIN — CEFTRIAXONE SODIUM 2 G: 2 INJECTION, POWDER, FOR SOLUTION INTRAMUSCULAR; INTRAVENOUS at 02:06

## 2023-06-30 NOTE — ED PROVIDER NOTES
Encounter Date: 2023       History     Chief Complaint   Patient presents with    Flank Pain     Diagnosed with kidney stones on Father's day. Still has antibiotics. Right sided Flank pain that radiates to lower abdomen. Daily fevers, N/V/D. No urinary complaints.     43-year-old female presents emergency room with reports of right flank pain.  Patient reports she was diagnosed with a kidney stone and was seen by Dr. Blevins.  Patient had a urethral stent placement in addition to cystoscopy and is currently taking antibiotics.  Patient reports she has been running fever over the past few days in addition to nausea, vomiting and diarrhea.  Patient states she is having issues with eating and drinking.  She is a past medical history of seasonal allergies, IBS, osteoarthritis, sickle cell trait, kidney stones.  She was sent for possible admission per Dr. Blevins.    The history is provided by the patient. No  was used.   Review of patient's allergies indicates:   Allergen Reactions    Aspirin Other (See Comments)     Abdominal cramping     Past Medical History:   Diagnosis Date    Allergy     IBS (irritable bowel syndrome)     Osteoarthritis     Renal abscess 2023    Sickle cell trait     Urinary tract infection      Past Surgical History:   Procedure Laterality Date    ANKLE SURGERY      left     SECTION, CLASSIC      x3    CHOLECYSTECTOMY      CYSTOSCOPY Right 2023    Procedure: CYSTOSCOPY;  Surgeon: Kaylie Blevins MD;  Location: [facility] OR;  Service: Urology;  Laterality: Right;    RETROGRADE PYELOGRAPHY Right 2023    Procedure: PYELOGRAM, RETROGRADE;  Surgeon: Kaylie Blevins MD;  Location: [facility] OR;  Service: Urology;  Laterality: Right;    URETERAL STENT PLACEMENT Right 2023    Procedure: INSERTION, STENT, URETER;  Surgeon: Kaylie Blevins MD;  Location: [facility] OR;  Service: Urology;  Laterality: Right;     No family history on file.  Social History     Tobacco Use     Smoking status: Never    Smokeless tobacco: Never   Substance Use Topics    Alcohol use: No    Drug use: No     Review of Systems   Constitutional:  Positive for fatigue and fever.   Gastrointestinal:  Positive for abdominal pain, diarrhea, nausea and vomiting.   Genitourinary:  Positive for dysuria and flank pain.   All other systems reviewed and are negative.    Physical Exam     Initial Vitals [06/30/23 1356]   BP Pulse Resp Temp SpO2   (!) 142/85 85 20 98.3 °F (36.8 °C) 100 %      MAP       --         Physical Exam    Constitutional: She appears well-developed and well-nourished. She appears ill.   HENT:   Head: Normocephalic.   Right Ear: Hearing and tympanic membrane normal.   Left Ear: Hearing and tympanic membrane normal.   Nose: Nose normal.   Mouth/Throat: Oropharynx is clear and moist.   Eyes: Lids are normal. Pupils are equal, round, and reactive to light.   Neck:   Normal range of motion.  Cardiovascular:  Normal rate.           Pulmonary/Chest: Breath sounds normal. No respiratory distress. She has no wheezes. She has no rhonchi.   Abdominal: Abdomen is soft. There is generalized abdominal tenderness and tenderness in the right upper quadrant.   There is right CVA tenderness.  Musculoskeletal:         General: Normal range of motion.      Cervical back: Normal range of motion. No rigidity.     Neurological: She is alert and oriented to person, place, and time.   Skin: Skin is warm and dry. No rash noted.   Psychiatric: She has a normal mood and affect. Her behavior is normal. Judgment and thought content normal.       ED Course   Procedures  Labs Reviewed   CBC W/ AUTO DIFFERENTIAL - Abnormal; Notable for the following components:       Result Value    WBC 16.35 (*)     RBC 3.43 (*)     Hemoglobin 9.1 (*)     Hematocrit 27.6 (*)     MCV 81 (*)     MCH 26.5 (*)     RDW 14.7 (*)     Platelets 477 (*)     MPV 8.8 (*)     Gran % 79.0 (*)     Lymph % 13.0 (*)     Platelet Estimate Increased (*)     All  other components within normal limits   COMPREHENSIVE METABOLIC PANEL - Abnormal; Notable for the following components:    Potassium 3.3 (*)     Albumin 2.8 (*)     All other components within normal limits   URINALYSIS, REFLEX TO URINE CULTURE - Abnormal; Notable for the following components:    Protein, UA 1+ (*)     Occult Blood UA 2+ (*)     Leukocytes, UA 3+ (*)     All other components within normal limits    Narrative:     Specimen Source->Urine   URINALYSIS MICROSCOPIC - Abnormal; Notable for the following components:    RBC, UA 5 (*)     WBC, UA 29 (*)     Yeast, UA Rare (*)     All other components within normal limits    Narrative:     Specimen Source->Urine   CULTURE, BLOOD   CULTURE, BLOOD   CULTURE, URINE   LACTIC ACID, PLASMA   POCT URINE PREGNANCY        ECG Results              EKG 12-lead (In process)  Result time 06/30/23 14:35:07      In process by Interface, Lab In [facility] (06/30/23 14:35:07)                   Narrative:    Test Reason : A41.9,    Vent. Rate : 087 BPM     Atrial Rate : 087 BPM     P-R Int : 150 ms          QRS Dur : 080 ms      QT Int : 356 ms       P-R-T Axes : 068 100 062 degrees     QTc Int : 428 ms    Normal sinus rhythm  Rightward axis  Borderline Abnormal ECG  When compared with ECG of 18-JAN-2020 15:10,  Non-specific change in ST segment in Lateral leads    Referred By: [email or web address]   SELF           Confirmed By:                                   Imaging Results               CT Renal Stone Study ABD Pelvis WO (Final result)  Result time 06/30/23 16:57:03      Final result by Morris Orantes MD (06/30/23 16:57:03)                   Impression:      Interval placement of right-sided double-J ureteral stent with resolution of previous right-sided hydronephrosis, noting grossly stable configuration of a 5 mm calculus within the distal right ureter near the UVJ.  Unchanged asymmetric mild right perinephric and periureteral fat stranding presumably related to previous obstructive  uropathy.    More conspicuous 2 subtle hypoattenuating parenchymal foci within the right kidney, while nonspecific could indicate potential developing renal abscesses.  Clinical correlation for potential right-sided pyeloureteronephritis, and with urinalysis as warranted.    Unchanged right renal 4 mm nonobstructing calculus and unchanged left renal 2 mm nonobstructing calculus.    Hepatomegaly.    Cholecystectomy.    Additional findings as above.    This report was flagged in Epic as abnormal.      Electronically signed by: Morris Orantes MD  Date:    06/30/2023  Time:    16:57               Narrative:    EXAMINATION:  CT RENAL STONE STUDY ABD PELVIS WO    CLINICAL HISTORY:  Flank pain, kidney stone suspected;    TECHNIQUE:  Low dose axial images, sagittal and coronal reformations were obtained from the lung bases to the pubic symphysis.  Contrast was not administered.    COMPARISON:  Renal ultrasound 06/23/2023, right upper quadrant ultrasound 06/22/2023, CT abdomen and pelvis 06/21/2023    FINDINGS:  Lack of IV contrast limits evaluation of soft tissue and vascular structures    Imaged lung bases are clear.  Base of the heart is within normal limits.    Remote cholecystectomy.  Stable prominence of the common bile duct.  No intrahepatic biliary ductal dilatation.    Liver remains enlarged without focal process.  Noncontrast appearance of the pancreas, spleen, stomach, duodenum and bilateral adrenal glands are within normal limits.    When compared to CT study of 06/21/2023, there has been interval placement of right-sided double-J ureteral stent with proximal loop in the right renal pelvis and distal loop within the midline and left paramedian dependent aspect of the urinary bladder.  The right kidney remains asymmetrically larger than the left with similar asymmetric mild right perinephric stranding, noting interval resolution of previous right-sided hydroureteronephrosis and previous contrast within the right  renal collecting system and right ureter has resolved.  Stable appearance of a 5 mm calculus within the region of the right UVJ.  The stent is adjacent to the posterior left aspect of the distal ureteral calculus.  Similar right perinephric stranding.  Newly developed 2 subtle hypoattenuating parenchymal foci at the right renal mid to lower pole measuring up to 1.9 cm, noting no previous cyst or focal parenchymal abnormality seen in this region.  No left hydronephrosis.  Left ureter is normal in course and caliber.  Unchanged 2 mm calculus within the left kidney.  No radiodense calculus seen within the left ureter or urinary bladder.  Urinary bladder is well distended without wall thickening.  Uterus and bilateral adnexa are within normal limits.  Trace volume nonspecific free pelvic fluid slightly increased from prior, likely reactive from previous right renal and ureter inflammatory process tracking to the pelvis.  Few scattered punctate pelvic phleboliths noted.    Appendix and terminal ileum are within normal limits.  No evidence of bowel obstruction or acute inflammation.  No pneumatosis or portal venous gas.    No abdominal ascites, free air or lymphadenopathy.    Extraperitoneal soft tissues are unchanged.  Osseous structures are stable without acute process seen.                                       X-Ray Chest AP Portable (Final result)  Result time 06/30/23 15:18:04      Final result by Adonis Aldana III, MD (06/30/23 15:18:04)                   Impression:      No acute process seen.      Electronically signed by: Adonis Aldana MD  Date:    06/30/2023  Time:    15:18               Narrative:    EXAMINATION:  XR CHEST AP PORTABLE    CLINICAL HISTORY:  Sepsis;    FINDINGS:  Chest one view portable.    There is borderline cardiomegaly.  Lungs are clear.  There is DJD.                                       Medications   cefTRIAXone (ROCEPHIN) 1 g in dextrose 5 % in water (D5W) 5 % 100 mL IVPB (MB+) (has  no administration in time range)   HYDROcodone-acetaminophen 7.5-325 mg per tablet 1 tablet (1 tablet Oral Given 6/30/23 1836)   multivitamin tablet (has no administration in time range)   tamsulosin 24 hr capsule 0.4 mg (has no administration in time range)   sodium chloride 0.9% flush 5 mL (has no administration in time range)   melatonin tablet 6 mg (has no administration in time range)   ondansetron injection 4 mg (has no administration in time range)   senna-docusate 8.6-50 mg per tablet 1 tablet (has no administration in time range)   acetaminophen tablet 650 mg (650 mg Oral Given 6/30/23 1930)   naloxone 0.4 mg/mL injection 0.02 mg (has no administration in time range)   enoxaparin injection 40 mg (40 mg Subcutaneous Given 6/30/23 1836)   lactated ringers infusion (has no administration in time range)   cefTRIAXone (ROCEPHIN) 2 g in dextrose 5 % in water (D5W) 5 % 100 mL IVPB (MB+) (0 g Intravenous Stopped 6/30/23 1530)   morphine injection 4 mg (4 mg Intravenous Given 6/30/23 1439)   ondansetron injection 4 mg (4 mg Intravenous Given 6/30/23 1439)   sodium chloride 0.9% bolus 1,710 mL 1,710 mL (0 mLs Intravenous Stopped 6/30/23 1713)   potassium chloride SA CR tablet 20 mEq (20 mEq Oral Given 6/30/23 1543)     Medical Decision Making:   Differential Diagnosis:   Differential Diagnosis includes, but is not limited to:  AAA, aortic dissection, SBO/volvulus, intussusception, ileus, appendicitis, cholecystitis, hepatitis, nephrolithiasis, pancreatitis, IBD/IBS, biliary colic, GERD, PUD, constipation, UTI/pyelonephritis, musculoskeletal pain.      Clinical Tests:   Lab Tests: Reviewed and Ordered  Radiological Study: Ordered           ED Course as of 06/30/23 1936 Fri Jun 30, 2023   1418 I wanted to discuss stone surgery with her however with her persistent fevers, sweats, and emesis daily, I am concerned that the pyelonephritis is still persisting despite the culture specific/appropriate antibiotics of  augmentin that she has been taking upon discharge from the hospital. She also has been nauseated and experiencing emesis at least once daily.  The patient still has the right ureteral stent in place therefore I do not believe any additional urologic procedures are indicated at this time and we can only proceed with ureteroscopy for definitive stone surgery once the urine culture documents complete resolution of the UTI.  Called the [facility] to alert them I am sending pt down for evaluation, labs, IV fluids, and possible admission to hospitalist service for IV antibiotics and anti-emetics.   I will send the urine she provided for UA and culture. +leuk on dipstick today.  [DT]   1425 TECHNIQUE:  Ultrasound of the kidneys was performed including color flow and Doppler evaluation of the kidneys.     COMPARISON:  CT abdomen pelvis without contrast dated 06/21/2023     FINDINGS:  Right kidney: Measures 15.1 cm.  Resistive index of 0.69.  Vague cortical heterogeneity.  Echogenic focus measuring up to 1.0 cm suggesting nonobstructing stone.  No hydronephrosis.     Left kidney: Measures 12.3 cm.  Resistive index of 0.67.  Corticomedullary distinction is maintained.  No hydronephrosis.     Urinary bladder is fluid-filled at time of scanning and otherwise unremarkable.     Impression:     Nonobstructing right renal stone.     Previous right-sided hydronephrosis by CT has essentially resolved.    [DT]   1535 FINDINGS:  Chest one view portable.     There is borderline cardiomegaly.  Lungs are clear.  There is DJD.     Impression:     No acute process seen. [DT]   1535 CBC, chemistry, urinary analysis, UPT and lactic acid have been reviewed.  The patient's white blood cell count is noted be 16.35 which is elevated from prior readings.  The H and H has also decreased to 27.6 and 9.1.  Potassium is also slightly decreased at 3.3.  Potassium supplement will be given at this time.  CT is pending.  The plan is still for admission.  [DT]   1632 [facility] called for admission [DT]   1644 Report given to [facility] who is also down to see the pt [DT]   1712 Impression:     Interval placement of right-sided double-J ureteral stent with resolution of previous right-sided hydronephrosis, noting grossly stable configuration of a 5 mm calculus within the distal right ureter near the UVJ.  Unchanged asymmetric mild right perinephric and periureteral fat stranding presumably related to previous obstructive uropathy.     More conspicuous 2 subtle hypoattenuating parenchymal foci within the right kidney, while nonspecific could indicate potential developing renal abscesses.  Clinical correlation for potential right-sided pyeloureteronephritis, and with urinalysis as warranted.     Unchanged right renal 4 mm nonobstructing calculus and unchanged left renal 2 mm nonobstructing calculus.     Hepatomegaly.     Cholecystectomy.     Additional findings as above.     This report was flagged in Epic as abnormal. [DT]   1713 [facility] updated on CT findings.  [DT]   1714  left for Dr Blevins [DT]   1719 Spoke with Dr Blevins concerning the CT findings.  [DT]      ED Course User Index  [DT] Savannah Cordon NP                 Clinical Impression:   Final diagnoses:  [A41.9] Sepsis  [R10.9] Flank pain (Primary)  [N30.00] Acute cystitis without hematuria        ED Disposition Condition    Admit                 Savannah Cordon NP  06/30/23 1936

## 2023-06-30 NOTE — ASSESSMENT & PLAN NOTE
Discharged s/p stent placement for nephrolithiasis.  Was on PO abx, augmentin.  Previous urine cx resulted with sensitivities.  Fever, nausea, vomiting on presentation.  WBC elevated, lactic within normal range in ED.  CT Renal with stable stent and stones with concern for renal abscesses.    Plan:  - Initiate on IV abx, ceftriaxone.  - Continue maintenance IVF.  - Hold nephrotoxic medications .  - Renally-dose current meds if available  - Avoid Hypotension.  - Strict I/O's.  - Monitor renal function carefully.   - Pending retroperitoneal US.  - Urology consulted.

## 2023-06-30 NOTE — ED NOTES
Bed: EXAM 24  Expected date:   Expected time:   Means of arrival:   Comments:  Pylo- failing outpatient abxs   [id number]

## 2023-06-30 NOTE — PHARMACY MED REC
"[facility]           Pharmacy  Admission Medication History     The home medication history was taken by Aydee Marcelino.      Medication history obtained from Medications listed below were obtained from: Patient/family    Based on information gathered for medication list, you may go to "Admission" then "Reconcile Home Medications" tabs to review and/or act upon those items.     The home medication list has been updated by the Pharmacy department.   Please read ALL comments highlighted in yellow.   Please address this information as you see fit.    Feel free to contact us if you have any questions or require assistance.    The medications listed below were removed from the home medication list.  Please reorder if appropriate:    Patient reports NOT TAKING the following medication(s):  Diflucan 200mg      No current facility-administered medications on file prior to encounter.     Current Outpatient Medications on File Prior to Encounter   Medication Sig Dispense Refill    amoxicillin-clavulanate 875-125mg (AUGMENTIN) 875-125 mg per tablet Take 1 tablet by mouth every 12 (twelve) hours. for 10 days 20 tablet 0    HYDROcodone-acetaminophen (NORCO) 7.5-325 mg per tablet Take 1 tablet by mouth every 8 (eight) hours as needed for Pain. 13 tablet 0    metoclopramide HCl (REGLAN) 10 MG tablet Take 10 mg by mouth 4 (four) times daily.      multivit-min-iron fum-folic ac (ONE-A-DAY WOMEN'S COMPLETE) 18 mg iron- 400 mcg Tab Take 1 tablet by mouth once daily.      oxyCODONE-acetaminophen (PERCOCET)  mg per tablet Take 1 tablet by mouth every 4 (four) hours as needed for Pain.      tamsulosin (FLOMAX) 0.4 mg Cap Take 1 capsule (0.4 mg total) by mouth once daily. for 15 days 15 capsule 0       Please address this information as you see fit.  Feel free to contact us if you have any questions or require assistance.    Aydee Marcelino  [phone]                  .        "

## 2023-06-30 NOTE — ED NOTES
After ambulating from bathroom RN notice patient was shivering with c/o being cold and having a h/a. Temp was taken blanket was given.

## 2023-06-30 NOTE — SUBJECTIVE & OBJECTIVE
Past Medical History:   Diagnosis Date    Allergy     IBS (irritable bowel syndrome)     Osteoarthritis     Sickle cell trait     Urinary tract infection        Past Surgical History:   Procedure Laterality Date    ANKLE SURGERY      left     SECTION, CLASSIC      x3    CHOLECYSTECTOMY      CYSTOSCOPY Right 2023    Procedure: CYSTOSCOPY;  Surgeon: Kaylie Blevins MD;  Location: [facility] OR;  Service: Urology;  Laterality: Right;    RETROGRADE PYELOGRAPHY Right 2023    Procedure: PYELOGRAM, RETROGRADE;  Surgeon: Kaylie Blevins MD;  Location: [facility] OR;  Service: Urology;  Laterality: Right;    URETERAL STENT PLACEMENT Right 2023    Procedure: INSERTION, STENT, URETER;  Surgeon: Kaylie Blevins MD;  Location: [facility] OR;  Service: Urology;  Laterality: Right;       Review of patient's allergies indicates:   Allergen Reactions    Aspirin Other (See Comments)     Abdominal cramping       No current facility-administered medications on file prior to encounter.     Current Outpatient Medications on File Prior to Encounter   Medication Sig    amoxicillin-clavulanate 875-125mg (AUGMENTIN) 875-125 mg per tablet Take 1 tablet by mouth every 12 (twelve) hours. for 10 days    HYDROcodone-acetaminophen (NORCO) 7.5-325 mg per tablet Take 1 tablet by mouth every 8 (eight) hours as needed for Pain.    metoclopramide HCl (REGLAN) 10 MG tablet Take 10 mg by mouth 4 (four) times daily.    multivit-min-iron fum-folic ac (ONE-A-DAY WOMEN'S COMPLETE) 18 mg iron- 400 mcg Tab Take 1 tablet by mouth once daily.    oxyCODONE-acetaminophen (PERCOCET)  mg per tablet Take 1 tablet by mouth every 4 (four) hours as needed for Pain.    tamsulosin (FLOMAX) 0.4 mg Cap Take 1 capsule (0.4 mg total) by mouth once daily. for 15 days    [DISCONTINUED] fluconazole (DIFLUCAN) 200 MG Tab Take 200 mg by mouth once daily.     Family History    None       Tobacco Use    Smoking status: Never    Smokeless tobacco: Never   Substance and  Sexual Activity    Alcohol use: No    Drug use: No    Sexual activity: Not on file     Review of Systems   Constitutional:  Positive for fever. Negative for chills.   Respiratory:  Negative for cough and shortness of breath.    Cardiovascular:  Negative for chest pain and palpitations.   Gastrointestinal:  Positive for nausea and vomiting. Negative for abdominal pain and diarrhea.   Genitourinary:  Positive for dysuria and flank pain. Negative for hematuria.   Neurological:  Negative for light-headedness and headaches.   Psychiatric/Behavioral:  Negative for confusion.    Objective:     Vital Signs (Most Recent):  Temp: 98.9 °F (37.2 °C) (06/30/23 1839)  Pulse: 83 (06/30/23 1801)  Resp: 18 (06/30/23 1839)  BP: (!) 178/93 (06/30/23 1801)  SpO2: 98 % (06/30/23 1801) Vital Signs (24h Range):  Temp:  [98.3 °F (36.8 °C)-99.3 °F (37.4 °C)] 98.9 °F (37.2 °C)  Pulse:  [81-97] 83  Resp:  [8-20] 18  SpO2:  [98 %-100 %] 98 %  BP: (142-178)/(75-93) 178/93     Weight: 90.7 kg (200 lb)  Body mass index is 33.28 kg/m².     Physical Exam  Vitals and nursing note reviewed.   Constitutional:       General: She is not in acute distress.     Appearance: Normal appearance. She is normal weight. She is not ill-appearing or toxic-appearing.   HENT:      Head: Normocephalic.      Right Ear: External ear normal.      Left Ear: External ear normal.      Nose: Nose normal.      Mouth/Throat:      Pharynx: Oropharynx is clear.   Eyes:      Extraocular Movements: Extraocular movements intact.   Cardiovascular:      Rate and Rhythm: Normal rate and regular rhythm.      Pulses: Normal pulses.      Heart sounds: Normal heart sounds.   Abdominal:      General: Abdomen is flat. Bowel sounds are normal.      Palpations: Abdomen is soft.      Tenderness: There is no abdominal tenderness. There is right CVA tenderness. There is no guarding or rebound.   Musculoskeletal:         General: Normal range of motion.      Cervical back: Normal range of  motion.   Skin:     General: Skin is warm.      Capillary Refill: Capillary refill takes less than 2 seconds.   Neurological:      General: No focal deficit present.      Mental Status: She is alert and oriented to person, place, and time. Mental status is at baseline.   Psychiatric:         Mood and Affect: Mood normal.         Behavior: Behavior normal.         Thought Content: Thought content normal.              Significant Labs: All pertinent labs within the past 24 hours have been reviewed.  CBC:   Recent Labs   Lab 06/30/23  1444   WBC 16.35*   HGB 9.1*   HCT 27.6*   *     CMP:   Recent Labs   Lab 06/30/23  1444      K 3.3*      CO2 24      BUN 7   CREATININE 1.0   CALCIUM 9.0   PROT 8.4   ALBUMIN 2.8*   BILITOT 0.5   ALKPHOS 75   AST 29   ALT 28   ANIONGAP 10     Lactic Acid:   Recent Labs   Lab 06/30/23  1444   LACTATE 1.3     Urine Studies:   Recent Labs   Lab 06/30/23  1423   COLORU Yellow   APPEARANCEUA Clear   PHUR 6.0   SPECGRAV 1.010   PROTEINUA 1+*   GLUCUA Negative   KETONESU Negative   BILIRUBINUA Negative   OCCULTUA 2+*   NITRITE Negative   UROBILINOGEN Negative   LEUKOCYTESUR 3+*   RBCUA 5*   WBCUA 29*   BACTERIA Rare   SQUAMEPITHEL 0   HYALINECASTS 0       Significant Imaging: I have reviewed all pertinent imaging results/findings within the past 24 hours.    Narrative & Impression  EXAMINATION:  XR CHEST AP PORTABLE     CLINICAL HISTORY:  Sepsis;     FINDINGS:  Chest one view portable.     There is borderline cardiomegaly.  Lungs are clear.  There is DJD.     Impression:     No acute process seen.        Electronically signed by: Adonis Aldana MD  Date:                                            06/30/2023  Time:                                           15:18      ---    Narrative & Impression  EXAMINATION:  CT RENAL STONE STUDY ABD PELVIS WO     CLINICAL HISTORY:  Flank pain, kidney stone suspected;     TECHNIQUE:  Low dose axial images, sagittal and coronal  reformations were obtained from the lung bases to the pubic symphysis.  Contrast was not administered.     COMPARISON:  Renal ultrasound 06/23/2023, right upper quadrant ultrasound 06/22/2023, CT abdomen and pelvis 06/21/2023     FINDINGS:  Lack of IV contrast limits evaluation of soft tissue and vascular structures     Imaged lung bases are clear.  Base of the heart is within normal limits.     Remote cholecystectomy.  Stable prominence of the common bile duct.  No intrahepatic biliary ductal dilatation.     Liver remains enlarged without focal process.  Noncontrast appearance of the pancreas, spleen, stomach, duodenum and bilateral adrenal glands are within normal limits.     When compared to CT study of 06/21/2023, there has been interval placement of right-sided double-J ureteral stent with proximal loop in the right renal pelvis and distal loop within the midline and left paramedian dependent aspect of the urinary bladder.  The right kidney remains asymmetrically larger than the left with similar asymmetric mild right perinephric stranding, noting interval resolution of previous right-sided hydroureteronephrosis and previous contrast within the right renal collecting system and right ureter has resolved.  Stable appearance of a 5 mm calculus within the region of the right UVJ.  The stent is adjacent to the posterior left aspect of the distal ureteral calculus.  Similar right perinephric stranding.  Newly developed 2 subtle hypoattenuating parenchymal foci at the right renal mid to lower pole measuring up to 1.9 cm, noting no previous cyst or focal parenchymal abnormality seen in this region.  No left hydronephrosis.  Left ureter is normal in course and caliber.  Unchanged 2 mm calculus within the left kidney.  No radiodense calculus seen within the left ureter or urinary bladder.  Urinary bladder is well distended without wall thickening.  Uterus and bilateral adnexa are within normal limits.  Trace volume  nonspecific free pelvic fluid slightly increased from prior, likely reactive from previous right renal and ureter inflammatory process tracking to the pelvis.  Few scattered punctate pelvic phleboliths noted.     Appendix and terminal ileum are within normal limits.  No evidence of bowel obstruction or acute inflammation.  No pneumatosis or portal venous gas.     No abdominal ascites, free air or lymphadenopathy.     Extraperitoneal soft tissues are unchanged.  Osseous structures are stable without acute process seen.     Impression:     Interval placement of right-sided double-J ureteral stent with resolution of previous right-sided hydronephrosis, noting grossly stable configuration of a 5 mm calculus within the distal right ureter near the UVJ.  Unchanged asymmetric mild right perinephric and periureteral fat stranding presumably related to previous obstructive uropathy.     More conspicuous 2 subtle hypoattenuating parenchymal foci within the right kidney, while nonspecific could indicate potential developing renal abscesses.  Clinical correlation for potential right-sided pyeloureteronephritis, and with urinalysis as warranted.     Unchanged right renal 4 mm nonobstructing calculus and unchanged left renal 2 mm nonobstructing calculus.     Hepatomegaly.     Cholecystectomy.     Additional findings as above.     This report was flagged in Epic as abnormal.        Electronically signed by: Morris Orantes MD  Date:                                            06/30/2023  Time:                                           16:57

## 2023-06-30 NOTE — H&P
[facility] Medicine  History & Physical    Patient Name: Sherri Machado  MRN: [id number]  Patient Class: IP- Inpatient  Admission Date: 2023  Attending Physician: Constantino Rey DO   Primary Care Provider: Modesto Waldron MD         Patient information was obtained from patient, past medical records and ER records.     Subjective:     Principal Problem:Pyelonephritis    Chief Complaint:   Chief Complaint   Patient presents with    Flank Pain     Diagnosed with kidney stones on Father's day. Still has antibiotics. Right sided Flank pain that radiates to lower abdomen. Daily fevers, N/V/D. No urinary complaints.        HPI: 43-year-old F w/ PMHx with history depression, opioid dependence who presents to the ED with reports of right flank pain. Patient reports she was diagnosed with a kidney stone and was seen by Dr. Blevins.  Patient had a urethral stent placement in addition to cystoscopy and is currently taking PO antibiotics.  Patient reports she has been running fever over the past few days in addition to nausea, vomiting and diarrhea.  Patient states she is having issues with eating and drinking. She was sent for possible admission per Dr. Blevins.    In the ED, initial vitals /85, HR 85, Temp 98.3F, SpO2 100% on room air. Labs include CBC with H/H 9.1/27.6 and WBC 16.35, CMP with K 3.3.  UA with 1+ protein, 2+ occult blood, 3+ leukocytes, 29 WBC, 5 RBC. Urine Pregnancy test negative. Lactic acid 1.3. CXR with no acute changes. [facility] consulted for evaluation for admission for UTI with concerns for pyelonephritis.       Past Medical History:   Diagnosis Date    Allergy     IBS (irritable bowel syndrome)     Osteoarthritis     Sickle cell trait     Urinary tract infection        Past Surgical History:   Procedure Laterality Date    ANKLE SURGERY      left     SECTION, CLASSIC      x3    CHOLECYSTECTOMY      CYSTOSCOPY Right 2023    Procedure:  CYSTOSCOPY;  Surgeon: Kaylie Blevins MD;  Location: [facility] OR;  Service: Urology;  Laterality: Right;    RETROGRADE PYELOGRAPHY Right 6/22/2023    Procedure: PYELOGRAM, RETROGRADE;  Surgeon: Kaylie Blevins MD;  Location: [facility] OR;  Service: Urology;  Laterality: Right;    URETERAL STENT PLACEMENT Right 6/22/2023    Procedure: INSERTION, STENT, URETER;  Surgeon: Kaylie Blevins MD;  Location: [facility] OR;  Service: Urology;  Laterality: Right;       Review of patient's allergies indicates:   Allergen Reactions    Aspirin Other (See Comments)     Abdominal cramping       No current facility-administered medications on file prior to encounter.     Current Outpatient Medications on File Prior to Encounter   Medication Sig    amoxicillin-clavulanate 875-125mg (AUGMENTIN) 875-125 mg per tablet Take 1 tablet by mouth every 12 (twelve) hours. for 10 days    HYDROcodone-acetaminophen (NORCO) 7.5-325 mg per tablet Take 1 tablet by mouth every 8 (eight) hours as needed for Pain.    metoclopramide HCl (REGLAN) 10 MG tablet Take 10 mg by mouth 4 (four) times daily.    multivit-min-iron fum-folic ac (ONE-A-DAY WOMEN'S COMPLETE) 18 mg iron- 400 mcg Tab Take 1 tablet by mouth once daily.    oxyCODONE-acetaminophen (PERCOCET)  mg per tablet Take 1 tablet by mouth every 4 (four) hours as needed for Pain.    tamsulosin (FLOMAX) 0.4 mg Cap Take 1 capsule (0.4 mg total) by mouth once daily. for 15 days    [DISCONTINUED] fluconazole (DIFLUCAN) 200 MG Tab Take 200 mg by mouth once daily.     Family History    None       Tobacco Use    Smoking status: Never    Smokeless tobacco: Never   Substance and Sexual Activity    Alcohol use: No    Drug use: No    Sexual activity: Not on file     Review of Systems   Constitutional:  Positive for fever. Negative for chills.   Respiratory:  Negative for cough and shortness of breath.    Cardiovascular:  Negative for chest pain and palpitations.   Gastrointestinal:  Positive for nausea and  vomiting. Negative for abdominal pain and diarrhea.   Genitourinary:  Positive for dysuria and flank pain. Negative for hematuria.   Neurological:  Negative for light-headedness and headaches.   Psychiatric/Behavioral:  Negative for confusion.    Objective:     Vital Signs (Most Recent):  Temp: 98.9 °F (37.2 °C) (06/30/23 1839)  Pulse: 83 (06/30/23 1801)  Resp: 18 (06/30/23 1839)  BP: (!) 178/93 (06/30/23 1801)  SpO2: 98 % (06/30/23 1801) Vital Signs (24h Range):  Temp:  [98.3 °F (36.8 °C)-99.3 °F (37.4 °C)] 98.9 °F (37.2 °C)  Pulse:  [81-97] 83  Resp:  [8-20] 18  SpO2:  [98 %-100 %] 98 %  BP: (142-178)/(75-93) 178/93     Weight: 90.7 kg (200 lb)  Body mass index is 33.28 kg/m².     Physical Exam  Vitals and nursing note reviewed.   Constitutional:       General: She is not in acute distress.     Appearance: Normal appearance. She is normal weight. She is not ill-appearing or toxic-appearing.   HENT:      Head: Normocephalic.      Right Ear: External ear normal.      Left Ear: External ear normal.      Nose: Nose normal.      Mouth/Throat:      Pharynx: Oropharynx is clear.   Eyes:      Extraocular Movements: Extraocular movements intact.   Cardiovascular:      Rate and Rhythm: Normal rate and regular rhythm.      Pulses: Normal pulses.      Heart sounds: Normal heart sounds.   Abdominal:      General: Abdomen is flat. Bowel sounds are normal.      Palpations: Abdomen is soft.      Tenderness: There is no abdominal tenderness. There is right CVA tenderness. There is no guarding or rebound.   Musculoskeletal:         General: Normal range of motion.      Cervical back: Normal range of motion.   Skin:     General: Skin is warm.      Capillary Refill: Capillary refill takes less than 2 seconds.   Neurological:      General: No focal deficit present.      Mental Status: She is alert and oriented to person, place, and time. Mental status is at baseline.   Psychiatric:         Mood and Affect: Mood normal.          Behavior: Behavior normal.         Thought Content: Thought content normal.              Significant Labs: All pertinent labs within the past 24 hours have been reviewed.  CBC:   Recent Labs   Lab 06/30/23  1444   WBC 16.35*   HGB 9.1*   HCT 27.6*   *     CMP:   Recent Labs   Lab 06/30/23  1444      K 3.3*      CO2 24      BUN 7   CREATININE 1.0   CALCIUM 9.0   PROT 8.4   ALBUMIN 2.8*   BILITOT 0.5   ALKPHOS 75   AST 29   ALT 28   ANIONGAP 10     Lactic Acid:   Recent Labs   Lab 06/30/23  1444   LACTATE 1.3     Urine Studies:   Recent Labs   Lab 06/30/23  1423   COLORU Yellow   APPEARANCEUA Clear   PHUR 6.0   SPECGRAV 1.010   PROTEINUA 1+*   GLUCUA Negative   KETONESU Negative   BILIRUBINUA Negative   OCCULTUA 2+*   NITRITE Negative   UROBILINOGEN Negative   LEUKOCYTESUR 3+*   RBCUA 5*   WBCUA 29*   BACTERIA Rare   SQUAMEPITHEL 0   HYALINECASTS 0       Significant Imaging: I have reviewed all pertinent imaging results/findings within the past 24 hours.    Narrative & Impression  EXAMINATION:  XR CHEST AP PORTABLE     CLINICAL HISTORY:  Sepsis;     FINDINGS:  Chest one view portable.     There is borderline cardiomegaly.  Lungs are clear.  There is DJD.     Impression:     No acute process seen.        Electronically signed by: Adonis Aldana MD  Date:                                            06/30/2023  Time:                                           15:18      ---    Narrative & Impression  EXAMINATION:  CT RENAL STONE STUDY ABD PELVIS WO     CLINICAL HISTORY:  Flank pain, kidney stone suspected;     TECHNIQUE:  Low dose axial images, sagittal and coronal reformations were obtained from the lung bases to the pubic symphysis.  Contrast was not administered.     COMPARISON:  Renal ultrasound 06/23/2023, right upper quadrant ultrasound 06/22/2023, CT abdomen and pelvis 06/21/2023     FINDINGS:  Lack of IV contrast limits evaluation of soft tissue and vascular structures     Imaged lung  bases are clear.  Base of the heart is within normal limits.     Remote cholecystectomy.  Stable prominence of the common bile duct.  No intrahepatic biliary ductal dilatation.     Liver remains enlarged without focal process.  Noncontrast appearance of the pancreas, spleen, stomach, duodenum and bilateral adrenal glands are within normal limits.     When compared to CT study of 06/21/2023, there has been interval placement of right-sided double-J ureteral stent with proximal loop in the right renal pelvis and distal loop within the midline and left paramedian dependent aspect of the urinary bladder.  The right kidney remains asymmetrically larger than the left with similar asymmetric mild right perinephric stranding, noting interval resolution of previous right-sided hydroureteronephrosis and previous contrast within the right renal collecting system and right ureter has resolved.  Stable appearance of a 5 mm calculus within the region of the right UVJ.  The stent is adjacent to the posterior left aspect of the distal ureteral calculus.  Similar right perinephric stranding.  Newly developed 2 subtle hypoattenuating parenchymal foci at the right renal mid to lower pole measuring up to 1.9 cm, noting no previous cyst or focal parenchymal abnormality seen in this region.  No left hydronephrosis.  Left ureter is normal in course and caliber.  Unchanged 2 mm calculus within the left kidney.  No radiodense calculus seen within the left ureter or urinary bladder.  Urinary bladder is well distended without wall thickening.  Uterus and bilateral adnexa are within normal limits.  Trace volume nonspecific free pelvic fluid slightly increased from prior, likely reactive from previous right renal and ureter inflammatory process tracking to the pelvis.  Few scattered punctate pelvic phleboliths noted.     Appendix and terminal ileum are within normal limits.  No evidence of bowel obstruction or acute inflammation.  No  pneumatosis or portal venous gas.     No abdominal ascites, free air or lymphadenopathy.     Extraperitoneal soft tissues are unchanged.  Osseous structures are stable without acute process seen.     Impression:     Interval placement of right-sided double-J ureteral stent with resolution of previous right-sided hydronephrosis, noting grossly stable configuration of a 5 mm calculus within the distal right ureter near the UVJ.  Unchanged asymmetric mild right perinephric and periureteral fat stranding presumably related to previous obstructive uropathy.     More conspicuous 2 subtle hypoattenuating parenchymal foci within the right kidney, while nonspecific could indicate potential developing renal abscesses.  Clinical correlation for potential right-sided pyeloureteronephritis, and with urinalysis as warranted.     Unchanged right renal 4 mm nonobstructing calculus and unchanged left renal 2 mm nonobstructing calculus.     Hepatomegaly.     Cholecystectomy.     Additional findings as above.     This report was flagged in Epic as abnormal.        Electronically signed by: Morris Orantes MD  Date:                                            06/30/2023  Time:                                           16:57    Assessment/Plan:     * Pyelonephritis  Discharged s/p stent placement for nephrolithiasis.  Was on PO abx, augmentin.  Previous urine cx resulted with sensitivities.  Fever, nausea, vomiting on presentation.  WBC elevated, lactic within normal range in ED.  CT Renal with stable stent and stones with concern for renal abscesses.    Plan:  - Initiate on IV abx, ceftriaxone.  - Continue maintenance IVF.  - Hold nephrotoxic medications .  - Renally-dose current meds if available  - Avoid Hypotension.  - Strict I/O's.  - Monitor renal function carefully.   - Pending retroperitoneal US.  - Urology consulted.    Renal abscess  Plan as in pyelonephritis.      Flank pain  Plan as in pyelonephritis.    Nephrolithiasis  Plan as  in pyelonephritis.    Elevated BP without diagnosis of hypertension  Plan:  - Will continue to monitor vitals.  - Add BP meds as needed.    VTE Risk Mitigation (From admission, onward)         Ordered     enoxaparin injection 40 mg  Daily         06/30/23 1744     IP VTE HIGH RISK PATIENT  Once         06/30/23 1744     Place sequential compression device  Until discontinued         06/30/23 1744                   ________________________  Brennen Schmid MD  [facility] PGY-1

## 2023-06-30 NOTE — HPI
43-year-old F w/ PMHx with history depression, opioid dependence who presents to the ED with reports of right flank pain. Patient reports she was diagnosed with a kidney stone and was seen by Dr. Blevins.  Patient had a urethral stent placement in addition to cystoscopy and is currently taking PO antibiotics.  Patient reports she has been running fever over the past few days in addition to nausea, vomiting and diarrhea.  Patient states she is having issues with eating and drinking. She was sent for possible admission per Dr. Blevins.    In the ED, initial vitals /85, HR 85, Temp 98.3F, SpO2 100% on room air. Labs include CBC with H/H 9.1/27.6 and WBC 16.35, CMP with K 3.3.  UA with 1+ protein, 2+ occult blood, 3+ leukocytes, 29 WBC, 5 RBC. Urine Pregnancy test negative. Lactic acid 1.3. CXR with no acute changes. [facility] consulted for evaluation for admission for UTI with concerns for pyelonephritis.

## 2023-06-30 NOTE — Clinical Note
Diagnosis: Flank pain [[id number]]   Future Attending Provider: ROLANDO ARANDA [[id number]]   Admitting Provider:: ROLANDO ARANDA [[id number]]

## 2023-06-30 NOTE — PROGRESS NOTES
Subjective:       Patient ID: Ihsinohemi Machado is a 43 y.o. female.    Chief Complaint:  f/u kidney stone hospital stay  This is a 43 y.o.  female patient that is  new to me in the office, I met her on 23 as an inpatient consult for kidney stones.  She presented with right flank pain and was diagnosed with a 5mm right ureterovesical junction stone / UVU on 23. Patient was offered admission by the ER (does not seem that urology was contacted) but asked to be discharged and was provided meds for MET. She re-presented back to ER on 23 with worsening right flank pain and nausea. WBCs, Cr stable, urinalysis suggestive of a UTI. Was admitted from hospital on . I performed cysto, right stent, RGP on 23, over pus noted to drain. Cr ultimately trended down after a few more days in the hospital.     23  Here to f/u for consents, provide urine sample. Patient understands I am leaving [facility] in the upcoming future but would like me to perform elective surgery which I have scheduled for 23.     CT 23 and 23 with similar findings-  Right distal 5mm ureteral calculus, possible right nonobstructing 4mm renal calculus.   Left lower pole punctate calcification - possible kidney stone.    23  Still having sweats, fevers up to 101F at home, emesis at least once daily. She notes she is trying to hydrate but can only tolerate small sips of water at a time and her PO intake of food has been poor. She feels tired/weak.   She is here today with her significant other Mr. Torres.    Lab Results   Component Value Date    CREATININE 2.0 (H) 2023       ---  Past Medical History:   Diagnosis Date    Allergy     IBS (irritable bowel syndrome)     Osteoarthritis     Sickle cell trait     Urinary tract infection        Past Surgical History:   Procedure Laterality Date    ANKLE SURGERY      left     SECTION, CLASSIC      x3    CHOLECYSTECTOMY      CYSTOSCOPY Right 2023     Procedure: CYSTOSCOPY;  Surgeon: Kaylie Blevins MD;  Location: [facility] OR;  Service: Urology;  Laterality: Right;    RETROGRADE PYELOGRAPHY Right 6/22/2023    Procedure: PYELOGRAM, RETROGRADE;  Surgeon: Kaylie Blevins MD;  Location: [facility] OR;  Service: Urology;  Laterality: Right;    URETERAL STENT PLACEMENT Right 6/22/2023    Procedure: INSERTION, STENT, URETER;  Surgeon: Kaylie Blevins MD;  Location: [facility] OR;  Service: Urology;  Laterality: Right;       History reviewed. No pertinent family history.    Social History     Tobacco Use    Smoking status: Never    Smokeless tobacco: Never   Substance Use Topics    Alcohol use: No    Drug use: No       Current Outpatient Medications on File Prior to Visit   Medication Sig Dispense Refill    amoxicillin-clavulanate 875-125mg (AUGMENTIN) 875-125 mg per tablet Take 1 tablet by mouth every 12 (twelve) hours. for 10 days 20 tablet 0    fluconazole (DIFLUCAN) 200 MG Tab Take 200 mg by mouth once daily.      HYDROcodone-acetaminophen (NORCO) 7.5-325 mg per tablet Take 1 tablet by mouth every 8 (eight) hours as needed for Pain. 13 tablet 0    multivit-min-iron fum-folic ac (ONE-A-DAY WOMEN'S COMPLETE) 18 mg iron- 400 mcg Tab Take 1 tablet by mouth once daily.      tamsulosin (FLOMAX) 0.4 mg Cap Take 1 capsule (0.4 mg total) by mouth once daily. for 15 days 15 capsule 0     No current facility-administered medications on file prior to visit.       Review of patient's allergies indicates:   Allergen Reactions    Aspirin Other (See Comments)     Abdominal cramping       Review of Systems   Constitutional:  Negative for activity change.   HENT:  Negative for congestion.    Eyes:  Negative for visual disturbance.   Respiratory:  Negative for shortness of breath.    Cardiovascular:  Negative for chest pain.   Gastrointestinal:  Negative for abdominal distention.   Musculoskeletal:  Negative for gait problem.   Skin:  Negative for color change.   Neurological:  Negative for dizziness.    Psychiatric/Behavioral:  Negative for agitation.      Objective:      Physical Exam  Constitutional:       Appearance: She is well-developed.   HENT:      Head: Normocephalic and atraumatic.   Pulmonary:      Effort: Pulmonary effort is normal.   Musculoskeletal:         General: Normal range of motion.      Cervical back: Normal range of motion.   Skin:     General: Skin is warm and dry.   Neurological:      Mental Status: She is alert and oriented to person, place, and time.       Assessment:       1. Nephrolithiasis    2. Flank pain    3. Urinary tract infection without hematuria, site unspecified    4. Hydronephrosis with urinary obstruction due to ureteral calculus        Plan:         CT 6/18/23 and 6/21/23 with similar findings-  Right distal 5mm ureteral calculus, possible right nonobstructing 4mm renal calculus.   Left lower pole punctate calcification - possible kidney stone.    I wanted to discuss stone surgery with her however with her persistent fevers, sweats, and emesis daily, I am concerned that the pyelonephritis is still persisting despite the culture specific/appropriate antibiotics of augmentin that she has been taking upon discharge from the hospital. She also has been nauseated and experiencing emesis at least once daily.  The patient still has the right ureteral stent in place therefore I do not believe any additional urologic procedures are indicated at this time and we can only proceed with ureteroscopy for definitive stone surgery once the urine culture documents complete resolution of the UTI.  Called the [place] ER to alert them I am sending pt down for evaluation, labs, IV fluids, and possible admission to hospitalist service for IV antibiotics and anti-emetics.   I will send the urine she provided for UA and culture. +leuk on dipstick today.   If she is admitted, please use this note as the urology consult note. Called report to Ms. Gonsalves who is the charge nurse in the ED today.      Nephrolithiasis  -     Urinalysis  -     Urinalysis Microscopic  -     Urine culture  -     POCT URINE DIPSTICK WITHOUT MICROSCOPE    Flank pain  -     Urinalysis  -     Urinalysis Microscopic  -     Urine culture  -     POCT URINE DIPSTICK WITHOUT MICROSCOPE    Urinary tract infection without hematuria, site unspecified  -     Urinalysis  -     Urinalysis Microscopic  -     Urine culture  -     POCT URINE DIPSTICK WITHOUT MICROSCOPE    Hydronephrosis with urinary obstruction due to ureteral calculus  -     Urinalysis  -     Urinalysis Microscopic  -     Urine culture  -     POCT URINE DIPSTICK WITHOUT MICROSCOPE

## 2023-06-30 NOTE — CONSULTS
[facility] - Emergency Dept  Urology  Consult Note    Patient Name: Sherri Machado  MRN: [id number]  Admission Date: 6/30/2023  Hospital Length of Stay: 0 days  Code Status: Prior   Attending Provider: Devonte Stewart MD   Consulting Provider: Kaylie Blevins MD  Primary Care Physician: Modesto Waldron MD  Principal Problem:<principal problem not specified>    Inpatient consult to Urology  Consult performed by: Kaylei Blevins MD  Consult ordered by: Savannah Cordon NP        Subjective:     HPI:   This is a 43 y.o.  female patient that is  new to me in the office, I met her on 6/22/23 as an inpatient consult for kidney stones.  She presented with right flank pain and was diagnosed with a 5mm right ureterovesical junction stone / UVU on 6/18/23. Patient was offered admission by the ER (does not seem that urology was contacted) but asked to be discharged and was provided meds for MET. She re-presented back to ER on 6/21/23 with worsening right flank pain and nausea. WBCs, Cr stable, urinalysis suggestive of a UTI. Was admitted from hospital on 6/21/2. I performed cysto, right stent, RGP on 6/22/23, over pus noted to drain. Cr ultimately trended down after a few more days in the hospital.      6/30/23  Here to f/u for consents, provide urine sample. Patient understands I am leaving [facility] in the upcoming future but would like me to perform elective surgery which I have scheduled for 7/5/23.      CT 6/18/23 and 6/21/23 with similar findings-  Right distal 5mm ureteral calculus, possible right nonobstructing 4mm renal calculus.   Left lower pole punctate calcification - possible kidney stone.     6/30/23  Still having sweats, fevers up to 101F at home, emesis at least once daily. She notes she is trying to hydrate but can only tolerate small sips of water at a time and her PO intake of food has been poor. She feels tired/weak.   She is here today with her significant other Mr. Torres.    Past Medical History:   Diagnosis  Date    Allergy     IBS (irritable bowel syndrome)     Osteoarthritis     Sickle cell trait     Urinary tract infection        Past Surgical History:   Procedure Laterality Date    ANKLE SURGERY      left     SECTION, CLASSIC      x3    CHOLECYSTECTOMY      CYSTOSCOPY Right 2023    Procedure: CYSTOSCOPY;  Surgeon: Kaylie Blevins MD;  Location: [facility] OR;  Service: Urology;  Laterality: Right;    RETROGRADE PYELOGRAPHY Right 2023    Procedure: PYELOGRAM, RETROGRADE;  Surgeon: Kaylie Blevins MD;  Location: [facility] OR;  Service: Urology;  Laterality: Right;    URETERAL STENT PLACEMENT Right 2023    Procedure: INSERTION, STENT, URETER;  Surgeon: Kaylie Blevins MD;  Location: [facility] OR;  Service: Urology;  Laterality: Right;       Review of patient's allergies indicates:   Allergen Reactions    Aspirin Other (See Comments)     Abdominal cramping       Family History    None         Tobacco Use    Smoking status: Never    Smokeless tobacco: Never   Substance and Sexual Activity    Alcohol use: No    Drug use: No    Sexual activity: Not on file       Review of Systems    Objective:     Temp:  [98.3 °F (36.8 °C)-99.3 °F (37.4 °C)] 98.5 °F (36.9 °C)  Pulse:  [81-97] 85  Resp:  [8-20] 15  SpO2:  [99 %-100 %] 99 %  BP: (142-172)/(75-91) 165/82     Body mass index is 33.28 kg/m².    Date 23 0700 - 23 0659   Shift 6344-9414 2349-3843 2671-3470 24 Hour Total   INTAKE   IV Piggyback  181  181   Shift Total(mL/kg)  1810(20)  1810(20)   OUTPUT   Shift Total(mL/kg)       Weight (kg) 90.7 90.7 90.7 90.7          Lines/Drains/Airways       Peripheral Intravenous Line  Duration                  Peripheral IV - Single Lumen 23 1437 20 G Right Antecubital <1 day                    Physical Exam    Significant Labs:  BMP:  Recent Labs   Lab 23  0700 23  1444   * 137   K 3.7 3.3*    103   CO2 21* 24   BUN 53* 7   CREATININE 2.0* 1.0   CALCIUM 8.7 9.0       CBC:  Recent Labs    Lab 06/24/23  0700 06/30/23  1444   WBC 14.45* 16.35*   HGB 11.7* 9.1*   HCT 33.1* 27.6*    477*       All pertinent labs results from the past 24 hours have been reviewed.  Recent Lab Results         06/30/23  1444   06/30/23  1428   06/30/23  1423   06/30/23  1323        Color, UA       Yellow       Clarity, UA       Clear       Bilirubin, POC       neg       Spec Grav UA       1.015       pH, UA       5       Protein, POC       30       Urobilinogen, UA       norm       Nitrite, UA       neg       Albumin 2.8             Alkaline Phosphatase 75             ALT 28             Anion Gap 10             Aniso Slight             Appearance, UA     Clear         AST 29             Bacteria, UA     Rare         Bands 2.0             Baso # CANCELED  Comment: Result canceled by the ancillary.             Basophil % 0.0             Bilirubin (UA)     Negative         BILIRUBIN TOTAL 0.5  Comment: For infants and newborns, interpretation of results should be based  on gestational age, weight and in agreement with clinical  observations.    Premature Infant recommended reference ranges:  Up to 24 hours.............<8.0 mg/dL  Up to 48 hours............<12.0 mg/dL  3-5 days..................<15.0 mg/dL  6-29 days.................<15.0 mg/dL               BUN 7             Calcium 9.0             Chloride 103             CO2 24             Color, UA     Yellow         Creatinine 1.0             Differential Method Manual             eGFR >60             Eos # CANCELED  Comment: Result canceled by the ancillary.             Eosinophil % 0.0             Glucose 101             Glucose, UA       norm       Glucose, UA     Negative         Gran % 79.0             Hematocrit 27.6             Hemoglobin 9.1             Hyaline Casts, UA     0         Immature Grans (Abs) CANCELED  Comment: Mild elevation in immature granulocytes is non specific and   can be seen in a variety of conditions including stress response,   acute  inflammation, trauma and pregnancy. Correlation with other   laboratory and clinical findings is essential.    Result canceled by the ancillary.               Immature Granulocytes CANCELED  Comment: Result canceled by the ancillary.             Ketones, UA       neg       Ketones, UA     Negative         Lactate, Arash 1.3  Comment: Falsely low lactic acid results can be found in samples   containing >=13.0 mg/dL total bilirubin and/or >=3.5 mg/dL   direct bilirubin.               Leukocytes, UA     3+         Lymph # CANCELED  Comment: Result canceled by the ancillary.             Lymph % 13.0             MCH 26.5             MCHC 33.0             MCV 81             Metamyelocytes 2.0             Microscopic Comment     SEE COMMENT  Comment: Other formed elements not mentioned in the report are not   present in the microscopic examination.            Mono # CANCELED  Comment: Result canceled by the ancillary.             Mono % 4.0             MPV 8.8             NITRITE UA     Negative         nRBC 0             Occult Blood UA     2+         pH, UA     6.0         Platelet Estimate Increased             Platelets 477             Poly Occasional             Potassium 3.3             Preg Test, Ur   Negative           PROTEIN TOTAL 8.4             Protein, UA     1+  Comment: Recommend a 24 hour urine protein or a urine   protein/creatinine ratio if globulin induced proteinuria is  clinically suspected.            Acceptable   Yes           RBC 3.43             Blood, UA       50       RBC, UA     5         RDW 14.7             Sodium 137             Specific Geary, UA     1.010         Specimen UA     Urine, Clean Catch         Squam Epithel, UA     0         Unclass Lizeth UA     2         UROBILINOGEN UA     Negative         WBC, UA       ++       WBC, UA     29         WBC 16.35             Yeast, UA     Rare                 Significant Imaging:  All pertinent imaging results/findings from the  past 24 hours have been reviewed.    Assessment and Plan:     Updated from the ER provider - WBCs elevated, Cr stable, urinalysis suggestive of a UTI. CT scan today demonstrated right stent in good position, but there has been interval development of renal abscesses despite being discharged with culture specific antibiotics.   Agree with IV fluids, hydration, IV antibiotics. Renal abscesses of this size usually are too small to be drained and the treatment is usually oral antibiotics however she has failed oral antibiotics and likely will require IV abx. Consider consult ID for antibiotic assistance as might require IV.     There are no hospital problems to display for this patient.      VTE Risk Mitigation (From admission, onward)      None            Thank you for your consult.     Kaylie Blevins MD  Urology  [place] - Emergency Dept

## 2023-06-30 NOTE — ED TRIAGE NOTES
Patient came in from a Urology appt with c/o abdominal pain due to recent Kidney stones. Patient states that this has been going on since Father's Day. Patient states she was recently in the hospital for the kidney stones and has been taken abx for 5 days with no relief. N/V/D everyday with no relief. She hasn't been able to eat and even though she has been drinking plenty of water she is still feels dehydrated. Spouse is at bedside helping her with ADL's

## 2023-07-01 LAB
ALBUMIN SERPL BCP-MCNC: 2.4 G/DL (ref 3.5–5.2)
ALP SERPL-CCNC: 69 U/L (ref 55–135)
ALT SERPL W/O P-5'-P-CCNC: 30 U/L (ref 10–44)
ANION GAP SERPL CALC-SCNC: 9 MMOL/L (ref 8–16)
AST SERPL-CCNC: 31 U/L (ref 10–40)
BASOPHILS # BLD AUTO: 0.05 K/UL (ref 0–0.2)
BASOPHILS NFR BLD: 0.4 % (ref 0–1.9)
BILIRUB SERPL-MCNC: 0.4 MG/DL (ref 0.1–1)
BUN SERPL-MCNC: 6 MG/DL (ref 6–20)
CALCIUM SERPL-MCNC: 8.6 MG/DL (ref 8.7–10.5)
CHLORIDE SERPL-SCNC: 108 MMOL/L (ref 95–110)
CO2 SERPL-SCNC: 23 MMOL/L (ref 23–29)
CREAT SERPL-MCNC: 0.8 MG/DL (ref 0.5–1.4)
DIFFERENTIAL METHOD: ABNORMAL
EOSINOPHIL # BLD AUTO: 0.1 K/UL (ref 0–0.5)
EOSINOPHIL NFR BLD: 0.4 % (ref 0–8)
ERYTHROCYTE [DISTWIDTH] IN BLOOD BY AUTOMATED COUNT: 14.6 % (ref 11.5–14.5)
EST. GFR  (NO RACE VARIABLE): >60 ML/MIN/1.73 M^2
GLUCOSE SERPL-MCNC: 104 MG/DL (ref 70–110)
HCT VFR BLD AUTO: 24.4 % (ref 37–48.5)
HGB BLD-MCNC: 8.2 G/DL (ref 12–16)
IMM GRANULOCYTES # BLD AUTO: 0.54 K/UL (ref 0–0.04)
IMM GRANULOCYTES NFR BLD AUTO: 4 % (ref 0–0.5)
LYMPHOCYTES # BLD AUTO: 2.4 K/UL (ref 1–4.8)
LYMPHOCYTES NFR BLD: 18.2 % (ref 18–48)
MAGNESIUM SERPL-MCNC: 1.5 MG/DL (ref 1.6–2.6)
MCH RBC QN AUTO: 27.1 PG (ref 27–31)
MCHC RBC AUTO-ENTMCNC: 33.6 G/DL (ref 32–36)
MCV RBC AUTO: 81 FL (ref 82–98)
MONOCYTES # BLD AUTO: 1.1 K/UL (ref 0.3–1)
MONOCYTES NFR BLD: 8 % (ref 4–15)
NEUTROPHILS # BLD AUTO: 9.2 K/UL (ref 1.8–7.7)
NEUTROPHILS NFR BLD: 69 % (ref 38–73)
NRBC BLD-RTO: 0 /100 WBC
PHOSPHATE SERPL-MCNC: 2.8 MG/DL (ref 2.7–4.5)
PLATELET # BLD AUTO: 431 K/UL (ref 150–450)
PMV BLD AUTO: 8.9 FL (ref 9.2–12.9)
POTASSIUM SERPL-SCNC: 3.6 MMOL/L (ref 3.5–5.1)
PROT SERPL-MCNC: 7.4 G/DL (ref 6–8.4)
RBC # BLD AUTO: 3.03 M/UL (ref 4–5.4)
SODIUM SERPL-SCNC: 140 MMOL/L (ref 136–145)
WBC # BLD AUTO: 13.35 K/UL (ref 3.9–12.7)

## 2023-07-01 PROCEDURE — 83735 ASSAY OF MAGNESIUM: CPT

## 2023-07-01 PROCEDURE — 80053 COMPREHEN METABOLIC PANEL: CPT

## 2023-07-01 PROCEDURE — 63600175 PHARM REV CODE 636 W HCPCS

## 2023-07-01 PROCEDURE — 25000003 PHARM REV CODE 250

## 2023-07-01 PROCEDURE — 11000001 HC ACUTE MED/SURG PRIVATE ROOM

## 2023-07-01 PROCEDURE — 36415 COLL VENOUS BLD VENIPUNCTURE: CPT

## 2023-07-01 PROCEDURE — 84100 ASSAY OF PHOSPHORUS: CPT

## 2023-07-01 PROCEDURE — 99233 SBSQ HOSP IP/OBS HIGH 50: CPT | Mod: ,,, | Performed by: STUDENT IN AN ORGANIZED HEALTH CARE EDUCATION/TRAINING PROGRAM

## 2023-07-01 PROCEDURE — 85025 COMPLETE CBC W/AUTO DIFF WBC: CPT

## 2023-07-01 PROCEDURE — 63600175 PHARM REV CODE 636 W HCPCS: Performed by: STUDENT IN AN ORGANIZED HEALTH CARE EDUCATION/TRAINING PROGRAM

## 2023-07-01 PROCEDURE — 99233 PR SUBSEQUENT HOSPITAL CARE,LEVL III: ICD-10-PCS | Mod: ,,, | Performed by: STUDENT IN AN ORGANIZED HEALTH CARE EDUCATION/TRAINING PROGRAM

## 2023-07-01 RX ORDER — KETOROLAC TROMETHAMINE 30 MG/ML
15 INJECTION, SOLUTION INTRAMUSCULAR; INTRAVENOUS EVERY 6 HOURS PRN
Status: DISPENSED | OUTPATIENT
Start: 2023-07-01 | End: 2023-07-04

## 2023-07-01 RX ORDER — HYDROCODONE BITARTRATE AND ACETAMINOPHEN 7.5; 325 MG/1; MG/1
1 TABLET ORAL EVERY 6 HOURS PRN
Status: DISCONTINUED | OUTPATIENT
Start: 2023-07-01 | End: 2023-07-01

## 2023-07-01 RX ORDER — HYDROCODONE BITARTRATE AND ACETAMINOPHEN 7.5; 325 MG/1; MG/1
1 TABLET ORAL EVERY 6 HOURS PRN
Status: DISCONTINUED | OUTPATIENT
Start: 2023-07-01 | End: 2023-07-07

## 2023-07-01 RX ORDER — AMLODIPINE BESYLATE 5 MG/1
5 TABLET ORAL DAILY
Status: DISCONTINUED | OUTPATIENT
Start: 2023-07-01 | End: 2023-07-02

## 2023-07-01 RX ADMIN — CEFTRIAXONE 1 G: 1 INJECTION, POWDER, FOR SOLUTION INTRAMUSCULAR; INTRAVENOUS at 02:07

## 2023-07-01 RX ADMIN — ONDANSETRON 4 MG: 2 INJECTION INTRAMUSCULAR; INTRAVENOUS at 08:07

## 2023-07-01 RX ADMIN — ONDANSETRON 4 MG: 2 INJECTION INTRAMUSCULAR; INTRAVENOUS at 06:07

## 2023-07-01 RX ADMIN — ACETAMINOPHEN 650 MG: 325 TABLET ORAL at 12:07

## 2023-07-01 RX ADMIN — ENOXAPARIN SODIUM 40 MG: 40 INJECTION SUBCUTANEOUS at 05:07

## 2023-07-01 RX ADMIN — HYDROCODONE BITARTRATE AND ACETAMINOPHEN 1 TABLET: 7.5; 325 TABLET ORAL at 02:07

## 2023-07-01 RX ADMIN — AMLODIPINE BESYLATE 5 MG: 5 TABLET ORAL at 10:07

## 2023-07-01 RX ADMIN — THERA TABS 1 TABLET: TAB at 08:07

## 2023-07-01 RX ADMIN — ACETAMINOPHEN 650 MG: 325 TABLET ORAL at 11:07

## 2023-07-01 RX ADMIN — TAMSULOSIN HYDROCHLORIDE 0.4 MG: 0.4 CAPSULE ORAL at 08:07

## 2023-07-01 RX ADMIN — Medication 6 MG: at 12:07

## 2023-07-01 RX ADMIN — KETOROLAC TROMETHAMINE 15 MG: 30 INJECTION, SOLUTION INTRAMUSCULAR; INTRAVENOUS at 06:07

## 2023-07-01 RX ADMIN — HYDROCODONE BITARTRATE AND ACETAMINOPHEN 1 TABLET: 7.5; 325 TABLET ORAL at 08:07

## 2023-07-01 RX ADMIN — HYDROCODONE BITARTRATE AND ACETAMINOPHEN 1 TABLET: 7.5; 325 TABLET ORAL at 07:07

## 2023-07-01 RX ADMIN — Medication 6 MG: at 11:07

## 2023-07-01 RX ADMIN — KETOROLAC TROMETHAMINE 15 MG: 30 INJECTION, SOLUTION INTRAMUSCULAR; INTRAVENOUS at 11:07

## 2023-07-01 NOTE — PROGRESS NOTES
[facility] Medicine  Progress Note    Patient Name: Sherri Machado  MRN: [id number]  Patient Class: IP- Inpatient   Admission Date: 6/30/2023  Length of Stay: 1 days  Attending Physician: Constantino Rey DO  Primary Care Provider: Modesto Waldron MD        Subjective:     Principal Problem:Pyelonephritis        HPI:  43-year-old F w/ PMHx with history depression, opioid dependence who presents to the ED with reports of right flank pain. Patient reports she was diagnosed with a kidney stone and was seen by Dr. Blevins.  Patient had a urethral stent placement in addition to cystoscopy and is currently taking PO antibiotics.  Patient reports she has been running fever over the past few days in addition to nausea, vomiting and diarrhea.  Patient states she is having issues with eating and drinking. She was sent for possible admission per Dr. Blevins.    In the ED, initial vitals /85, HR 85, Temp 98.3F, SpO2 100% on room air. Labs include CBC with H/H 9.1/27.6 and WBC 16.35, CMP with K 3.3.  UA with 1+ protein, 2+ occult blood, 3+ leukocytes, 29 WBC, 5 RBC. Urine Pregnancy test negative. Lactic acid 1.3. CXR with no acute changes. [facility] consulted for evaluation for admission for UTI with concerns for pyelonephritis.       Overview/Hospital Course:  No notes on file    Interval History:   ONE: Patient febrile (100.9).  Today patient complains of right lower abdominal pain with radiation to the left groin as well as decrease in appetite.  Patient had bowel movement this morning.    Patient hypertensive at 174/81 and afebrile at 100.1  Renal US unremarkable  Labs unremarkable  Blood cx unremarkable  Urine Cx pending      Review of Systems   Constitutional:  Positive for appetite change and fever (101.1). Negative for activity change, chills and fatigue.   HENT:  Negative for congestion, rhinorrhea, sinus pressure, sinus pain, sore throat, trouble swallowing and voice change.    Eyes:  Negative  for photophobia, discharge and visual disturbance.   Respiratory:  Negative for cough, chest tightness, shortness of breath and wheezing.    Cardiovascular:  Negative for chest pain, palpitations and leg swelling.   Gastrointestinal:  Negative for abdominal distention, abdominal pain, anal bleeding, blood in stool, constipation, diarrhea, nausea and vomiting.   Genitourinary:  Positive for flank pain (right-sided flank pain with radiation to left groin). Negative for decreased urine volume, difficulty urinating, dysuria, frequency and urgency.   Musculoskeletal:  Negative for arthralgias and gait problem.   Neurological:  Negative for dizziness, seizures, syncope, facial asymmetry, speech difficulty, weakness, light-headedness, numbness and headaches.   Psychiatric/Behavioral:  Negative for agitation and behavioral problems.    Objective:     Vital Signs (Most Recent):  Temp: 100.1 °F (37.8 °C) (07/01/23 0742)  Pulse: 94 (07/01/23 0742)  Resp: 16 (07/01/23 0757)  BP: (!) 174/81 (07/01/23 0742)  SpO2: 99 % (07/01/23 0742) Vital Signs (24h Range):  Temp:  [98.3 °F (36.8 °C)-100.9 °F (38.3 °C)] 100.1 °F (37.8 °C)  Pulse:  [] 94  Resp:  [8-20] 16  SpO2:  [96 %-100 %] 99 %  BP: (139-182)/(69-93) 174/81     Weight: 87.3 kg (192 lb 7.4 oz)  Body mass index is 32.03 kg/m².    Intake/Output Summary (Last 24 hours) at 7/1/2023 0901  Last data filed at 7/1/2023 0617  Gross per 24 hour   Intake 1810 ml   Output 1600 ml   Net 210 ml         Physical Exam  Constitutional:       Appearance: Normal appearance. She is obese.   HENT:      Head: Normocephalic and atraumatic.      Mouth/Throat:      Mouth: Mucous membranes are moist.      Pharynx: Oropharynx is clear.   Eyes:      Extraocular Movements: Extraocular movements intact.      Pupils: Pupils are equal, round, and reactive to light.   Cardiovascular:      Rate and Rhythm: Normal rate and regular rhythm.      Pulses: Normal pulses.      Heart sounds: Normal heart sounds.    Pulmonary:      Effort: Pulmonary effort is normal.      Breath sounds: Normal breath sounds.   Abdominal:      General: Abdomen is flat. Bowel sounds are normal.      Palpations: Abdomen is soft.      Tenderness: There is no abdominal tenderness (right flank pain with radiation to left groin).   Musculoskeletal:         General: Normal range of motion.      Cervical back: Normal range of motion.   Lymphadenopathy:      Cervical: No cervical adenopathy.   Skin:     General: Skin is warm and dry.   Neurological:      General: No focal deficit present.      Mental Status: She is alert and oriented to person, place, and time. Mental status is at baseline.   Psychiatric:         Mood and Affect: Mood normal.         Behavior: Behavior normal.           Significant Labs: All pertinent labs within the past 24 hours have been reviewed.  CBC:   Recent Labs   Lab 06/30/23  1444 07/01/23  0253   WBC 16.35* 13.35*   HGB 9.1* 8.2*   HCT 27.6* 24.4*   * 431     CMP:   Recent Labs   Lab 06/30/23  1444 07/01/23  0253    140   K 3.3* 3.6    108   CO2 24 23    104   BUN 7 6   CREATININE 1.0 0.8   CALCIUM 9.0 8.6*   PROT 8.4 7.4   ALBUMIN 2.8* 2.4*   BILITOT 0.5 0.4   ALKPHOS 75 69   AST 29 31   ALT 28 30   ANIONGAP 10 9       Significant Imaging: I have reviewed all pertinent imaging results/findings within the past 24 hours.      Assessment/Plan:      * Pyelonephritis  Discharged s/p stent placement for nephrolithiasis.  Was on PO abx, augmentin.  Previous urine cx resulted with sensitivities.  Fever, nausea, vomiting on presentation.  WBC elevated, lactic within normal range in ED.  CT Renal with stable stent and stones with concern for renal abscesses.  Retroperitoneal US Bilateral nonobstructing renal calculi     Plan:  - Continue IV ceftriaxone.  - Continue maintenance IVF.  - Hold nephrotoxic medications .  - Renally-dose current meds if available  - Avoid Hypotension.  - Strict I/O's.  - Monitor  renal function carefully.  - Continue Norco q6hrs for pain as needed.  - Urology consult for recommendations.  - Follow urine culture.      Renal abscess  Plan as in pyelonephritis.      Flank pain  Plan as in pyelonephritis.    Nephrolithiasis  Plan as in pyelonephritis.    Elevated BP without diagnosis of hypertension  Plan:  - Will continue to monitor vitals.  - Add amlodipine 5mg daily, can consider titrating dose or can add additional agent if needed.  -Avoid diuretics      VTE Risk Mitigation (From admission, onward)         Ordered     enoxaparin injection 40 mg  Daily         06/30/23 1744     IP VTE HIGH RISK PATIENT  Once         06/30/23 1744     Place sequential compression device  Until discontinued         06/30/23 1744                Discharge Planning   RUDOLPH:      Code Status: Full Code   Is the patient medically ready for discharge?:     Reason for patient still in hospital (select all that apply): Patient trending condition                     Irene Peralta MD  Department of Hospital Medicine   [facility]

## 2023-07-01 NOTE — PROGRESS NOTES
[facility]  Urology Progress Note    Problems:   Patient Active Problem List   Diagnosis    Opioid dependence with withdrawal    Elevated BP without diagnosis of hypertension    Depression with suicidal ideation    Calculus of ureterovesical junction (UVJ)    RICK (acute kidney injury)    Pyelonephritis    Hyperbilirubinemia    UTI (urinary tract infection)    Nephrolithiasis    Flank pain    Renal abscess       Subjective   Still having fevers, pain, overall feeling uncomfortable. Significant other at bedside.     Meds:   amLODIPine  5 mg Oral Daily    cefTRIAXone (ROCEPHIN) IVPB  1 g Intravenous Q24H    enoxparin  40 mg Subcutaneous Daily    multivitamin  1 tablet Oral Daily    tamsulosin  0.4 mg Oral Daily       acetaminophen, HYDROcodone-acetaminophen, ketorolac, melatonin, naloxone, ondansetron, senna-docusate 8.6-50 mg, sodium chloride 0.9%    Temp:  [98.5 °F (36.9 °C)-100.9 °F (38.3 °C)] 99 °F (37.2 °C)  Pulse:  [] 77  Resp:  [8-18] 18  SpO2:  [96 %-100 %] 100 %  BP: (139-182)/(69-93) 166/91    I/O last 3 completed shifts:  In: 1810 [IV Piggyback:1810]  Out: 1600 [Urine:1600]    General:  Alert, cooperative, no distress, appears stated age   Head:  Normocephalic, without obvious abnormality, atraumatic   Eyes:  PERRL, conjunctiva/corneas clear   Lungs:   Respirations unlabored    Heart:  Warm and well perfused   Abdomen:   abdomen is soft without significant tenderness, masses, organomegaly or guarding   : defer exam   Drains: none   Extremities: Extremities normal, atraumatic, no cyanosis or edema   Skin: Skin color, texture, turgor normal, no rashes or lesions   Psych: Appropriate   Neurologic: Non-focal     Recent Results (from the past 24 hour(s))   CBC Auto Differential    Collection Time: 07/01/23  2:53 AM   Result Value Ref Range    WBC 13.35 (H) 3.90 - 12.70 K/uL    RBC 3.03 (L) 4.00 - 5.40 M/uL    Hemoglobin 8.2 (L) 12.0 - 16.0 g/dL    Hematocrit 24.4 (L) 37.0 - 48.5 %     MCV 81 (L) 82 - 98 fL    MCH 27.1 27.0 - 31.0 pg    MCHC 33.6 32.0 - 36.0 g/dL    RDW 14.6 (H) 11.5 - 14.5 %    Platelets 431 150 - 450 K/uL    MPV 8.9 (L) 9.2 - 12.9 fL    Immature Granulocytes 4.0 (H) 0.0 - 0.5 %    Gran # (ANC) 9.2 (H) 1.8 - 7.7 K/uL    Immature Grans (Abs) 0.54 (H) 0.00 - 0.04 K/uL    Lymph # 2.4 1.0 - 4.8 K/uL    Mono # 1.1 (H) 0.3 - 1.0 K/uL    Eos # 0.1 0.0 - 0.5 K/uL    Baso # 0.05 0.00 - 0.20 K/uL    nRBC 0 0 /100 WBC    Gran % 69.0 38.0 - 73.0 %    Lymph % 18.2 18.0 - 48.0 %    Mono % 8.0 4.0 - 15.0 %    Eosinophil % 0.4 0.0 - 8.0 %    Basophil % 0.4 0.0 - 1.9 %    Differential Method Automated    Comprehensive Metabolic Panel    Collection Time: 07/01/23  2:53 AM   Result Value Ref Range    Sodium 140 136 - 145 mmol/L    Potassium 3.6 3.5 - 5.1 mmol/L    Chloride 108 95 - 110 mmol/L    CO2 23 23 - 29 mmol/L    Glucose 104 70 - 110 mg/dL    BUN 6 6 - 20 mg/dL    Creatinine 0.8 0.5 - 1.4 mg/dL    Calcium 8.6 (L) 8.7 - 10.5 mg/dL    Total Protein 7.4 6.0 - 8.4 g/dL    Albumin 2.4 (L) 3.5 - 5.2 g/dL    Total Bilirubin 0.4 0.1 - 1.0 mg/dL    Alkaline Phosphatase 69 55 - 135 U/L    AST 31 10 - 40 U/L    ALT 30 10 - 44 U/L    eGFR >60 >60 mL/min/1.73 m^2    Anion Gap 9 8 - 16 mmol/L   Magnesium    Collection Time: 07/01/23  2:53 AM   Result Value Ref Range    Magnesium 1.5 (L) 1.6 - 2.6 mg/dL   Phosphorus    Collection Time: 07/01/23  2:53 AM   Result Value Ref Range    Phosphorus 2.8 2.7 - 4.5 mg/dL     43 y.o. female with right 5mm distal ureteral stone, s/p cysto right stent 7/5/23. Was discharged with appropriate PO abx for UTI but had persistent fevers, nausea, vomiting, sent to ER from urology clinic    CT in ER demonstrated possibly 2 renal abscesses - largest is 1.9cm, ureteral stent in place, distal 5mm ureteral stone still present    Plan:  Discussed with primary that abscess size is overall on the smaller size and historically when I have involved IR they have not recommended drainage  with an abscess of this size. If the renal abscess increases in size or if patient does not improve with IV abx could consider re-imaging and consulting IR.  Can tailor abx based off of urine culture from last admission, consider infectious disease consult.   Unfortunately with the development of renal abscesses I will have to cancel the definitive stone surgery I had tentatively scheduled for 7/5. Patient is aware that I am departing [facility] and primary team will setup with one of the other urologic providers here at [facility] for f/u to continue her care. Pt expressed interest in continuing care at [facility].

## 2023-07-01 NOTE — PROGRESS NOTES
[facility] Medicine  Progress Note    Patient Name: Sherri Machado  MRN: [id number]  Patient Class: IP- Inpatient   Admission Date: 6/30/2023  Length of Stay: 1 days  Attending Physician: Constantino Rey DO  Primary Care Provider: Modesto Waldron MD        Subjective:     Principal Problem:Pyelonephritis        HPI:  43-year-old F w/ PMHx with history depression, opioid dependence who presents to the ED with reports of right flank pain. Patient reports she was diagnosed with a kidney stone and was seen by Dr. Blevins.  Patient had a urethral stent placement in addition to cystoscopy and is currently taking PO antibiotics.  Patient reports she has been running fever over the past few days in addition to nausea, vomiting and diarrhea.  Patient states she is having issues with eating and drinking. She was sent for possible admission per Dr. Blevins.    In the ED, initial vitals /85, HR 85, Temp 98.3F, SpO2 100% on room air. Labs include CBC with H/H 9.1/27.6 and WBC 16.35, CMP with K 3.3.  UA with 1+ protein, 2+ occult blood, 3+ leukocytes, 29 WBC, 5 RBC. Urine Pregnancy test negative. Lactic acid 1.3. CXR with no acute changes. [facility] consulted for evaluation for admission for UTI with concerns for pyelonephritis.       Overview/Hospital Course:  No notes on file    Interval History:   ONE: Patient febrile (100.9).  Today patient complains of right lower abdominal pain with radiation to the left groin as well as decrease in appetite.  Patient had bowel movement this morning.    Patient hypertensive at 174/81 and afebrile at 100.1  Renal US unremarkable  Labs unremarkable  Blood cx unremarkable  Urine Cx pending      Review of Systems   Constitutional:  Positive for appetite change and fever (101.1). Negative for activity change, chills and fatigue.   HENT:  Negative for congestion, rhinorrhea, sinus pressure, sinus pain, sore throat, trouble swallowing and voice change.    Eyes:  Negative  for photophobia, discharge and visual disturbance.   Respiratory:  Negative for cough, chest tightness, shortness of breath and wheezing.    Cardiovascular:  Negative for chest pain, palpitations and leg swelling.   Gastrointestinal:  Negative for abdominal distention, abdominal pain, anal bleeding, blood in stool, constipation, diarrhea, nausea and vomiting.   Genitourinary:  Positive for flank pain (right-sided flank pain with radiation to left groin). Negative for decreased urine volume, difficulty urinating, dysuria, frequency and urgency.   Musculoskeletal:  Negative for arthralgias and gait problem.   Neurological:  Negative for dizziness, seizures, syncope, facial asymmetry, speech difficulty, weakness, light-headedness, numbness and headaches.   Psychiatric/Behavioral:  Negative for agitation and behavioral problems.    Objective:     Vital Signs (Most Recent):  Temp: 100.1 °F (37.8 °C) (07/01/23 0742)  Pulse: 94 (07/01/23 0742)  Resp: 16 (07/01/23 0757)  BP: (!) 174/81 (07/01/23 0742)  SpO2: 99 % (07/01/23 0742) Vital Signs (24h Range):  Temp:  [98.3 °F (36.8 °C)-100.9 °F (38.3 °C)] 100.1 °F (37.8 °C)  Pulse:  [] 94  Resp:  [8-20] 16  SpO2:  [96 %-100 %] 99 %  BP: (139-182)/(69-93) 174/81     Weight: 87.3 kg (192 lb 7.4 oz)  Body mass index is 32.03 kg/m².    Intake/Output Summary (Last 24 hours) at 7/1/2023 0901  Last data filed at 7/1/2023 0617  Gross per 24 hour   Intake 1810 ml   Output 1600 ml   Net 210 ml         Physical Exam  Constitutional:       Appearance: Normal appearance. She is obese.   HENT:      Head: Normocephalic and atraumatic.      Mouth/Throat:      Mouth: Mucous membranes are moist.      Pharynx: Oropharynx is clear.   Eyes:      Extraocular Movements: Extraocular movements intact.      Pupils: Pupils are equal, round, and reactive to light.   Cardiovascular:      Rate and Rhythm: Normal rate and regular rhythm.      Pulses: Normal pulses.      Heart sounds: Normal heart sounds.    Pulmonary:      Effort: Pulmonary effort is normal.      Breath sounds: Normal breath sounds.   Abdominal:      General: Abdomen is flat. Bowel sounds are normal.      Palpations: Abdomen is soft.      Tenderness: There is no abdominal tenderness (right flank pain with radiation to left groin).   Musculoskeletal:         General: Normal range of motion.      Cervical back: Normal range of motion.   Lymphadenopathy:      Cervical: No cervical adenopathy.   Skin:     General: Skin is warm and dry.   Neurological:      General: No focal deficit present.      Mental Status: She is alert and oriented to person, place, and time. Mental status is at baseline.   Psychiatric:         Mood and Affect: Mood normal.         Behavior: Behavior normal.           Significant Labs: All pertinent labs within the past 24 hours have been reviewed.  CBC:   Recent Labs   Lab 06/30/23  1444 07/01/23  0253   WBC 16.35* 13.35*   HGB 9.1* 8.2*   HCT 27.6* 24.4*   * 431     CMP:   Recent Labs   Lab 06/30/23  1444 07/01/23  0253    140   K 3.3* 3.6    108   CO2 24 23    104   BUN 7 6   CREATININE 1.0 0.8   CALCIUM 9.0 8.6*   PROT 8.4 7.4   ALBUMIN 2.8* 2.4*   BILITOT 0.5 0.4   ALKPHOS 75 69   AST 29 31   ALT 28 30   ANIONGAP 10 9       Significant Imaging: I have reviewed all pertinent imaging results/findings within the past 24 hours.      Assessment/Plan:      * Pyelonephritis  Discharged s/p stent placement for nephrolithiasis.  Was on PO abx, augmentin.  Previous urine cx resulted with sensitivities.  Fever, nausea, vomiting on presentation.  WBC elevated, lactic within normal range in ED.  CT Renal with stable stent and stones with concern for renal abscesses.  Retroperitoneal US Bilateral nonobstructing renal calculi     Plan:  - Continue IV ceftriaxone.  - Continue maintenance IVF.  - Hold nephrotoxic medications .  - Renally-dose current meds if available  - Avoid Hypotension.  - Strict I/O's.  - Monitor  renal function carefully.  - Continue Norco q6hrs for pain as needed.  -Will add Toradol 15mg q6 hrs PRN for pain  - Urology consult for recommendations.  - Follow urine culture.      Renal abscess  Plan as in pyelonephritis.      Flank pain  Plan as in pyelonephritis.    Nephrolithiasis  Plan as in pyelonephritis.    Elevated BP without diagnosis of hypertension  Plan:  - Will continue to monitor vitals.  - Add amlodipine 5mg daily, can consider titrating dose or can add additional agent if needed.  -Avoid diuretics      VTE Risk Mitigation (From admission, onward)         Ordered     enoxaparin injection 40 mg  Daily         06/30/23 1744     IP VTE HIGH RISK PATIENT  Once         06/30/23 1744     Place sequential compression device  Until discontinued         06/30/23 1744                Discharge Planning   RUDOLPH:      Code Status: Full Code   Is the patient medically ready for discharge?:     Reason for patient still in hospital (select all that apply): Patient trending condition                     Irene Peralta MD  Department of Hospital Medicine   [facility]

## 2023-07-01 NOTE — ASSESSMENT & PLAN NOTE
Plan:  - Will continue to monitor vitals.  - Add amlodipine 5mg daily, can consider titrating dose or can add additional agent if needed.  -Avoid diuretics

## 2023-07-01 NOTE — SUBJECTIVE & OBJECTIVE
Interval History:   ONE: Patient febrile (100.9).  Today patient complains of right lower abdominal pain with radiation to the left groin as well as decrease in appetite.  Patient had bowel movement this morning.    Patient hypertensive at 174/81 and afebrile at 100.1  Renal US unremarkable  Labs unremarkable  Blood cx unremarkable  Urine Cx pending      Review of Systems   Constitutional:  Positive for appetite change and fever (101.1). Negative for activity change, chills and fatigue.   HENT:  Negative for congestion, rhinorrhea, sinus pressure, sinus pain, sore throat, trouble swallowing and voice change.    Eyes:  Negative for photophobia, discharge and visual disturbance.   Respiratory:  Negative for cough, chest tightness, shortness of breath and wheezing.    Cardiovascular:  Negative for chest pain, palpitations and leg swelling.   Gastrointestinal:  Negative for abdominal distention, abdominal pain, anal bleeding, blood in stool, constipation, diarrhea, nausea and vomiting.   Genitourinary:  Positive for flank pain (right-sided flank pain with radiation to left groin). Negative for decreased urine volume, difficulty urinating, dysuria, frequency and urgency.   Musculoskeletal:  Negative for arthralgias and gait problem.   Neurological:  Negative for dizziness, seizures, syncope, facial asymmetry, speech difficulty, weakness, light-headedness, numbness and headaches.   Psychiatric/Behavioral:  Negative for agitation and behavioral problems.    Objective:     Vital Signs (Most Recent):  Temp: 100.1 °F (37.8 °C) (07/01/23 0742)  Pulse: 94 (07/01/23 0742)  Resp: 16 (07/01/23 0757)  BP: (!) 174/81 (07/01/23 0742)  SpO2: 99 % (07/01/23 0742) Vital Signs (24h Range):  Temp:  [98.3 °F (36.8 °C)-100.9 °F (38.3 °C)] 100.1 °F (37.8 °C)  Pulse:  [] 94  Resp:  [8-20] 16  SpO2:  [96 %-100 %] 99 %  BP: (139-182)/(69-93) 174/81     Weight: 87.3 kg (192 lb 7.4 oz)  Body mass index is 32.03 kg/m².    Intake/Output  Summary (Last 24 hours) at 7/1/2023 0901  Last data filed at 7/1/2023 0617  Gross per 24 hour   Intake 1810 ml   Output 1600 ml   Net 210 ml         Physical Exam  Constitutional:       Appearance: Normal appearance. She is obese.   HENT:      Head: Normocephalic and atraumatic.      Mouth/Throat:      Mouth: Mucous membranes are moist.      Pharynx: Oropharynx is clear.   Eyes:      Extraocular Movements: Extraocular movements intact.      Pupils: Pupils are equal, round, and reactive to light.   Cardiovascular:      Rate and Rhythm: Normal rate and regular rhythm.      Pulses: Normal pulses.      Heart sounds: Normal heart sounds.   Pulmonary:      Effort: Pulmonary effort is normal.      Breath sounds: Normal breath sounds.   Abdominal:      General: Abdomen is flat. Bowel sounds are normal.      Palpations: Abdomen is soft.      Tenderness: There is no abdominal tenderness (right flank pain with radiation to left groin).   Musculoskeletal:         General: Normal range of motion.      Cervical back: Normal range of motion.   Lymphadenopathy:      Cervical: No cervical adenopathy.   Skin:     General: Skin is warm and dry.   Neurological:      General: No focal deficit present.      Mental Status: She is alert and oriented to person, place, and time. Mental status is at baseline.   Psychiatric:         Mood and Affect: Mood normal.         Behavior: Behavior normal.           Significant Labs: All pertinent labs within the past 24 hours have been reviewed.  CBC:   Recent Labs   Lab 06/30/23  1444 07/01/23  0253   WBC 16.35* 13.35*   HGB 9.1* 8.2*   HCT 27.6* 24.4*   * 431     CMP:   Recent Labs   Lab 06/30/23  1444 07/01/23  0253    140   K 3.3* 3.6    108   CO2 24 23    104   BUN 7 6   CREATININE 1.0 0.8   CALCIUM 9.0 8.6*   PROT 8.4 7.4   ALBUMIN 2.8* 2.4*   BILITOT 0.5 0.4   ALKPHOS 75 69   AST 29 31   ALT 28 30   ANIONGAP 10 9       Significant Imaging: I have reviewed all pertinent  imaging results/findings within the past 24 hours.

## 2023-07-01 NOTE — ASSESSMENT & PLAN NOTE
Discharged s/p stent placement for nephrolithiasis.  Was on PO abx, augmentin.  Previous urine cx resulted with sensitivities.  Fever, nausea, vomiting on presentation.  WBC elevated, lactic within normal range in ED.  CT Renal with stable stent and stones with concern for renal abscesses.  Retroperitoneal US Bilateral nonobstructing renal calculi     Plan:  - Continue IV ceftriaxone.  - Continue maintenance IVF.  - Hold nephrotoxic medications .  - Renally-dose current meds if available  - Avoid Hypotension.  - Strict I/O's.  - Monitor renal function carefully.  - Continue Norco q6hrs for pain as needed.  - Urology consult for recommendations.  - Follow urine culture.

## 2023-07-01 NOTE — ASSESSMENT & PLAN NOTE
Discharged s/p stent placement for nephrolithiasis.  Was on PO abx, augmentin.  Previous urine cx resulted with sensitivities.  Fever, nausea, vomiting on presentation.  WBC elevated, lactic within normal range in ED.  CT Renal with stable stent and stones with concern for renal abscesses.  Retroperitoneal US Bilateral nonobstructing renal calculi     Plan:  - Continue IV ceftriaxone.  - Continue maintenance IVF.  - Hold nephrotoxic medications .  - Renally-dose current meds if available  - Avoid Hypotension.  - Strict I/O's.  - Monitor renal function carefully.  - Continue Norco q6hrs for pain as needed.  -Will add Toradol 15mg q6 hrs PRN for pain  - Urology consult for recommendations.  - Follow urine culture.

## 2023-07-01 NOTE — PROGRESS NOTES
[facility] Medicine  Progress Note    Patient Name: Sherri Machado  MRN: [id number]  Patient Class: IP- Inpatient   Admission Date: 6/30/2023  Length of Stay: 1 days  Attending Physician: Constantino Rey DO  Primary Care Provider: Modesto Waldron MD        Subjective:     Principal Problem:Pyelonephritis        HPI:  43-year-old F w/ PMHx with history depression, opioid dependence who presents to the ED with reports of right flank pain. Patient reports she was diagnosed with a kidney stone and was seen by Dr. Blevins.  Patient had a urethral stent placement in addition to cystoscopy and is currently taking PO antibiotics.  Patient reports she has been running fever over the past few days in addition to nausea, vomiting and diarrhea.  Patient states she is having issues with eating and drinking. She was sent for possible admission per Dr. Blevins.    In the ED, initial vitals /85, HR 85, Temp 98.3F, SpO2 100% on room air. Labs include CBC with H/H 9.1/27.6 and WBC 16.35, CMP with K 3.3.  UA with 1+ protein, 2+ occult blood, 3+ leukocytes, 29 WBC, 5 RBC. Urine Pregnancy test negative. Lactic acid 1.3. CXR with no acute changes. [facility] consulted for evaluation for admission for UTI with concerns for pyelonephritis.       Overview/Hospital Course:  No notes on file    No new subjective & objective note has been filed under this hospital service since the last note was generated.      Assessment/Plan:      * Pyelonephritis  Discharged s/p stent placement for nephrolithiasis.  Was on PO abx, augmentin.  Previous urine cx resulted with sensitivities.  Fever, nausea, vomiting on presentation.  WBC elevated, lactic within normal range in ED.  CT Renal with stable stent and stones with concern for renal abscesses.  Retroperitoneal US Bilateral nonobstructing renal calculi     Plan:  - Continue IV ceftriaxone.  - Continue maintenance IVF.  - Hold nephrotoxic medications .  - Renally-dose current  meds if available  - Avoid Hypotension.  - Strict I/O's.  - Monitor renal function carefully.  - Continue Norco q6hrs for pain as needed.  - Urology consult for recommendations.  - Follow urine culture.      Renal abscess  Plan as in pyelonephritis.      Flank pain  Plan as in pyelonephritis.    Nephrolithiasis  Plan as in pyelonephritis.    Elevated BP without diagnosis of hypertension  Plan:  - Will continue to monitor vitals.  - Add amlodipine 5mg daily, can consider titrating dose or can add additional agent if needed.  -Avoid diuretics      VTE Risk Mitigation (From admission, onward)         Ordered     enoxaparin injection 40 mg  Daily         06/30/23 1744     IP VTE HIGH RISK PATIENT  Once         06/30/23 1744     Place sequential compression device  Until discontinued         06/30/23 1744                Discharge Planning   RUDOLPH:      Code Status: Full Code   Is the patient medically ready for discharge?:     Reason for patient still in hospital (select all that apply): Patient trending condition                     Irene Peralta MD  Department of Hospital Medicine   [place] [facility]

## 2023-07-02 PROBLEM — E87.6 HYPOKALEMIA: Status: ACTIVE | Noted: 2023-07-02

## 2023-07-02 LAB
ALBUMIN SERPL BCP-MCNC: 2.5 G/DL (ref 3.5–5.2)
ALP SERPL-CCNC: 69 U/L (ref 55–135)
ALT SERPL W/O P-5'-P-CCNC: 41 U/L (ref 10–44)
ANION GAP SERPL CALC-SCNC: 9 MMOL/L (ref 8–16)
AST SERPL-CCNC: 35 U/L (ref 10–40)
BACTERIA UR CULT: NO GROWTH
BACTERIA UR CULT: NO GROWTH
BASOPHILS # BLD AUTO: 0.05 K/UL (ref 0–0.2)
BASOPHILS NFR BLD: 0.4 % (ref 0–1.9)
BILIRUB SERPL-MCNC: 0.2 MG/DL (ref 0.1–1)
BUN SERPL-MCNC: 8 MG/DL (ref 6–20)
CALCIUM SERPL-MCNC: 9 MG/DL (ref 8.7–10.5)
CHLORIDE SERPL-SCNC: 106 MMOL/L (ref 95–110)
CHOLEST SERPL-MCNC: 127 MG/DL (ref 120–199)
CHOLEST/HDLC SERPL: 6 {RATIO} (ref 2–5)
CO2 SERPL-SCNC: 24 MMOL/L (ref 23–29)
CREAT SERPL-MCNC: 0.9 MG/DL (ref 0.5–1.4)
DIFFERENTIAL METHOD: ABNORMAL
EOSINOPHIL # BLD AUTO: 0.1 K/UL (ref 0–0.5)
EOSINOPHIL NFR BLD: 0.5 % (ref 0–8)
ERYTHROCYTE [DISTWIDTH] IN BLOOD BY AUTOMATED COUNT: 14.4 % (ref 11.5–14.5)
EST. GFR  (NO RACE VARIABLE): >60 ML/MIN/1.73 M^2
GLUCOSE SERPL-MCNC: 115 MG/DL (ref 70–110)
HCT VFR BLD AUTO: 24 % (ref 37–48.5)
HDLC SERPL-MCNC: 21 MG/DL (ref 40–75)
HDLC SERPL: 16.5 % (ref 20–50)
HGB BLD-MCNC: 8.1 G/DL (ref 12–16)
IMM GRANULOCYTES # BLD AUTO: 0.33 K/UL (ref 0–0.04)
IMM GRANULOCYTES NFR BLD AUTO: 2.5 % (ref 0–0.5)
LDLC SERPL CALC-MCNC: 71 MG/DL (ref 63–159)
LYMPHOCYTES # BLD AUTO: 2.8 K/UL (ref 1–4.8)
LYMPHOCYTES NFR BLD: 20.8 % (ref 18–48)
MAGNESIUM SERPL-MCNC: 1.6 MG/DL (ref 1.6–2.6)
MCH RBC QN AUTO: 26.9 PG (ref 27–31)
MCHC RBC AUTO-ENTMCNC: 33.8 G/DL (ref 32–36)
MCV RBC AUTO: 80 FL (ref 82–98)
MONOCYTES # BLD AUTO: 1 K/UL (ref 0.3–1)
MONOCYTES NFR BLD: 7.8 % (ref 4–15)
NEUTROPHILS # BLD AUTO: 9 K/UL (ref 1.8–7.7)
NEUTROPHILS NFR BLD: 68 % (ref 38–73)
NONHDLC SERPL-MCNC: 106 MG/DL
NRBC BLD-RTO: 0 /100 WBC
PHOSPHATE SERPL-MCNC: 2.8 MG/DL (ref 2.7–4.5)
PLATELET # BLD AUTO: 466 K/UL (ref 150–450)
PMV BLD AUTO: 8.7 FL (ref 9.2–12.9)
POTASSIUM SERPL-SCNC: 3.3 MMOL/L (ref 3.5–5.1)
PROT SERPL-MCNC: 7.5 G/DL (ref 6–8.4)
RBC # BLD AUTO: 3.01 M/UL (ref 4–5.4)
SODIUM SERPL-SCNC: 139 MMOL/L (ref 136–145)
T4 FREE SERPL-MCNC: 1.45 NG/DL (ref 0.71–1.51)
TRIGL SERPL-MCNC: 175 MG/DL (ref 30–150)
TSH SERPL DL<=0.005 MIU/L-ACNC: 0.31 UIU/ML (ref 0.4–4)
WBC # BLD AUTO: 13.25 K/UL (ref 3.9–12.7)

## 2023-07-02 PROCEDURE — 84100 ASSAY OF PHOSPHORUS: CPT

## 2023-07-02 PROCEDURE — 25000003 PHARM REV CODE 250: Performed by: STUDENT IN AN ORGANIZED HEALTH CARE EDUCATION/TRAINING PROGRAM

## 2023-07-02 PROCEDURE — 84439 ASSAY OF FREE THYROXINE: CPT

## 2023-07-02 PROCEDURE — 25000003 PHARM REV CODE 250

## 2023-07-02 PROCEDURE — 11000001 HC ACUTE MED/SURG PRIVATE ROOM

## 2023-07-02 PROCEDURE — 36415 COLL VENOUS BLD VENIPUNCTURE: CPT

## 2023-07-02 PROCEDURE — 83735 ASSAY OF MAGNESIUM: CPT

## 2023-07-02 PROCEDURE — 80061 LIPID PANEL: CPT

## 2023-07-02 PROCEDURE — 85025 COMPLETE CBC W/AUTO DIFF WBC: CPT

## 2023-07-02 PROCEDURE — 84443 ASSAY THYROID STIM HORMONE: CPT

## 2023-07-02 PROCEDURE — 63600175 PHARM REV CODE 636 W HCPCS

## 2023-07-02 PROCEDURE — 63600175 PHARM REV CODE 636 W HCPCS: Performed by: STUDENT IN AN ORGANIZED HEALTH CARE EDUCATION/TRAINING PROGRAM

## 2023-07-02 PROCEDURE — 80053 COMPREHEN METABOLIC PANEL: CPT

## 2023-07-02 RX ORDER — SODIUM CHLORIDE, SODIUM LACTATE, POTASSIUM CHLORIDE, CALCIUM CHLORIDE 600; 310; 30; 20 MG/100ML; MG/100ML; MG/100ML; MG/100ML
INJECTION, SOLUTION INTRAVENOUS CONTINUOUS
Status: ACTIVE | OUTPATIENT
Start: 2023-07-02 | End: 2023-07-03

## 2023-07-02 RX ORDER — MAGNESIUM SULFATE HEPTAHYDRATE 40 MG/ML
4 INJECTION, SOLUTION INTRAVENOUS ONCE
Status: DISCONTINUED | OUTPATIENT
Start: 2023-07-02 | End: 2023-07-10 | Stop reason: HOSPADM

## 2023-07-02 RX ORDER — AMLODIPINE BESYLATE 5 MG/1
5 TABLET ORAL DAILY
Status: DISCONTINUED | OUTPATIENT
Start: 2023-07-02 | End: 2023-07-10 | Stop reason: HOSPADM

## 2023-07-02 RX ADMIN — ONDANSETRON 4 MG: 2 INJECTION INTRAMUSCULAR; INTRAVENOUS at 02:07

## 2023-07-02 RX ADMIN — Medication 6 MG: at 11:07

## 2023-07-02 RX ADMIN — ACETAMINOPHEN 650 MG: 325 TABLET ORAL at 04:07

## 2023-07-02 RX ADMIN — POTASSIUM BICARBONATE 25 MEQ: 978 TABLET, EFFERVESCENT ORAL at 08:07

## 2023-07-02 RX ADMIN — ACETAMINOPHEN 650 MG: 325 TABLET ORAL at 08:07

## 2023-07-02 RX ADMIN — SODIUM CHLORIDE, POTASSIUM CHLORIDE, SODIUM LACTATE AND CALCIUM CHLORIDE: 600; 310; 30; 20 INJECTION, SOLUTION INTRAVENOUS at 12:07

## 2023-07-02 RX ADMIN — THERA TABS 1 TABLET: TAB at 08:07

## 2023-07-02 RX ADMIN — HYDROCODONE BITARTRATE AND ACETAMINOPHEN 1 TABLET: 7.5; 325 TABLET ORAL at 06:07

## 2023-07-02 RX ADMIN — HYDROCODONE BITARTRATE AND ACETAMINOPHEN 1 TABLET: 7.5; 325 TABLET ORAL at 12:07

## 2023-07-02 RX ADMIN — HYDROCODONE BITARTRATE AND ACETAMINOPHEN 1 TABLET: 7.5; 325 TABLET ORAL at 11:07

## 2023-07-02 RX ADMIN — AMLODIPINE BESYLATE 5 MG: 5 TABLET ORAL at 08:07

## 2023-07-02 RX ADMIN — KETOROLAC TROMETHAMINE 15 MG: 30 INJECTION, SOLUTION INTRAMUSCULAR; INTRAVENOUS at 12:07

## 2023-07-02 RX ADMIN — CEFTRIAXONE 1 G: 1 INJECTION, POWDER, FOR SOLUTION INTRAMUSCULAR; INTRAVENOUS at 02:07

## 2023-07-02 RX ADMIN — ENOXAPARIN SODIUM 40 MG: 40 INJECTION SUBCUTANEOUS at 04:07

## 2023-07-02 RX ADMIN — TAMSULOSIN HYDROCHLORIDE 0.4 MG: 0.4 CAPSULE ORAL at 08:07

## 2023-07-02 RX ADMIN — KETOROLAC TROMETHAMINE 15 MG: 30 INJECTION, SOLUTION INTRAMUSCULAR; INTRAVENOUS at 06:07

## 2023-07-02 NOTE — CONSULTS
Full note to follow     Patient with kleb UTI on 6-21-23 treated with ceftriaxone and discharged on Augmentin - now re-admitted for pyelo and possible small renal abscess likely not able to be drained or needed to be drained by IR or urology     For now agree with ceftriaxone IV - repeat UC NGTD likely due to the abx prior     More recs after exam

## 2023-07-02 NOTE — SUBJECTIVE & OBJECTIVE
Interval History: No adverse events overnight. Patient pain which radiated from right flank and into groin continues to improve. Denies nausea/vomiting. Afebrile and hemodynamically stable over last 24hrs. Continuing abx.    Review of Systems   Constitutional:  Negative for activity change, chills and fatigue.   Respiratory:  Negative for cough, shortness of breath and wheezing.    Cardiovascular:  Negative for chest pain and palpitations.   Gastrointestinal:  Negative for abdominal distention, constipation, diarrhea, nausea and vomiting.   Genitourinary:  Positive for flank pain (right-sided flank pain with radiation to left groin). Negative for decreased urine volume, difficulty urinating, dysuria, frequency and urgency.   Musculoskeletal:  Negative for arthralgias and gait problem.   Neurological:  Negative for dizziness, weakness, light-headedness and headaches.   Psychiatric/Behavioral:  Negative for agitation and confusion.    Objective:     Vital Signs (Most Recent):  Temp: 98.8 °F (37.1 °C) (07/02/23 0545)  Pulse: 86 (07/02/23 0545)  Resp: 18 (07/02/23 0617)  BP: (!) 181/95 (07/02/23 0545)  SpO2: 100 % (07/02/23 0545) Vital Signs (24h Range):  Temp:  [98 °F (36.7 °C)-99 °F (37.2 °C)] 98.8 °F (37.1 °C)  Pulse:  [69-86] 86  Resp:  [18-20] 18  SpO2:  [98 %-100 %] 100 %  BP: (145-181)/(77-95) 181/95     Weight: 87.3 kg (192 lb 7.4 oz)  Body mass index is 32.03 kg/m².    Intake/Output Summary (Last 24 hours) at 7/2/2023 0849  Last data filed at 7/2/2023 0605  Gross per 24 hour   Intake --   Output 3751 ml   Net -3751 ml         Physical Exam  Vitals and nursing note reviewed.   Constitutional:       General: She is not in acute distress.     Appearance: Normal appearance. She is normal weight. She is not ill-appearing, toxic-appearing or diaphoretic.   HENT:      Head: Normocephalic.      Right Ear: External ear normal.      Left Ear: External ear normal.      Nose: Nose normal.      Mouth/Throat:      Pharynx:  Oropharynx is clear.   Eyes:      Extraocular Movements: Extraocular movements intact.   Cardiovascular:      Rate and Rhythm: Normal rate and regular rhythm.      Pulses: Normal pulses.      Heart sounds: Normal heart sounds.   Pulmonary:      Effort: Pulmonary effort is normal. No respiratory distress.      Breath sounds: Normal breath sounds. No wheezing, rhonchi or rales.   Abdominal:      General: Abdomen is flat. There is no distension.      Palpations: Abdomen is soft.      Tenderness: There is abdominal tenderness (mild tenderness with radiation into the groin). There is right CVA tenderness.   Musculoskeletal:         General: Normal range of motion.      Cervical back: Normal range of motion.   Skin:     General: Skin is warm.      Capillary Refill: Capillary refill takes less than 2 seconds.   Neurological:      General: No focal deficit present.      Mental Status: She is alert and oriented to person, place, and time. Mental status is at baseline.   Psychiatric:         Mood and Affect: Mood normal.         Behavior: Behavior normal.         Thought Content: Thought content normal.           Significant Labs: All pertinent labs within the past 24 hours have been reviewed.  CBC:   Recent Labs   Lab 06/30/23  1444 07/01/23  0253 07/02/23  0320   WBC 16.35* 13.35* 13.25*   HGB 9.1* 8.2* 8.1*   HCT 27.6* 24.4* 24.0*   * 431 466*     CMP:   Recent Labs   Lab 06/30/23  1444 07/01/23  0253 07/02/23  0320    140 139   K 3.3* 3.6 3.3*    108 106   CO2 24 23 24    104 115*   BUN 7 6 8   CREATININE 1.0 0.8 0.9   CALCIUM 9.0 8.6* 9.0   PROT 8.4 7.4 7.5   ALBUMIN 2.8* 2.4* 2.5*   BILITOT 0.5 0.4 0.2   ALKPHOS 75 69 69   AST 29 31 35   ALT 28 30 41   ANIONGAP 10 9 9       Significant Imaging: I have reviewed all pertinent imaging results/findings within the past 24 hours.

## 2023-07-02 NOTE — PLAN OF CARE
07/02/23 0925   Post-Acute Status   Post-Acute Authorization Other   Other Status Awaiting f/u Appts     F/U req sent for Uro and PCC.

## 2023-07-02 NOTE — HOSPITAL COURSE
The patient has a known history of prior hospital admission with complicated pyelonephritis and nephrolithiasis diagnosis with renal stent placement and failed outpatient treatment with PO antibiotics, Augmentin. CT Renal stone with interval placement of right-sided double-J ureteral stent with resolution of previous right-sided hydronephrosis, stable 5 mm calculus within the distal right ureter near the UVJ with 2 subtle hypoattenuating parenchymal foci within the right kidney, concerning for renal abscesses. US Retroperitoneal with bilateral nonobstructing renal calculi and no hydronephrosis noted. Although patient was on IV ceftriaxone throughout this admission, she had persistently elevated WBC (16.35 on admission) and intermittent fevers. Per urology and IR recommendations, patient had CT urogram completed revealing multiple hypodense irregularities with largest being 4.6 cm, suspicious for cyst or abscess. CT guided Aspiration of right kidney resulted in removal of 3mL of purulent fluid and improvement in RUQ and right flank pain. Patient's white blood cell count declined to normal range and remained afebrile after aspiration and on day 7 of IV abx, ceftriaxone. Patient required Visatril 50mg for anxiety. Patient persistent headache pain treated with Ketorolac 15mg. Patient also continued receiving IV diuresis during the course with appropriate UOP.    On the day of discharge patient was back to baseline and hemodynamically stable. Patient was sent home to resume home meds (as below). Percocet refilled and included tapered dose. Per urology recommendations, patient will be discharged with oxybutynin PRN, Flomax PRN, and Ciprofloxacin.  Close outpatient f/u was advised with Urology and PCP.  Low intensity therapy at home, per PT recommendations. The importance of medication adherence was again stressed to the patient. Patient agreeable to discharge plan, expressed understanding, all questions answered, return  precautions were discussed.

## 2023-07-02 NOTE — PROGRESS NOTES
[facility]  Progress Note    Patient Name: Sherri Machado  MRN: [id number]  Patient Class: IP- Inpatient   Admission Date: 6/30/2023  Length of Stay: 2 days  Attending Physician: Constantino Rey DO  Primary Care Provider: Modesto Waldron MD        Subjective:     Principal Problem:Pyelonephritis        HPI:  43-year-old F w/ PMHx with history depression, opioid dependence who presents to the ED with reports of right flank pain. Patient reports she was diagnosed with a kidney stone and was seen by Dr. Blevins.  Patient had a urethral stent placement in addition to cystoscopy and is currently taking PO antibiotics.  Patient reports she has been running fever over the past few days in addition to nausea, vomiting and diarrhea.  Patient states she is having issues with eating and drinking. She was sent for possible admission per Dr. Blevins.    In the ED, initial vitals /85, HR 85, Temp 98.3F, SpO2 100% on room air. Labs include CBC with H/H 9.1/27.6 and WBC 16.35, CMP with K 3.3.  UA with 1+ protein, 2+ occult blood, 3+ leukocytes, 29 WBC, 5 RBC. Urine Pregnancy test negative. Lactic acid 1.3. CXR with no acute changes. [facility] consulted for evaluation for admission for UTI with concerns for pyelonephritis.       Overview/Hospital Course:  Patient admitted to [facility] for pyelonephritis. CT Renal stone with interval placement of right-sided double-J ureteral stent with resolution of previous right-sided hydronephrosis, stable 5 mm calculus within the distal right ureter near the UVJ with 2 subtle hypoattenuating parenchymal foci within the right kidney, concerning for renal abscesses. US Retroperitoneal with bilateral nonobstructing renal calculi and no hydronephrosis noted. Urology, Dr. Blevins, consulted. Continuing IV abx, ceftriaxone.      Interval History: No adverse events overnight. Patient pain which radiated from right flank and into groin continues to improve.  Denies nausea/vomiting. Afebrile and hemodynamically stable over last 24hrs. Continuing abx.    Review of Systems   Constitutional:  Negative for activity change, chills and fatigue.   Respiratory:  Negative for cough, shortness of breath and wheezing.    Cardiovascular:  Negative for chest pain and palpitations.   Gastrointestinal:  Negative for abdominal distention, constipation, diarrhea, nausea and vomiting.   Genitourinary:  Positive for flank pain (right-sided flank pain with radiation to left groin). Negative for decreased urine volume, difficulty urinating, dysuria, frequency and urgency.   Musculoskeletal:  Negative for arthralgias and gait problem.   Neurological:  Negative for dizziness, weakness, light-headedness and headaches.   Psychiatric/Behavioral:  Negative for agitation and confusion.    Objective:     Vital Signs (Most Recent):  Temp: 98.8 °F (37.1 °C) (07/02/23 0545)  Pulse: 86 (07/02/23 0545)  Resp: 18 (07/02/23 0617)  BP: (!) 181/95 (07/02/23 0545)  SpO2: 100 % (07/02/23 0545) Vital Signs (24h Range):  Temp:  [98 °F (36.7 °C)-99 °F (37.2 °C)] 98.8 °F (37.1 °C)  Pulse:  [69-86] 86  Resp:  [18-20] 18  SpO2:  [98 %-100 %] 100 %  BP: (145-181)/(77-95) 181/95     Weight: 87.3 kg (192 lb 7.4 oz)  Body mass index is 32.03 kg/m².    Intake/Output Summary (Last 24 hours) at 7/2/2023 0849  Last data filed at 7/2/2023 0605  Gross per 24 hour   Intake --   Output 3751 ml   Net -3751 ml         Physical Exam  Vitals and nursing note reviewed.   Constitutional:       General: She is not in acute distress.     Appearance: Normal appearance. She is normal weight. She is not ill-appearing, toxic-appearing or diaphoretic.   HENT:      Head: Normocephalic.      Right Ear: External ear normal.      Left Ear: External ear normal.      Nose: Nose normal.      Mouth/Throat:      Pharynx: Oropharynx is clear.   Eyes:      Extraocular Movements: Extraocular movements intact.   Cardiovascular:      Rate and Rhythm:  Normal rate and regular rhythm.      Pulses: Normal pulses.      Heart sounds: Normal heart sounds.   Pulmonary:      Effort: Pulmonary effort is normal. No respiratory distress.      Breath sounds: Normal breath sounds. No wheezing, rhonchi or rales.   Abdominal:      General: Abdomen is flat. There is no distension.      Palpations: Abdomen is soft.      Tenderness: There is abdominal tenderness (mild tenderness with radiation into the groin). There is right CVA tenderness.   Musculoskeletal:         General: Normal range of motion.      Cervical back: Normal range of motion.   Skin:     General: Skin is warm.      Capillary Refill: Capillary refill takes less than 2 seconds.   Neurological:      General: No focal deficit present.      Mental Status: She is alert and oriented to person, place, and time. Mental status is at baseline.   Psychiatric:         Mood and Affect: Mood normal.         Behavior: Behavior normal.         Thought Content: Thought content normal.           Significant Labs: All pertinent labs within the past 24 hours have been reviewed.  CBC:   Recent Labs   Lab 06/30/23  1444 07/01/23  0253 07/02/23  0320   WBC 16.35* 13.35* 13.25*   HGB 9.1* 8.2* 8.1*   HCT 27.6* 24.4* 24.0*   * 431 466*     CMP:   Recent Labs   Lab 06/30/23  1444 07/01/23  0253 07/02/23  0320    140 139   K 3.3* 3.6 3.3*    108 106   CO2 24 23 24    104 115*   BUN 7 6 8   CREATININE 1.0 0.8 0.9   CALCIUM 9.0 8.6* 9.0   PROT 8.4 7.4 7.5   ALBUMIN 2.8* 2.4* 2.5*   BILITOT 0.5 0.4 0.2   ALKPHOS 75 69 69   AST 29 31 35   ALT 28 30 41   ANIONGAP 10 9 9       Significant Imaging: I have reviewed all pertinent imaging results/findings within the past 24 hours.      Assessment/Plan:      * Pyelonephritis  Discharged s/p stent placement for nephrolithiasis.  Was on PO abx, augmentin.  Previous urine cx resulted with sensitivities.  Fever, nausea, vomiting on presentation.  WBC elevated, lactic within normal  range in ED.  CT Renal with stable stent and stones with concern for renal abscesses.  Retroperitoneal US Bilateral nonobstructing renal calculi     Plan:  - Continue IV ceftriaxone.  - Continue maintenance IVF.  - Hold nephrotoxic medications .  - Renally-dose current meds if available  - Avoid Hypotension.  - Strict I/O's.  - Monitor renal function carefully.  - Continue Norco q6hrs, Toradol 15mg q6 hrs PRN for pain  - Urology consult for recommendations.  - Follow urine culture.    Hypokalemia  K 3.3 on 7/2 AM labs.    Plan:  - Replete as needed.    Renal abscess  Plan as in pyelonephritis.      Flank pain  Plan as in pyelonephritis.    Nephrolithiasis  Plan as in pyelonephritis.    Elevated BP without diagnosis of hypertension  BP elevated on admission.  Potentially 2/2 to pain.    Plan:  - Will continue to monitor vitals.  - Add amlodipine 5mg daily, can consider titrating dose or can add additional agent if needed.  - Avoid diuretics  - Will order TSH/T4 to evaluate potential secondary causes      VTE Risk Mitigation (From admission, onward)         Ordered     enoxaparin injection 40 mg  Daily         06/30/23 1744     IP VTE HIGH RISK PATIENT  Once         06/30/23 1744     Place sequential compression device  Until discontinued         06/30/23 1744                Discharge Planning   RUDOLPH:      Code Status: Full Code   Is the patient medically ready for discharge?:     Reason for patient still in hospital (select all that apply): Treatment, Consult recommendations and Pending disposition           ________________________  Brennen Schmid MD  [facility] PGY-2

## 2023-07-02 NOTE — PLAN OF CARE
Reviewed POC with the patient, and family at the bedside. Patient verbalized understanding. Ongoing.

## 2023-07-02 NOTE — ASSESSMENT & PLAN NOTE
Discharged s/p stent placement for nephrolithiasis.  Was on PO abx, augmentin.  Previous urine cx resulted with sensitivities.  Fever, nausea, vomiting on presentation.  WBC elevated, lactic within normal range in ED.  CT Renal with stable stent and stones with concern for renal abscesses.  Retroperitoneal US Bilateral nonobstructing renal calculi     Plan:  - Continue IV ceftriaxone.  - Continue maintenance IVF.  - Hold nephrotoxic medications .  - Renally-dose current meds if available  - Avoid Hypotension.  - Strict I/O's.  - Monitor renal function carefully.  - Continue Norco q6hrs, Toradol 15mg q6 hrs PRN for pain  - Urology consult for recommendations.  - Follow urine culture.

## 2023-07-02 NOTE — ASSESSMENT & PLAN NOTE
BP elevated on admission.  Potentially 2/2 to pain.    Plan:  - Will continue to monitor vitals.  - Add amlodipine 5mg daily, can consider titrating dose or can add additional agent if needed.  - Avoid diuretics  - Will order TSH/T4 to evaluate potential secondary causes

## 2023-07-03 PROBLEM — E05.90 HYPERTHYROIDISM, SUBCLINICAL: Status: ACTIVE | Noted: 2023-07-03

## 2023-07-03 LAB
ALBUMIN SERPL BCP-MCNC: 2.6 G/DL (ref 3.5–5.2)
ALP SERPL-CCNC: 72 U/L (ref 55–135)
ALT SERPL W/O P-5'-P-CCNC: 75 U/L (ref 10–44)
ANION GAP SERPL CALC-SCNC: 12 MMOL/L (ref 8–16)
AST SERPL-CCNC: 52 U/L (ref 10–40)
BASOPHILS # BLD AUTO: 0.06 K/UL (ref 0–0.2)
BASOPHILS NFR BLD: 0.5 % (ref 0–1.9)
BILIRUB SERPL-MCNC: 0.3 MG/DL (ref 0.1–1)
BUN SERPL-MCNC: 7 MG/DL (ref 6–20)
CALCIUM SERPL-MCNC: 9.2 MG/DL (ref 8.7–10.5)
CHLORIDE SERPL-SCNC: 104 MMOL/L (ref 95–110)
CO2 SERPL-SCNC: 22 MMOL/L (ref 23–29)
CREAT SERPL-MCNC: 0.9 MG/DL (ref 0.5–1.4)
DIFFERENTIAL METHOD: ABNORMAL
EOSINOPHIL # BLD AUTO: 0.1 K/UL (ref 0–0.5)
EOSINOPHIL NFR BLD: 0.5 % (ref 0–8)
ERYTHROCYTE [DISTWIDTH] IN BLOOD BY AUTOMATED COUNT: 14.2 % (ref 11.5–14.5)
EST. GFR  (NO RACE VARIABLE): >60 ML/MIN/1.73 M^2
GLUCOSE SERPL-MCNC: 124 MG/DL (ref 70–110)
HCT VFR BLD AUTO: 25.6 % (ref 37–48.5)
HGB BLD-MCNC: 8.5 G/DL (ref 12–16)
IMM GRANULOCYTES # BLD AUTO: 0.21 K/UL (ref 0–0.04)
IMM GRANULOCYTES NFR BLD AUTO: 1.6 % (ref 0–0.5)
LYMPHOCYTES # BLD AUTO: 2.5 K/UL (ref 1–4.8)
LYMPHOCYTES NFR BLD: 18.7 % (ref 18–48)
MAGNESIUM SERPL-MCNC: 1.6 MG/DL (ref 1.6–2.6)
MCH RBC QN AUTO: 26.8 PG (ref 27–31)
MCHC RBC AUTO-ENTMCNC: 33.2 G/DL (ref 32–36)
MCV RBC AUTO: 81 FL (ref 82–98)
MONOCYTES # BLD AUTO: 0.7 K/UL (ref 0.3–1)
MONOCYTES NFR BLD: 5.4 % (ref 4–15)
NEUTROPHILS # BLD AUTO: 9.7 K/UL (ref 1.8–7.7)
NEUTROPHILS NFR BLD: 73.3 % (ref 38–73)
NRBC BLD-RTO: 0 /100 WBC
PHOSPHATE SERPL-MCNC: 3 MG/DL (ref 2.7–4.5)
PLATELET # BLD AUTO: 545 K/UL (ref 150–450)
PMV BLD AUTO: 8.6 FL (ref 9.2–12.9)
POTASSIUM SERPL-SCNC: 3.5 MMOL/L (ref 3.5–5.1)
PROT SERPL-MCNC: 8 G/DL (ref 6–8.4)
RBC # BLD AUTO: 3.17 M/UL (ref 4–5.4)
SODIUM SERPL-SCNC: 138 MMOL/L (ref 136–145)
WBC # BLD AUTO: 13.22 K/UL (ref 3.9–12.7)

## 2023-07-03 PROCEDURE — 25000003 PHARM REV CODE 250

## 2023-07-03 PROCEDURE — 99233 SBSQ HOSP IP/OBS HIGH 50: CPT | Mod: ,,, | Performed by: STUDENT IN AN ORGANIZED HEALTH CARE EDUCATION/TRAINING PROGRAM

## 2023-07-03 PROCEDURE — 25000003 PHARM REV CODE 250: Performed by: STUDENT IN AN ORGANIZED HEALTH CARE EDUCATION/TRAINING PROGRAM

## 2023-07-03 PROCEDURE — 85025 COMPLETE CBC W/AUTO DIFF WBC: CPT

## 2023-07-03 PROCEDURE — 63600175 PHARM REV CODE 636 W HCPCS: Performed by: STUDENT IN AN ORGANIZED HEALTH CARE EDUCATION/TRAINING PROGRAM

## 2023-07-03 PROCEDURE — 84100 ASSAY OF PHOSPHORUS: CPT

## 2023-07-03 PROCEDURE — 11000001 HC ACUTE MED/SURG PRIVATE ROOM

## 2023-07-03 PROCEDURE — 36415 COLL VENOUS BLD VENIPUNCTURE: CPT

## 2023-07-03 PROCEDURE — 83735 ASSAY OF MAGNESIUM: CPT

## 2023-07-03 PROCEDURE — 80053 COMPREHEN METABOLIC PANEL: CPT

## 2023-07-03 PROCEDURE — 99233 PR SUBSEQUENT HOSPITAL CARE,LEVL III: ICD-10-PCS | Mod: ,,, | Performed by: STUDENT IN AN ORGANIZED HEALTH CARE EDUCATION/TRAINING PROGRAM

## 2023-07-03 PROCEDURE — 63600175 PHARM REV CODE 636 W HCPCS

## 2023-07-03 RX ORDER — HYDROXYZINE PAMOATE 25 MG/1
50 CAPSULE ORAL ONCE
Status: COMPLETED | OUTPATIENT
Start: 2023-07-03 | End: 2023-07-03

## 2023-07-03 RX ORDER — POTASSIUM CHLORIDE 20 MEQ/1
40 TABLET, EXTENDED RELEASE ORAL ONCE
Status: COMPLETED | OUTPATIENT
Start: 2023-07-03 | End: 2023-07-03

## 2023-07-03 RX ADMIN — AMLODIPINE BESYLATE 5 MG: 5 TABLET ORAL at 08:07

## 2023-07-03 RX ADMIN — TAMSULOSIN HYDROCHLORIDE 0.4 MG: 0.4 CAPSULE ORAL at 08:07

## 2023-07-03 RX ADMIN — ENOXAPARIN SODIUM 40 MG: 40 INJECTION SUBCUTANEOUS at 05:07

## 2023-07-03 RX ADMIN — ACETAMINOPHEN 650 MG: 325 TABLET ORAL at 02:07

## 2023-07-03 RX ADMIN — HYDROCODONE BITARTRATE AND ACETAMINOPHEN 1 TABLET: 7.5; 325 TABLET ORAL at 08:07

## 2023-07-03 RX ADMIN — CEFTRIAXONE 1 G: 1 INJECTION, POWDER, FOR SOLUTION INTRAMUSCULAR; INTRAVENOUS at 02:07

## 2023-07-03 RX ADMIN — POTASSIUM CHLORIDE 40 MEQ: 1500 TABLET, EXTENDED RELEASE ORAL at 08:07

## 2023-07-03 RX ADMIN — HYDROXYZINE PAMOATE 50 MG: 25 CAPSULE ORAL at 03:07

## 2023-07-03 RX ADMIN — THERA TABS 1 TABLET: TAB at 08:07

## 2023-07-03 RX ADMIN — KETOROLAC TROMETHAMINE 15 MG: 30 INJECTION, SOLUTION INTRAMUSCULAR; INTRAVENOUS at 03:07

## 2023-07-03 RX ADMIN — HYDROCODONE BITARTRATE AND ACETAMINOPHEN 1 TABLET: 7.5; 325 TABLET ORAL at 10:07

## 2023-07-03 NOTE — SUBJECTIVE & OBJECTIVE
Interval History:   Patient reported breakthrough right flank pain last night, improved with Toradol. Today patient reports mild right flank and right lumbar back pain. Headache resolved. Last BM 1 day ago. Tolerating PO intake. Remains afebrile and hypertensive at 160/85. Remaining vital signs stable at this time.      Review of Systems   Constitutional:  Negative for activity change, appetite change, chills, fatigue and fever.   HENT:  Negative for congestion, rhinorrhea, sinus pressure, sinus pain, sore throat, trouble swallowing and voice change.    Eyes:  Negative for photophobia, discharge and visual disturbance.   Respiratory:  Negative for cough, chest tightness, shortness of breath and wheezing.    Cardiovascular:  Negative for chest pain, palpitations and leg swelling.   Gastrointestinal:  Negative for abdominal distention, abdominal pain (RLQ), anal bleeding, blood in stool, constipation, diarrhea, nausea and vomiting.   Genitourinary:  Positive for flank pain (right). Negative for decreased urine volume, difficulty urinating, dysuria, frequency and urgency.   Musculoskeletal:  Positive for back pain (right lumbar). Negative for arthralgias and gait problem.   Neurological:  Negative for dizziness, seizures, syncope, facial asymmetry, speech difficulty, weakness, light-headedness, numbness and headaches.   Psychiatric/Behavioral:  Negative for agitation and behavioral problems.    Objective:     Vital Signs (Most Recent):  Temp: 99.8 °F (37.7 °C) (07/03/23 0735)  Pulse: 88 (07/03/23 0735)  Resp: 18 (07/03/23 0735)  BP: (!) 160/85 (07/03/23 0735)  SpO2: 98 % (07/03/23 0735) Vital Signs (24h Range):  Temp:  [96.6 °F (35.9 °C)-99.8 °F (37.7 °C)] 99.8 °F (37.7 °C)  Pulse:  [75-88] 88  Resp:  [17-20] 18  SpO2:  [97 %-100 %] 98 %  BP: (155-177)/(81-99) 160/85     Weight: 87.3 kg (192 lb 7.4 oz)  Body mass index is 32.03 kg/m².    Intake/Output Summary (Last 24 hours) at 7/3/2023 0801  Last data filed at  7/2/2023 1600  Gross per 24 hour   Intake --   Output 1300 ml   Net -1300 ml         Physical Exam  Constitutional:       Appearance: Normal appearance. She is normal weight.   HENT:      Head: Normocephalic and atraumatic.      Mouth/Throat:      Mouth: Mucous membranes are moist.      Pharynx: Oropharynx is clear.   Eyes:      Extraocular Movements: Extraocular movements intact.      Pupils: Pupils are equal, round, and reactive to light.   Cardiovascular:      Rate and Rhythm: Normal rate and regular rhythm.      Pulses: Normal pulses.      Heart sounds: Normal heart sounds.   Pulmonary:      Effort: Pulmonary effort is normal.      Breath sounds: Normal breath sounds.   Abdominal:      General: Abdomen is flat. Bowel sounds are normal.      Palpations: Abdomen is soft.      Tenderness: There is abdominal tenderness (palpation RLQ).   Musculoskeletal:         General: Normal range of motion.      Cervical back: Normal range of motion.   Lymphadenopathy:      Cervical: No cervical adenopathy.   Skin:     General: Skin is warm and dry.   Neurological:      General: No focal deficit present.      Mental Status: She is alert and oriented to person, place, and time. Mental status is at baseline.   Psychiatric:         Mood and Affect: Mood normal.         Behavior: Behavior normal.           Significant Labs: All pertinent labs within the past 24 hours have been reviewed.  Blood Culture: No results for input(s): LABBLOO in the last 48 hours.  CMP:   Recent Labs   Lab 07/02/23  0320 07/03/23  0323    138   K 3.3* 3.5    104   CO2 24 22*   * 124*   BUN 8 7   CREATININE 0.9 0.9   CALCIUM 9.0 9.2   PROT 7.5 8.0   ALBUMIN 2.5* 2.6*   BILITOT 0.2 0.3   ALKPHOS 69 72   AST 35 52*   ALT 41 75*   ANIONGAP 9 12     TSH:   Recent Labs   Lab 07/02/23  0320   TSH 0.310*     Urine Culture: No results for input(s): LABURIN in the last 48 hours.    Significant Imaging: I have reviewed all pertinent imaging  results/findings within the past 24 hours.  I have reviewed and interpreted all pertinent imaging results/findings within the past 24 hours.

## 2023-07-03 NOTE — ASSESSMENT & PLAN NOTE
BP elevated on admission.  Potentially 2/2 to pain.    Plan:  - Will continue to monitor vitals.  - Add amlodipine 5mg daily, can consider titrating dose or can add additional agent if needed.  -Patient high blood pressure to be evaluated outpatient  - Avoid diuretics

## 2023-07-03 NOTE — CONSULTS
[place] - Telemetry  Infectious Disease  Consult Note    Patient Name: Sherri Machado  MRN: [id number]  Admission Date: 6/30/2023  Hospital Length of Stay: 2 days  Attending Physician: Constantino Rey DO  Primary Care Provider: Modesto Waldron MD     Isolation Status: No active isolations    Patient information was obtained from patient and ER records.      Consults  Assessment/Plan:     ID  * Pyelonephritis  43-year-old F w/ PMHx with history depression, opioid dependence who presents to the ED with reports of right flank pain. Patient reports she was diagnosed with a kidney stone and was seen by Dr. Blevins.  Patient had a urethral stent placement in addition to cystoscopy and is currently taking PO antibiotics.  Patient reports she has been running fever over the past few days in addition to nausea, vomiting and diarrhea.  Patient states she is having issues with eating and drinking. She was sent for possible admission per Dr. Blevins.     In the ED, initial vitals /85, HR 85, Temp 98.3F, SpO2 100% on room air. Labs include CBC with H/H 9.1/27.6 and WBC 16.35, CMP with K 3.3.  UA with 1+ protein, 2+ occult blood, 3+ leukocytes, 29 WBC, 5 RBC. Urine Pregnancy test negative. Lactic acid 1.3. CXR with no acute changes. [facility] consulted for evaluation for admission for UTI with concerns for pyelonephritis.     6-18-23 - ED visit - CT showed right stones for sure and possible left stones - pain management - no abx    6-21-23 thru 6-24 - admit - UTI kleb, stent placed reduced right hydro, ceftriaxone then PO augmentin    6-30-23 - fever to 101, vomiting qd, decreased PO intake admitted for pyelo - CT- renal - showed faint 2 small abscesses  Urology felt that these were not accessible by IR at this time  - attempt abx therapy  - ID  consulted     Patient wiped out today but adequate pain control and no dysuria    Rec:   1. Continue IV ceftriaxone - await repeat culture results   2. Agree with urology - very  small renal abscesses and will try abx alone at this time - if she does not respond quickly then consider re-image renal pathology         Thank you for your consult. I will follow-up with patient. Please contact us if you have any additional questions.    Junior Castro MD  Infectious Disease  [place] - Telemetry    Subjective:     Principal Problem: Pyelonephritis    HPI: 43-year-old F w/ PMHx with history depression, opioid dependence who presents to the ED with reports of right flank pain. Patient reports she was diagnosed with a kidney stone and was seen by Dr. Blevins.  Patient had a urethral stent placement in addition to cystoscopy and is currently taking PO antibiotics.  Patient reports she has been running fever over the past few days in addition to nausea, vomiting and diarrhea.  Patient states she is having issues with eating and drinking. She was sent for possible admission per Dr. Blevins.     In the ED, initial vitals /85, HR 85, Temp 98.3F, SpO2 100% on room air. Labs include CBC with H/H 9.1/27.6 and WBC 16.35, CMP with K 3.3.  UA with 1+ protein, 2+ occult blood, 3+ leukocytes, 29 WBC, 5 RBC. Urine Pregnancy test negative. Lactic acid 1.3. CXR with no acute changes. [facility] consulted for evaluation for admission for UTI with concerns for pyelonephritis.     6-18-23 - ED visit - CT showed right stones for sure and possible left stones - pain management - no abx    6-21-23 thru 6-24 - admit - UTI kleb, stent placed reduced right hydro, ceftriaxone then PO augmentin    6-30-23 - fever to 101, vomiting qd, decreased PO intake admitted for pyelo - CT- renal - showed faint 2 small abscesses  Urology felt that these were not accessible by IR at this time  - attempt abx therapy  - ID  consulted     Patient wiped out today but adequate pain control and no dysuria      Past Medical History:   Diagnosis Date    Allergy     IBS (irritable bowel syndrome)     Osteoarthritis     Renal abscess  2023    Sickle cell trait     Urinary tract infection        Past Surgical History:   Procedure Laterality Date    ANKLE SURGERY      left     SECTION, CLASSIC      x3    CHOLECYSTECTOMY      CYSTOSCOPY Right 2023    Procedure: CYSTOSCOPY;  Surgeon: Kaylie Blevins MD;  Location: [facility] OR;  Service: Urology;  Laterality: Right;    RETROGRADE PYELOGRAPHY Right 2023    Procedure: PYELOGRAM, RETROGRADE;  Surgeon: Kaylie Blevins MD;  Location: [facility] OR;  Service: Urology;  Laterality: Right;    URETERAL STENT PLACEMENT Right 2023    Procedure: INSERTION, STENT, URETER;  Surgeon: Kaylie Blevins MD;  Location: [facility] OR;  Service: Urology;  Laterality: Right;       Review of patient's allergies indicates:   Allergen Reactions    Aspirin Other (See Comments)     Abdominal cramping       Medications:  Medications Prior to Admission   Medication Sig    amoxicillin-clavulanate 875-125mg (AUGMENTIN) 875-125 mg per tablet Take 1 tablet by mouth every 12 (twelve) hours. for 10 days    HYDROcodone-acetaminophen (NORCO) 7.5-325 mg per tablet Take 1 tablet by mouth every 8 (eight) hours as needed for Pain.    metoclopramide HCl (REGLAN) 10 MG tablet Take 10 mg by mouth 4 (four) times daily.    multivit-min-iron fum-folic ac (ONE-A-DAY WOMEN'S COMPLETE) 18 mg iron- 400 mcg Tab Take 1 tablet by mouth once daily.    oxyCODONE-acetaminophen (PERCOCET)  mg per tablet Take 1 tablet by mouth every 4 (four) hours as needed for Pain.    tamsulosin (FLOMAX) 0.4 mg Cap Take 1 capsule (0.4 mg total) by mouth once daily. for 15 days     Antibiotics (From admission, onward)      Start     Stop Route Frequency Ordered    23 1430  cefTRIAXone (ROCEPHIN) 1 g in dextrose 5 % in water (D5W) 5 % 100 mL IVPB (MB+)         -- IV Every 24 hours (non-standard times) 23 1744          Antifungals (From admission, onward)      None          Antivirals (From admission, onward)      None                There is no immunization history on file for this patient.    Family History    None       Social History     Socioeconomic History    Marital status:    Tobacco Use    Smoking status: Never    Smokeless tobacco: Never   Substance and Sexual Activity    Alcohol use: No    Drug use: No     Review of Systems   Constitutional:  Positive for activity change, appetite change, fatigue and fever. Negative for chills and diaphoresis.   HENT: Negative.     Eyes: Negative.    Respiratory: Negative.     Cardiovascular: Negative.    Gastrointestinal: Negative.    Endocrine: Negative.    Genitourinary:  Positive for flank pain.   Skin: Negative.    Objective:     Vital Signs (Most Recent):  Temp: 98.6 °F (37 °C) (07/02/23 1945)  Pulse: 77 (07/02/23 1945)  Resp: 18 (07/02/23 1945)  BP: (!) 155/81 (07/02/23 1945)  SpO2: 100 % (07/02/23 1945) Vital Signs (24h Range):  Temp:  [96.6 °F (35.9 °C)-98.8 °F (37.1 °C)] 98.6 °F (37 °C)  Pulse:  [69-86] 77  Resp:  [18-20] 18  SpO2:  [98 %-100 %] 100 %  BP: (155-181)/(81-95) 155/81     Weight: 87.3 kg (192 lb 7.4 oz)  Body mass index is 32.03 kg/m².    Estimated Creatinine Clearance: 87 mL/min (based on SCr of 0.9 mg/dL).     Physical Exam  Constitutional:       Appearance: Normal appearance.   HENT:      Head: Atraumatic.   Cardiovascular:      Rate and Rhythm: Normal rate.   Pulmonary:      Effort: Pulmonary effort is normal.      Breath sounds: Normal breath sounds.   Abdominal:      General: Bowel sounds are normal.   Musculoskeletal:         General: Normal range of motion.      Cervical back: Normal range of motion and neck supple.   Skin:     General: Skin is warm and dry.   Neurological:      General: No focal deficit present.      Mental Status: She is alert.        Significant Labs: All pertinent labs within the past 24 hours have been reviewed.    Significant Imaging: I have reviewed all pertinent imaging results/findings within the past 24 hours.

## 2023-07-03 NOTE — PROGRESS NOTES
[facility]  Urology Progress Note    Problems:   Patient Active Problem List   Diagnosis    Opioid dependence with withdrawal    Elevated BP without diagnosis of hypertension    Depression with suicidal ideation    Calculus of ureterovesical junction (UVJ)    RICK (acute kidney injury)    Pyelonephritis    Hyperbilirubinemia    UTI (urinary tract infection)    Nephrolithiasis    Flank pain    Renal abscess    Hypokalemia    Hyperthyroidism, subclinical       Subjective   Tmax 99.8 in the past 24 hours. Pt reports still feeling weak, poor energy. PO intake improving. Voiding. Trying to ambulate today.    Meds:   amLODIPine  5 mg Oral Daily    cefTRIAXone (ROCEPHIN) IVPB  1 g Intravenous Q24H    enoxparin  40 mg Subcutaneous Daily    magnesium sulfate IVPB  4 g Intravenous Once    multivitamin  1 tablet Oral Daily    tamsulosin  0.4 mg Oral Daily       acetaminophen, HYDROcodone-acetaminophen, ketorolac, melatonin, naloxone, ondansetron, senna-docusate 8.6-50 mg, sodium chloride 0.9%    Temp:  [97.8 °F (36.6 °C)-99.8 °F (37.7 °C)] 98 °F (36.7 °C)  Pulse:  [75-88] 87  Resp:  [16-20] 18  SpO2:  [97 %-100 %] 98 %  BP: (138-177)/(80-99) 138/80    I/O last 3 completed shifts:  In: -   Out: 3350 [Urine:3350]    General:  Alert, cooperative, no distress, appears stated age   Head:  Normocephalic, without obvious abnormality, atraumatic   Eyes:  PERRL, conjunctiva/corneas clear   Lungs:   Respirations unlabored    Heart:  Warm and well perfused   Abdomen:   abdomen is soft without significant tenderness, masses, organomegaly or guarding   : defer exam   Drains: none   Extremities: Extremities normal, atraumatic, no cyanosis or edema   Skin: Skin color, texture, turgor normal, no rashes or lesions   Psych: Appropriate   Neurologic: Non-focal     Recent Results (from the past 24 hour(s))   CBC Auto Differential    Collection Time: 07/03/23  3:23 AM   Result Value Ref Range    WBC 13.22 (H) 3.90 - 12.70  K/uL    RBC 3.17 (L) 4.00 - 5.40 M/uL    Hemoglobin 8.5 (L) 12.0 - 16.0 g/dL    Hematocrit 25.6 (L) 37.0 - 48.5 %    MCV 81 (L) 82 - 98 fL    MCH 26.8 (L) 27.0 - 31.0 pg    MCHC 33.2 32.0 - 36.0 g/dL    RDW 14.2 11.5 - 14.5 %    Platelets 545 (H) 150 - 450 K/uL    MPV 8.6 (L) 9.2 - 12.9 fL    Immature Granulocytes 1.6 (H) 0.0 - 0.5 %    Gran # (ANC) 9.7 (H) 1.8 - 7.7 K/uL    Immature Grans (Abs) 0.21 (H) 0.00 - 0.04 K/uL    Lymph # 2.5 1.0 - 4.8 K/uL    Mono # 0.7 0.3 - 1.0 K/uL    Eos # 0.1 0.0 - 0.5 K/uL    Baso # 0.06 0.00 - 0.20 K/uL    nRBC 0 0 /100 WBC    Gran % 73.3 (H) 38.0 - 73.0 %    Lymph % 18.7 18.0 - 48.0 %    Mono % 5.4 4.0 - 15.0 %    Eosinophil % 0.5 0.0 - 8.0 %    Basophil % 0.5 0.0 - 1.9 %    Differential Method Automated    Comprehensive Metabolic Panel    Collection Time: 07/03/23  3:23 AM   Result Value Ref Range    Sodium 138 136 - 145 mmol/L    Potassium 3.5 3.5 - 5.1 mmol/L    Chloride 104 95 - 110 mmol/L    CO2 22 (L) 23 - 29 mmol/L    Glucose 124 (H) 70 - 110 mg/dL    BUN 7 6 - 20 mg/dL    Creatinine 0.9 0.5 - 1.4 mg/dL    Calcium 9.2 8.7 - 10.5 mg/dL    Total Protein 8.0 6.0 - 8.4 g/dL    Albumin 2.6 (L) 3.5 - 5.2 g/dL    Total Bilirubin 0.3 0.1 - 1.0 mg/dL    Alkaline Phosphatase 72 55 - 135 U/L    AST 52 (H) 10 - 40 U/L    ALT 75 (H) 10 - 44 U/L    eGFR >60 >60 mL/min/1.73 m^2    Anion Gap 12 8 - 16 mmol/L   Magnesium    Collection Time: 07/03/23  3:23 AM   Result Value Ref Range    Magnesium 1.6 1.6 - 2.6 mg/dL   Phosphorus    Collection Time: 07/03/23  3:23 AM   Result Value Ref Range    Phosphorus 3.0 2.7 - 4.5 mg/dL     43 y.o. female with right 5mm distal ureteral stone, s/p cysto right stent 7/5/23. Was discharged with appropriate PO abx for UTI but had persistent fevers, nausea, vomiting, sent to ER from urology clinic     CT in ER demonstrated possibly 2 renal abscesses - largest is 1.9cm, ureteral stent in place, distal 5mm ureteral stone still present     Plan:  Discussed with  primary that abscess size is overall on the smaller size and historically when I have involved IR they have not recommended drainage with an abscess of this size. If the renal abscess increases in size or if patient does not improve with IV abx could consider re-imaging and consulting IR. Would recommend if no improvement in WBCs or resolution of fevers by AM of 7/5/23, then re-scan. Contact/consult IR first and discuss which imaging modality would be better to visualize abscess likely CT abdomen w/wo IV contrast.   ID has weighed in.  Will arrange for f/u with another urologic provider at [facility] as I am departing [facility]. Pt expressed interested in keeping her care at [place] [facility].

## 2023-07-03 NOTE — HPI
43-year-old F w/ PMHx with history depression, opioid dependence who presents to the ED with reports of right flank pain. Patient reports she was diagnosed with a kidney stone and was seen by Dr. Blevins.  Patient had a urethral stent placement in addition to cystoscopy and is currently taking PO antibiotics.  Patient reports she has been running fever over the past few days in addition to nausea, vomiting and diarrhea.  Patient states she is having issues with eating and drinking. She was sent for possible admission per Dr. Blevins.     In the ED, initial vitals /85, HR 85, Temp 98.3F, SpO2 100% on room air. Labs include CBC with H/H 9.1/27.6 and WBC 16.35, CMP with K 3.3.  UA with 1+ protein, 2+ occult blood, 3+ leukocytes, 29 WBC, 5 RBC. Urine Pregnancy test negative. Lactic acid 1.3. CXR with no acute changes. [facility] consulted for evaluation for admission for UTI with concerns for pyelonephritis.     6-18-23 - ED visit - CT showed right stones for sure and possible left stones - pain management - no abx    6-21-23 thru 6-24 - admit - UTI kleb, stent placed reduced right hydro, ceftriaxone then PO augmentin    6-30-23 - fever to 101, vomiting qd, decreased PO intake admitted for pyelo - CT- renal - showed faint 2 small abscesses  Urology felt that these were not accessible by IR at this time  - attempt abx therapy  - ID  consulted     Patient wiped out today but adequate pain control and no dysuria

## 2023-07-03 NOTE — ASSESSMENT & PLAN NOTE
Discharged s/p stent placement for nephrolithiasis.  Was on PO abx, augmentin.  Previous urine cx resulted with sensitivities.  Fever, nausea, vomiting on presentation.  WBC elevated, lactic within normal range in ED.  CT Renal with stable stent and stones with concern for renal abscesses.  Retroperitoneal US Bilateral nonobstructing renal calculi     Plan:  - Continue IV ceftriaxone.  - Continue maintenance IVF.  - Hold nephrotoxic medications .  - Renally-dose current meds if available  - Avoid Hypotension.  - Strict I/O's.  - Monitor renal function carefully.  - Continue Norco q6hrs, Toradol 15mg q6 hrs PRN for pain  - Urology consult for recommendations.  - ID consult for recommendations

## 2023-07-03 NOTE — PROGRESS NOTES
[place] - Telemetry  Infectious Disease  Consult Note     Patient Name: Sherri Machado  MRN: [id number]  Admission Date: 6/30/2023  Attending Physician: Zackary Gaspar MD    Assessment/Plan:      ID  * Pyelonephritis  43-year-old F w/ PMHx with history depression, opioid dependence who presents to the ED with reports of right flank pain. Patient reports she was diagnosed with a kidney stone and was seen by Dr. Blevins.  Patient had a urethral stent placement in addition to cystoscopy and is currently taking PO antibiotics.  Patient reports she has been running fever over the past few days in addition to nausea, vomiting and diarrhea.  Patient states she is having issues with eating and drinking. She was sent for possible admission per Dr. Blevins.     In the ED, initial vitals /85, HR 85, Temp 98.3F, SpO2 100% on room air. Labs include CBC with H/H 9.1/27.6 and WBC 16.35, CMP with K 3.3.  UA with 1+ protein, 2+ occult blood, 3+ leukocytes, 29 WBC, 5 RBC. Urine Pregnancy test negative. Lactic acid 1.3. CXR with no acute changes. [facility] consulted for evaluation for admission for UTI with concerns for pyelonephritis.      6-18-23 - ED visit - CT showed right stones for sure and possible left stones - pain management - no abx     6-21-23 thru 6-24 - admit - UTI kleb, stent placed reduced right hydro, ceftriaxone then PO augmentin     6-30-23 - fever to 101, vomiting qd, decreased PO intake admitted for pyelo - CT- renal - showed faint 2 small abscesses  Urology felt that these were not accessible by IR at this time  - attempt abx therapy  - ID  consulted      Patient with continued flank pain and subjective fevers, however notes improvement in overall pain.  Would consider remimaging and reculture if no improvement, however for now appears to be stable to improved.     Rec:   1. Continue IV ceftriaxone - repeat ucx 6/30 NGTD;   2. Agree with urology - very small renal abscesses and will try abx alone at this time  - if she does not respond quickly then consider re-image renal pathology and reculture urine.           Thank you for your consult. I will follow-up with patient. Please contact us if you have any additional questions.     Jemal Lujan MD  [facility]      Subjective:      Patient continues to have flank pain.  Denies nausea or vomiting.  Had subjective fevers overrnight.  Also complaining of headache.     Objective:     Last 24 Hour Vital Signs:  BP  Min: 155/81  Max: 177/99  Temp  Av.7 °F (37.1 °C)  Min: 97.8 °F (36.6 °C)  Max: 99.8 °F (37.7 °C)  Pulse  Av.6  Min: 75  Max: 88  Resp  Av.9  Min: 16  Max: 20  SpO2  Av.8 %  Min: 97 %  Max: 100 %  I/O last 3 completed shifts:  In: -   Out: 3350 [Urine:3350]    Physical Examination:  Gen:  Well-developed, well-nourished, NAD  HEENT:  EOMI, conjunctiva clear, mucous membranes moist  Neck: supple, normal ROM  Resp: Clear to auscultation bilaterally without wheezes or crackles, normal WOB  CV: Regular rate, normal rhythm and S1S2 w/out murmur. Radial & DP pulses 2+ and symmetric. No LE edema  Abd: Soft, non-tender, non-distended, bowel sounds present; R flank tenderness, no CVA tenderness  Neuro: AAO x 4  Derm: Skin is warm and dry, no rashes or lesions appreciated  Psych: Normal mood and affect, normal behavior        Laboratory:  Laboratory Data Reviewed: yes  Pertinent Findings:      Microbiology Data Reviewed: yes  Pertinent Findings:      Other Results:  Radiology Data Reviewed: Yes  Pertinent Findings:  Microbiology Results (last 7 days)       Procedure Component Value Units Date/Time    Blood culture #1 **CANNOT BE ORDERED STAT** [[id number]] Collected: 23 1444    Order Status: Completed Specimen: Blood from Antecubital, Right Arm Updated: 23 2212     Blood Culture, Routine No Growth to date      No Growth to date      No Growth to date    Blood culture #2 **CANNOT BE ORDERED STAT** [[id number]] Collected: 23 1444    Order  Status: Completed Specimen: Blood from Antecubital, Left Arm Updated: 07/02/23 2212     Blood Culture, Routine No Growth to date      No Growth to date      No Growth to date    Urine culture [[id number]] Collected: 06/30/23 1423    Order Status: Completed Specimen: Urine Updated: 07/02/23 0256     Urine Culture, Routine No growth    Narrative:      Specimen Source->Urine              Current Medications:     Infusions:       Scheduled:   amLODIPine  5 mg Oral Daily    cefTRIAXone (ROCEPHIN) IVPB  1 g Intravenous Q24H    enoxparin  40 mg Subcutaneous Daily    magnesium sulfate IVPB  4 g Intravenous Once    multivitamin  1 tablet Oral Daily    tamsulosin  0.4 mg Oral Daily        PRN:  acetaminophen, HYDROcodone-acetaminophen, ketorolac, melatonin, naloxone, ondansetron, senna-docusate 8.6-50 mg, sodium chloride 0.9%    Antibiotics and Day Number of Therapy:  Ceftriaxone 7/1-now

## 2023-07-03 NOTE — PROGRESS NOTES
[facility]  Progress Note    Patient Name: Sherri Machado  MRN: [id number]  Patient Class: IP- Inpatient   Admission Date: 6/30/2023  Length of Stay: 3 days  Attending Physician: Zackary Gaspar III, MD  Primary Care Provider: Modesto Waldron MD        Subjective:     Principal Problem:Pyelonephritis        HPI:  43-year-old F w/ PMHx with history depression, opioid dependence who presents to the ED with reports of right flank pain. Patient reports she was diagnosed with a kidney stone and was seen by Dr. Blevins.  Patient had a urethral stent placement in addition to cystoscopy and is currently taking PO antibiotics.  Patient reports she has been running fever over the past few days in addition to nausea, vomiting and diarrhea.  Patient states she is having issues with eating and drinking. She was sent for possible admission per Dr. Blevins.    In the ED, initial vitals /85, HR 85, Temp 98.3F, SpO2 100% on room air. Labs include CBC with H/H 9.1/27.6 and WBC 16.35, CMP with K 3.3.  UA with 1+ protein, 2+ occult blood, 3+ leukocytes, 29 WBC, 5 RBC. Urine Pregnancy test negative. Lactic acid 1.3. CXR with no acute changes. [facility] consulted for evaluation for admission for UTI with concerns for pyelonephritis.       Overview/Hospital Course:  Patient admitted to [facility] for pyelonephritis. CT Renal stone with interval placement of right-sided double-J ureteral stent with resolution of previous right-sided hydronephrosis, stable 5 mm calculus within the distal right ureter near the UVJ with 2 subtle hypoattenuating parenchymal foci within the right kidney, concerning for renal abscesses. US Retroperitoneal with bilateral nonobstructing renal calculi and no hydronephrosis noted. Urology, Dr. Blevins, consulted. Continuing IV abx, ceftriaxone.      Interval History:   Patient reported breakthrough right flank pain last night, improved with Toradol. Today patient reports mild  right flank and right lumbar back pain. Headache resolved. Last BM 1 day ago. Tolerating PO intake. Remains afebrile and hypertensive at 160/85. Remaining vital signs stable at this time.      Review of Systems   Constitutional:  Negative for activity change, appetite change, chills, fatigue and fever.   HENT:  Negative for congestion, rhinorrhea, sinus pressure, sinus pain, sore throat, trouble swallowing and voice change.    Eyes:  Negative for photophobia, discharge and visual disturbance.   Respiratory:  Negative for cough, chest tightness, shortness of breath and wheezing.    Cardiovascular:  Negative for chest pain, palpitations and leg swelling.   Gastrointestinal:  Negative for abdominal distention, abdominal pain (RLQ), anal bleeding, blood in stool, constipation, diarrhea, nausea and vomiting.   Genitourinary:  Positive for flank pain (right). Negative for decreased urine volume, difficulty urinating, dysuria, frequency and urgency.   Musculoskeletal:  Positive for back pain (right lumbar). Negative for arthralgias and gait problem.   Neurological:  Negative for dizziness, seizures, syncope, facial asymmetry, speech difficulty, weakness, light-headedness, numbness and headaches.   Psychiatric/Behavioral:  Negative for agitation and behavioral problems.    Objective:     Vital Signs (Most Recent):  Temp: 99.8 °F (37.7 °C) (07/03/23 0735)  Pulse: 88 (07/03/23 0735)  Resp: 18 (07/03/23 0735)  BP: (!) 160/85 (07/03/23 0735)  SpO2: 98 % (07/03/23 0735) Vital Signs (24h Range):  Temp:  [96.6 °F (35.9 °C)-99.8 °F (37.7 °C)] 99.8 °F (37.7 °C)  Pulse:  [75-88] 88  Resp:  [17-20] 18  SpO2:  [97 %-100 %] 98 %  BP: (155-177)/(81-99) 160/85     Weight: 87.3 kg (192 lb 7.4 oz)  Body mass index is 32.03 kg/m².    Intake/Output Summary (Last 24 hours) at 7/3/2023 0801  Last data filed at 7/2/2023 1600  Gross per 24 hour   Intake --   Output 1300 ml   Net -1300 ml         Physical Exam  Constitutional:       Appearance:  Normal appearance. She is normal weight.   HENT:      Head: Normocephalic and atraumatic.      Mouth/Throat:      Mouth: Mucous membranes are moist.      Pharynx: Oropharynx is clear.   Eyes:      Extraocular Movements: Extraocular movements intact.      Pupils: Pupils are equal, round, and reactive to light.   Cardiovascular:      Rate and Rhythm: Normal rate and regular rhythm.      Pulses: Normal pulses.      Heart sounds: Normal heart sounds.   Pulmonary:      Effort: Pulmonary effort is normal.      Breath sounds: Normal breath sounds.   Abdominal:      General: Abdomen is flat. Bowel sounds are normal.      Palpations: Abdomen is soft.      Tenderness: There is abdominal tenderness (palpation RLQ).   Musculoskeletal:         General: Normal range of motion.      Cervical back: Normal range of motion.   Lymphadenopathy:      Cervical: No cervical adenopathy.   Skin:     General: Skin is warm and dry.   Neurological:      General: No focal deficit present.      Mental Status: She is alert and oriented to person, place, and time. Mental status is at baseline.   Psychiatric:         Mood and Affect: Mood normal.         Behavior: Behavior normal.           Significant Labs: All pertinent labs within the past 24 hours have been reviewed.  Blood Culture: No results for input(s): LABBLOO in the last 48 hours.  CMP:   Recent Labs   Lab 07/02/23  0320 07/03/23  0323    138   K 3.3* 3.5    104   CO2 24 22*   * 124*   BUN 8 7   CREATININE 0.9 0.9   CALCIUM 9.0 9.2   PROT 7.5 8.0   ALBUMIN 2.5* 2.6*   BILITOT 0.2 0.3   ALKPHOS 69 72   AST 35 52*   ALT 41 75*   ANIONGAP 9 12     TSH:   Recent Labs   Lab 07/02/23  0320   TSH 0.310*     Urine Culture: No results for input(s): LABURIN in the last 48 hours.    Significant Imaging: I have reviewed all pertinent imaging results/findings within the past 24 hours.  I have reviewed and interpreted all pertinent imaging results/findings within the past 24  hours.      Assessment/Plan:      * Pyelonephritis  Discharged s/p stent placement for nephrolithiasis.  Was on PO abx, augmentin.  Previous urine cx resulted with sensitivities.  Fever, nausea, vomiting on presentation.  WBC elevated, lactic within normal range in ED.  CT Renal with stable stent and stones with concern for renal abscesses.  Retroperitoneal US Bilateral nonobstructing renal calculi     Plan:  - Continue IV ceftriaxone.  - Continue maintenance IVF.  - Hold nephrotoxic medications .  - Renally-dose current meds if available  - Avoid Hypotension.  - Strict I/O's.  - Monitor renal function carefully.  - Continue Norco q6hrs, Toradol 15mg q6 hrs PRN for pain  - Urology consult for recommendations.  - ID consult for recommendations    Hyperthyroidism, subclinical  -Follow up outpatient for repeat thyroid studies       Hypokalemia  K 3.5 on 7/3 AM labs.    Plan:  - Replete as needed.    Renal abscess  Plan as in pyelonephritis.      Flank pain  Plan as in pyelonephritis.    Nephrolithiasis  Plan as in pyelonephritis.    Elevated BP without diagnosis of hypertension  BP elevated on admission.  Potentially 2/2 to pain.    Plan:  - Will continue to monitor vitals.  - Add amlodipine 5mg daily, can consider titrating dose or can add additional agent if needed.  -Patient high blood pressure to be evaluated outpatient  - Avoid diuretics        VTE Risk Mitigation (From admission, onward)         Ordered     enoxaparin injection 40 mg  Daily         06/30/23 1744     IP VTE HIGH RISK PATIENT  Once         06/30/23 1744     Place sequential compression device  Until discontinued         06/30/23 1744                Discharge Planning   RUDOLPH:      Code Status: Full Code   Is the patient medically ready for discharge?:     Reason for patient still in hospital (select all that apply): Patient trending condition                     Irene Peralta MD  Department of Hospital Medicine   [place] [facility]

## 2023-07-03 NOTE — ASSESSMENT & PLAN NOTE
43-year-old F w/ PMHx with history depression, opioid dependence who presents to the ED with reports of right flank pain. Patient reports she was diagnosed with a kidney stone and was seen by Dr. Blevins.  Patient had a urethral stent placement in addition to cystoscopy and is currently taking PO antibiotics.  Patient reports she has been running fever over the past few days in addition to nausea, vomiting and diarrhea.  Patient states she is having issues with eating and drinking. She was sent for possible admission per Dr. Blevins.     In the ED, initial vitals /85, HR 85, Temp 98.3F, SpO2 100% on room air. Labs include CBC with H/H 9.1/27.6 and WBC 16.35, CMP with K 3.3.  UA with 1+ protein, 2+ occult blood, 3+ leukocytes, 29 WBC, 5 RBC. Urine Pregnancy test negative. Lactic acid 1.3. CXR with no acute changes. [facility] consulted for evaluation for admission for UTI with concerns for pyelonephritis.     6-18-23 - ED visit - CT showed right stones for sure and possible left stones - pain management - no abx    6-21-23 thru 6-24 - admit - UTI kleb, stent placed reduced right hydro, ceftriaxone then PO augmentin    6-30-23 - fever to 101, vomiting qd, decreased PO intake admitted for pyelo - CT- renal - showed faint 2 small abscesses  Urology felt that these were not accessible by IR at this time  - attempt abx therapy  - ID  consulted     Patient wiped out today but adequate pain control and no dysuria    Rec:   1. Continue IV ceftriaxone - await repeat culture results   2. Agree with urology - very small renal abscesses and will try abx alone at this time - if she does not respond quickly then consider re-image renal pathology

## 2023-07-03 NOTE — SUBJECTIVE & OBJECTIVE
Past Medical History:   Diagnosis Date    Allergy     IBS (irritable bowel syndrome)     Osteoarthritis     Renal abscess 2023    Sickle cell trait     Urinary tract infection        Past Surgical History:   Procedure Laterality Date    ANKLE SURGERY      left     SECTION, CLASSIC      x3    CHOLECYSTECTOMY      CYSTOSCOPY Right 2023    Procedure: CYSTOSCOPY;  Surgeon: Kaylie Blevins MD;  Location: [facility] OR;  Service: Urology;  Laterality: Right;    RETROGRADE PYELOGRAPHY Right 2023    Procedure: PYELOGRAM, RETROGRADE;  Surgeon: Kaylie Blevins MD;  Location: [facility] OR;  Service: Urology;  Laterality: Right;    URETERAL STENT PLACEMENT Right 2023    Procedure: INSERTION, STENT, URETER;  Surgeon: Kaylie Blevins MD;  Location: [facility] OR;  Service: Urology;  Laterality: Right;       Review of patient's allergies indicates:   Allergen Reactions    Aspirin Other (See Comments)     Abdominal cramping       Medications:  Medications Prior to Admission   Medication Sig    amoxicillin-clavulanate 875-125mg (AUGMENTIN) 875-125 mg per tablet Take 1 tablet by mouth every 12 (twelve) hours. for 10 days    HYDROcodone-acetaminophen (NORCO) 7.5-325 mg per tablet Take 1 tablet by mouth every 8 (eight) hours as needed for Pain.    metoclopramide HCl (REGLAN) 10 MG tablet Take 10 mg by mouth 4 (four) times daily.    multivit-min-iron fum-folic ac (ONE-A-DAY WOMEN'S COMPLETE) 18 mg iron- 400 mcg Tab Take 1 tablet by mouth once daily.    oxyCODONE-acetaminophen (PERCOCET)  mg per tablet Take 1 tablet by mouth every 4 (four) hours as needed for Pain.    tamsulosin (FLOMAX) 0.4 mg Cap Take 1 capsule (0.4 mg total) by mouth once daily. for 15 days     Antibiotics (From admission, onward)      Start     Stop Route Frequency Ordered    23 1430  cefTRIAXone (ROCEPHIN) 1 g in dextrose 5 % in water (D5W) 5 % 100 mL IVPB (MB+)         -- IV Every 24 hours (non-standard times) 23 0811           Antifungals (From admission, onward)      None          Antivirals (From admission, onward)      None               There is no immunization history on file for this patient.    Family History    None       Social History     Socioeconomic History    Marital status:    Tobacco Use    Smoking status: Never    Smokeless tobacco: Never   Substance and Sexual Activity    Alcohol use: No    Drug use: No     Review of Systems   Constitutional:  Positive for activity change, appetite change, fatigue and fever. Negative for chills and diaphoresis.   HENT: Negative.     Eyes: Negative.    Respiratory: Negative.     Cardiovascular: Negative.    Gastrointestinal: Negative.    Endocrine: Negative.    Genitourinary:  Positive for flank pain.   Skin: Negative.    Objective:     Vital Signs (Most Recent):  Temp: 98.6 °F (37 °C) (07/02/23 1945)  Pulse: 77 (07/02/23 1945)  Resp: 18 (07/02/23 1945)  BP: (!) 155/81 (07/02/23 1945)  SpO2: 100 % (07/02/23 1945) Vital Signs (24h Range):  Temp:  [96.6 °F (35.9 °C)-98.8 °F (37.1 °C)] 98.6 °F (37 °C)  Pulse:  [69-86] 77  Resp:  [18-20] 18  SpO2:  [98 %-100 %] 100 %  BP: (155-181)/(81-95) 155/81     Weight: 87.3 kg (192 lb 7.4 oz)  Body mass index is 32.03 kg/m².    Estimated Creatinine Clearance: 87 mL/min (based on SCr of 0.9 mg/dL).     Physical Exam  Constitutional:       Appearance: Normal appearance.   HENT:      Head: Atraumatic.   Cardiovascular:      Rate and Rhythm: Normal rate.   Pulmonary:      Effort: Pulmonary effort is normal.      Breath sounds: Normal breath sounds.   Abdominal:      General: Bowel sounds are normal.   Musculoskeletal:         General: Normal range of motion.      Cervical back: Normal range of motion and neck supple.   Skin:     General: Skin is warm and dry.   Neurological:      General: No focal deficit present.      Mental Status: She is alert.        Significant Labs: All pertinent labs within the past 24 hours have been  reviewed.    Significant Imaging: I have reviewed all pertinent imaging results/findings within the past 24 hours.

## 2023-07-04 PROBLEM — N12 PYELONEPHRITIS: Chronic | Status: ACTIVE | Noted: 2023-06-21

## 2023-07-04 LAB
ALBUMIN SERPL BCP-MCNC: 2.8 G/DL (ref 3.5–5.2)
ALP SERPL-CCNC: 77 U/L (ref 55–135)
ALT SERPL W/O P-5'-P-CCNC: 94 U/L (ref 10–44)
ANION GAP SERPL CALC-SCNC: 12 MMOL/L (ref 8–16)
AST SERPL-CCNC: 54 U/L (ref 10–40)
BASOPHILS # BLD AUTO: 0.08 K/UL (ref 0–0.2)
BASOPHILS NFR BLD: 0.6 % (ref 0–1.9)
BILIRUB SERPL-MCNC: 0.3 MG/DL (ref 0.1–1)
BUN SERPL-MCNC: 10 MG/DL (ref 6–20)
CALCIUM SERPL-MCNC: 9.6 MG/DL (ref 8.7–10.5)
CHLORIDE SERPL-SCNC: 104 MMOL/L (ref 95–110)
CO2 SERPL-SCNC: 24 MMOL/L (ref 23–29)
CREAT SERPL-MCNC: 1 MG/DL (ref 0.5–1.4)
DIFFERENTIAL METHOD: ABNORMAL
EOSINOPHIL # BLD AUTO: 0 K/UL (ref 0–0.5)
EOSINOPHIL NFR BLD: 0.2 % (ref 0–8)
ERYTHROCYTE [DISTWIDTH] IN BLOOD BY AUTOMATED COUNT: 14.5 % (ref 11.5–14.5)
EST. GFR  (NO RACE VARIABLE): >60 ML/MIN/1.73 M^2
GLUCOSE SERPL-MCNC: 105 MG/DL (ref 70–110)
HCT VFR BLD AUTO: 24.9 % (ref 37–48.5)
HGB BLD-MCNC: 8.2 G/DL (ref 12–16)
IMM GRANULOCYTES # BLD AUTO: 0.14 K/UL (ref 0–0.04)
IMM GRANULOCYTES NFR BLD AUTO: 1 % (ref 0–0.5)
LYMPHOCYTES # BLD AUTO: 2.4 K/UL (ref 1–4.8)
LYMPHOCYTES NFR BLD: 17.3 % (ref 18–48)
MAGNESIUM SERPL-MCNC: 1.8 MG/DL (ref 1.6–2.6)
MCH RBC QN AUTO: 26.5 PG (ref 27–31)
MCHC RBC AUTO-ENTMCNC: 32.9 G/DL (ref 32–36)
MCV RBC AUTO: 81 FL (ref 82–98)
MONOCYTES # BLD AUTO: 1 K/UL (ref 0.3–1)
MONOCYTES NFR BLD: 7.4 % (ref 4–15)
NEUTROPHILS # BLD AUTO: 10.1 K/UL (ref 1.8–7.7)
NEUTROPHILS NFR BLD: 73.5 % (ref 38–73)
NRBC BLD-RTO: 0 /100 WBC
PHOSPHATE SERPL-MCNC: 3.4 MG/DL (ref 2.7–4.5)
PLATELET # BLD AUTO: 629 K/UL (ref 150–450)
PMV BLD AUTO: 8.8 FL (ref 9.2–12.9)
POTASSIUM SERPL-SCNC: 4.4 MMOL/L (ref 3.5–5.1)
PROT SERPL-MCNC: 8.3 G/DL (ref 6–8.4)
RBC # BLD AUTO: 3.09 M/UL (ref 4–5.4)
SODIUM SERPL-SCNC: 140 MMOL/L (ref 136–145)
WBC # BLD AUTO: 13.71 K/UL (ref 3.9–12.7)

## 2023-07-04 PROCEDURE — 63600175 PHARM REV CODE 636 W HCPCS

## 2023-07-04 PROCEDURE — 36415 COLL VENOUS BLD VENIPUNCTURE: CPT

## 2023-07-04 PROCEDURE — 11000001 HC ACUTE MED/SURG PRIVATE ROOM

## 2023-07-04 PROCEDURE — 83735 ASSAY OF MAGNESIUM: CPT

## 2023-07-04 PROCEDURE — 85025 COMPLETE CBC W/AUTO DIFF WBC: CPT

## 2023-07-04 PROCEDURE — 80053 COMPREHEN METABOLIC PANEL: CPT

## 2023-07-04 PROCEDURE — 84100 ASSAY OF PHOSPHORUS: CPT

## 2023-07-04 PROCEDURE — 25000003 PHARM REV CODE 250

## 2023-07-04 PROCEDURE — 25000003 PHARM REV CODE 250: Performed by: STUDENT IN AN ORGANIZED HEALTH CARE EDUCATION/TRAINING PROGRAM

## 2023-07-04 RX ORDER — KETOROLAC TROMETHAMINE 30 MG/ML
15 INJECTION, SOLUTION INTRAMUSCULAR; INTRAVENOUS EVERY 6 HOURS PRN
Status: DISPENSED | OUTPATIENT
Start: 2023-07-04 | End: 2023-07-06

## 2023-07-04 RX ORDER — HYDROXYZINE PAMOATE 25 MG/1
25 CAPSULE ORAL ONCE
Status: COMPLETED | OUTPATIENT
Start: 2023-07-04 | End: 2023-07-04

## 2023-07-04 RX ADMIN — ACETAMINOPHEN 650 MG: 325 TABLET ORAL at 10:07

## 2023-07-04 RX ADMIN — TAMSULOSIN HYDROCHLORIDE 0.4 MG: 0.4 CAPSULE ORAL at 08:07

## 2023-07-04 RX ADMIN — AMLODIPINE BESYLATE 5 MG: 5 TABLET ORAL at 08:07

## 2023-07-04 RX ADMIN — HYDROCODONE BITARTRATE AND ACETAMINOPHEN 1 TABLET: 7.5; 325 TABLET ORAL at 08:07

## 2023-07-04 RX ADMIN — THERA TABS 1 TABLET: TAB at 08:07

## 2023-07-04 RX ADMIN — HYDROXYZINE PAMOATE 25 MG: 25 CAPSULE ORAL at 01:07

## 2023-07-04 RX ADMIN — CEFTRIAXONE 1 G: 1 INJECTION, POWDER, FOR SOLUTION INTRAMUSCULAR; INTRAVENOUS at 01:07

## 2023-07-04 RX ADMIN — ENOXAPARIN SODIUM 40 MG: 40 INJECTION SUBCUTANEOUS at 04:07

## 2023-07-04 RX ADMIN — HYDROCODONE BITARTRATE AND ACETAMINOPHEN 1 TABLET: 7.5; 325 TABLET ORAL at 04:07

## 2023-07-04 RX ADMIN — HYDROCODONE BITARTRATE AND ACETAMINOPHEN 1 TABLET: 7.5; 325 TABLET ORAL at 10:07

## 2023-07-04 NOTE — NURSING
Shift assessment done. Pt AAO x4. Pt resting in bed comfortably. No signs of distress. Bed wheels locked, bed alarm on. Plan of care reviewed and pt verbalized understanding. Call light within reach. Instructed to call staffs for assistance.

## 2023-07-04 NOTE — ASSESSMENT & PLAN NOTE
Thyroid studies completed to determine etiology of high blood pressure.   -TSH 0.310  -Follow up outpatient for repeat thyroid studies

## 2023-07-04 NOTE — PLAN OF CARE
Problem: Adult Inpatient Plan of Care  Goal: Optimal Comfort and Wellbeing  Outcome: Ongoing, Progressing     Problem: Renal Function Impairment (Acute Kidney Injury/Impairment)  Goal: Effective Renal Function  Outcome: Ongoing, Progressing     Problem: UTI (Urinary Tract Infection)  Goal: Improved Infection Symptoms  Outcome: Ongoing, Progressing     Problem: Pain Acute  Goal: Acceptable Pain Control and Functional Ability  Outcome: Ongoing, Progressing

## 2023-07-04 NOTE — SUBJECTIVE & OBJECTIVE
Interval History:   No adverse events overnight. Today patient reported pain in RLQ, right flank, and right thoracic back. BM this AM. Tolerating PO intake. Patient had temperature of 100.3 and blood pressure 147/80.      Review of Systems   Constitutional:  Negative for activity change, appetite change, chills, fatigue and fever.   HENT:  Negative for congestion, rhinorrhea, sinus pressure, sinus pain, sore throat, trouble swallowing and voice change.    Eyes:  Negative for photophobia, discharge and visual disturbance.   Respiratory:  Negative for cough, chest tightness, shortness of breath and wheezing.    Cardiovascular:  Negative for chest pain, palpitations and leg swelling.   Gastrointestinal:  Positive for abdominal distention, abdominal pain (RUQ, RLQ) and diarrhea. Negative for anal bleeding, blood in stool, constipation, nausea and vomiting.   Genitourinary:  Negative for decreased urine volume, difficulty urinating, dysuria, flank pain, frequency and urgency.   Musculoskeletal:  Positive for back pain (right thoracic). Negative for arthralgias and gait problem.   Neurological:  Positive for headaches (right sided). Negative for dizziness, seizures, syncope, facial asymmetry, speech difficulty, weakness, light-headedness and numbness.   Psychiatric/Behavioral:  Negative for agitation and behavioral problems.    Objective:     Vital Signs (Most Recent):  Temp: 100.3 °F (37.9 °C) (07/04/23 0803)  Pulse: 98 (07/04/23 0802)  Resp: 18 (07/04/23 0805)  BP: (!) 147/80 (07/04/23 0802)  SpO2: 98 % (07/04/23 0802) Vital Signs (24h Range):  Temp:  [97.7 °F (36.5 °C)-100.3 °F (37.9 °C)] 100.3 °F (37.9 °C)  Pulse:  [] 98  Resp:  [16-20] 18  SpO2:  [98 %-100 %] 98 %  BP: (138-155)/(77-83) 147/80     Weight: 87.3 kg (192 lb 7.4 oz)  Body mass index is 32.03 kg/m².    Intake/Output Summary (Last 24 hours) at 7/4/2023 0866  Last data filed at 7/3/2023 6961  Gross per 24 hour   Intake --   Output 900 ml   Net -900 ml          Physical Exam  Constitutional:       General: She is not in acute distress.     Appearance: Normal appearance. She is normal weight. She is not toxic-appearing.   HENT:      Head: Normocephalic and atraumatic.      Mouth/Throat:      Mouth: Mucous membranes are moist.      Pharynx: Oropharynx is clear.   Eyes:      Extraocular Movements: Extraocular movements intact.      Pupils: Pupils are equal, round, and reactive to light.   Cardiovascular:      Rate and Rhythm: Normal rate and regular rhythm.      Pulses: Normal pulses.      Heart sounds: Normal heart sounds.   Pulmonary:      Effort: Pulmonary effort is normal. No respiratory distress.      Breath sounds: Normal breath sounds. No wheezing or rales.   Abdominal:      General: Abdomen is flat. Bowel sounds are normal. There is distension (mild).      Palpations: Abdomen is soft.      Tenderness: There is abdominal tenderness (RLQ, RUQ).      Comments: Right flank pain   Musculoskeletal:         General: No swelling or tenderness. Normal range of motion.      Cervical back: Normal range of motion.      Right lower leg: No edema.      Left lower leg: No edema.   Lymphadenopathy:      Cervical: No cervical adenopathy.   Skin:     General: Skin is warm and dry.   Neurological:      General: No focal deficit present.      Mental Status: She is alert and oriented to person, place, and time. Mental status is at baseline.   Psychiatric:         Mood and Affect: Mood normal.         Behavior: Behavior normal.           Significant Labs: All pertinent labs within the past 24 hours have been reviewed.  CBC:   Recent Labs   Lab 07/03/23  0323 07/04/23  0446   WBC 13.22* 13.71*   HGB 8.5* 8.2*   HCT 25.6* 24.9*   * 629*     CMP:   Recent Labs   Lab 07/03/23  0323 07/04/23  0446    140   K 3.5 4.4    104   CO2 22* 24   * 105   BUN 7 10   CREATININE 0.9 1.0   CALCIUM 9.2 9.6   PROT 8.0 8.3   ALBUMIN 2.6* 2.8*   BILITOT 0.3 0.3   ALKPHOS 72 77    AST 52* 54*   ALT 75* 94*   ANIONGAP 12 12       Significant Imaging: I have reviewed all pertinent imaging results/findings within the past 24 hours.  I have reviewed and interpreted all pertinent imaging results/findings within the past 24 hours.

## 2023-07-04 NOTE — ASSESSMENT & PLAN NOTE
Discharged s/p stent placement for nephrolithiasis.  Was on PO abx, augmentin.  Previous urine cx resulted with sensitivities.  Fever, nausea, vomiting on presentation.  WBC elevated, lactic within normal range in ED.  CT Renal with stable stent and stones with concern for renal abscesses.  Retroperitoneal US Bilateral nonobstructing renal calculi     Plan:  - Continue IV ceftriaxone.  - Continue maintenance IVF.  - Hold nephrotoxic medications .  - Renally-dose current meds if available  - Avoid Hypotension.  - Strict I/O's.  - Monitor renal function carefully.  - Continue Norco q6hrs, Toradol 15mg q6 hrs PRN for pain  - Monitor WBC count.  -Follow fever  - Urology consult for recommendations.  - ID consult for recommendations

## 2023-07-04 NOTE — ASSESSMENT & PLAN NOTE
BP elevated on admission. Patient never been diagnosed with hypertension.  Potentially 2/2 to pain.    Plan:  - Will continue to monitor vitals.  - Add amlodipine 5mg daily, can consider titrating dose or can add additional agent if needed.  -Patient high blood pressure to be evaluated outpatient  - Avoid diuretics

## 2023-07-04 NOTE — PROGRESS NOTES
[facility] Medicine  Progress Note    Patient Name: Sherri Machado  MRN: [id number]  Patient Class: IP- Inpatient   Admission Date: 6/30/2023  Length of Stay: 4 days  Attending Physician: Zackary Gaspar III, MD  Primary Care Provider: Modesto Waldron MD        Subjective:     Principal Problem:Pyelonephritis        HPI:  43-year-old F w/ PMHx with history depression, opioid dependence who presents to the ED with reports of right flank pain. Patient reports she was diagnosed with a kidney stone and was seen by Dr. Blevins.  Patient had a urethral stent placement in addition to cystoscopy and is currently taking PO antibiotics.  Patient reports she has been running fever over the past few days in addition to nausea, vomiting and diarrhea.  Patient states she is having issues with eating and drinking. She was sent for possible admission per Dr. Blevins.    In the ED, initial vitals /85, HR 85, Temp 98.3F, SpO2 100% on room air. Labs include CBC with H/H 9.1/27.6 and WBC 16.35, CMP with K 3.3.  UA with 1+ protein, 2+ occult blood, 3+ leukocytes, 29 WBC, 5 RBC. Urine Pregnancy test negative. Lactic acid 1.3. CXR with no acute changes. [facility] consulted for evaluation for admission for UTI with concerns for pyelonephritis.       Overview/Hospital Course:  Patient admitted to [facility] for pyelonephritis. CT Renal stone with interval placement of right-sided double-J ureteral stent with resolution of previous right-sided hydronephrosis, stable 5 mm calculus within the distal right ureter near the UVJ with 2 subtle hypoattenuating parenchymal foci within the right kidney, concerning for renal abscesses. US Retroperitoneal with bilateral nonobstructing renal calculi and no hydronephrosis noted. Urology, Dr. Blevins, consulted. Per Urology recommendations, abscesses too small for draining. Low grade fever today; consider repeat CT if persistent. Continuing IV abx, ceftriaxone. Patient required  Visatril 50mg for anxiety. Patient persistent headache pain treated with Ketorolac 15mg.      Interval History:   No adverse events overnight. Today patient reported pain in RLQ, right flank, and right thoracic back. BM this AM. Tolerating PO intake. Patient had temperature of 100.3 and blood pressure 147/80.      Review of Systems   Constitutional:  Negative for activity change, appetite change, chills, fatigue and fever.   HENT:  Negative for congestion, rhinorrhea, sinus pressure, sinus pain, sore throat, trouble swallowing and voice change.    Eyes:  Negative for photophobia, discharge and visual disturbance.   Respiratory:  Negative for cough, chest tightness, shortness of breath and wheezing.    Cardiovascular:  Negative for chest pain, palpitations and leg swelling.   Gastrointestinal:  Positive for abdominal distention, abdominal pain (RUQ, RLQ) and diarrhea. Negative for anal bleeding, blood in stool, constipation, nausea and vomiting.   Genitourinary:  Negative for decreased urine volume, difficulty urinating, dysuria, flank pain, frequency and urgency.   Musculoskeletal:  Positive for back pain (right thoracic). Negative for arthralgias and gait problem.   Neurological:  Positive for headaches (right sided). Negative for dizziness, seizures, syncope, facial asymmetry, speech difficulty, weakness, light-headedness and numbness.   Psychiatric/Behavioral:  Negative for agitation and behavioral problems.    Objective:     Vital Signs (Most Recent):  Temp: 100.3 °F (37.9 °C) (07/04/23 0803)  Pulse: 98 (07/04/23 0802)  Resp: 18 (07/04/23 0805)  BP: (!) 147/80 (07/04/23 0802)  SpO2: 98 % (07/04/23 0802) Vital Signs (24h Range):  Temp:  [97.7 °F (36.5 °C)-100.3 °F (37.9 °C)] 100.3 °F (37.9 °C)  Pulse:  [] 98  Resp:  [16-20] 18  SpO2:  [98 %-100 %] 98 %  BP: (138-155)/(77-83) 147/80     Weight: 87.3 kg (192 lb 7.4 oz)  Body mass index is 32.03 kg/m².    Intake/Output Summary (Last 24 hours) at 7/4/2023  0849  Last data filed at 7/3/2023 2313  Gross per 24 hour   Intake --   Output 900 ml   Net -900 ml         Physical Exam  Constitutional:       General: She is not in acute distress.     Appearance: Normal appearance. She is normal weight. She is not toxic-appearing.   HENT:      Head: Normocephalic and atraumatic.      Mouth/Throat:      Mouth: Mucous membranes are moist.      Pharynx: Oropharynx is clear.   Eyes:      Extraocular Movements: Extraocular movements intact.      Pupils: Pupils are equal, round, and reactive to light.   Cardiovascular:      Rate and Rhythm: Normal rate and regular rhythm.      Pulses: Normal pulses.      Heart sounds: Normal heart sounds.   Pulmonary:      Effort: Pulmonary effort is normal. No respiratory distress.      Breath sounds: Normal breath sounds. No wheezing or rales.   Abdominal:      General: Abdomen is flat. Bowel sounds are normal. There is distension (mild).      Palpations: Abdomen is soft.      Tenderness: There is abdominal tenderness (RLQ, RUQ).      Comments: Right flank pain   Musculoskeletal:         General: No swelling or tenderness. Normal range of motion.      Cervical back: Normal range of motion.      Right lower leg: No edema.      Left lower leg: No edema.   Lymphadenopathy:      Cervical: No cervical adenopathy.   Skin:     General: Skin is warm and dry.   Neurological:      General: No focal deficit present.      Mental Status: She is alert and oriented to person, place, and time. Mental status is at baseline.   Psychiatric:         Mood and Affect: Mood normal.         Behavior: Behavior normal.           Significant Labs: All pertinent labs within the past 24 hours have been reviewed.  CBC:   Recent Labs   Lab 07/03/23  0323 07/04/23  0446   WBC 13.22* 13.71*   HGB 8.5* 8.2*   HCT 25.6* 24.9*   * 629*     CMP:   Recent Labs   Lab 07/03/23  0323 07/04/23  0446    140   K 3.5 4.4    104   CO2 22* 24   * 105   BUN 7 10    CREATININE 0.9 1.0   CALCIUM 9.2 9.6   PROT 8.0 8.3   ALBUMIN 2.6* 2.8*   BILITOT 0.3 0.3   ALKPHOS 72 77   AST 52* 54*   ALT 75* 94*   ANIONGAP 12 12       Significant Imaging: I have reviewed all pertinent imaging results/findings within the past 24 hours.  I have reviewed and interpreted all pertinent imaging results/findings within the past 24 hours.      Assessment/Plan:      * Pyelonephritis  Discharged s/p stent placement for nephrolithiasis.  Was on PO abx, augmentin.  Previous urine cx resulted with sensitivities.  Fever, nausea, vomiting on presentation.  WBC elevated, lactic within normal range in ED.  CT Renal with stable stent and stones with concern for renal abscesses.  Retroperitoneal US Bilateral nonobstructing renal calculi     Plan:  - Continue IV ceftriaxone.  - Continue maintenance IVF.  - Hold nephrotoxic medications .  - Renally-dose current meds if available  - Avoid Hypotension.  - Strict I/O's.  - Monitor renal function carefully.  - Continue Norco q6hrs, Toradol 15mg q6 hrs PRN for pain  - Monitor WBC count.  -Follow fever  - Urology consult for recommendations.  - ID consult for recommendations    Hyperthyroidism, subclinical  Thyroid studies completed to determine etiology of high blood pressure.   -TSH 0.310  -Follow up outpatient for repeat thyroid studies       Hypokalemia  K 4.4 on 7/4 AM labs.    Plan:  - Replete as needed.    Renal abscess  Plan as in pyelonephritis.      Flank pain  Plan as in pyelonephritis.    Nephrolithiasis  Plan as in pyelonephritis.    Elevated BP without diagnosis of hypertension  BP elevated on admission. Patient never been diagnosed with hypertension.  Potentially 2/2 to pain.    Plan:  - Will continue to monitor vitals.  - Add amlodipine 5mg daily, can consider titrating dose or can add additional agent if needed.  -Patient high blood pressure to be evaluated outpatient  - Avoid diuretics        VTE Risk Mitigation (From admission, onward)          Ordered     enoxaparin injection 40 mg  Daily         06/30/23 1744     IP VTE HIGH RISK PATIENT  Once         06/30/23 1744     Place sequential compression device  Until discontinued         06/30/23 1744                Discharge Planning   RUDOLPH:      Code Status: Full Code   Is the patient medically ready for discharge?:     Reason for patient still in hospital (select all that apply): Patient trending condition  Discharge Plan A: Home, Home with family          ________________________  Gwendolyn Peralta MD  [facility] PGY-1

## 2023-07-04 NOTE — PLAN OF CARE
[facility] - Telemetry  Initial Discharge Assessment       Primary Care Provider: Modesto Waldron MD    Admission Diagnosis: Flank pain [R10.9]  Acute cystitis without hematuria [N30.00]  Sepsis [A41.9]    Admission Date: 6/30/2023  Expected Discharge Date:     Pt oriented. DCA done at bedside with patient. Demographics/Ins/NextofKin/PCP verified with patient/family and updated as needed. Denies any DME needs at present from pt/family. Patient/family plans to return home with family. Educated on bedside RX. Patient consents for [place] to discuss discharge plan with next of kin. Preferences appointment times and location obtained. Patient reports they will have transportation home upon discharge.      Transition of Care Barriers: (P) None    Payor: MEDICAID / Plan: HEALTHY BLUE ([employer]) / Product Type: Managed Medicaid /     Extended Emergency Contact Information  Primary Emergency Contact: Jeane Trimble [place] [address] [facility]  Mobile Phone: [phone]  Relation: Mother  Secondary Emergency Contact: YARITZA CARRANZA  Mobile Phone: [phone]  Relation: Spouse   needed? No    Discharge Plan A: (P) Home, Home with family         [id number] [place] - [id number]  [id number] [facility]  [place] [address]  Phone: [phone] Fax: [phone]    [facility] - [place] - [address]  [address]  [place] [address]  Phone: [phone] Fax: [phone]      Initial Assessment (most recent)       Adult Discharge Assessment - 07/04/23 1134          Discharge Assessment    Assessment Type Discharge Planning Assessment (P)      Confirmed/corrected address, phone number and insurance Yes (P)      Confirmed Demographics Correct on Facesheet (P)      Source of Information patient (P)      Communicated RUDOLPH with patient/caregiver Yes (P)      People in Home significant other (P)      Do you expect to return to your  current living situation? Yes (P)      Prior to hospitilization cognitive status: Alert/Oriented;No Deficits (P)      Current cognitive status: Alert/Oriented;No Deficits (P)      Equipment Currently Used at Home none (P)      Readmission within 30 days? No (P)      Do you currently have service(s) that help you manage your care at home? No (P)      Do you have any problems affording any of your prescribed medications? No (P)      How do you get to doctors appointments? car, drives self;family or friend will provide (P)      Discharge Plan A Home;Home with family (P)      DME Needed Upon Discharge  none (P)      Discharge Plan discussed with: Patient (P)      Transition of Care Barriers None (P)         Financial Resource Strain    How hard is it for you to pay for the very basics like food, housing, medical care, and heating? Not hard at all (P)         Housing Stability    In the last 12 months, was there a time when you were not able to pay the mortgage or rent on time? No (P)      In the last 12 months, was there a time when you did not have a steady place to sleep or slept in a shelter (including now)? No (P)         Transportation Needs    In the past 12 months, has lack of transportation kept you from medical appointments or from getting medications? No (P)      In the past 12 months, has lack of transportation kept you from meetings, work, or from getting things needed for daily living? No (P)         Food Insecurity    Within the past 12 months, you worried that your food would run out before you got the money to buy more. Never true (P)      Within the past 12 months, the food you bought just didn't last and you didn't have money to get more. Never true (P)                    Future Appointments   Date Time Provider Department Center   7/12/2023  9:00 AM Colten Leon PA-C [facility]   7/31/2023  9:45 AM Kip Beckham MD [facility]     BP (!) 147/80 (BP Location: Right arm,  "Patient Position: Lying)   Pulse 98   Temp 100.3 °F (37.9 °C)   Resp 18   Ht 5' 5" (1.651 m)   Wt 87.3 kg (192 lb 7.4 oz)   LMP 06/07/2023 (Exact Date)   SpO2 98%   Breastfeeding No   BMI 32.03 kg/m²      amLODIPine  5 mg Oral Daily    cefTRIAXone (ROCEPHIN) IVPB  1 g Intravenous Q24H    enoxparin  40 mg Subcutaneous Daily    magnesium sulfate IVPB  4 g Intravenous Once    multivitamin  1 tablet Oral Daily    tamsulosin  0.4 mg Oral Daily               "

## 2023-07-05 LAB
ALBUMIN SERPL BCP-MCNC: 2.8 G/DL (ref 3.5–5.2)
ALP SERPL-CCNC: 83 U/L (ref 55–135)
ALT SERPL W/O P-5'-P-CCNC: 89 U/L (ref 10–44)
ANION GAP SERPL CALC-SCNC: 12 MMOL/L (ref 8–16)
AST SERPL-CCNC: 43 U/L (ref 10–40)
BACTERIA BLD CULT: NORMAL
BACTERIA BLD CULT: NORMAL
BASOPHILS # BLD AUTO: 0.13 K/UL (ref 0–0.2)
BASOPHILS NFR BLD: 0.9 % (ref 0–1.9)
BILIRUB SERPL-MCNC: 0.3 MG/DL (ref 0.1–1)
BUN SERPL-MCNC: 9 MG/DL (ref 6–20)
CALCIUM SERPL-MCNC: 9.3 MG/DL (ref 8.7–10.5)
CHLORIDE SERPL-SCNC: 101 MMOL/L (ref 95–110)
CO2 SERPL-SCNC: 24 MMOL/L (ref 23–29)
CREAT SERPL-MCNC: 0.9 MG/DL (ref 0.5–1.4)
DIFFERENTIAL METHOD: ABNORMAL
EOSINOPHIL # BLD AUTO: 0.1 K/UL (ref 0–0.5)
EOSINOPHIL NFR BLD: 0.4 % (ref 0–8)
ERYTHROCYTE [DISTWIDTH] IN BLOOD BY AUTOMATED COUNT: 14.1 % (ref 11.5–14.5)
EST. GFR  (NO RACE VARIABLE): >60 ML/MIN/1.73 M^2
GLUCOSE SERPL-MCNC: 99 MG/DL (ref 70–110)
HCT VFR BLD AUTO: 23.5 % (ref 37–48.5)
HGB BLD-MCNC: 7.7 G/DL (ref 12–16)
IMM GRANULOCYTES # BLD AUTO: 0.13 K/UL (ref 0–0.04)
IMM GRANULOCYTES NFR BLD AUTO: 0.9 % (ref 0–0.5)
LYMPHOCYTES # BLD AUTO: 2.2 K/UL (ref 1–4.8)
LYMPHOCYTES NFR BLD: 16.2 % (ref 18–48)
MAGNESIUM SERPL-MCNC: 1.8 MG/DL (ref 1.6–2.6)
MCH RBC QN AUTO: 26.2 PG (ref 27–31)
MCHC RBC AUTO-ENTMCNC: 32.8 G/DL (ref 32–36)
MCV RBC AUTO: 80 FL (ref 82–98)
MONOCYTES # BLD AUTO: 1 K/UL (ref 0.3–1)
MONOCYTES NFR BLD: 6.9 % (ref 4–15)
NEUTROPHILS # BLD AUTO: 10.2 K/UL (ref 1.8–7.7)
NEUTROPHILS NFR BLD: 74.7 % (ref 38–73)
NRBC BLD-RTO: 0 /100 WBC
PHOSPHATE SERPL-MCNC: 3.8 MG/DL (ref 2.7–4.5)
PLATELET # BLD AUTO: 605 K/UL (ref 150–450)
PMV BLD AUTO: 8.5 FL (ref 9.2–12.9)
POTASSIUM SERPL-SCNC: 4 MMOL/L (ref 3.5–5.1)
PROT SERPL-MCNC: 8.3 G/DL (ref 6–8.4)
RBC # BLD AUTO: 2.94 M/UL (ref 4–5.4)
SODIUM SERPL-SCNC: 137 MMOL/L (ref 136–145)
WBC # BLD AUTO: 13.71 K/UL (ref 3.9–12.7)

## 2023-07-05 PROCEDURE — 63600175 PHARM REV CODE 636 W HCPCS: Performed by: RADIOLOGY

## 2023-07-05 PROCEDURE — 83735 ASSAY OF MAGNESIUM: CPT

## 2023-07-05 PROCEDURE — 25000003 PHARM REV CODE 250

## 2023-07-05 PROCEDURE — 87077 CULTURE AEROBIC IDENTIFY: CPT

## 2023-07-05 PROCEDURE — 63600175 PHARM REV CODE 636 W HCPCS

## 2023-07-05 PROCEDURE — 87205 SMEAR GRAM STAIN: CPT | Mod: 59

## 2023-07-05 PROCEDURE — 99223 1ST HOSP IP/OBS HIGH 75: CPT | Mod: 25,,, | Performed by: PHYSICIAN ASSISTANT

## 2023-07-05 PROCEDURE — 87186 SC STD MICRODIL/AGAR DIL: CPT

## 2023-07-05 PROCEDURE — 99223 PR INITIAL HOSPITAL CARE,LEVL III: ICD-10-PCS | Mod: 25,,, | Performed by: PHYSICIAN ASSISTANT

## 2023-07-05 PROCEDURE — 87210 SMEAR WET MOUNT SALINE/INK: CPT

## 2023-07-05 PROCEDURE — 85025 COMPLETE CBC W/AUTO DIFF WBC: CPT

## 2023-07-05 PROCEDURE — 84100 ASSAY OF PHOSPHORUS: CPT

## 2023-07-05 PROCEDURE — 87116 MYCOBACTERIA CULTURE: CPT

## 2023-07-05 PROCEDURE — 25000003 PHARM REV CODE 250: Performed by: STUDENT IN AN ORGANIZED HEALTH CARE EDUCATION/TRAINING PROGRAM

## 2023-07-05 PROCEDURE — 36415 COLL VENOUS BLD VENIPUNCTURE: CPT

## 2023-07-05 PROCEDURE — 87205 SMEAR GRAM STAIN: CPT

## 2023-07-05 PROCEDURE — 25500020 PHARM REV CODE 255: Performed by: FAMILY MEDICINE

## 2023-07-05 PROCEDURE — 11000001 HC ACUTE MED/SURG PRIVATE ROOM

## 2023-07-05 PROCEDURE — 80053 COMPREHEN METABOLIC PANEL: CPT

## 2023-07-05 PROCEDURE — 87206 SMEAR FLUORESCENT/ACID STAI: CPT

## 2023-07-05 PROCEDURE — 87070 CULTURE OTHR SPECIMN AEROBIC: CPT

## 2023-07-05 PROCEDURE — 25000003 PHARM REV CODE 250: Performed by: RADIOLOGY

## 2023-07-05 RX ORDER — SODIUM CHLORIDE 9 MG/ML
INJECTION, SOLUTION INTRAVENOUS
Status: COMPLETED | OUTPATIENT
Start: 2023-07-05 | End: 2023-07-05

## 2023-07-05 RX ORDER — SODIUM CHLORIDE 0.9 % (FLUSH) 0.9 %
3 SYRINGE (ML) INJECTION
Status: DISCONTINUED | OUTPATIENT
Start: 2023-07-05 | End: 2023-07-10 | Stop reason: HOSPADM

## 2023-07-05 RX ORDER — SODIUM CHLORIDE 0.9 % (FLUSH) 0.9 %
.1-1 SYRINGE (ML) INJECTION
Status: DISCONTINUED | OUTPATIENT
Start: 2023-07-05 | End: 2023-07-10 | Stop reason: HOSPADM

## 2023-07-05 RX ORDER — FENTANYL CITRATE 50 UG/ML
INJECTION, SOLUTION INTRAMUSCULAR; INTRAVENOUS
Status: COMPLETED | OUTPATIENT
Start: 2023-07-05 | End: 2023-07-05

## 2023-07-05 RX ORDER — MIDAZOLAM HYDROCHLORIDE 1 MG/ML
INJECTION INTRAMUSCULAR; INTRAVENOUS
Status: COMPLETED | OUTPATIENT
Start: 2023-07-05 | End: 2023-07-05

## 2023-07-05 RX ADMIN — SODIUM CHLORIDE 500 ML: 0.9 INJECTION, SOLUTION INTRAVENOUS at 04:07

## 2023-07-05 RX ADMIN — KETOROLAC TROMETHAMINE 15 MG: 30 INJECTION, SOLUTION INTRAMUSCULAR; INTRAVENOUS at 11:07

## 2023-07-05 RX ADMIN — IOHEXOL 150 ML: 350 INJECTION, SOLUTION INTRAVENOUS at 09:07

## 2023-07-05 RX ADMIN — AMLODIPINE BESYLATE 5 MG: 5 TABLET ORAL at 11:07

## 2023-07-05 RX ADMIN — FENTANYL CITRATE 50 MCG: 50 INJECTION, SOLUTION INTRAMUSCULAR; INTRAVENOUS at 04:07

## 2023-07-05 RX ADMIN — HYDROCODONE BITARTRATE AND ACETAMINOPHEN 1 TABLET: 7.5; 325 TABLET ORAL at 05:07

## 2023-07-05 RX ADMIN — MIDAZOLAM HYDROCHLORIDE 1 MG: 1 INJECTION, SOLUTION INTRAMUSCULAR; INTRAVENOUS at 04:07

## 2023-07-05 RX ADMIN — CEFTRIAXONE 1 G: 1 INJECTION, POWDER, FOR SOLUTION INTRAMUSCULAR; INTRAVENOUS at 05:07

## 2023-07-05 RX ADMIN — HYDROCODONE BITARTRATE AND ACETAMINOPHEN 1 TABLET: 7.5; 325 TABLET ORAL at 08:07

## 2023-07-05 RX ADMIN — THERA TABS 1 TABLET: TAB at 11:07

## 2023-07-05 RX ADMIN — TAMSULOSIN HYDROCHLORIDE 0.4 MG: 0.4 CAPSULE ORAL at 11:07

## 2023-07-05 NOTE — PROGRESS NOTES
[facility]  [facility]fectious Disease  Progress Note    Patient Name: Sherri Machado  MRN: [id number]  Admission Date: 6/30/2023  Length of Stay: 4 days  Attending Physician: Zackary Gaspar III, MD  Primary Care Provider: Modesto Waldron MD    Isolation Status: No active isolations  Assessment/Plan:      ID  * Pyelonephritis  43-year-old F w/ PMHx with history depression, opioid dependence who presents to the ED with reports of right flank pain. Patient reports she was diagnosed with a kidney stone and was seen by Dr. Blevins.  Patient had a urethral stent placement in addition to cystoscopy and is currently taking PO antibiotics.  Patient reports she has been running fever over the past few days in addition to nausea, vomiting and diarrhea.  Patient states she is having issues with eating and drinking. She was sent for possible admission per Dr. Blevins.     In the ED, initial vitals /85, HR 85, Temp 98.3F, SpO2 100% on room air. Labs include CBC with H/H 9.1/27.6 and WBC 16.35, CMP with K 3.3.  UA with 1+ protein, 2+ occult blood, 3+ leukocytes, 29 WBC, 5 RBC. Urine Pregnancy test negative. Lactic acid 1.3. CXR with no acute changes. [facility] consulted for evaluation for admission for UTI with concerns for pyelonephritis.     6-18-23 - ED visit - CT showed right stones for sure and possible left stones - pain management - no abx    6-21-23 thru 6-24 - admit - UTI kleb, stent placed reduced right hydro, ceftriaxone then PO augmentin    6-30-23 - fever to 101, vomiting qd, decreased PO intake admitted for pyelo - CT- renal - showed faint 2 small abscesses  Urology felt that these were not accessible by IR at this time  - attempt abx therapy  - ID  consulted     Patient is clinically not greatly improved - and the headaches are not explained.     Rec:   1. Consult IR and determine whether CT-abdomen vs US is the best test to re-look at the renal abscesses   2. Consider CT head for work up of headaches  3.  Ceftriaxone should work unless we do not have source control - we may need a drain         Anticipated Disposition:     Thank you for your consult. I will follow-up with patient. Please contact us if you have any additional questions.    Junior Castro MD  Infectious Disease  [facility] - Telemetry    Subjective:     Principal Problem:Pyelonephritis    HPI: 43-year-old F w/ PMHx with history depression, opioid dependence who presents to the ED with reports of right flank pain. Patient reports she was diagnosed with a kidney stone and was seen by Dr. Blevins.  Patient had a urethral stent placement in addition to cystoscopy and is currently taking PO antibiotics.  Patient reports she has been running fever over the past few days in addition to nausea, vomiting and diarrhea.  Patient states she is having issues with eating and drinking. She was sent for possible admission per Dr. Blevins.     In the ED, initial vitals /85, HR 85, Temp 98.3F, SpO2 100% on room air. Labs include CBC with H/H 9.1/27.6 and WBC 16.35, CMP with K 3.3.  UA with 1+ protein, 2+ occult blood, 3+ leukocytes, 29 WBC, 5 RBC. Urine Pregnancy test negative. Lactic acid 1.3. CXR with no acute changes. [facility] consulted for evaluation for admission for UTI with concerns for pyelonephritis.     6-18-23 - ED visit - CT showed right stones for sure and possible left stones - pain management - no abx    6-21-23 thru 6-24 - admit - UTI kleb, stent placed reduced right hydro, ceftriaxone then PO augmentin    6-30-23 - fever to 101, vomiting qd, decreased PO intake admitted for pyelo - CT- renal - showed faint 2 small abscesses  Urology felt that these were not accessible by IR at this time  - attempt abx therapy  - ID  consulted     Patient wiped out today but adequate pain control and no dysuria    Interval History: Patient not any better - still with severe headaches which are very unusual for her - abd/back pain has  been stable for a few days  - overall still looks acutely ill - fever to 100.3 and WBC at 14K     Review of Systems    Constitutional:  Positive for activity change, appetite change, fatigue and fever. Negative for chills and diaphoresis.   HENT: Negative.     Eyes: Negative.    Respiratory: Negative.     Cardiovascular: Negative.    Gastrointestinal: Negative.    Endocrine: Negative.    Genitourinary:  Positive for flank pain.   Skin: Negative.    Objective:     Vital Signs (Most Recent):  Temp: 97.9 °F (36.6 °C) (07/04/23 1658)  Pulse: 86 (07/04/23 1658)  Resp: 20 (07/04/23 1658)  BP: (!) 140/82 (07/04/23 1658)  SpO2: 100 % (07/04/23 1658) Vital Signs (24h Range):  Temp:  [97.7 °F (36.5 °C)-100.3 °F (37.9 °C)] 97.9 °F (36.6 °C)  Pulse:  [] 86  Resp:  [16-20] 20  SpO2:  [98 %-100 %] 100 %  BP: (140-155)/(80-83) 140/82     Weight: 87.3 kg (192 lb 7.4 oz)  Body mass index is 32.03 kg/m².    Estimated Creatinine Clearance: 78.3 mL/min (based on SCr of 1 mg/dL).     Physical Exam     Constitutional:       Appearance: Normal appearance.   HENT:      Head: Atraumatic.   Cardiovascular:      Rate and Rhythm: Normal rate.   Pulmonary:      Effort: Pulmonary effort is normal.      Breath sounds: Normal breath sounds.   Abdominal:      General: Bowel sounds are normal.   Musculoskeletal:         General: Normal range of motion.      Cervical back: Normal range of motion and neck supple.   Skin:     General: Skin is warm and dry.   Neurological:      General: No focal deficit present.      Mental Status: She is alert.     Significant Labs: All pertinent labs within the past 24 hours have been reviewed.    Significant Imaging: I have reviewed all pertinent imaging results/findings within the past 24 hours.

## 2023-07-05 NOTE — SUBJECTIVE & OBJECTIVE
Interval History: Patient not any better - still with severe headaches which are very unusual for her - abd/back pain has  been stable for a few days - overall still looks acutely ill - fever to 100.3 and WBC at 14K     Review of Systems    Constitutional:  Positive for activity change, appetite change, fatigue and fever. Negative for chills and diaphoresis.   HENT: Negative.     Eyes: Negative.    Respiratory: Negative.     Cardiovascular: Negative.    Gastrointestinal: Negative.    Endocrine: Negative.    Genitourinary:  Positive for flank pain.   Skin: Negative.    Objective:     Vital Signs (Most Recent):  Temp: 97.9 °F (36.6 °C) (07/04/23 1658)  Pulse: 86 (07/04/23 1658)  Resp: 20 (07/04/23 1658)  BP: (!) 140/82 (07/04/23 1658)  SpO2: 100 % (07/04/23 1658) Vital Signs (24h Range):  Temp:  [97.7 °F (36.5 °C)-100.3 °F (37.9 °C)] 97.9 °F (36.6 °C)  Pulse:  [] 86  Resp:  [16-20] 20  SpO2:  [98 %-100 %] 100 %  BP: (140-155)/(80-83) 140/82     Weight: 87.3 kg (192 lb 7.4 oz)  Body mass index is 32.03 kg/m².    Estimated Creatinine Clearance: 78.3 mL/min (based on SCr of 1 mg/dL).     Physical Exam     Constitutional:       Appearance: Normal appearance.   HENT:      Head: Atraumatic.   Cardiovascular:      Rate and Rhythm: Normal rate.   Pulmonary:      Effort: Pulmonary effort is normal.      Breath sounds: Normal breath sounds.   Abdominal:      General: Bowel sounds are normal.   Musculoskeletal:         General: Normal range of motion.      Cervical back: Normal range of motion and neck supple.   Skin:     General: Skin is warm and dry.   Neurological:      General: No focal deficit present.      Mental Status: She is alert.     Significant Labs: All pertinent labs within the past 24 hours have been reviewed.    Significant Imaging: I have reviewed all pertinent imaging results/findings within the past 24 hours.

## 2023-07-05 NOTE — PT/OT/SLP PROGRESS
Physical Therapy      Patient Name:  Sherri Machado   MRN:  [id number]    Patient not seen today secondary to Testing/imaging (xray/CT/MRI). Will follow-up as able.    7/5/2023

## 2023-07-05 NOTE — ASSESSMENT & PLAN NOTE
Patient has complained of constant right flank pain and RUQ pain.  CT Renal with stable stent and stones with concern for renal abscesses.  Per IR & Urology recs, CT urogram with multiple hypodense irregularities, concerning for abscesses.  Per IR recs, S/p aspiration of 3 ml of pus from right kidney on patient's 6th day on ceftriaxone- Gram stain and KOH fluid studies negative    Plan  -Continue IV ceftriaxone  -Follow up fluid culture, negative for organisms to date  -Follow up with ID for source control

## 2023-07-05 NOTE — NURSING
Arrived on the unit AAOX3, Dressing on the R upper back CDI. Vital signs per flow sheet, place on telemetry, Safety measures in place, call bell in reach. Instructed to call for assistance.

## 2023-07-05 NOTE — MEDICAL/APP STUDENT
[facility] Medicine  Progress Note     Patient Name: Sherri Machado  MRN: [id number]  Patient Class: IP- Inpatient     Admission Date: 6/30/2023  Length of Stay: 4 days  Attending Physician: Zackary Gaspar III, MD  Primary Care Provider: Modesto Waldron MD           Subjective:      Principal Problem:Pyelonephritis           HPI:  43-year-old F w/ PMHx with history depression, opioid dependence who presents to the ED with reports of right flank pain. Patient reports she was diagnosed with a kidney stone and was seen by Dr. Blevins.  Patient had a urethral stent placement in addition to cystoscopy and is currently taking PO antibiotics.  Patient reports she has been running fever over the past few days in addition to nausea, vomiting and diarrhea.  Patient states she is having issues with eating and drinking. She was sent for possible admission per Dr. Blevins.     In the ED, initial vitals /85, HR 85, Temp 98.3F, SpO2 100% on room air. Labs include CBC with H/H 9.1/27.6 and WBC 16.35, CMP with K 3.3.  UA with 1+ protein, 2+ occult blood, 3+ leukocytes, 29 WBC, 5 RBC. Urine Pregnancy test negative. Lactic acid 1.3. CXR with no acute changes. [facility] consulted for evaluation for admission for UTI with concerns for pyelonephritis.         Overview/Hospital Course:  Patient admitted to [facility] for pyelonephritis. CT Renal stone with interval placement of right-sided double-J ureteral stent with resolution of previous right-sided hydronephrosis, stable 5 mm calculus within the distal right ureter near the UVJ with 2 subtle hypoattenuating parenchymal foci within the right kidney, concerning for renal abscesses. US Retroperitoneal with bilateral nonobstructing renal calculi and no hydronephrosis noted. Urology, Dr. Blevins, consulted. Per Urology recommendations, abscesses too small for draining. Pt continued to have high normal temperatures to 100.3 and elevated WBC.  Per ID  recs, IR was consulted on 7/5/23 for repeat imaging (US vs CT-Abd) and possible drainage. ID planned CT Urogram with contrast to probe for evolving renal abscess vs cyst. Continuing IV abx, ceftriaxone. Patient required Visatril 50mg for anxiety. Patient persistent headache pain treated with Ketorolac 15mg.     Interval History:   No adverse events overnight. Today patient reports full body aches, RUQ pain, right flank, and right thoracic back. Reports urinary urgency. + BM. Tolerating PO intake. Patient had repeat temperature of 100.3 and blood pressure 150/85.        Review of Systems   Constitutional:  Negative for activity change, appetite change, chills, fatigue and fever.   HENT:  Negative for congestion, rhinorrhea, sinus pressure, sinus pain, sore throat, trouble swallowing and voice change.    Eyes:  Negative for photophobia, discharge and visual disturbance.   Respiratory:  Negative for cough, chest tightness, shortness of breath and wheezing.    Cardiovascular:  Negative for chest pain, palpitations and leg swelling.   Gastrointestinal:  Positive for abdominal distention, abdominal pain (RUQ, RLQ) and diarrhea. Negative for anal bleeding, blood in stool, constipation, nausea and vomiting.   Genitourinary:  Negative for decreased urine volume, difficulty urinating, dysuria, flank pain, frequency and urgency.   Musculoskeletal:  Positive for back pain (right thoracic). Negative for arthralgias and gait problem.   Neurological:  Positive for headaches (frontal) Negative for dizziness, seizures, syncope, facial asymmetry, speech difficulty, weakness, light-headedness and numbness.   Psychiatric/Behavioral:  Negative for agitation and behavioral problems.    Objective:      Vital Signs (Most Recent):  Temp: 100.3 °F (37.9 °C) (07/04/23 0803)  Pulse: 98 (07/04/23 0802)  Resp: 18 (07/04/23 0805)  BP: (!) 147/80 (07/04/23 0802)  SpO2: 98 % (07/04/23 0802) Vital Signs (24h Range):  Temp:  [97.7 °F (36.5 °C)-100.3  °F (37.9 °C)] 100.3 °F (37.9 °C)  Pulse:  [] 98  Resp:  [16-20] 18  SpO2:  [98 %-100 %] 98 %  BP: (138-155)/(77-83) 147/80      Weight: 87.3 kg (192 lb 7.4 oz)  Body mass index is 32.03 kg/m².     Intake/Output Summary   I/O last 3 completed shifts:  In: -   Out: 2500 [Urine:2500]  No intake/output data recorded.          Physical Exam  Constitutional:       General: She is not in acute distress.     Appearance: Normal appearance. She is normal weight. She is not toxic-appearing.   HENT:      Head: Normocephalic and atraumatic.      Mouth/Throat:      Mouth: Mucous membranes are moist.      Pharynx: Oropharynx is clear.   Eyes:      Extraocular Movements: Extraocular movements intact.      Pupils: Pupils are equal, round, and reactive to light.   Cardiovascular:      Rate and Rhythm: Normal rate and regular rhythm.      Pulses: Normal pulses.      Heart sounds: Normal heart sounds.   Pulmonary:      Effort: Pulmonary effort is normal. No respiratory distress.      Breath sounds: Normal breath sounds. No wheezing or rales.   Abdominal:      General: Abdomen is flat. Bowel sounds are normal. There is distension (mild).      Palpations: Abdomen is soft.      Tenderness: There is abdominal tenderness (RLQ, RUQ).      Comments: Right flank pain   Musculoskeletal:         General: No swelling or tenderness. Normal range of motion.      Cervical back: Normal range of motion.      Right lower leg: No edema.      Left lower leg: No edema.   Lymphadenopathy:      Cervical: No cervical adenopathy.   Skin:     General: Skin is warm and dry.   Neurological:      General: No focal deficit present.      Mental Status: She is alert and oriented to person, place, and time. Mental status is at baseline.   Psychiatric:         Mood and Affect: Mood normal.         Behavior: Behavior normal.          Significant Labs: All pertinent labs within the past 24 hours have been reviewed.  CBC:       Recent Labs     07/05/23  0427   WBC  13.71*              Recent Labs   Lab 07/03/23  0323 07/04/23  0446   WBC 13.22* 13.71*   HGB 8.5* 8.2*   HCT 25.6* 24.9*   * 629*      CMP:           Recent Labs   Lab 07/03/23  0323 07/04/23  0446    140   K 3.5 4.4    104   CO2 22* 24   * 105   BUN 7 10   CREATININE 0.9 1.0   CALCIUM 9.2 9.6   PROT 8.0 8.3   ALBUMIN 2.6* 2.8*   BILITOT 0.3 0.3   ALKPHOS 72 77   AST 52* 54*   ALT 75* 94*   ANIONGAP 12 12         Significant Imaging: I have reviewed all pertinent imaging results/findings within the past 24 hours.    7/5/2023  CT Urogram w Contrast pending        Assessment/Plan:      * Pyelonephritis  Discharged s/p stent placement for nephrolithiasis.  Was on PO abx, augmentin.  Previous urine cx resulted with sensitivities.  Fever, nausea, vomiting on presentation.  WBC elevated, lactic within normal range in ED.  CT Renal with stable stent and stones with concern for renal abscesses.  Retroperitoneal US Bilateral nonobstructing renal calculi   Persistently elevated WBC concerning for worsening abscess vs cyst; IR consulted and planning CT Urogram with contrast     Plan:  - f/u CT Urogram  - Continue IV ceftriaxone.  - Continue maintenance IVF.  - Hold nephrotoxic medications .  - Renally-dose current meds if available  - Avoid Hypotension.  - Strict I/O's.  - Monitor renal function carefully.  - Continue Norco q6hrs, Toradol 15mg q6 hrs PRN for pain  - Monitor WBC count.  - Follow fever  - Urology consult for recommendations.  - ID consult for recommendations     Hyperthyroidism, subclinical  Thyroid studies completed to determine etiology of high blood pressure.   -TSH 0.310  -Follow up outpatient for repeat thyroid studies         Hypokalemia  K 4.4 on 7/4 AM labs.     Plan:  - Replete as needed.     Renal abscess vs Cyst  Plan as in pyelonephritis.        Flank pain  Plan as in pyelonephritis.     Nephrolithiasis  Plan as in pyelonephritis.     Elevated BP without diagnosis of  hypertension  BP elevated on admission. Patient never been diagnosed with hypertension.  Potentially 2/2 to pain.     Plan:  - Will continue to monitor vitals.  - Add amlodipine 5mg daily, can consider titrating dose or can add additional agent if needed.  -Patient high blood pressure to be evaluated outpatient  - Avoid diuretics                        VTE Risk Mitigation (From admission, onward)            Ordered       enoxaparin injection 40 mg  Daily         06/30/23 1744       IP VTE HIGH RISK PATIENT  Once         06/30/23 1744       Place sequential compression device  Until discontinued         06/30/23 1744                     Discharge Planning   RUDOLPH:      Code Status: Full Code   Is the patient medically ready for discharge?:     Reason for patient still in hospital (select all that apply): Patient trending condition  Discharge Plan A: Home, Home with family         ________________________  Note completed with the assistance of Julianne Frost, M4      I hereby acknowledge that I am relying upon documentation authored by a medical student working under my supervision and further I hereby attest that I have verified the student documentation or findings by personally re-performing the physical exam and medical decision making activities of the Evaluation and Management service to be billed.  ________________________  Gwendolyn Peralta MD  [facility] PGY-1

## 2023-07-05 NOTE — SEDATION DOCUMENTATION
IR aspiration completed, removed 3mL from R posterior lower back, sent specimen to lab as requested, tolerated well, called and gave report to terri Nagy awaiting to transport back to floor

## 2023-07-05 NOTE — PROCEDURES
Radiology Post-Procedure Note    Pre Op Diagnosis: Renal abscess    Post Op Diagnosis: Same    Procedure: CT guided aspiration    Procedure performed by: Fred Leon MD    Written Informed Consent Obtained: Yes  Specimen Removed: YES 3 cc pus  Estimated Blood Loss: Minimal    Findings:   Under CT guidance, 17 gauge needle was advanced to the right kidney hypodense lesion and 3 cc pus aspirated. No complications. Sample sent to lab. See dictation.    Patient tolerated procedure well.    Fred Leon M.D.  Interventional Radiology  Department of Radiology  Pager: [phone]

## 2023-07-05 NOTE — ASSESSMENT & PLAN NOTE
43-year-old F w/ PMHx with history depression, opioid dependence who presents to the ED with reports of right flank pain. Patient reports she was diagnosed with a kidney stone and was seen by Dr. Blevins.  Patient had a urethral stent placement in addition to cystoscopy and is currently taking PO antibiotics.  Patient reports she has been running fever over the past few days in addition to nausea, vomiting and diarrhea.  Patient states she is having issues with eating and drinking. She was sent for possible admission per Dr. Blevins.     In the ED, initial vitals /85, HR 85, Temp 98.3F, SpO2 100% on room air. Labs include CBC with H/H 9.1/27.6 and WBC 16.35, CMP with K 3.3.  UA with 1+ protein, 2+ occult blood, 3+ leukocytes, 29 WBC, 5 RBC. Urine Pregnancy test negative. Lactic acid 1.3. CXR with no acute changes. [facility] consulted for evaluation for admission for UTI with concerns for pyelonephritis.     6-18-23 - ED visit - CT showed right stones for sure and possible left stones - pain management - no abx    6-21-23 thru 6-24 - admit - UTI kleb, stent placed reduced right hydro, ceftriaxone then PO augmentin    6-30-23 - fever to 101, vomiting qd, decreased PO intake admitted for pyelo - CT- renal - showed faint 2 small abscesses  Urology felt that these were not accessible by IR at this time  - attempt abx therapy  - ID  consulted     Patient is clinically not greatly improved - and the headaches are not explained.     Rec:   1. Consult IR and determine whether CT-abdomen vs US is the best test to re-look at the renal abscesses   2. Consider CT head for work up of headaches  3. Ceftriaxone should work unless we do not have source control - we may need a drain

## 2023-07-05 NOTE — PLAN OF CARE
"   07/05/23 0937   Post-Acute Status   Post-Acute Authorization Other   Other Status No Post-Acute Service Needs   Discharge Delays None known at this time   Discharge Plan   Discharge Plan A Home       Not medically ready for DC at present. R/O Renal Abscess per ID    "Patient not any better - still with severe headaches which are very unusual for her - abd/back pain has  been stable for a few days - overall still looks acutely ill - fever to 100.3 and WBC at 14K    "    Future Appointments   Date Time Provider Department Center   7/12/2023  9:00 AM Colten Leon PA-C [facility] [place] [facility]   7/31/2023  9:45 AM Kip Beckham MD [facility]OLOG[facility] [place] [facility]     /73 (BP Location: Right arm, Patient Position: Lying)   Pulse 83   Temp 98.5 °F (36.9 °C) (Oral)   Resp 20   Ht 5' 5" (1.651 m)   Wt 87.3 kg (192 lb 7.4 oz)   LMP 06/07/2023 (Exact Date)   SpO2 99%   Breastfeeding No   BMI 32.03 kg/m²      amLODIPine  5 mg Oral Daily    cefTRIAXone (ROCEPHIN) IVPB  1 g Intravenous Q24H    enoxparin  40 mg Subcutaneous Daily    magnesium sulfate IVPB  4 g Intravenous Once    multivitamin  1 tablet Oral Daily    tamsulosin  0.4 mg Oral Daily       "

## 2023-07-05 NOTE — ASSESSMENT & PLAN NOTE
Patient with recent diagnosis of pyelonephritis and nephrolithiasis recently discharged from hospital s/p stent placement for nephrolithiasis.  Was on PO abx, augmentin.  Previous urine cx resulted with sensitivities.  Fever, nausea, vomiting on admission.  Patient experienced spikes in fevers throughout stay while on IV ceftriaxone.  WBC elevated, lactic within normal range in ED.  CT Renal with stable stent and stones with concern for renal abscesses.  Retroperitoneal US Bilateral nonobstructing renal calculi.   CT urogram revealing stent in stable position    Plan:  - Continue IV ceftriaxone day 7  - Continue maintenance IVF.  - Hold nephrotoxic medications .  - Renally-dose current meds if available  - Avoid Hypotension.  - Strict I/O's.  - Monitor renal function carefully.  - Continue Norco q6hrs, Toradol 15mg q6 hrs PRN for pain  -Toradol discontinued due to 3 days of use with 2 days extension  - one time dose of dilaudid 0.5mg for R cva pain  - Monitor WBC count.  -Follow fever  - per ID & uro recs, CT urogram with contrast due to consistent spikes in fever and elevated WBC  -per IR recs, aspiration of right kidney  -Follow Uro & ID recommendations for any alterations in antibiotic regimen based on negative fluid studies

## 2023-07-05 NOTE — MEDICAL/APP STUDENT
[facility] Medicine  Progress Note     Patient Name: Sherri Machado  MRN: [id number]  Patient Class: IP- Inpatient     Admission Date: 6/30/2023  Length of Stay: 4 days  Attending Physician: Zackary Gaspar III, MD  Primary Care Provider: Modesto Waldron MD           Subjective:      Principal Problem:Pyelonephritis           HPI:  43-year-old F w/ PMHx with history depression, opioid dependence who presents to the ED with reports of right flank pain. Patient reports she was diagnosed with a kidney stone and was seen by Dr. Blevins.  Patient had a urethral stent placement in addition to cystoscopy and is currently taking PO antibiotics.  Patient reports she has been running fever over the past few days in addition to nausea, vomiting and diarrhea.  Patient states she is having issues with eating and drinking. She was sent for possible admission per Dr. Blevins.     In the ED, initial vitals /85, HR 85, Temp 98.3F, SpO2 100% on room air. Labs include CBC with H/H 9.1/27.6 and WBC 16.35, CMP with K 3.3.  UA with 1+ protein, 2+ occult blood, 3+ leukocytes, 29 WBC, 5 RBC. Urine Pregnancy test negative. Lactic acid 1.3. CXR with no acute changes. [facility] consulted for evaluation for admission for UTI with concerns for pyelonephritis.         Overview/Hospital Course:  Patient admitted to [facility] for pyelonephritis. CT Renal stone with interval placement of right-sided double-J ureteral stent with resolution of previous right-sided hydronephrosis, stable 5 mm calculus within the distal right ureter near the UVJ with 2 subtle hypoattenuating parenchymal foci within the right kidney, concerning for renal abscesses. US Retroperitoneal with bilateral nonobstructing renal calculi and no hydronephrosis noted. Urology, Dr. Blevins, consulted. Per Urology recommendations, abscesses too small for draining. Pt continued to have high normal temperatures to 100.3 and elevated WBC.  Per ID  recs, IR was consulted on 7/5/23 for repeat imaging (US vs CT-Abd) and possible drainage. ID planned CT Urogram with contrast to probe for evolving renal abscess vs cyst. Continuing IV abx, ceftriaxone. Patient required Visatril 50mg for anxiety. Patient persistent headache pain treated with Ketorolac 15mg.     Interval History:   No adverse events overnight. Today patient reports full body aches, RUQ pain, right flank, and right thoracic back. Reports urinary urgency. + BM. Tolerating PO intake. Patient had repeat temperature of 100.3 and blood pressure 150/85.        Review of Systems   Constitutional:  Negative for activity change, appetite change, chills, fatigue and fever.   HENT:  Negative for congestion, rhinorrhea, sinus pressure, sinus pain, sore throat, trouble swallowing and voice change.    Eyes:  Negative for photophobia, discharge and visual disturbance.   Respiratory:  Negative for cough, chest tightness, shortness of breath and wheezing.    Cardiovascular:  Negative for chest pain, palpitations and leg swelling.   Gastrointestinal:  Positive for abdominal distention, abdominal pain (RUQ, RLQ) and diarrhea. Negative for anal bleeding, blood in stool, constipation, nausea and vomiting.   Genitourinary:  Negative for decreased urine volume, difficulty urinating, dysuria, flank pain, frequency and urgency.   Musculoskeletal:  Positive for back pain (right thoracic). Negative for arthralgias and gait problem.   Neurological:  Positive for headaches (frontal) Negative for dizziness, seizures, syncope, facial asymmetry, speech difficulty, weakness, light-headedness and numbness.   Psychiatric/Behavioral:  Negative for agitation and behavioral problems.    Objective:      Vital Signs (Most Recent):  Temp: 100.3 °F (37.9 °C) (07/04/23 0803)  Pulse: 98 (07/04/23 0802)  Resp: 18 (07/04/23 0805)  BP: (!) 147/80 (07/04/23 0802)  SpO2: 98 % (07/04/23 0802) Vital Signs (24h Range):  Temp:  [97.7 °F (36.5 °C)-100.3  °F (37.9 °C)] 100.3 °F (37.9 °C)  Pulse:  [] 98  Resp:  [16-20] 18  SpO2:  [98 %-100 %] 98 %  BP: (138-155)/(77-83) 147/80      Weight: 87.3 kg (192 lb 7.4 oz)  Body mass index is 32.03 kg/m².     Intake/Output Summary   I/O last 3 completed shifts:  In: -   Out: 2500 [Urine:2500]  No intake/output data recorded.          Physical Exam  Constitutional:       General: She is not in acute distress.     Appearance: Normal appearance. She is normal weight. She is not toxic-appearing.   HENT:      Head: Normocephalic and atraumatic.      Mouth/Throat:      Mouth: Mucous membranes are moist.      Pharynx: Oropharynx is clear.   Eyes:      Extraocular Movements: Extraocular movements intact.      Pupils: Pupils are equal, round, and reactive to light.   Cardiovascular:      Rate and Rhythm: Normal rate and regular rhythm.      Pulses: Normal pulses.      Heart sounds: Normal heart sounds.   Pulmonary:      Effort: Pulmonary effort is normal. No respiratory distress.      Breath sounds: Normal breath sounds. No wheezing or rales.   Abdominal:      General: Abdomen is flat. Bowel sounds are normal. There is distension (mild).      Palpations: Abdomen is soft.      Tenderness: There is abdominal tenderness (RLQ, RUQ).      Comments: Right flank pain   Musculoskeletal:         General: No swelling or tenderness. Normal range of motion.      Cervical back: Normal range of motion.      Right lower leg: No edema.      Left lower leg: No edema.   Lymphadenopathy:      Cervical: No cervical adenopathy.   Skin:     General: Skin is warm and dry.   Neurological:      General: No focal deficit present.      Mental Status: She is alert and oriented to person, place, and time. Mental status is at baseline.   Psychiatric:         Mood and Affect: Mood normal.         Behavior: Behavior normal.          Significant Labs: All pertinent labs within the past 24 hours have been reviewed.  CBC:       Recent Labs     07/05/23  0427   WBC  13.71*              Recent Labs   Lab 07/03/23  0323 07/04/23  0446   WBC 13.22* 13.71*   HGB 8.5* 8.2*   HCT 25.6* 24.9*   * 629*      CMP:           Recent Labs   Lab 07/03/23  0323 07/04/23  0446    140   K 3.5 4.4    104   CO2 22* 24   * 105   BUN 7 10   CREATININE 0.9 1.0   CALCIUM 9.2 9.6   PROT 8.0 8.3   ALBUMIN 2.6* 2.8*   BILITOT 0.3 0.3   ALKPHOS 72 77   AST 52* 54*   ALT 75* 94*   ANIONGAP 12 12         Significant Imaging: I have reviewed all pertinent imaging results/findings within the past 24 hours.    7/5/2023  CT Urogram w Contrast pending        Assessment/Plan:      * Pyelonephritis  Discharged s/p stent placement for nephrolithiasis.  Was on PO abx, augmentin.  Previous urine cx resulted with sensitivities.  Fever, nausea, vomiting on presentation.  WBC elevated, lactic within normal range in ED.  CT Renal with stable stent and stones with concern for renal abscesses.  Retroperitoneal US Bilateral nonobstructing renal calculi   Persistently elevated WBC concerning for worsening abscess vs cyst; IR consulted and planning CT Urogram with contrast     Plan:  - f/u CT Urogram  - Continue IV ceftriaxone.  - Continue maintenance IVF.  - Hold nephrotoxic medications .  - Renally-dose current meds if available  - Avoid Hypotension.  - Strict I/O's.  - Monitor renal function carefully.  - Continue Norco q6hrs, Toradol 15mg q6 hrs PRN for pain  - Monitor WBC count.  - Follow fever  - Urology consult for recommendations.  - ID consult for recommendations     Hyperthyroidism, subclinical  Thyroid studies completed to determine etiology of high blood pressure.   -TSH 0.310  -Follow up outpatient for repeat thyroid studies         Hypokalemia  K 4.4 on 7/4 AM labs.     Plan:  - Replete as needed.     Renal abscess vs Cyst  Plan as in pyelonephritis.        Flank pain  Plan as in pyelonephritis.     Nephrolithiasis  Plan as in pyelonephritis.     Elevated BP without diagnosis of  hypertension  BP elevated on admission. Patient never been diagnosed with hypertension.  Potentially 2/2 to pain.     Plan:  - Will continue to monitor vitals.  - Add amlodipine 5mg daily, can consider titrating dose or can add additional agent if needed.  -Patient high blood pressure to be evaluated outpatient  - Avoid diuretics                        VTE Risk Mitigation (From admission, onward)            Ordered       enoxaparin injection 40 mg  Daily         06/30/23 1744       IP VTE HIGH RISK PATIENT  Once         06/30/23 1744       Place sequential compression device  Until discontinued         06/30/23 1744                     Discharge Planning   RUDOLPH:      Code Status: Full Code   Is the patient medically ready for discharge?:     Reason for patient still in hospital (select all that apply): Patient trending condition  Discharge Plan A: Home, Home with family         ________________________  Note completed with the assistance of Julianne Frost, M4    I hereby acknowledge that I am relying upon documentation authored by a medical student working under my supervision and further I hereby attest that I have verified the student documentation or findings by personally performing the physical exam and medical decision making activities of the Evaluation and Management service to be billed.  ________________________  Gwendolyn Peralta MD  [facility] PGY-1         .

## 2023-07-05 NOTE — PT/OT/SLP PROGRESS
Occupational Therapy      Patient Name:  Sherri Machado   MRN:  [id number]    Patient not seen today secondary to Testing/imaging (xray/CT/MRI). Will follow-up as able.    7/5/2023

## 2023-07-05 NOTE — SUBJECTIVE & OBJECTIVE
Interval History: No adverse events overnight. S/p IR drainage of a R renal cyst.  Today patient R back pain is worsened to 8/10. Patient given one time dose of dilaudid 0.5mg. States she has a goal to move out of bed today. No BM this AM. Tolerating PO intake.   Patient had CT urogram 1 day ago revealing edematous right kidney concerning for infection with multiple hypodense irregularities with largest being 4.6cm.   Patient underwent IR renal aspiration removing 3cc of pus. Results from gram stain and KOH negative. Culture, many WBCs no organisms seen to date.   Patient afebrile, bp 141/78, other vitals stable at this time.        Review of Systems   Constitutional:  Negative for appetite change, fatigue and fever.   HENT:  Negative for congestion, rhinorrhea, sinus pressure, sinus pain and sore throat.    Eyes:  Negative for redness.   Respiratory:  Negative for cough, chest tightness, shortness of breath and wheezing.    Cardiovascular:  Negative for chest pain, palpitations and leg swelling.   Gastrointestinal:  Negative for abdominal distention, abdominal pain, constipation, diarrhea, nausea and vomiting.   Genitourinary:  Positive for flank pain. Negative for decreased urine volume, difficulty urinating, dysuria, frequency and urgency.   Musculoskeletal:  Positive for back pain. Negative for arthralgias and gait problem.   Neurological:  Negative for dizziness, facial asymmetry, speech difficulty, weakness, light-headedness and headaches.   Psychiatric/Behavioral:  Negative for agitation and behavioral problems. The patient is nervous/anxious.    Objective:     Vital Signs (Most Recent):  Temp: 98.5 °F (36.9 °C) (07/05/23 0802)  Pulse: 83 (07/05/23 0802)  Resp: 20 (07/05/23 0802)  BP: 132/73 (07/05/23 0802)  SpO2: 99 % (07/05/23 0802) Vital Signs (24h Range):  Temp:  [97.9 °F (36.6 °C)-100.3 °F (37.9 °C)] 98.5 °F (36.9 °C)  Pulse:  [] 83  Resp:  [18-20] 20  SpO2:  [97 %-100 %] 99 %  BP:  (132-150)/(73-85) 132/73     Weight: 87.3 kg (192 lb 7.4 oz)  Body mass index is 32.03 kg/m².    Intake/Output Summary (Last 24 hours) at 7/5/2023 0823  Last data filed at 7/4/2023 1449  Gross per 24 hour   Intake --   Output 1600 ml   Net -1600 ml         Physical Exam  Constitutional:       Appearance: Normal appearance.   Cardiovascular:      Rate and Rhythm: Normal rate and regular rhythm.   Pulmonary:      Effort: Pulmonary effort is normal.      Breath sounds: Normal breath sounds.   Abdominal:      General: Bowel sounds are normal. There is distension.      Tenderness: There is abdominal tenderness (mid epigastric). There is right CVA tenderness. There is no guarding or rebound.   Musculoskeletal:      Comments: R flank dressing CDI   Skin:     General: Skin is warm and dry.   Neurological:      Mental Status: She is alert and oriented to person, place, and time.   Psychiatric:         Mood and Affect: Mood normal.         Behavior: Behavior normal.         Thought Content: Thought content normal.           Significant Labs: All pertinent labs within the past 24 hours have been reviewed.  CBC:   Recent Labs   Lab 07/05/23 0427 07/06/23  0405   WBC 13.71* 11.91   HGB 7.7* 7.9*   HCT 23.5* 24.2*   * 562*     CMP:   Recent Labs   Lab 07/05/23 0427 07/06/23  0405    137   K 4.0 3.8    100   CO2 24 26   GLU 99 127*   BUN 9 14   CREATININE 0.9 1.2   CALCIUM 9.3 9.3   PROT 8.3 8.0   ALBUMIN 2.8* 2.6*   BILITOT 0.3 0.2   ALKPHOS 83 114   AST 43* 55*   ALT 89* 103*   ANIONGAP 12 11       Significant Imaging: CT: I have reviewed all pertinent results/findings within the past 24 hours and my personal findings are:     Percutaneous aspiration of right renal abscess, yielding 3 mL of purulent fluid. No drainage catheter was left in place.

## 2023-07-05 NOTE — CONSULTS
"Percutaneous Drain Placement/Aspiration Consult Note  Interventional Radiology      Reason for Consult: eval, possible drainage of renal abscesses    SUBJECTIVE:     Chief Complaint:  pain    History of Present Illness:  Sherri Machado is a 43 y.o. female with a PMHx of depression, right ureterovesical junction stone / UVU s/p cysto, right stent, RGP on 6/22/23 who was admitted on 6/30 for evaluation of R flank pain. Interventional Radiology has been consulted for image guided percutaneous drain placement/aspiration for management of  renal abscess . Pt with recent admission 6/21-6/24 UTI with stent placement for hydronephrosis discharged on augmentin.  CT 6/30 showed 2 faint small abscesses.  IR consulted for imaging guidance and possible intervention.  CT urogram obtained noting "multifocal irregular hypodensities in the mid to lower right kidney, correlating to the finding on the prior noncontrast CT. The largest measures up to 4.6 cm in maximal cranial caudal dimension. Each kidney demonstrates a hypodensity which is at the upper pole and measures 1.7 cm on the left, subcentimeter on the right, possible cyst. There is no hydronephrosis. Bladder is partially distended and grossly unremarkable."  The pt's WBC is 13.71 and is trending up. She is currently afebrile. The pt is hemodynamically stable.     Review of Systems   Constitutional:  Negative for chills, fever, malaise/fatigue and weight loss.   Respiratory:  Negative for cough and shortness of breath.    Cardiovascular:  Negative for chest pain, palpitations and leg swelling.   Gastrointestinal:  Positive for abdominal pain. Negative for diarrhea, nausea and vomiting.   Genitourinary:  Negative for dysuria and flank pain.   Musculoskeletal:  Negative for back pain and myalgias.   Neurological:  Positive for headaches. Negative for dizziness, focal weakness and weakness.     Scheduled Meds:   amLODIPine  5 mg Oral Daily    cefTRIAXone (ROCEPHIN) IVPB  1 g " Intravenous Q24H    enoxparin  40 mg Subcutaneous Daily    magnesium sulfate IVPB  4 g Intravenous Once    multivitamin  1 tablet Oral Daily    tamsulosin  0.4 mg Oral Daily     Continuous Infusions:  PRN Meds:acetaminophen, HYDROcodone-acetaminophen, ketorolac, melatonin, naloxone, ondansetron, senna-docusate 8.6-50 mg, sodium chloride 0.9%    Review of patient's allergies indicates:   Allergen Reactions    Aspirin Other (See Comments)     Abdominal cramping       Past Medical History:   Diagnosis Date    Allergy     IBS (irritable bowel syndrome)     Osteoarthritis     Renal abscess 2023    Sickle cell trait     Urinary tract infection      Past Surgical History:   Procedure Laterality Date    ANKLE SURGERY      left     SECTION, CLASSIC      x3    CHOLECYSTECTOMY      CYSTOSCOPY Right 2023    Procedure: CYSTOSCOPY;  Surgeon: Kaylie Blevins MD;  Location: [facility] OR;  Service: Urology;  Laterality: Right;    RETROGRADE PYELOGRAPHY Right 2023    Procedure: PYELOGRAM, RETROGRADE;  Surgeon: Kaylie Blevins MD;  Location: [facility] OR;  Service: Urology;  Laterality: Right;    URETERAL STENT PLACEMENT Right 2023    Procedure: INSERTION, STENT, URETER;  Surgeon: Kaylie Blevins MD;  Location: [facility] OR;  Service: Urology;  Laterality: Right;     No family history on file.  Social History     Tobacco Use    Smoking status: Never    Smokeless tobacco: Never   Substance Use Topics    Alcohol use: No    Drug use: No       OBJECTIVE:     Vital Signs (Most Recent)  Temp: 97.5 °F (36.4 °C) (23 120)  Pulse: 88 (23 1202)  Resp: 20 (23 120)  BP: (!) 140/88 (23 1202)  SpO2: 98 % (23 1202)    Physical Exam:  Physical Exam  Vitals and nursing note reviewed.   Constitutional:       Appearance: Normal appearance.   HENT:      Head: Normocephalic and atraumatic.   Eyes:      Extraocular Movements: Extraocular movements intact.   Cardiovascular:      Rate and Rhythm: Normal rate.       Pulses: Normal pulses.   Pulmonary:      Effort: Pulmonary effort is normal.   Abdominal:      General: Abdomen is flat.      Tenderness: There is abdominal tenderness in the right upper quadrant and right lower quadrant. There is right CVA tenderness. There is no left CVA tenderness.   Musculoskeletal:      Cervical back: Normal range of motion and neck supple.   Skin:     General: Skin is warm and dry.   Neurological:      General: No focal deficit present.      Mental Status: She is alert and oriented to person, place, and time.   Psychiatric:         Mood and Affect: Mood normal.         Behavior: Behavior normal.       Laboratory  I have reviewed all pertinent lab results within the past 24 hours.  CBC:   Recent Labs   Lab 07/05/23 0427   WBC 13.71*   RBC 2.94*   HGB 7.7*   HCT 23.5*   *   MCV 80*   MCH 26.2*   MCHC 32.8     CMP:   Recent Labs   Lab 07/05/23 0427   GLU 99   CALCIUM 9.3   ALBUMIN 2.8*   PROT 8.3      K 4.0   CO2 24      BUN 9   CREATININE 0.9   ALKPHOS 83   ALT 89*   AST 43*   BILITOT 0.3     Coagulation: No results for input(s): LABPROT, INR, APTT in the last 168 hours.    ASA/Mallampati  ASA: 3  Mallampati: 2    Imaging:  Recent imaging studies including  CT Urogram  on 7/5 which was independently reviewed by Consuelo Fofana PA-C and Fred Leon MD.     ASSESSMENT/PLAN:     Assessment:  43 y.o. female with a PMHx of right ureterovesical junction stone / UVU (s/p cysto, right stent, RGP on 6/22/23) who has been referred to IR for image guided aspiration for management of  renal abscess . The procedure was discussed in great detail with the patient including thorough explanations of the potential risks and benefits of percutaneous drain placement/aspiration. Risks include sepsis, severe infection, hemorrhage, damage to surrounding structures, catheter malfunction, inability to remove catheter, and catheter dislodgment. The patient is a candidate for CT guided aspiration under  moderate sedation. Plan discussed with ordering physician.The pt verbalized understanding of the plan and would like to proceed.    Plan:  Will proceed with CT guided percutaneous aspiration under moderate sedation on 7/5/23, approximately 1330.   Please keep pt NPO.  Anticoagulation history reviewed. Coagulation labs reviewed.    Thank you for the consult. Please contact with questions via [employer] secure chat or [employer]    Consuelo Fofana PA-C  Interventional Radiology

## 2023-07-06 PROBLEM — R53.81 PHYSICAL DECONDITIONING: Status: ACTIVE | Noted: 2023-07-06

## 2023-07-06 PROBLEM — D50.9 IRON DEFICIENCY ANEMIA: Status: ACTIVE | Noted: 2023-07-06

## 2023-07-06 PROBLEM — R51.9 HEADACHE ON TOP OF HEAD: Status: ACTIVE | Noted: 2023-07-06

## 2023-07-06 LAB
ALBUMIN SERPL BCP-MCNC: 2.6 G/DL (ref 3.5–5.2)
ALP SERPL-CCNC: 114 U/L (ref 55–135)
ALT SERPL W/O P-5'-P-CCNC: 103 U/L (ref 10–44)
ANION GAP SERPL CALC-SCNC: 11 MMOL/L (ref 8–16)
AST SERPL-CCNC: 55 U/L (ref 10–40)
BASOPHILS # BLD AUTO: 0.08 K/UL (ref 0–0.2)
BASOPHILS NFR BLD: 0.7 % (ref 0–1.9)
BILIRUB SERPL-MCNC: 0.2 MG/DL (ref 0.1–1)
BUN SERPL-MCNC: 14 MG/DL (ref 6–20)
CALCIUM SERPL-MCNC: 9.3 MG/DL (ref 8.7–10.5)
CHLORIDE SERPL-SCNC: 100 MMOL/L (ref 95–110)
CO2 SERPL-SCNC: 26 MMOL/L (ref 23–29)
CREAT SERPL-MCNC: 1.2 MG/DL (ref 0.5–1.4)
DIFFERENTIAL METHOD: ABNORMAL
EOSINOPHIL # BLD AUTO: 0.1 K/UL (ref 0–0.5)
EOSINOPHIL NFR BLD: 0.5 % (ref 0–8)
ERYTHROCYTE [DISTWIDTH] IN BLOOD BY AUTOMATED COUNT: 14.1 % (ref 11.5–14.5)
EST. GFR  (NO RACE VARIABLE): 57 ML/MIN/1.73 M^2
FERRITIN SERPL-MCNC: 465 NG/ML (ref 20–300)
GLUCOSE SERPL-MCNC: 127 MG/DL (ref 70–110)
GRAM STN SPEC: NORMAL
GRAM STN SPEC: NORMAL
HCT VFR BLD AUTO: 24.2 % (ref 37–48.5)
HGB BLD-MCNC: 7.9 G/DL (ref 12–16)
IMM GRANULOCYTES # BLD AUTO: 0.08 K/UL (ref 0–0.04)
IMM GRANULOCYTES NFR BLD AUTO: 0.7 % (ref 0–0.5)
IRON SERPL-MCNC: 29 UG/DL (ref 30–160)
KOH PREP SPEC: NORMAL
LYMPHOCYTES # BLD AUTO: 1.9 K/UL (ref 1–4.8)
LYMPHOCYTES NFR BLD: 16.1 % (ref 18–48)
MAGNESIUM SERPL-MCNC: 2.1 MG/DL (ref 1.6–2.6)
MCH RBC QN AUTO: 26.3 PG (ref 27–31)
MCHC RBC AUTO-ENTMCNC: 32.6 G/DL (ref 32–36)
MCV RBC AUTO: 81 FL (ref 82–98)
MONOCYTES # BLD AUTO: 0.7 K/UL (ref 0.3–1)
MONOCYTES NFR BLD: 6.2 % (ref 4–15)
NEUTROPHILS # BLD AUTO: 9 K/UL (ref 1.8–7.7)
NEUTROPHILS NFR BLD: 75.8 % (ref 38–73)
NRBC BLD-RTO: 0 /100 WBC
PHOSPHATE SERPL-MCNC: 3.9 MG/DL (ref 2.7–4.5)
PLATELET # BLD AUTO: 562 K/UL (ref 150–450)
PMV BLD AUTO: 8.1 FL (ref 9.2–12.9)
POTASSIUM SERPL-SCNC: 3.8 MMOL/L (ref 3.5–5.1)
PROT SERPL-MCNC: 8 G/DL (ref 6–8.4)
RBC # BLD AUTO: 3 M/UL (ref 4–5.4)
SATURATED IRON: 13 % (ref 20–50)
SODIUM SERPL-SCNC: 137 MMOL/L (ref 136–145)
TOTAL IRON BINDING CAPACITY: 218 UG/DL (ref 250–450)
TRANSFERRIN SERPL-MCNC: 147 MG/DL (ref 200–375)
WBC # BLD AUTO: 11.91 K/UL (ref 3.9–12.7)

## 2023-07-06 PROCEDURE — 83735 ASSAY OF MAGNESIUM: CPT

## 2023-07-06 PROCEDURE — 97530 THERAPEUTIC ACTIVITIES: CPT

## 2023-07-06 PROCEDURE — 63600175 PHARM REV CODE 636 W HCPCS

## 2023-07-06 PROCEDURE — 85025 COMPLETE CBC W/AUTO DIFF WBC: CPT

## 2023-07-06 PROCEDURE — 36415 COLL VENOUS BLD VENIPUNCTURE: CPT

## 2023-07-06 PROCEDURE — 97110 THERAPEUTIC EXERCISES: CPT

## 2023-07-06 PROCEDURE — 80053 COMPREHEN METABOLIC PANEL: CPT

## 2023-07-06 PROCEDURE — 25000003 PHARM REV CODE 250

## 2023-07-06 PROCEDURE — 97165 OT EVAL LOW COMPLEX 30 MIN: CPT

## 2023-07-06 PROCEDURE — 25000003 PHARM REV CODE 250: Performed by: STUDENT IN AN ORGANIZED HEALTH CARE EDUCATION/TRAINING PROGRAM

## 2023-07-06 PROCEDURE — 11000001 HC ACUTE MED/SURG PRIVATE ROOM

## 2023-07-06 PROCEDURE — 94761 N-INVAS EAR/PLS OXIMETRY MLT: CPT

## 2023-07-06 PROCEDURE — 97116 GAIT TRAINING THERAPY: CPT

## 2023-07-06 PROCEDURE — 84100 ASSAY OF PHOSPHORUS: CPT

## 2023-07-06 PROCEDURE — 97161 PT EVAL LOW COMPLEX 20 MIN: CPT

## 2023-07-06 PROCEDURE — 94760 N-INVAS EAR/PLS OXIMETRY 1: CPT

## 2023-07-06 PROCEDURE — 82728 ASSAY OF FERRITIN: CPT

## 2023-07-06 PROCEDURE — 84466 ASSAY OF TRANSFERRIN: CPT

## 2023-07-06 RX ORDER — LANOLIN ALCOHOL/MO/W.PET/CERES
1 CREAM (GRAM) TOPICAL DAILY
Status: DISCONTINUED | OUTPATIENT
Start: 2023-07-06 | End: 2023-07-10 | Stop reason: HOSPADM

## 2023-07-06 RX ORDER — HYDROMORPHONE HYDROCHLORIDE 1 MG/ML
0.5 INJECTION, SOLUTION INTRAMUSCULAR; INTRAVENOUS; SUBCUTANEOUS ONCE
Status: COMPLETED | OUTPATIENT
Start: 2023-07-06 | End: 2023-07-06

## 2023-07-06 RX ADMIN — FERROUS SULFATE TAB 325 MG (65 MG ELEMENTAL FE) 1 EACH: 325 (65 FE) TAB at 07:07

## 2023-07-06 RX ADMIN — CEFTRIAXONE 1 G: 1 INJECTION, POWDER, FOR SOLUTION INTRAMUSCULAR; INTRAVENOUS at 02:07

## 2023-07-06 RX ADMIN — HYDROCODONE BITARTRATE AND ACETAMINOPHEN 1 TABLET: 7.5; 325 TABLET ORAL at 01:07

## 2023-07-06 RX ADMIN — THERA TABS 1 TABLET: TAB at 08:07

## 2023-07-06 RX ADMIN — TAMSULOSIN HYDROCHLORIDE 0.4 MG: 0.4 CAPSULE ORAL at 08:07

## 2023-07-06 RX ADMIN — HYDROMORPHONE HYDROCHLORIDE 0.5 MG: 1 INJECTION, SOLUTION INTRAMUSCULAR; INTRAVENOUS; SUBCUTANEOUS at 08:07

## 2023-07-06 RX ADMIN — ENOXAPARIN SODIUM 40 MG: 40 INJECTION SUBCUTANEOUS at 05:07

## 2023-07-06 RX ADMIN — AMLODIPINE BESYLATE 5 MG: 5 TABLET ORAL at 08:07

## 2023-07-06 RX ADMIN — HYDROCODONE BITARTRATE AND ACETAMINOPHEN 1 TABLET: 7.5; 325 TABLET ORAL at 03:07

## 2023-07-06 RX ADMIN — HYDROCODONE BITARTRATE AND ACETAMINOPHEN 1 TABLET: 7.5; 325 TABLET ORAL at 08:07

## 2023-07-06 RX ADMIN — Medication 6 MG: at 11:07

## 2023-07-06 RX ADMIN — KETOROLAC TROMETHAMINE 15 MG: 30 INJECTION, SOLUTION INTRAMUSCULAR; INTRAVENOUS at 05:07

## 2023-07-06 NOTE — PROGRESS NOTES
[place] - Telemetry  Urology  Progress Note    Patient Name: Sherri Machado  MRN: [id number]  Admission Date: 6/30/2023  Hospital Length of Stay: 6 days  Code Status: Full Code   Attending Provider: Zackary Gaspar III, MD   Primary Care Physician: Modesto Waldron MD    Subjective:     HPI:  No notes on file    Interval History: NAEON. AFVSS. Some right flank pain since procedure, but improving. WBC trending down. Pain improving. Tolerating PO. Ambulating.       Objective:     Temp:  [96.8 °F (36 °C)-99.4 °F (37.4 °C)] 98.6 °F (37 °C)  Pulse:  [] 87  Resp:  [12-20] 18  SpO2:  [97 %-100 %] 99 %  BP: (127-161)/(72-95) 135/83     Body mass index is 32.03 kg/m².           Drains       None                    Physical Exam  Vitals reviewed.   Constitutional:       General: She is not in acute distress.     Appearance: She is not diaphoretic.   HENT:      Head: Normocephalic and atraumatic.      Nose: Nose normal.   Eyes:      Conjunctiva/sclera: Conjunctivae normal.   Cardiovascular:      Rate and Rhythm: Normal rate.   Pulmonary:      Effort: Pulmonary effort is normal. No respiratory distress.   Abdominal:      General: There is no distension.      Palpations: Abdomen is soft.      Tenderness: There is right CVA tenderness. There is no left CVA tenderness.   Musculoskeletal:         General: Normal range of motion.      Cervical back: Normal range of motion.   Skin:     General: Skin is dry.   Neurological:      Mental Status: She is alert.   Psychiatric:         Behavior: Behavior normal.         Thought Content: Thought content normal.         Significant Labs:    BMP:  Recent Labs   Lab 07/04/23  0446 07/05/23  0427 07/06/23  0405    137 137   K 4.4 4.0 3.8    101 100   CO2 24 24 26   BUN 10 9 14   CREATININE 1.0 0.9 1.2   CALCIUM 9.6 9.3 9.3       CBC:   Recent Labs   Lab 07/04/23  0446 07/05/23  0427 07/06/23  0405   WBC 13.71* 13.71* 11.91   HGB 8.2* 7.7* 7.9*   HCT 24.9* 23.5* 24.2*   * 605*  562*       All pertinent labs results from the past 24 hours have been reviewed.    Significant Imaging:  All pertinent imaging results/findings from the past 24 hours have been reviewed.                    Assessment/Plan:     * Pyelonephritis  42 yo F with recent history of right ureteral stone s/p right ureteral stent placement (6/22/23) who developed a right renal abscess.    - S/p right renal abscess aspiration by IR on 7/5/23.   - Pain and WBC improving.   - F/u culture from aspiration.  - Recommend discharge with culture appropriate antibiotics.   - Discussed typical ureteral stent symptoms with the patient - urinary frequency, urgency, bladder spasms, occasional flank pain with voiding.  - Recommend discharge with Flomax 0.4mg qd for stent colic, Oxybutynin 5mg TID PRN for bladder spasms.    - Urology will sign off, please call with any questions.   - F/u outpatient with Urology on 7/31/23.         VTE Risk Mitigation (From admission, onward)           Ordered     enoxaparin injection 40 mg  Daily         06/30/23 1744     IP VTE HIGH RISK PATIENT  Once         06/30/23 1744     Place sequential compression device  Until discontinued         06/30/23 1744                    Luke Orellana MD  Urology  [place] - Telemetry

## 2023-07-06 NOTE — PLAN OF CARE
Problem: Physical Therapy  Goal: Physical Therapy Goal  Description: Goals to be met by: 23    Patient will increase functional independence with mobility by performin. Sit to stand transfer with [place] and no AD.   2. Gait  x 150 feet with [place] using No Assistive Device.     Outcome: Ongoing, Progressing    PT evaluation completed. Pt was previously Independent with no AD; most recently only able to walk short distances and overall increasing weakness (stating she has been mostly in bed since Father's day). Pt at this time CGA/MIN A with transfers and ambulation with HHA as needed. Pt will continue to benefit from skilled acute PT during hospital stay to improve functional dynamic standing balance to return to prior independent level of function. PT recommending home with low intensity therapy (OP PT) and assistance from family/spouse as needed.

## 2023-07-06 NOTE — SUBJECTIVE & OBJECTIVE
Interval History: NAEON. AFVSS. Some right flank pain since procedure, but improving. WBC trending down. Pain improving. Tolerating PO. Ambulating.       Objective:     Temp:  [96.8 °F (36 °C)-99.4 °F (37.4 °C)] 98.6 °F (37 °C)  Pulse:  [] 87  Resp:  [12-20] 18  SpO2:  [97 %-100 %] 99 %  BP: (127-161)/(72-95) 135/83     Body mass index is 32.03 kg/m².           Drains       None                    Physical Exam  Vitals reviewed.   Constitutional:       General: She is not in acute distress.     Appearance: She is not diaphoretic.   HENT:      Head: Normocephalic and atraumatic.      Nose: Nose normal.   Eyes:      Conjunctiva/sclera: Conjunctivae normal.   Cardiovascular:      Rate and Rhythm: Normal rate.   Pulmonary:      Effort: Pulmonary effort is normal. No respiratory distress.   Abdominal:      General: There is no distension.      Palpations: Abdomen is soft.      Tenderness: There is right CVA tenderness. There is no left CVA tenderness.   Musculoskeletal:         General: Normal range of motion.      Cervical back: Normal range of motion.   Skin:     General: Skin is dry.   Neurological:      Mental Status: She is alert.   Psychiatric:         Behavior: Behavior normal.         Thought Content: Thought content normal.         Significant Labs:    BMP:  Recent Labs   Lab 07/04/23 0446 07/05/23 0427 07/06/23  0405    137 137   K 4.4 4.0 3.8    101 100   CO2 24 24 26   BUN 10 9 14   CREATININE 1.0 0.9 1.2   CALCIUM 9.6 9.3 9.3       CBC:   Recent Labs   Lab 07/04/23 0446 07/05/23 0427 07/06/23  0405   WBC 13.71* 13.71* 11.91   HGB 8.2* 7.7* 7.9*   HCT 24.9* 23.5* 24.2*   * 605* 562*       All pertinent labs results from the past 24 hours have been reviewed.    Significant Imaging:  All pertinent imaging results/findings from the past 24 hours have been reviewed.

## 2023-07-06 NOTE — PT/OT/SLP EVAL
Occupational Therapy   Evaluation/Treatment/Dc Summary     Name: Sherri Machado  MRN: [id number]  Admitting Diagnosis: Pyelonephritis  Recent Surgery: * No surgery found *      Recommendations:     Discharge Recommendations: home  Discharge Equipment Recommendations:  none  Barriers to discharge:  None    Assessment:     Sherri Machado is a 43 y.o. female with a medical diagnosis of Pyelonephritis.  She presents with The primary encounter diagnosis was Flank pain. Diagnoses of Sepsis and Acute cystitis without hematuria were also pertinent to this visit.  . Performance deficits affecting function: impaired endurance, pain, edema.      Pt performing at baseline for ADLs at this time- reports completed dsg, sponge bathing, toileting, and G&H Axs with increased time, but physical assist required this AM. Educated on adaptive ADLs to decrease/prevent pain. No further OT needs. D/c OT     Rehab Prognosis: Good; patient would benefit from acute skilled OT services to address these deficits and reach maximum level of function.       Plan:     Patient to be seen  (d/c OT) to address the above listed problems via    Plan of Care Expires: 07/06/23  Plan of Care Reviewed with: patient    Subjective     Chief Complaint: pt reports physical decline since Father's day this year   Patient/Family Comments/goals: return to PLOF     Occupational Profile:  Living Environment: with spouse and children in 2nd floor apt, B rails, t/s combo  Previous level of function: independent; works as a ; drives   Equipment Used at Home: none  Assistance upon Discharge: from spouse if required     Pain/Comfort:  Pain Rating 1: 7/10  Location - Side 1: Right  Location - Orientation 1: lateral  Location 1: flank  Pain Addressed 1: Reposition, Distraction, Cessation of Activity, Nurse notified  Pain Rating Post-Intervention 1:  (did not rate)    Patients cultural, spiritual, [employer] conflicts given the current situation:       Objective:     Communicated with: nsg prior to session.  Patient found supine with   upon OT entry to room.    General Precautions: Standard, fall  Orthopedic Precautions: N/A  Braces: N/A  Respiratory Status: Room air    Occupational Performance:    Bed Mobility:    Patient completed Scooting/Bridging with modified independence  Patient completed Supine to Sit with modified independence  Patient completed Sit to Supine with modified independence    Functional Mobility/Transfers:  Patient completed Sit <> Stand Transfer with supervision  with  no assistive device   Functional Mobility: SBA-supervision without AD     Activities of Daily Living:  Lower Body Dressing: simulated; able to perform fig 4 EOB to adjust socks   Pt self reports sponge bathing, changing clothes and performing G&H axs standing at sink this AM without assist from staff     Cognitive/Visual Perceptual:  WFL    Physical Exam:  BUE WFL ROM and strength based on observed function   Forward flexed posture/guarded position while standing 2/2 R posterior flank pain   R knee swelling from arthritis     AMPA 6 Click ADL:  AMPA Total Score: 23    Treatment & Education:   Pt found supine   Discussed pain management   Demo'd adaptive LBD 2/2 reports of R flank pain - simulated performance by pt   Functional mobility in room and hallway without AD ; emphasis on upright posture, allowing arm swing and forward gaze   Educated on impt of OOB axs to prevent further functional decline     Patient left supine with all lines intact, call button in reach, and nsg notified    GOALS:   Multidisciplinary Problems       Occupational Therapy Goals          Problem: Occupational Therapy    Goal Priority Disciplines Outcome Interventions   Occupational Therapy Goal     OT, PT/OT Adequate for Care Transition                        History:     Past Medical History:   Diagnosis Date    Allergy     IBS (irritable bowel syndrome)     Osteoarthritis     Renal abscess  2023    Sickle cell trait     Urinary tract infection          Past Surgical History:   Procedure Laterality Date    ANKLE SURGERY      left     SECTION, CLASSIC      x3    CHOLECYSTECTOMY      CYSTOSCOPY Right 2023    Procedure: CYSTOSCOPY;  Surgeon: Kaylie Blevins MD;  Location: [facility] OR;  Service: Urology;  Laterality: Right;    RETROGRADE PYELOGRAPHY Right 2023    Procedure: PYELOGRAM, RETROGRADE;  Surgeon: Kaylie Blevins MD;  Location: [facility] OR;  Service: Urology;  Laterality: Right;    URETERAL STENT PLACEMENT Right 2023    Procedure: INSERTION, STENT, URETER;  Surgeon: Kaylie Blevins MD;  Location: [facility] OR;  Service: Urology;  Laterality: Right;       Time Tracking:     OT Date of Treatment: 23  OT Start Time: 1031  OT Stop Time: 1052  OT Total Time (min): 21 min    Billable Minutes:Evaluation 8  Therapeutic Activity 13    2023

## 2023-07-06 NOTE — ASSESSMENT & PLAN NOTE
Thyroid studies completed to determine etiology of high blood pressure. Patient has had systolic BP (140s-160s)  -TSH 0.310  -Follow up outpatient for repeat thyroid studies

## 2023-07-06 NOTE — ASSESSMENT & PLAN NOTE
Iron studies- elevated ferritin, low iron and TIBC indicating iron deficiency anemia    -ferritin administered
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
## 2023-07-06 NOTE — PT/OT/SLP EVAL
Physical Therapy Evaluation and Treatment    Patient Name:  Sherri Machado   MRN:  [id number]    Recommendations:     Discharge Recommendations: outpatient PT (low intensity therapy)   Discharge Equipment Recommendations: none   Barriers to discharge: None    Assessment:     Sherri Machado is a 43 y.o. female admitted with a medical diagnosis of Pyelonephritis.  She presents with the following impairments/functional limitations: weakness, impaired endurance, impaired balance, gait instability, pain. PT evaluation completed. Pt was previously Independent with no AD; most recently only able to walk short distances and overall increasing weakness (stating she has been mostly in bed since Father's day). Pt at this time CGA/MIN A with transfers and ambulation with HHA as needed. Pt will continue to benefit from skilled acute PT during hospital stay to improve functional dynamic standing balance to return to prior independent level of function. PT recommending home with low intensity therapy (OP PT) and assistance from family/spouse as needed.     Rehab Prognosis: Good; patient would benefit from acute skilled PT services to address these deficits and reach maximum level of function.    Recent Surgery: * No surgery found *      Plan:     During this hospitalization, patient to be seen 3 x/week to address the identified rehab impairments via gait training, therapeutic activities, therapeutic exercises, neuromuscular re-education and progress toward the following goals:    Plan of Care Expires:  08/06/23    Subjective     Chief Complaint: back pain  Patient/Family Comments/goals: to be stable on her feet  Pain/Comfort:  Pain Rating 1: 7/10  Location 1: back  Pain Addressed 1: Reposition, Cessation of Activity    Patients cultural, spiritual, Hinduism conflicts given the current situation: no    Living Environment:  Pt lives with spouse and 3 kids on the second floor of building with ~12 steps to enter with [facility].   Prior to  admission, patients level of function was independent with no AD; requiring assist most recently with transfer to bathroom; mobility limited due to pain recently.  Equipment used at home: none.  DME owned (not currently used): none.  Upon discharge, patient will have assistance from / older children.    Objective:     Communicated with nurse prior to session.  Patient found HOB elevated with telemetry  upon PT entry to room.    General Precautions: Standard, fall  Orthopedic Precautions:N/A   Braces: N/A  Respiratory Status: Room air    Exams:  Cognitive Exam:  Patient is oriented to Person, Place, Time, and Situation  Sensation:    -       Intact  RLE ROM: WFL  RLE Strength: WFL  LLE ROM: WFL  LLE Strength: WFL    Functional Mobility:  Bed Mobility:     Scooting: modified independence  Supine to Sit: stand by assistance  Sit to Supine: stand by assistance  Gait: ~100ft with use of no AD and MIN A with HHA x2 trials with seated rest breaks between trials; mild balance impairments however improvement with increased ambulation  Sit<>stand: CGA with no AD; HHA      AM-PAC 6 CLICK MOBILITY  Total Score:20       Treatment & Education:  PT evaluation performed; educated on role of PT.   Pt performed bed mobility, transfers and ambulation as documented above.   Pt performs standing BLE forward lunges with no AD and MIN A/HHA x8.   Pt educated on improving posture in stance and normal arm swing with ambulation to improve balance and independence with mobility.     Patient left HOB elevated with all lines intact, call button in reach, nurse notified, and  present at end of session (entering room).    GOALS:   Multidisciplinary Problems       Physical Therapy Goals          Problem: Physical Therapy    Goal Priority Disciplines Outcome Goal Variances Interventions   Physical Therapy Goal     PT, PT/OT Ongoing, Progressing     Description: Goals to be met by: 8/6/23    Patient will increase functional  independence with mobility by performin. Sit to stand transfer with [place] and no AD.   2. Gait  x 150 feet with [place] using No Assistive Device.                          History:     Past Medical History:   Diagnosis Date    Allergy     IBS (irritable bowel syndrome)     Osteoarthritis     Renal abscess 2023    Sickle cell trait     Urinary tract infection        Past Surgical History:   Procedure Laterality Date    ANKLE SURGERY      left     SECTION, CLASSIC      x3    CHOLECYSTECTOMY      CYSTOSCOPY Right 2023    Procedure: CYSTOSCOPY;  Surgeon: Kaylie Blevins MD;  Location: [facility] OR;  Service: Urology;  Laterality: Right;    RETROGRADE PYELOGRAPHY Right 2023    Procedure: PYELOGRAM, RETROGRADE;  Surgeon: Kaylie Blevins MD;  Location: [facility] OR;  Service: Urology;  Laterality: Right;    URETERAL STENT PLACEMENT Right 2023    Procedure: INSERTION, STENT, URETER;  Surgeon: Kaylie Blevins MD;  Location: [facility] OR;  Service: Urology;  Laterality: Right;       Time Tracking:     PT Received On: 23  PT Start Time: 924     PT Stop Time: 957  PT Total Time (min): 33 min     Billable Minutes: Evaluation 8, Gait Training 15, and Therapeutic Exercise 10      2023

## 2023-07-06 NOTE — PLAN OF CARE
Problem: Occupational Therapy  Goal: Occupational Therapy Goal  Outcome: Adequate for Care Transition       Pt performing at baseline for ADLs at this time- reports completed dsg, sponge bathing, toileting, and G&H Axs with increased time, but physical assist required this AM. Educated on adaptive ADLs to decrease/prevent pain. No further OT needs. D/c OT

## 2023-07-06 NOTE — PROGRESS NOTES
[facility]  Progress Note    Patient Name: Sherri Machado  MRN: [id number]  Patient Class: IP- Inpatient   Admission Date: 6/30/2023  Length of Stay: 6 days  Attending Physician: Zackary Gaspar III, MD  Primary Care Provider: Modesto Waldron MD        Subjective:     Principal Problem:Pyelonephritis        HPI:  43-year-old F w/ PMHx with history depression, opioid dependence who presents to the ED with reports of right flank pain. Patient reports she was diagnosed with a kidney stone and was seen by Dr. Blevins.  Patient had a urethral stent placement in addition to cystoscopy and is currently taking PO antibiotics.  Patient reports she has been running fever over the past few days in addition to nausea, vomiting and diarrhea.  Patient states she is having issues with eating and drinking. She was sent for possible admission per Dr. Blevins.    In the ED, initial vitals /85, HR 85, Temp 98.3F, SpO2 100% on room air. Labs include CBC with H/H 9.1/27.6 and WBC 16.35, CMP with K 3.3.  UA with 1+ protein, 2+ occult blood, 3+ leukocytes, 29 WBC, 5 RBC. Urine Pregnancy test negative. Lactic acid 1.3. CXR with no acute changes. [facility] consulted for evaluation for admission for UTI with concerns for pyelonephritis.       Overview/Hospital Course:  Patient admitted to [facility] for pyelonephritis. CT Renal stone with interval placement of right-sided double-J ureteral stent with resolution of previous right-sided hydronephrosis, stable 5 mm calculus within the distal right ureter near the UVJ with 2 subtle hypoattenuating parenchymal foci within the right kidney, concerning for renal abscesses. US Retroperitoneal with bilateral nonobstructing renal calculi and no hydronephrosis noted. Urology, Dr. Blevins, consulted. Pt continued to have high normal temperatures to 100.3 and elevated WBC.  Per ID recs, IR was consulted on 7/5/23 for repeat imaging (US vs CT-Abd) and drainage. CT  Urogram with contrast per Interventional Radiology read as possible cyst, concerning for abscess with hyperdensity measured as 4.6cm (increased from previous imaging). Patient's white blood cell count declined to normal range after aspiration and on day 7 of IV abx, ceftriaxone. Patient required Visatril 50mg for anxiety. Patient persistent headache pain treated with Ketorolac 15mg.      Interval History: No adverse events overnight. S/p IR drainage of a R renal cyst.  Today patient R back pain is worsened to 8/10. Patient given one time dose of dilaudid 0.5mg. States she has a goal to move out of bed today. No BM this AM. Tolerating PO intake.   Patient had CT urogram 1 day ago revealing edematous right kidney concerning for infection with multiple hypodense irregularities with largest being 4.6cm.   Patient underwent IR renal aspiration removing 3cc of pus. Results from gram stain and KOH negative. Culture, many WBCs no organisms seen to date.   Patient afebrile, bp 141/78, other vitals stable at this time.        Review of Systems   Constitutional:  Negative for appetite change, fatigue and fever.   HENT:  Negative for congestion, rhinorrhea, sinus pressure, sinus pain and sore throat.    Eyes:  Negative for redness.   Respiratory:  Negative for cough, chest tightness, shortness of breath and wheezing.    Cardiovascular:  Negative for chest pain, palpitations and leg swelling.   Gastrointestinal:  Negative for abdominal distention, abdominal pain, constipation, diarrhea, nausea and vomiting.   Genitourinary:  Positive for flank pain. Negative for decreased urine volume, difficulty urinating, dysuria, frequency and urgency.   Musculoskeletal:  Positive for back pain. Negative for arthralgias and gait problem.   Neurological:  Negative for dizziness, facial asymmetry, speech difficulty, weakness, light-headedness and headaches.   Psychiatric/Behavioral:  Negative for agitation and behavioral problems. The patient  is nervous/anxious.    Objective:     Vital Signs (Most Recent):  Temp: 98.5 °F (36.9 °C) (07/05/23 0802)  Pulse: 83 (07/05/23 0802)  Resp: 20 (07/05/23 0802)  BP: 132/73 (07/05/23 0802)  SpO2: 99 % (07/05/23 0802) Vital Signs (24h Range):  Temp:  [97.9 °F (36.6 °C)-100.3 °F (37.9 °C)] 98.5 °F (36.9 °C)  Pulse:  [] 83  Resp:  [18-20] 20  SpO2:  [97 %-100 %] 99 %  BP: (132-150)/(73-85) 132/73     Weight: 87.3 kg (192 lb 7.4 oz)  Body mass index is 32.03 kg/m².    Intake/Output Summary (Last 24 hours) at 7/5/2023 0823  Last data filed at 7/4/2023 1449  Gross per 24 hour   Intake --   Output 1600 ml   Net -1600 ml         Physical Exam  Constitutional:       Appearance: Normal appearance.   Cardiovascular:      Rate and Rhythm: Normal rate and regular rhythm.   Pulmonary:      Effort: Pulmonary effort is normal.      Breath sounds: Normal breath sounds.   Abdominal:      General: Bowel sounds are normal. There is distension.      Tenderness: There is abdominal tenderness (mid epigastric). There is right CVA tenderness. There is no guarding or rebound.   Musculoskeletal:      Comments: R flank dressing CDI   Skin:     General: Skin is warm and dry.   Neurological:      Mental Status: She is alert and oriented to person, place, and time.   Psychiatric:         Mood and Affect: Mood normal.         Behavior: Behavior normal.         Thought Content: Thought content normal.           Significant Labs: All pertinent labs within the past 24 hours have been reviewed.  CBC:   Recent Labs   Lab 07/05/23 0427 07/06/23  0405   WBC 13.71* 11.91   HGB 7.7* 7.9*   HCT 23.5* 24.2*   * 562*     CMP:   Recent Labs   Lab 07/05/23 0427 07/06/23  0405    137   K 4.0 3.8    100   CO2 24 26   GLU 99 127*   BUN 9 14   CREATININE 0.9 1.2   CALCIUM 9.3 9.3   PROT 8.3 8.0   ALBUMIN 2.8* 2.6*   BILITOT 0.3 0.2   ALKPHOS 83 114   AST 43* 55*   ALT 89* 103*   ANIONGAP 12 11       Significant Imaging: CT: I have reviewed  all pertinent results/findings within the past 24 hours and my personal findings are:     Percutaneous aspiration of right renal abscess, yielding 3 mL of purulent fluid. No drainage catheter was left in place.      Assessment/Plan:      * Pyelonephritis  Patient with recent diagnosis of pyelonephritis and nephrolithiasis recently discharged from hospital s/p stent placement for nephrolithiasis.  Was on PO abx, augmentin.  Previous urine cx resulted with sensitivities.  Fever, nausea, vomiting on admission.  Patient experienced spikes in fevers throughout stay while on IV ceftriaxone.  WBC elevated, lactic within normal range in ED.  CT Renal with stable stent and stones with concern for renal abscesses.  Retroperitoneal US Bilateral nonobstructing renal calculi.   CT urogram revealing stent in stable position    Plan:  - Continue IV ceftriaxone day 7  - Continue maintenance IVF.  - Hold nephrotoxic medications .  - Renally-dose current meds if available  - Avoid Hypotension.  - Strict I/O's.  - Monitor renal function carefully.  - Continue Norco q6hrs, Toradol 15mg q6 hrs PRN for pain  -Toradol discontinued due to 3 days of use with 2 days extension  - one time dose of dilaudid 0.5mg for R cva pain  - Monitor WBC count.  -Follow fever  - per ID & uro recs, CT urogram with contrast due to consistent spikes in fever and elevated WBC  -per IR recs, aspiration of right kidney  -Follow Uro & ID recommendations for any alterations in antibiotic regimen based on negative fluid studies    Hyperthyroidism, subclinical  Thyroid studies completed to determine etiology of high blood pressure. Patient has had systolic BP (140s-160s)  -TSH 0.310  -Follow up outpatient for repeat thyroid studies       Hypokalemia  K 3.8 on 7/6 AM labs.    Plan:  - Replete as needed.    Renal abscess  Patient has complained of constant right flank pain and RUQ pain.  CT Renal with stable stent and stones with concern for renal abscesses.  Per IR &  Urology recs, CT urogram with multiple hypodense irregularities, concerning for abscesses.  Per IR recs, S/p aspiration of 3 ml of pus from right kidney on patient's 6th day on ceftriaxone- Gram stain and KOH fluid studies negative    Plan  -Continue IV ceftriaxone  -Follow up fluid culture, negative for organisms to date  -Follow up with ID for source control      Flank pain  Plan as in pyelonephritis.    Nephrolithiasis  Plan as in pyelonephritis.    Elevated BP without diagnosis of hypertension  BP elevated on admission. Patient never been diagnosed with hypertension.  Potentially 2/2 to pain.    Plan:  - Will continue to monitor vitals.  - Add amlodipine 5mg daily, can consider titrating dose or can add additional agent if needed.  -Patient high blood pressure to be evaluated outpatient  - Avoid diuretics        VTE Risk Mitigation (From admission, onward)         Ordered     enoxaparin injection 40 mg  Daily         06/30/23 1744     IP VTE HIGH RISK PATIENT  Once         06/30/23 1744     Place sequential compression device  Until discontinued         06/30/23 1744                Discharge Planning   RUDOLPH: 7/7/2023     Code Status: Full Code   Is the patient medically ready for discharge?:     Reason for patient still in hospital (select all that apply): Patient trending condition  Discharge Plan A: Home   Discharge Delays: None known at this time      ________________________  Gwendolyn Peralta MD  [facility] PGY-1

## 2023-07-06 NOTE — PROGRESS NOTES
[facility] - Telemetry  Infectious Disease  Consult Note     Patient Name: Sherri Machado  MRN: [id number]  Admission Date: 6/30/2023  Attending Physician: Zackary Gaspar MD    Assessment/Plan:      ID  * Pyelonephritis  43-year-old F w/ PMHx with history depression, opioid dependence who presents to the ED with reports of right flank pain. Patient reports she was diagnosed with a kidney stone and was seen by Dr. Blevins.  Patient had a urethral stent placement in addition to cystoscopy and is currently taking PO antibiotics.  Patient reports she has been running fever over the past few days in addition to nausea, vomiting and diarrhea.  Patient states she is having issues with eating and drinking. She was sent for possible admission per Dr. Blevins.     In the ED, initial vitals /85, HR 85, Temp 98.3F, SpO2 100% on room air. Labs include CBC with H/H 9.1/27.6 and WBC 16.35, CMP with K 3.3.  UA with 1+ protein, 2+ occult blood, 3+ leukocytes, 29 WBC, 5 RBC. Urine Pregnancy test negative. Lactic acid 1.3. CXR with no acute changes. [facility] consulted for evaluation for admission for UTI with concerns for pyelonephritis.      6-18-23 - ED visit - CT showed right stones for sure and possible left stones - pain management - no abx     6-21-23 thru 6-24 - admit - UTI kleb, stent placed reduced right hydro, ceftriaxone then PO augmentin     6-30-23 - fever to 101, vomiting qd, decreased PO intake admitted for pyelo - CT- renal - showed faint 2 small abscesses  Urology felt that these were not accessible by IR at this time  - attempt abx therapy  - ID  consulted     7/5/23: IR drainage of renal abscess found on repeat CT 7/5/23    Today patient appears much improved.  Patient able to ambulate and headaches resolved.       Rec:   1. Continue IV ceftriaxone, will need likely 14 days from 7/5/23, would order PICC line placement  2. Will continue to monitor for improvement, hopefully source control achieved and patient will  continue improvement.        Thank you for your consult. I will follow-up with patient. Please contact us if you have any additional questions.     Jemal Lujan MD  [facility]      Subjective:      Patient continues to have flank pain which is well controlled and headahces resolved.  Patient ambulated with PT and went to cafeteria for lunch, appears much improved today after drainage.     Objective:     Last 24 Hour Vital Signs:  BP  Min: 130/72  Max: 161/87  Temp  Av.5 °F (36.9 °C)  Min: 96.8 °F (36 °C)  Max: 99.4 °F (37.4 °C)  Pulse  Av.9  Min: 84  Max: 105  Resp  Av.4  Min: 12  Max: 18  SpO2  Av.7 %  Min: 97 %  Max: 100 %  I/O last 3 completed shifts:  In: 1500 [P.O.:1500]  Out: 3 [Other:3]    Physical Examination:  Gen:  Well-developed, well-nourished, NAD  HEENT:  EOMI, conjunctiva clear, mucous membranes moist  Neck: supple, normal ROM  Resp: Clear to auscultation bilaterally without wheezes or crackles, normal WOB  CV: Regular rate, normal rhythm and S1S2 w/out murmur.   Abd: Soft, non-tender, non-distended, bowel sounds present; R flank tenderness  Neuro: AAO x 4  Derm: Skin is warm and dry, no rashes or lesions appreciated  Psych: Normal mood and affect, normal behavior        Laboratory:  Laboratory Data Reviewed: yes  Pertinent Findings:      Microbiology Data Reviewed: yes  Pertinent Findings:      Other Results:  Radiology Data Reviewed: Yes  Pertinent Findings:  Microbiology Results (last 7 days)       Procedure Component Value Units Date/Time    AFB Culture & Smear [[id number]] Collected: 23    Order Status: Completed Specimen: Abscess from Kidney, Right Updated: 23 1150     AFB CULTURE STAIN No acid fast bacilli seen.    Culture, Body Fluid (Aerobic) w/ GS [[id number]] Collected: 23    Order Status: Completed Specimen: Body Fluid from Back Updated: 23 0206     Gram Stain Result Many WBC's      No organisms seen    KOH prep [[id number]] Collected:  07/05/23 1810    Order Status: Completed Specimen: Abscess from Kidney, Right Updated: 07/06/23 0157     KOH Prep No yeast or fungal elements seen    Gram stain [[id number]] Collected: 07/05/23 1810    Order Status: Completed Specimen: Abscess from Kidney, Right Updated: 07/06/23 0157     Gram Stain Result Moderate WBC's      No organisms seen    Blood culture #1 **CANNOT BE ORDERED STAT** [[id number]] Collected: 06/30/23 1444    Order Status: Completed Specimen: Blood from Antecubital, Right Arm Updated: 07/05/23 2212     Blood Culture, Routine No growth after 5 days.    Blood culture #2 **CANNOT BE ORDERED STAT** [[id number]] Collected: 06/30/23 1444    Order Status: Completed Specimen: Blood from Antecubital, Left Arm Updated: 07/05/23 2212     Blood Culture, Routine No growth after 5 days.    Urine culture [[id number]] Collected: 06/30/23 1423    Order Status: Completed Specimen: Urine Updated: 07/02/23 0256     Urine Culture, Routine No growth    Narrative:      Specimen Source->Urine              Current Medications:     Infusions:       Scheduled:   amLODIPine  5 mg Oral Daily    cefTRIAXone (ROCEPHIN) IVPB  1 g Intravenous Q24H    enoxparin  40 mg Subcutaneous Daily    magnesium sulfate IVPB  4 g Intravenous Once    multivitamin  1 tablet Oral Daily    tamsulosin  0.4 mg Oral Daily        PRN:  acetaminophen, HYDROcodone-acetaminophen, melatonin, naloxone, ondansetron, senna-docusate 8.6-50 mg, sodium chloride 0.9%, sodium chloride 0.9%, sodium chloride 0.9%    Antibiotics and Day Number of Therapy:  Ceftriaxone 7/1-now

## 2023-07-06 NOTE — ASSESSMENT & PLAN NOTE
44 yo F with recent history of right ureteral stone s/p right ureteral stent placement (6/22/23) who developed a right renal abscess.    - S/p right renal abscess aspiration by IR on 7/5/23.   - Pain and WBC improving.   - F/u culture from aspiration.  - Recommend discharge with culture appropriate antibiotics once sensitivities result.   - Discussed typical ureteral stent symptoms with the patient - urinary frequency, urgency, bladder spasms, occasional flank pain with voiding.  - Recommend discharge with Flomax 0.4mg qd for stent colic, Oxybutynin 5mg TID PRN for bladder spasms.    - Urology will sign off, please call with any questions.   - F/u outpatient with Urology on 7/31/23.

## 2023-07-07 LAB
ALBUMIN SERPL BCP-MCNC: 2.8 G/DL (ref 3.5–5.2)
ALP SERPL-CCNC: 101 U/L (ref 55–135)
ALT SERPL W/O P-5'-P-CCNC: 76 U/L (ref 10–44)
ANION GAP SERPL CALC-SCNC: 11 MMOL/L (ref 8–16)
AST SERPL-CCNC: 27 U/L (ref 10–40)
BASOPHILS # BLD AUTO: 0.07 K/UL (ref 0–0.2)
BASOPHILS NFR BLD: 0.5 % (ref 0–1.9)
BILIRUB SERPL-MCNC: 0.4 MG/DL (ref 0.1–1)
BUN SERPL-MCNC: 9 MG/DL (ref 6–20)
CALCIUM SERPL-MCNC: 9.3 MG/DL (ref 8.7–10.5)
CHLORIDE SERPL-SCNC: 101 MMOL/L (ref 95–110)
CO2 SERPL-SCNC: 24 MMOL/L (ref 23–29)
CREAT SERPL-MCNC: 0.9 MG/DL (ref 0.5–1.4)
DIFFERENTIAL METHOD: ABNORMAL
EOSINOPHIL # BLD AUTO: 0.1 K/UL (ref 0–0.5)
EOSINOPHIL NFR BLD: 0.5 % (ref 0–8)
ERYTHROCYTE [DISTWIDTH] IN BLOOD BY AUTOMATED COUNT: 14.1 % (ref 11.5–14.5)
EST. GFR  (NO RACE VARIABLE): >60 ML/MIN/1.73 M^2
GLUCOSE SERPL-MCNC: 107 MG/DL (ref 70–110)
HCT VFR BLD AUTO: 23.2 % (ref 37–48.5)
HGB BLD-MCNC: 7.6 G/DL (ref 12–16)
IMM GRANULOCYTES # BLD AUTO: 0.1 K/UL (ref 0–0.04)
IMM GRANULOCYTES NFR BLD AUTO: 0.8 % (ref 0–0.5)
LYMPHOCYTES # BLD AUTO: 2 K/UL (ref 1–4.8)
LYMPHOCYTES NFR BLD: 15.9 % (ref 18–48)
MAGNESIUM SERPL-MCNC: 1.9 MG/DL (ref 1.6–2.6)
MCH RBC QN AUTO: 26.3 PG (ref 27–31)
MCHC RBC AUTO-ENTMCNC: 32.8 G/DL (ref 32–36)
MCV RBC AUTO: 80 FL (ref 82–98)
MONOCYTES # BLD AUTO: 0.9 K/UL (ref 0.3–1)
MONOCYTES NFR BLD: 7.4 % (ref 4–15)
NEUTROPHILS # BLD AUTO: 9.5 K/UL (ref 1.8–7.7)
NEUTROPHILS NFR BLD: 74.9 % (ref 38–73)
NRBC BLD-RTO: 0 /100 WBC
PHOSPHATE SERPL-MCNC: 3.1 MG/DL (ref 2.7–4.5)
PLATELET # BLD AUTO: 594 K/UL (ref 150–450)
PMV BLD AUTO: 8.5 FL (ref 9.2–12.9)
POTASSIUM SERPL-SCNC: 4 MMOL/L (ref 3.5–5.1)
PROT SERPL-MCNC: 8.4 G/DL (ref 6–8.4)
RBC # BLD AUTO: 2.89 M/UL (ref 4–5.4)
SODIUM SERPL-SCNC: 136 MMOL/L (ref 136–145)
WBC # BLD AUTO: 12.74 K/UL (ref 3.9–12.7)

## 2023-07-07 PROCEDURE — 25000003 PHARM REV CODE 250: Performed by: STUDENT IN AN ORGANIZED HEALTH CARE EDUCATION/TRAINING PROGRAM

## 2023-07-07 PROCEDURE — 94761 N-INVAS EAR/PLS OXIMETRY MLT: CPT

## 2023-07-07 PROCEDURE — 25000003 PHARM REV CODE 250

## 2023-07-07 PROCEDURE — 11000001 HC ACUTE MED/SURG PRIVATE ROOM

## 2023-07-07 PROCEDURE — 83735 ASSAY OF MAGNESIUM: CPT

## 2023-07-07 PROCEDURE — 84100 ASSAY OF PHOSPHORUS: CPT

## 2023-07-07 PROCEDURE — 36415 COLL VENOUS BLD VENIPUNCTURE: CPT

## 2023-07-07 PROCEDURE — 63600175 PHARM REV CODE 636 W HCPCS

## 2023-07-07 PROCEDURE — 99232 PR SUBSEQUENT HOSPITAL CARE,LEVL II: ICD-10-PCS | Mod: ,,, | Performed by: INTERNAL MEDICINE

## 2023-07-07 PROCEDURE — 85025 COMPLETE CBC W/AUTO DIFF WBC: CPT

## 2023-07-07 PROCEDURE — 80053 COMPREHEN METABOLIC PANEL: CPT

## 2023-07-07 PROCEDURE — 99232 SBSQ HOSP IP/OBS MODERATE 35: CPT | Mod: ,,, | Performed by: INTERNAL MEDICINE

## 2023-07-07 RX ORDER — TAMSULOSIN HYDROCHLORIDE 0.4 MG/1
0.4 CAPSULE ORAL DAILY
Status: DISCONTINUED | OUTPATIENT
Start: 2023-07-07 | End: 2023-07-10 | Stop reason: HOSPADM

## 2023-07-07 RX ORDER — LIDOCAINE 50 MG/G
1 PATCH TOPICAL
Status: DISCONTINUED | OUTPATIENT
Start: 2023-07-07 | End: 2023-07-10 | Stop reason: HOSPADM

## 2023-07-07 RX ORDER — HYDROMORPHONE HYDROCHLORIDE 1 MG/ML
0.5 INJECTION, SOLUTION INTRAMUSCULAR; INTRAVENOUS; SUBCUTANEOUS ONCE
Status: COMPLETED | OUTPATIENT
Start: 2023-07-07 | End: 2023-07-07

## 2023-07-07 RX ORDER — OXYBUTYNIN CHLORIDE 5 MG/1
5 TABLET ORAL 3 TIMES DAILY PRN
Status: DISCONTINUED | OUTPATIENT
Start: 2023-07-07 | End: 2023-07-10 | Stop reason: HOSPADM

## 2023-07-07 RX ORDER — HYDROMORPHONE HYDROCHLORIDE 1 MG/ML
0.5 INJECTION, SOLUTION INTRAMUSCULAR; INTRAVENOUS; SUBCUTANEOUS
Status: DISCONTINUED | OUTPATIENT
Start: 2023-07-07 | End: 2023-07-10 | Stop reason: HOSPADM

## 2023-07-07 RX ORDER — OXYCODONE AND ACETAMINOPHEN 10; 325 MG/1; MG/1
1 TABLET ORAL EVERY 4 HOURS
Status: DISCONTINUED | OUTPATIENT
Start: 2023-07-07 | End: 2023-07-10 | Stop reason: HOSPADM

## 2023-07-07 RX ORDER — HYDROCODONE BITARTRATE AND ACETAMINOPHEN 10; 325 MG/1; MG/1
1 TABLET ORAL EVERY 6 HOURS PRN
Status: DISCONTINUED | OUTPATIENT
Start: 2023-07-07 | End: 2023-07-07

## 2023-07-07 RX ORDER — OXYBUTYNIN CHLORIDE 5 MG/1
5 TABLET ORAL 3 TIMES DAILY PRN
Status: DISCONTINUED | OUTPATIENT
Start: 2023-07-07 | End: 2023-07-07

## 2023-07-07 RX ORDER — KETOROLAC TROMETHAMINE 30 MG/ML
15 INJECTION, SOLUTION INTRAMUSCULAR; INTRAVENOUS EVERY 6 HOURS PRN
Status: DISPENSED | OUTPATIENT
Start: 2023-07-07 | End: 2023-07-10

## 2023-07-07 RX ORDER — METHOCARBAMOL 500 MG/1
500 TABLET, FILM COATED ORAL 4 TIMES DAILY PRN
Status: DISCONTINUED | OUTPATIENT
Start: 2023-07-07 | End: 2023-07-10 | Stop reason: HOSPADM

## 2023-07-07 RX ADMIN — HYDROMORPHONE HYDROCHLORIDE 0.5 MG: 1 INJECTION, SOLUTION INTRAMUSCULAR; INTRAVENOUS; SUBCUTANEOUS at 02:07

## 2023-07-07 RX ADMIN — THERA TABS 1 TABLET: TAB at 09:07

## 2023-07-07 RX ADMIN — HYDROMORPHONE HYDROCHLORIDE 0.5 MG: 1 INJECTION, SOLUTION INTRAMUSCULAR; INTRAVENOUS; SUBCUTANEOUS at 11:07

## 2023-07-07 RX ADMIN — ENOXAPARIN SODIUM 40 MG: 40 INJECTION SUBCUTANEOUS at 04:07

## 2023-07-07 RX ADMIN — OXYBUTYNIN CHLORIDE 5 MG: 5 TABLET ORAL at 09:07

## 2023-07-07 RX ADMIN — KETOROLAC TROMETHAMINE 15 MG: 30 INJECTION, SOLUTION INTRAMUSCULAR; INTRAVENOUS at 06:07

## 2023-07-07 RX ADMIN — HYDROCODONE BITARTRATE AND ACETAMINOPHEN 1 TABLET: 10; 325 TABLET ORAL at 12:07

## 2023-07-07 RX ADMIN — TAMSULOSIN HYDROCHLORIDE 0.4 MG: 0.4 CAPSULE ORAL at 09:07

## 2023-07-07 RX ADMIN — CEFTRIAXONE 1 G: 1 INJECTION, POWDER, FOR SOLUTION INTRAMUSCULAR; INTRAVENOUS at 02:07

## 2023-07-07 RX ADMIN — OXYCODONE AND ACETAMINOPHEN 1 TABLET: 10; 325 TABLET ORAL at 08:07

## 2023-07-07 RX ADMIN — AMLODIPINE BESYLATE 5 MG: 5 TABLET ORAL at 09:07

## 2023-07-07 RX ADMIN — FERROUS SULFATE TAB 325 MG (65 MG ELEMENTAL FE) 1 EACH: 325 (65 FE) TAB at 09:07

## 2023-07-07 RX ADMIN — OXYCODONE AND ACETAMINOPHEN 1 TABLET: 10; 325 TABLET ORAL at 03:07

## 2023-07-07 RX ADMIN — HYDROCODONE BITARTRATE AND ACETAMINOPHEN 1 TABLET: 7.5; 325 TABLET ORAL at 04:07

## 2023-07-07 RX ADMIN — HYDROMORPHONE HYDROCHLORIDE 0.5 MG: 1 INJECTION, SOLUTION INTRAMUSCULAR; INTRAVENOUS; SUBCUTANEOUS at 09:07

## 2023-07-07 RX ADMIN — LIDOCAINE 1 PATCH: 50 PATCH CUTANEOUS at 08:07

## 2023-07-07 NOTE — ASSESSMENT & PLAN NOTE
42 yo F with recent history of right ureteral stone s/p right ureteral stent placement (6/22/23) who developed a right renal abscess.    - S/p right renal abscess aspiration by IR on 7/5/23.   - Culture from aspiration growing GNR.  - Labs stable  - Worsening flank pain overnight. Patient is stable with vital signs WNL. CT A/P noncon obtained shows essentially no new findings, although is limited in exam due to being a non-contrasted study.   - Flank pain is not unexpected in the setting of pyelonephritis and renal abscess s/p drainage.   - Recommend multimodal pain regimen, consider Robaxin for muscle spasms.   - Given patient's stability, if pain is not able to be controlled, recommend re-imaging with renal ultrasound on Sunday and possibly reaching out to IR for intervention Monday depending on imaging results.     - Recommend discharge with culture appropriate antibiotics once sensitivities result.   - Discussed typical ureteral stent symptoms with the patient - urinary frequency, urgency, bladder spasms, occasional flank pain with voiding.  - Recommend discharge with Flomax 0.4mg qd for stent colic, Oxybutynin 5mg TID PRN for bladder spasms.  - F/u outpatient with Urology on 7/31/23.

## 2023-07-07 NOTE — PROGRESS NOTES
[facility] - Telemetry  Infectious Disease  Consult Note     Patient Name: Sherri Machado  MRN: [id number]  Admission Date: 6/30/2023  Attending Physician: Zackary Gaspar MD    Assessment/Plan:      ID  * Pyelonephritis  43-year-old F w/ PMHx with history depression, opioid dependence who presents to the ED with reports of right flank pain. Patient reports she was diagnosed with a kidney stone and was seen by Dr. Blevins.  Patient had a urethral stent placement in addition to cystoscopy and is currently taking PO antibiotics.  Patient reports she has been running fever over the past few days in addition to nausea, vomiting and diarrhea.  Patient states she is having issues with eating and drinking. She was sent for possible admission per Dr. Blevins.     In the ED, initial vitals /85, HR 85, Temp 98.3F, SpO2 100% on room air. Labs include CBC with H/H 9.1/27.6 and WBC 16.35, CMP with K 3.3.  UA with 1+ protein, 2+ occult blood, 3+ leukocytes, 29 WBC, 5 RBC. Urine Pregnancy test negative. Lactic acid 1.3. CXR with no acute changes. [facility] consulted for evaluation for admission for UTI with concerns for pyelonephritis.      6-18-23 - ED visit - CT showed right stones for sure and possible left stones - pain management - no abx     6-21-23 thru 6-24 - admit - UTI kleb, stent placed reduced right hydro, ceftriaxone then PO augmentin     6-30-23 - fever to 101, vomiting qd, decreased PO intake admitted for pyelo - CT- renal - showed faint 2 small abscesses  Urology felt that these were not accessible by IR at this time  - attempt abx therapy  - ID  consulted     7/5/23: IR drainage of renal abscess found on repeat CT 7/5/23    Today patient complaining of worsening flank pain.  Describes them as muscle spasms.  Repeat CT abdomen showing interval increase in inflammation as well as new attenuation in R kidney that may be interval hemorrhage.  Would continue current abx regimen for now and continue to monitor for  improvement in pain.  May need additional intervention/imaging if no improvement.     Rec:   1. Continue IV ceftriaxone, and monitor for improvement in setting of worsened inflammation   2. Gven increase in pain as well as inflammation/hemorrhage on CT, may consider repeat intervention if no improvement          Thank you for your consult. I will follow-up with patient. Please contact us if you have any additional questions.     Jemal Lujan MD  [facility]      Subjective:      Patient continues to have flank pain which is well controlled and headahces resolved.  Patient ambulated with PT and went to cafeteria for lunch, appears much improved today after drainage.     Objective:     Last 24 Hour Vital Signs:  BP  Min: 128/64  Max: 156/80  Temp  Av.5 °F (36.9 °C)  Min: 97 °F (36.1 °C)  Max: 99.9 °F (37.7 °C)  Pulse  Av.9  Min: 86  Max: 98  Resp  Av.1  Min: 16  Max: 20  SpO2  Av.5 %  Min: 96 %  Max: 100 %  I/O last 3 completed shifts:  In: 1500 [P.O.:1500]  Out: -     Physical Examination:  Gen:  Well-developed, well-nourished, NAD  HEENT:  EOMI, conjunctiva clear, mucous membranes moist  Neck: supple, normal ROM  Resp: Clear to auscultation bilaterally without wheezes or crackles, normal WOB  CV: Regular rate, normal rhythm and S1S2 w/out murmur.   Abd: Soft, non-tender, non-distended, bowel sounds present; increased  R flank tenderness, drianage site without erythema or edema  Neuro: AAO x 4  Derm: Skin is warm and dry, no rashes or lesions appreciated  Psych: Normal mood and affect, normal behavior        Laboratory:  Laboratory Data Reviewed: yes  Pertinent Findings:      Microbiology Data Reviewed: yes  Pertinent Findings:      Other Results:  Radiology Data Reviewed: Yes  Pertinent Findings:  Microbiology Results (last 7 days)       Procedure Component Value Units Date/Time    AFB Culture & Smear [[id number]] Collected: 23 1810    Order Status: Completed Specimen: Abscess from Kidney,  Right Updated: 07/07/23 0927     AFB Culture & Smear Culture in progress     AFB CULTURE STAIN No acid fast bacilli seen.    Culture, Body Fluid (Aerobic) w/ GS [[id number]]  (Abnormal) Collected: 07/05/23 1811    Order Status: Completed Specimen: Body Fluid from Back Updated: 07/07/23 0739     AEROBIC CULTURE - FLUID GRAM NEGATIVE LANA  Few  Identification and susceptibility pending       Gram Stain Result Many WBC's      No organisms seen    KOH prep [[id number]] Collected: 07/05/23 1810    Order Status: Completed Specimen: Abscess from Kidney, Right Updated: 07/06/23 0157     KOH Prep No yeast or fungal elements seen    Gram stain [[id number]] Collected: 07/05/23 1810    Order Status: Completed Specimen: Abscess from Kidney, Right Updated: 07/06/23 0157     Gram Stain Result Moderate WBC's      No organisms seen    Blood culture #1 **CANNOT BE ORDERED STAT** [[id number]] Collected: 06/30/23 1444    Order Status: Completed Specimen: Blood from Antecubital, Right Arm Updated: 07/05/23 2212     Blood Culture, Routine No growth after 5 days.    Blood culture #2 **CANNOT BE ORDERED STAT** [[id number]] Collected: 06/30/23 1444    Order Status: Completed Specimen: Blood from Antecubital, Left Arm Updated: 07/05/23 2212     Blood Culture, Routine No growth after 5 days.    Urine culture [[id number]] Collected: 06/30/23 1423    Order Status: Completed Specimen: Urine Updated: 07/02/23 0256     Urine Culture, Routine No growth    Narrative:      Specimen Source->Urine              Current Medications:     Infusions:       Scheduled:   amLODIPine  5 mg Oral Daily    cefTRIAXone (ROCEPHIN) IVPB  1 g Intravenous Q24H    enoxparin  40 mg Subcutaneous Daily    ferrous sulfate  1 tablet Oral Daily    magnesium sulfate IVPB  4 g Intravenous Once    multivitamin  1 tablet Oral Daily    oxyCODONE-acetaminophen  1 tablet Oral Q4H    tamsulosin  0.4 mg Oral Daily        PRN:  acetaminophen, HYDROmorphone, ketorolac, melatonin, naloxone,  ondansetron, oxybutynin, senna-docusate 8.6-50 mg, sodium chloride 0.9%, sodium chloride 0.9%, sodium chloride 0.9%    Antibiotics and Day Number of Therapy:  Ceftriaxone 7/1-now

## 2023-07-07 NOTE — ASSESSMENT & PLAN NOTE
Patient with recent diagnosis of pyelonephritis and nephrolithiasis recently discharged from hospital s/p stent placement for nephrolithiasis.  Was on PO abx, augmentin.  Previous urine cx resulted with sensitivities.  Fever, nausea, vomiting on admission.  Patient experienced spikes in fevers throughout stay while on IV ceftriaxone.  WBC elevated, lactic within normal range in ED.  CT Renal with stable stent and stones with concern for renal abscesses.  Retroperitoneal US Bilateral nonobstructing renal calculi.   CT urogram revealing stent in stable position    Plan:  - Continue IV ceftriaxone day 8  - Continue maintenance IVF.  - Hold nephrotoxic medications .  - Renally-dose current meds if available  - Avoid Hypotension.  - Strict I/O's.  - Monitor renal function carefully.  -Toradol discontinued due to 3 days of use with 2 days extension  - one time dose of dilaudid 0.5mg for R cva pain  - Monitor WBC count.  - For pain, Oxycodone 10mg q4 and Norco 0.5mg q3 PRN, Toradol 15mg q6 PRN  -Follow fever  -Follow Uro & ID recommendations for any alterations in antibiotic regimen based on negative fluid studies  -If pain does not improve with pain regimen, will order retroperitoneal US on Sunday 7/9, reach out to IR Monday depending on results.  -Per urology, Recommend discharge with Flomax 0.4mg qd for stent colic, Oxybutynin 5mg TID PRN for bladder spasms.  - F/u outpatient with Urology on 7/31/23.

## 2023-07-07 NOTE — PROGRESS NOTES
[facility]  Progress Note    Patient Name: Sherri Machado  MRN: [id number]  Patient Class: IP- Inpatient   Admission Date: 6/30/2023  Length of Stay: 7 days  Attending Physician: Zackary Gaspar III, MD  Primary Care Provider: Modesto Waldron MD        Subjective:     Principal Problem:Pyelonephritis        HPI:  43-year-old F w/ PMHx with history depression, opioid dependence who presents to the ED with reports of right flank pain. Patient reports she was diagnosed with a kidney stone and was seen by Dr. Blevins.  Patient had a urethral stent placement in addition to cystoscopy and is currently taking PO antibiotics.  Patient reports she has been running fever over the past few days in addition to nausea, vomiting and diarrhea.  Patient states she is having issues with eating and drinking. She was sent for possible admission per Dr. Blevins.    In the ED, initial vitals /85, HR 85, Temp 98.3F, SpO2 100% on room air. Labs include CBC with H/H 9.1/27.6 and WBC 16.35, CMP with K 3.3.  UA with 1+ protein, 2+ occult blood, 3+ leukocytes, 29 WBC, 5 RBC. Urine Pregnancy test negative. Lactic acid 1.3. CXR with no acute changes. [facility] consulted for evaluation for admission for UTI with concerns for pyelonephritis.       Overview/Hospital Course:  The patient has a known history of prior hospital admission with complicated pyelonephritis and nephrolithiasis diagnosis with renal stent placement and failed outpatient treatment with PO antibiotics, Augmentin. CT Renal stone with interval placement of right-sided double-J ureteral stent with resolution of previous right-sided hydronephrosis, stable 5 mm calculus within the distal right ureter near the UVJ with 2 subtle hypoattenuating parenchymal foci within the right kidney, concerning for renal abscesses. US Retroperitoneal with bilateral nonobstructing renal calculi and no hydronephrosis noted. Although patient was on IV ceftriaxone  throughout this admission, she had persistently elevated WBC (16.35 on admission) and intermittent fevers. Per urology and IR recommendations, patient had CT urogram completed revealing multiple hypodense irregularities with largest being 4.6 cm, suspicious for cyst or abscess. CT guided Aspiration of right kidney resulted in removal of 3mL of purulent fluid and improvement in RUQ and right flank pain. Patient's white blood cell count declined to normal range and remained afebrile after aspiration and on day 7 of IV abx, ceftriaxone. Patient required Visatril 50mg for anxiety. Patient persistent headache pain treated with Ketorolac 15mg. Patient also continued receiving IV diuresis during the course with appropriate UOP.  Per urology recommendations, patient will be discharged with oxybutynin PRN, Flomax PRN, and oral antibiotics. Close outpatient f/u was advised with Urology and PCP.  Low intensity therapy at home, per PT recommendations.       Interval History: Overnight patient became argumentative with nurse. Pt states that her Right CVA pain was uncontrolled last night. Today patient reports worsened right CVA and flank pain. Last BM 1 day ago. Tolerating PO intake. No organisms to date in fluid culture or AFB stain.   CT abdomen/pelvis revealed -   1. Right perinephric inflammation noting diffuse edematous change throughout the kidney, Right ureteral stent catheter is in place noting stable 3 mm calculus at the level of the distal ureter.    2. High attenuating focus within the upper pole of the right kidney, This may reflect interval hemorrhage into an existing cyst although not confirmed.    3. Mild right pleural effusion, developed since the previous exam.  Bladder scan  WBC have elevated to 12.7. Remains afebrile and vital signs stable at this time.        Review of Systems   Constitutional:  Negative for appetite change, fatigue and fever.   HENT:  Negative for congestion, rhinorrhea, sinus pressure,  sinus pain and sore throat.    Eyes:  Negative for redness.   Respiratory:  Negative for cough, chest tightness, shortness of breath and wheezing.    Cardiovascular:  Negative for chest pain, palpitations and leg swelling.   Gastrointestinal:  Positive for abdominal pain (RUQ, RLQ, epigastric). Negative for abdominal distention, constipation, diarrhea, nausea and vomiting.   Genitourinary:  Positive for flank pain, urgency and vaginal pain. Negative for decreased urine volume, difficulty urinating, dysuria and frequency.   Musculoskeletal:  Negative for arthralgias and gait problem.   Neurological:  Negative for dizziness, facial asymmetry, speech difficulty, weakness, light-headedness and headaches.   Psychiatric/Behavioral:  Negative for agitation and behavioral problems. The patient is nervous/anxious.    Objective:     Vital Signs (Most Recent):  Temp: 99.7 °F (37.6 °C) (07/07/23 0602)  Pulse: 93 (07/07/23 0602)  Resp: 20 (07/07/23 0602)  BP: 138/76 (07/07/23 0602)  SpO2: 98 % (07/07/23 0602) Vital Signs (24h Range):  Temp:  [97 °F (36.1 °C)-99.9 °F (37.7 °C)] 99.7 °F (37.6 °C)  Pulse:  [86-98] 93  Resp:  [17-20] 20  SpO2:  [98 %-100 %] 98 %  BP: (128-156)/(64-83) 138/76     Weight: 87.3 kg (192 lb 7.4 oz)  Body mass index is 32.03 kg/m².  No intake or output data in the 24 hours ending 07/07/23 0703      Physical Exam  Vitals reviewed.   Constitutional:       General: She is in acute distress.      Appearance: Normal appearance. She is normal weight.   HENT:      Head: Normocephalic and atraumatic.      Mouth/Throat:      Mouth: Mucous membranes are moist.      Pharynx: Oropharynx is clear.   Eyes:      Extraocular Movements: Extraocular movements intact.      Pupils: Pupils are equal, round, and reactive to light.   Cardiovascular:      Rate and Rhythm: Normal rate and regular rhythm.      Pulses: Normal pulses.      Heart sounds: Normal heart sounds. No murmur heard.  Pulmonary:      Effort: Pulmonary effort  is normal.      Breath sounds: Normal breath sounds.   Abdominal:      General: Abdomen is flat. Bowel sounds are normal. There is distension.      Palpations: Abdomen is soft. There is no mass.      Tenderness: There is abdominal tenderness. There is right CVA tenderness and guarding.   Musculoskeletal:         General: No swelling, deformity or signs of injury. Normal range of motion.      Cervical back: Normal range of motion.      Right lower leg: No edema.      Left lower leg: No edema.   Lymphadenopathy:      Cervical: No cervical adenopathy.   Skin:     General: Skin is warm and dry.   Neurological:      General: No focal deficit present.      Mental Status: She is alert and oriented to person, place, and time. Mental status is at baseline.   Psychiatric:         Mood and Affect: Mood normal.         Behavior: Behavior normal.         Thought Content: Thought content normal.           Significant Labs: All pertinent labs within the past 24 hours have been reviewed.  CBC:   Recent Labs   Lab 07/06/23  0405 07/07/23  0351   WBC 11.91 12.74*   HGB 7.9* 7.6*   HCT 24.2* 23.2*   * 594*     CMP:   Recent Labs   Lab 07/06/23  0405 07/07/23  0351    136   K 3.8 4.0    101   CO2 26 24   * 107   BUN 14 9   CREATININE 1.2 0.9   CALCIUM 9.3 9.3   PROT 8.0 8.4   ALBUMIN 2.6* 2.8*   BILITOT 0.2 0.4   ALKPHOS 114 101   AST 55* 27   * 76*   ANIONGAP 11 11       Significant Imaging: I have reviewed all pertinent imaging results/findings within the past 24 hours.  I have reviewed and interpreted all pertinent imaging results/findings within the past 24 hours.      Assessment/Plan:      * Pyelonephritis  Patient with recent diagnosis of pyelonephritis and nephrolithiasis recently discharged from hospital s/p stent placement for nephrolithiasis.  Was on PO abx, augmentin.  Previous urine cx resulted with sensitivities.  Fever, nausea, vomiting on admission.  Patient experienced spikes in fevers  throughout stay while on IV ceftriaxone.  WBC elevated, lactic within normal range in ED.  CT Renal with stable stent and stones with concern for renal abscesses.  Retroperitoneal US Bilateral nonobstructing renal calculi.   CT urogram revealing stent in stable position    Plan:  - Continue IV ceftriaxone day 8  - Continue maintenance IVF.  - Hold nephrotoxic medications .  - Renally-dose current meds if available  - Avoid Hypotension.  - Strict I/O's.  - Monitor renal function carefully.  -Toradol discontinued due to 3 days of use with 2 days extension  - one time dose of dilaudid 0.5mg for R cva pain  - Monitor WBC count.  - For pain, Oxycodone 10mg q4 and Norco 0.5mg q3 PRN, Toradol 15mg q6 PRN  -Follow fever  -Follow Uro & ID recommendations for any alterations in antibiotic regimen based on negative fluid studies  -If pain does not improve with pain regimen, will order retroperitoneal US on Sunday 7/9, reach out to IR Monday depending on results.  -Per urology, Recommend discharge with Flomax 0.4mg qd for stent colic, Oxybutynin 5mg TID PRN for bladder spasms.  - F/u outpatient with Urology on 7/31/23.       Iron deficiency anemia  Iron studies- elevated ferritin, low iron and TIBC indicating iron deficiency anemia    -ferritin administered        Physical deconditioning  Patient with prolonged hospital stay experiencing physical deconditoning  -continue PT inpatient  -refer PT outpatient    Hyperthyroidism, subclinical  Thyroid studies completed to determine etiology of high blood pressure. Patient has had systolic BP (140s-160s)  -TSH 0.310  -Follow up outpatient for repeat thyroid studies       Hypokalemia  K 3.8 on 7/6 AM labs.    Plan:  - Replete as needed.    Renal abscess  Patient has complained of constant right flank pain and RUQ pain.  CT Renal with stable stent and stones with concern for renal abscesses.  Per IR & Urology recs, CT urogram with multiple hypodense irregularities, concerning for  abscesses.  Per IR recs, S/p aspiration of 3 ml of pus from right kidney on patient's 6th day on ceftriaxone- Gram stain and KOH fluid studies negative    Plan  -Continue IV ceftriaxone  -Follow up fluid culture, negative for organisms to date  -Follow up with ID for source control      Flank pain  Plan as in pyelonephritis.    Nephrolithiasis  Plan as in pyelonephritis.    Elevated BP without diagnosis of hypertension  BP elevated on admission. Patient never been diagnosed with hypertension.  Potentially 2/2 to pain.    Plan:  - Will continue to monitor vitals.  - Add amlodipine 5mg daily, can consider titrating dose or can add additional agent if needed.  -Patient high blood pressure to be evaluated outpatient  - Avoid diuretics        VTE Risk Mitigation (From admission, onward)         Ordered     enoxaparin injection 40 mg  Daily         06/30/23 1744     IP VTE HIGH RISK PATIENT  Once         06/30/23 1744     Place sequential compression device  Until discontinued         06/30/23 1744                Discharge Planning   RUDOLPH: 7/7/2023     Code Status: Full Code   Is the patient medically ready for discharge?:     Reason for patient still in hospital (select all that apply): Patient trending condition  Discharge Plan A: Home   Discharge Delays: None known at this time            ________________________  Gwendolyn Peralta MD  [facility] PGY-1

## 2023-07-07 NOTE — SUBJECTIVE & OBJECTIVE
Interval History: Patient with increased right flank pain that began overnight. AFVSS. Labs stable. She denies difficulty voiding. CT scan obtained shows essentially no new findings, although is limited in exam due to being a non-contrasted study.       Objective:     Temp:  [97 °F (36.1 °C)-99.9 °F (37.7 °C)] 97.7 °F (36.5 °C)  Pulse:  [86-98] 91  Resp:  [16-20] 18  SpO2:  [96 %-100 %] 100 %  BP: (128-156)/(64-81) 135/81     Body mass index is 32.03 kg/m².    Date 07/07/23 0700 - 07/08/23 0659   Shift 1105-3086 3121-9480 5049-9836 24 Hour Total   INTAKE   P.O. 0   0   Shift Total(mL/kg) 0(0)   0(0)   OUTPUT   Shift Total(mL/kg)       Weight (kg) 87.3 87.3 87.3 87.3          Drains       None                    Physical Exam  Vitals reviewed.   Constitutional:       General: She is not in acute distress.     Appearance: She is not diaphoretic.   HENT:      Head: Normocephalic and atraumatic.      Nose: Nose normal.   Eyes:      Conjunctiva/sclera: Conjunctivae normal.   Cardiovascular:      Rate and Rhythm: Normal rate.   Pulmonary:      Effort: Pulmonary effort is normal. No respiratory distress.   Abdominal:      General: There is no distension.      Palpations: Abdomen is soft.      Tenderness: There is right CVA tenderness. There is no left CVA tenderness.   Musculoskeletal:         General: Normal range of motion.      Cervical back: Normal range of motion.   Skin:     General: Skin is dry.   Neurological:      Mental Status: She is alert.   Psychiatric:         Behavior: Behavior normal.         Thought Content: Thought content normal.         Significant Labs:    BMP:  Recent Labs   Lab 07/05/23  0427 07/06/23  0405 07/07/23  0351    137 136   K 4.0 3.8 4.0    100 101   CO2 24 26 24   BUN 9 14 9   CREATININE 0.9 1.2 0.9   CALCIUM 9.3 9.3 9.3         CBC:   Recent Labs   Lab 07/05/23  0427 07/06/23  0405 07/07/23  0351   WBC 13.71* 11.91 12.74*   HGB 7.7* 7.9* 7.6*   HCT 23.5* 24.2* 23.2*   PLT  605* 562* 594*         All pertinent labs results from the past 24 hours have been reviewed.    Significant Imaging:  All pertinent imaging results/findings from the past 24 hours have been reviewed.

## 2023-07-07 NOTE — SUBJECTIVE & OBJECTIVE
Interval History: Overnight patient became argumentative with nurse. Pt states that her Right CVA pain was uncontrolled last night. Today patient reports worsened right CVA and flank pain. Last BM 1 day ago. Tolerating PO intake. No organisms to date in fluid culture or AFB stain.   CT abdomen/pelvis revealed -   1. Right perinephric inflammation noting diffuse edematous change throughout the kidney, Right ureteral stent catheter is in place noting stable 3 mm calculus at the level of the distal ureter.    2. High attenuating focus within the upper pole of the right kidney, This may reflect interval hemorrhage into an existing cyst although not confirmed.    3. Mild right pleural effusion, developed since the previous exam.  Bladder scan  WBC have elevated to 12.7. Remains afebrile and vital signs stable at this time.        Review of Systems   Constitutional:  Negative for appetite change, fatigue and fever.   HENT:  Negative for congestion, rhinorrhea, sinus pressure, sinus pain and sore throat.    Eyes:  Negative for redness.   Respiratory:  Negative for cough, chest tightness, shortness of breath and wheezing.    Cardiovascular:  Negative for chest pain, palpitations and leg swelling.   Gastrointestinal:  Positive for abdominal pain (RUQ, RLQ, epigastric). Negative for abdominal distention, constipation, diarrhea, nausea and vomiting.   Genitourinary:  Positive for flank pain, urgency and vaginal pain. Negative for decreased urine volume, difficulty urinating, dysuria and frequency.   Musculoskeletal:  Negative for arthralgias and gait problem.   Neurological:  Negative for dizziness, facial asymmetry, speech difficulty, weakness, light-headedness and headaches.   Psychiatric/Behavioral:  Negative for agitation and behavioral problems. The patient is nervous/anxious.    Objective:     Vital Signs (Most Recent):  Temp: 99.7 °F (37.6 °C) (07/07/23 0602)  Pulse: 93 (07/07/23 0602)  Resp: 20 (07/07/23 0602)  BP:  138/76 (07/07/23 0602)  SpO2: 98 % (07/07/23 0602) Vital Signs (24h Range):  Temp:  [97 °F (36.1 °C)-99.9 °F (37.7 °C)] 99.7 °F (37.6 °C)  Pulse:  [86-98] 93  Resp:  [17-20] 20  SpO2:  [98 %-100 %] 98 %  BP: (128-156)/(64-83) 138/76     Weight: 87.3 kg (192 lb 7.4 oz)  Body mass index is 32.03 kg/m².  No intake or output data in the 24 hours ending 07/07/23 0703      Physical Exam  Vitals reviewed.   Constitutional:       General: She is in acute distress.      Appearance: Normal appearance. She is normal weight.   HENT:      Head: Normocephalic and atraumatic.      Mouth/Throat:      Mouth: Mucous membranes are moist.      Pharynx: Oropharynx is clear.   Eyes:      Extraocular Movements: Extraocular movements intact.      Pupils: Pupils are equal, round, and reactive to light.   Cardiovascular:      Rate and Rhythm: Normal rate and regular rhythm.      Pulses: Normal pulses.      Heart sounds: Normal heart sounds. No murmur heard.  Pulmonary:      Effort: Pulmonary effort is normal.      Breath sounds: Normal breath sounds.   Abdominal:      General: Abdomen is flat. Bowel sounds are normal. There is distension.      Palpations: Abdomen is soft. There is no mass.      Tenderness: There is abdominal tenderness. There is right CVA tenderness and guarding.   Musculoskeletal:         General: No swelling, deformity or signs of injury. Normal range of motion.      Cervical back: Normal range of motion.      Right lower leg: No edema.      Left lower leg: No edema.   Lymphadenopathy:      Cervical: No cervical adenopathy.   Skin:     General: Skin is warm and dry.   Neurological:      General: No focal deficit present.      Mental Status: She is alert and oriented to person, place, and time. Mental status is at baseline.   Psychiatric:         Mood and Affect: Mood normal.         Behavior: Behavior normal.         Thought Content: Thought content normal.           Significant Labs: All pertinent labs within the past 24  hours have been reviewed.  CBC:   Recent Labs   Lab 07/06/23  0405 07/07/23  0351   WBC 11.91 12.74*   HGB 7.9* 7.6*   HCT 24.2* 23.2*   * 594*     CMP:   Recent Labs   Lab 07/06/23  0405 07/07/23 0351    136   K 3.8 4.0    101   CO2 26 24   * 107   BUN 14 9   CREATININE 1.2 0.9   CALCIUM 9.3 9.3   PROT 8.0 8.4   ALBUMIN 2.6* 2.8*   BILITOT 0.2 0.4   ALKPHOS 114 101   AST 55* 27   * 76*   ANIONGAP 11 11       Significant Imaging: I have reviewed all pertinent imaging results/findings within the past 24 hours.  I have reviewed and interpreted all pertinent imaging results/findings within the past 24 hours.

## 2023-07-07 NOTE — PT/OT/SLP PROGRESS
Physical Therapy      Patient Name:  Sherri Machado   MRN:  [id number]    1516- Patient declined tx at this time secondary to 10/10 pain because of having spasms in her right kidney.  Pt stated this started last night, and she is waiting for the results of a CT scan she had earlier today.  Pt stated she is unable to move at this time because of the intense pain.   Will follow-up next tx date.

## 2023-07-07 NOTE — ASSESSMENT & PLAN NOTE
Patient with recent diagnosis of pyelonephritis and nephrolithiasis recently discharged from hospital s/p stent placement for nephrolithiasis.  Was on PO abx, augmentin.  Previous urine cx resulted with sensitivities.  Fever, nausea, vomiting on admission.  Patient experienced spikes in fevers throughout stay while on IV ceftriaxone.  WBC elevated, lactic within normal range in ED.  CT Renal with stable stent and stones with concern for renal abscesses.  Retroperitoneal US Bilateral nonobstructing renal calculi.   CT urogram revealing stent in stable position with inflammation of right kidney and possible interval bleeding into existing cyst    Plan:  - Continue IV ceftriaxone day 9  - Continue maintenance IVF.  - Hold nephrotoxic medications .  - Renally-dose current meds if available  - Avoid Hypotension.  - Strict I/O's.  - Monitor renal function carefully.  - Monitor WBC count.  - For pain, Oxycodone 10mg q4 scheduled, lidocaine patch, and Norco 0.5mg q3 PRN, Toradol 15mg q6 PRN  -Per urology recs, oxybutynin for bladder spasms and flomax for stent colic  -Muscle spasms: Robaxin PRN  -Follow fever  -Renal US ordered due to persistent right CVA pain to determine if size of remaining abscess has changed.  -Will consult IR if need for additional intervention.  -Patient will follow up outpatient with Urology on 7/31/23.   -Fluid cultures from aspiration revealed + Klebsiella and many WBCs  -Appreciate recommendations from ID regarding antibiotic regimen  -Appreciate recommendations from Urology

## 2023-07-07 NOTE — PROGRESS NOTES
[facility]  [facility] Note    Patient Name: Sherri Machado  MRN: [id number]  Admission Date: 6/30/2023  Hospital Length of Stay: 7 days  Code Status: Full Code   Attending Provider: Zackary Gaspar III, MD   Primary Care Physician: Modesto Waldron MD    Subjective:     HPI:  No notes on file    Interval History: Patient with increased right flank pain that began overnight. AFVSS. Labs stable. She denies difficulty voiding. CT scan obtained shows essentially no new findings, although is limited in exam due to being a non-contrasted study.       Objective:     Temp:  [97 °F (36.1 °C)-99.9 °F (37.7 °C)] 97.7 °F (36.5 °C)  Pulse:  [86-98] 91  Resp:  [16-20] 18  SpO2:  [96 %-100 %] 100 %  BP: (128-156)/(64-81) 135/81     Body mass index is 32.03 kg/m².    Date 07/07/23 0700 - 07/08/23 0659   Shift 0207-2787 [id number] 0527-3029 24 Hour Total   INTAKE   P.O. 0   0   Shift Total(mL/kg) 0(0)   0(0)   OUTPUT   Shift Total(mL/kg)       Weight (kg) 87.3 87.3 87.3 87.3          Drains       None                    Physical Exam  Vitals reviewed.   Constitutional:       General: She is not in acute distress.     Appearance: She is not diaphoretic.   HENT:      Head: Normocephalic and atraumatic.      Nose: Nose normal.   Eyes:      Conjunctiva/sclera: Conjunctivae normal.   Cardiovascular:      Rate and Rhythm: Normal rate.   Pulmonary:      Effort: Pulmonary effort is normal. No respiratory distress.   Abdominal:      General: There is no distension.      Palpations: Abdomen is soft.      Tenderness: There is right CVA tenderness. There is no left CVA tenderness.   Musculoskeletal:         General: Normal range of motion.      Cervical back: Normal range of motion.   Skin:     General: Skin is dry.   Neurological:      Mental Status: She is alert.   Psychiatric:         Behavior: Behavior normal.         Thought Content: Thought content normal.         Significant Labs:    BMP:  Recent Labs   Lab 07/05/23  2284  07/06/23  0405 07/07/23  0351    137 136   K 4.0 3.8 4.0    100 101   CO2 24 26 24   BUN 9 14 9   CREATININE 0.9 1.2 0.9   CALCIUM 9.3 9.3 9.3         CBC:   Recent Labs   Lab 07/05/23  0427 07/06/23  0405 07/07/23  0351   WBC 13.71* 11.91 12.74*   HGB 7.7* 7.9* 7.6*   HCT 23.5* 24.2* 23.2*   * 562* 594*         All pertinent labs results from the past 24 hours have been reviewed.    Significant Imaging:  All pertinent imaging results/findings from the past 24 hours have been reviewed.          Assessment/Plan:     * Pyelonephritis  44 yo F with recent history of right ureteral stone s/p right ureteral stent placement (6/22/23) who developed a right renal abscess.    - S/p right renal abscess aspiration by IR on 7/5/23.   - Culture from aspiration growing GNR.  - Labs stable  - Worsening flank pain overnight. Patient is stable with vital signs WNL. CT A/P noncon obtained shows essentially no new findings, although is limited in exam due to being a non-contrasted study.   - Flank pain is not unexpected in the setting of pyelonephritis and renal abscess s/p drainage.   - Recommend multimodal pain regimen, consider Robaxin for muscle spasms.   - Given patient's stability, if pain is not able to be controlled, recommend re-imaging with renal ultrasound on Sunday and possibly reaching out to IR for intervention Monday depending on imaging results.     - Recommend discharge with culture appropriate antibiotics once sensitivities result.   - Discussed typical ureteral stent symptoms with the patient - urinary frequency, urgency, bladder spasms, occasional flank pain with voiding.  - Recommend discharge with Flomax 0.4mg qd for stent colic, Oxybutynin 5mg TID PRN for bladder spasms.  - F/u outpatient with Urology on 7/31/23.         VTE Risk Mitigation (From admission, onward)         Ordered     enoxaparin injection 40 mg  Daily         06/30/23 1744     IP VTE HIGH RISK PATIENT  Once         06/30/23  1744     Place sequential compression device  Until discontinued         06/30/23 1744                Luke Orellana MD  Urology  [facility] - Telemetry

## 2023-07-08 LAB
ALBUMIN SERPL BCP-MCNC: 2.9 G/DL (ref 3.5–5.2)
ALP SERPL-CCNC: 96 U/L (ref 55–135)
ALT SERPL W/O P-5'-P-CCNC: 60 U/L (ref 10–44)
ANION GAP SERPL CALC-SCNC: 11 MMOL/L (ref 8–16)
AST SERPL-CCNC: 21 U/L (ref 10–40)
BACTERIA FLD AEROBE CULT: ABNORMAL
BASOPHILS # BLD AUTO: 0.09 K/UL (ref 0–0.2)
BASOPHILS NFR BLD: 0.8 % (ref 0–1.9)
BILIRUB SERPL-MCNC: 0.3 MG/DL (ref 0.1–1)
BUN SERPL-MCNC: 7 MG/DL (ref 6–20)
CALCIUM SERPL-MCNC: 9.4 MG/DL (ref 8.7–10.5)
CHLORIDE SERPL-SCNC: 101 MMOL/L (ref 95–110)
CO2 SERPL-SCNC: 24 MMOL/L (ref 23–29)
CREAT SERPL-MCNC: 0.9 MG/DL (ref 0.5–1.4)
DIFFERENTIAL METHOD: ABNORMAL
EOSINOPHIL # BLD AUTO: 0.1 K/UL (ref 0–0.5)
EOSINOPHIL NFR BLD: 0.6 % (ref 0–8)
ERYTHROCYTE [DISTWIDTH] IN BLOOD BY AUTOMATED COUNT: 13.9 % (ref 11.5–14.5)
EST. GFR  (NO RACE VARIABLE): >60 ML/MIN/1.73 M^2
GLUCOSE SERPL-MCNC: 107 MG/DL (ref 70–110)
GRAM STN SPEC: ABNORMAL
GRAM STN SPEC: ABNORMAL
HCT VFR BLD AUTO: 23.3 % (ref 37–48.5)
HGB BLD-MCNC: 7.8 G/DL (ref 12–16)
IMM GRANULOCYTES # BLD AUTO: 0.08 K/UL (ref 0–0.04)
IMM GRANULOCYTES NFR BLD AUTO: 0.8 % (ref 0–0.5)
LYMPHOCYTES # BLD AUTO: 1.7 K/UL (ref 1–4.8)
LYMPHOCYTES NFR BLD: 16.2 % (ref 18–48)
MAGNESIUM SERPL-MCNC: 2 MG/DL (ref 1.6–2.6)
MCH RBC QN AUTO: 26.8 PG (ref 27–31)
MCHC RBC AUTO-ENTMCNC: 33.5 G/DL (ref 32–36)
MCV RBC AUTO: 80 FL (ref 82–98)
MONOCYTES # BLD AUTO: 0.9 K/UL (ref 0.3–1)
MONOCYTES NFR BLD: 8.2 % (ref 4–15)
NEUTROPHILS # BLD AUTO: 7.8 K/UL (ref 1.8–7.7)
NEUTROPHILS NFR BLD: 73.4 % (ref 38–73)
NRBC BLD-RTO: 0 /100 WBC
PHOSPHATE SERPL-MCNC: 3.5 MG/DL (ref 2.7–4.5)
PLATELET # BLD AUTO: 577 K/UL (ref 150–450)
PMV BLD AUTO: 8.3 FL (ref 9.2–12.9)
POTASSIUM SERPL-SCNC: 4 MMOL/L (ref 3.5–5.1)
PROT SERPL-MCNC: 8.6 G/DL (ref 6–8.4)
RBC # BLD AUTO: 2.91 M/UL (ref 4–5.4)
SODIUM SERPL-SCNC: 136 MMOL/L (ref 136–145)
WBC # BLD AUTO: 10.63 K/UL (ref 3.9–12.7)

## 2023-07-08 PROCEDURE — 63600175 PHARM REV CODE 636 W HCPCS

## 2023-07-08 PROCEDURE — 85025 COMPLETE CBC W/AUTO DIFF WBC: CPT

## 2023-07-08 PROCEDURE — 94761 N-INVAS EAR/PLS OXIMETRY MLT: CPT

## 2023-07-08 PROCEDURE — 80053 COMPREHEN METABOLIC PANEL: CPT

## 2023-07-08 PROCEDURE — 99232 PR SUBSEQUENT HOSPITAL CARE,LEVL II: ICD-10-PCS | Mod: ,,, | Performed by: INTERNAL MEDICINE

## 2023-07-08 PROCEDURE — 25500020 PHARM REV CODE 255: Performed by: STUDENT IN AN ORGANIZED HEALTH CARE EDUCATION/TRAINING PROGRAM

## 2023-07-08 PROCEDURE — 84100 ASSAY OF PHOSPHORUS: CPT

## 2023-07-08 PROCEDURE — 83735 ASSAY OF MAGNESIUM: CPT

## 2023-07-08 PROCEDURE — 25000003 PHARM REV CODE 250

## 2023-07-08 PROCEDURE — 11000001 HC ACUTE MED/SURG PRIVATE ROOM

## 2023-07-08 PROCEDURE — 25000003 PHARM REV CODE 250: Performed by: STUDENT IN AN ORGANIZED HEALTH CARE EDUCATION/TRAINING PROGRAM

## 2023-07-08 PROCEDURE — 99232 SBSQ HOSP IP/OBS MODERATE 35: CPT | Mod: ,,, | Performed by: INTERNAL MEDICINE

## 2023-07-08 PROCEDURE — 36415 COLL VENOUS BLD VENIPUNCTURE: CPT

## 2023-07-08 RX ORDER — HYDROXYZINE PAMOATE 25 MG/1
25 CAPSULE ORAL ONCE
Status: COMPLETED | OUTPATIENT
Start: 2023-07-08 | End: 2023-07-08

## 2023-07-08 RX ORDER — AMOXICILLIN 250 MG
1 CAPSULE ORAL 2 TIMES DAILY
Status: DISCONTINUED | OUTPATIENT
Start: 2023-07-08 | End: 2023-07-10 | Stop reason: HOSPADM

## 2023-07-08 RX ADMIN — IOHEXOL 100 ML: 350 INJECTION, SOLUTION INTRAVENOUS at 04:07

## 2023-07-08 RX ADMIN — OXYCODONE AND ACETAMINOPHEN 1 TABLET: 10; 325 TABLET ORAL at 06:07

## 2023-07-08 RX ADMIN — THERA TABS 1 TABLET: TAB at 09:07

## 2023-07-08 RX ADMIN — AMLODIPINE BESYLATE 5 MG: 5 TABLET ORAL at 09:07

## 2023-07-08 RX ADMIN — Medication 6 MG: at 11:07

## 2023-07-08 RX ADMIN — ONDANSETRON 4 MG: 2 INJECTION INTRAMUSCULAR; INTRAVENOUS at 08:07

## 2023-07-08 RX ADMIN — FERROUS SULFATE TAB 325 MG (65 MG ELEMENTAL FE) 1 EACH: 325 (65 FE) TAB at 09:07

## 2023-07-08 RX ADMIN — OXYCODONE AND ACETAMINOPHEN 1 TABLET: 10; 325 TABLET ORAL at 09:07

## 2023-07-08 RX ADMIN — CEFTRIAXONE 1 G: 1 INJECTION, POWDER, FOR SOLUTION INTRAMUSCULAR; INTRAVENOUS at 02:07

## 2023-07-08 RX ADMIN — HYDROXYZINE PAMOATE 25 MG: 25 CAPSULE ORAL at 12:07

## 2023-07-08 RX ADMIN — LIDOCAINE 1 PATCH: 50 PATCH CUTANEOUS at 06:07

## 2023-07-08 RX ADMIN — DOCUSATE SODIUM AND SENNOSIDES 1 TABLET: 8.6; 5 TABLET, FILM COATED ORAL at 09:07

## 2023-07-08 RX ADMIN — OXYCODONE AND ACETAMINOPHEN 1 TABLET: 10; 325 TABLET ORAL at 05:07

## 2023-07-08 RX ADMIN — TAMSULOSIN HYDROCHLORIDE 0.4 MG: 0.4 CAPSULE ORAL at 09:07

## 2023-07-08 RX ADMIN — KETOROLAC TROMETHAMINE 15 MG: 30 INJECTION, SOLUTION INTRAMUSCULAR; INTRAVENOUS at 09:07

## 2023-07-08 RX ADMIN — ENOXAPARIN SODIUM 40 MG: 40 INJECTION SUBCUTANEOUS at 06:07

## 2023-07-08 RX ADMIN — OXYCODONE AND ACETAMINOPHEN 1 TABLET: 10; 325 TABLET ORAL at 01:07

## 2023-07-08 NOTE — ASSESSMENT & PLAN NOTE
Patient has complained of constant right flank pain and RUQ pain.  CT Renal with stable stent and stones with concern for renal abscesses.  Per IR & Urology recs, CT urogram with multiple hypodense irregularities, concerning for abscesses.  Per IR recs, S/p aspiration of 3 ml of pus from right kidney on patient's 6th day on ceftriaxone- Gram stain and KOH fluid studies negative  Fluid culture + for Klebsiella    Plan  -Continue IV ceftriaxone  -ID recs for possible changes in coverage  -Renal US

## 2023-07-08 NOTE — NURSING
Shift assessment complete. Pt is alert and oriented x 4. Complains of back pain. Medicated per MAR and will monitor for effectiveness. Ice packs given to pt. Verbalizes no other needs/complaints at this time. Bed in lowest position, locked, and side rails up x 2. Call light in reach. Family at bedside.

## 2023-07-08 NOTE — PROGRESS NOTES
[facility] Medicine  Progress Note    Patient Name: Sherri Machado  MRN: [id number]  Patient Class: IP- Inpatient   Admission Date: 6/30/2023  Length of Stay: 8 days  Attending Physician: Tomasa Orantes MD  Primary Care Provider: Modesto Waldron MD        Subjective:     Principal Problem:Pyelonephritis        HPI:  43-year-old F w/ PMHx with history depression, opioid dependence who presents to the ED with reports of right flank pain. Patient reports she was diagnosed with a kidney stone and was seen by Dr. Blevins.  Patient had a urethral stent placement in addition to cystoscopy and is currently taking PO antibiotics.  Patient reports she has been running fever over the past few days in addition to nausea, vomiting and diarrhea.  Patient states she is having issues with eating and drinking. She was sent for possible admission per Dr. Blevins.    In the ED, initial vitals /85, HR 85, Temp 98.3F, SpO2 100% on room air. Labs include CBC with H/H 9.1/27.6 and WBC 16.35, CMP with K 3.3.  UA with 1+ protein, 2+ occult blood, 3+ leukocytes, 29 WBC, 5 RBC. Urine Pregnancy test negative. Lactic acid 1.3. CXR with no acute changes. [facility] consulted for evaluation for admission for UTI with concerns for pyelonephritis.       Overview/Hospital Course:  The patient has a known history of prior hospital admission with complicated pyelonephritis and nephrolithiasis diagnosis with renal stent placement and failed outpatient treatment with PO antibiotics, Augmentin. CT Renal stone with interval placement of right-sided double-J ureteral stent with resolution of previous right-sided hydronephrosis, stable 5 mm calculus within the distal right ureter near the UVJ with 2 subtle hypoattenuating parenchymal foci within the right kidney, concerning for renal abscesses. US Retroperitoneal with bilateral nonobstructing renal calculi and no hydronephrosis noted. Although patient was on IV ceftriaxone  "throughout this admission, she had persistently elevated WBC (16.35 on admission) and intermittent fevers. Per urology and IR recommendations, patient had CT urogram completed revealing multiple hypodense irregularities with largest being 4.6 cm, suspicious for cyst or abscess. CT guided Aspiration of right kidney resulted in removal of 3mL of purulent fluid and improvement in RUQ and right flank pain. Patient's white blood cell count declined to normal range and remained afebrile after aspiration and on day 7 of IV abx, ceftriaxone. Patient required Visatril 50mg for anxiety. Patient persistent headache pain treated with Ketorolac 15mg. Patient also continued receiving IV diuresis during the course with appropriate UOP.  Per urology recommendations, patient will be discharged with oxybutynin PRN, Flomax PRN, and oral antibiotics. Close outpatient f/u was advised with Urology and PCP.  Low intensity therapy at home, per PT recommendations.       Interval History: No adverse events overnight. Today patient reports improved pain from the night prior, but still have severe R CVA pain described as "stabbing" exacerbated with movement and deep breathing. BM this AM. Although notes constipation likely secondary to pain regimen.Tolerating PO intake. Scheduled for renal US today. Remains afebrile and vital signs stable at this time.      Review of Systems   Constitutional:  Positive for activity change (decreased 2/2 pain level). Negative for appetite change, fatigue and fever.   HENT:  Negative for congestion, rhinorrhea, sinus pressure, sinus pain and sore throat.    Eyes:  Negative for redness.   Respiratory:  Negative for cough, chest tightness, shortness of breath and wheezing.    Cardiovascular:  Negative for chest pain, palpitations and leg swelling.   Gastrointestinal:  Positive for abdominal distention, constipation and nausea. Negative for abdominal pain, diarrhea and vomiting.   Genitourinary:  Positive for flank " pain (right). Negative for decreased urine volume, difficulty urinating, dysuria, frequency and urgency.   Musculoskeletal:  Negative for arthralgias and gait problem.   Neurological:  Negative for dizziness, facial asymmetry, speech difficulty, weakness, light-headedness and headaches.   Psychiatric/Behavioral:  Negative for agitation and behavioral problems.    Objective:     Vital Signs (Most Recent):  Temp: 98.8 °F (37.1 °C) (07/08/23 1201)  Pulse: 95 (07/08/23 1201)  Resp: 18 (07/08/23 1201)  BP: 133/74 (07/08/23 1201)  SpO2: 96 % (07/08/23 1201) Vital Signs (24h Range):  Temp:  [98.5 °F (36.9 °C)-100.1 °F (37.8 °C)] 98.8 °F (37.1 °C)  Pulse:  [] 95  Resp:  [16-18] 18  SpO2:  [96 %-99 %] 96 %  BP: (120-179)/(74-85) 133/74     Weight: 87.3 kg (192 lb 7.4 oz)  Body mass index is 32.03 kg/m².    Intake/Output Summary (Last 24 hours) at 7/8/2023 1303  Last data filed at 7/8/2023 0537  Gross per 24 hour   Intake 620 ml   Output 600 ml   Net 20 ml         Physical Exam  Constitutional:       General: She is in acute distress.      Appearance: Normal appearance. She is normal weight.   HENT:      Head: Normocephalic and atraumatic.      Mouth/Throat:      Mouth: Mucous membranes are moist.      Pharynx: Oropharynx is clear.   Eyes:      Extraocular Movements: Extraocular movements intact.      Pupils: Pupils are equal, round, and reactive to light.   Cardiovascular:      Rate and Rhythm: Normal rate and regular rhythm.      Pulses: Normal pulses.      Heart sounds: Normal heart sounds. No murmur heard.  Pulmonary:      Effort: Pulmonary effort is normal. No respiratory distress.      Breath sounds: Normal breath sounds. No wheezing.   Abdominal:      General: Abdomen is flat. Bowel sounds are normal.      Palpations: Abdomen is soft.      Tenderness: There is abdominal tenderness (right flank). There is right CVA tenderness.   Musculoskeletal:         General: No swelling or deformity. Normal range of motion.       Cervical back: Normal range of motion.   Lymphadenopathy:      Cervical: No cervical adenopathy.   Skin:     General: Skin is warm and dry.   Neurological:      General: No focal deficit present.      Mental Status: She is alert and oriented to person, place, and time. Mental status is at baseline.   Psychiatric:         Mood and Affect: Mood normal.         Behavior: Behavior normal.           Significant Labs: All pertinent labs within the past 24 hours have been reviewed.  CBC:   Recent Labs   Lab 07/07/23  0351 07/08/23  0407   WBC 12.74* 10.63   HGB 7.6* 7.8*   HCT 23.2* 23.3*   * 577*     CMP:   Recent Labs   Lab 07/07/23  0351 07/08/23  0407    136   K 4.0 4.0    101   CO2 24 24    107   BUN 9 7   CREATININE 0.9 0.9   CALCIUM 9.3 9.4   PROT 8.4 8.6*   ALBUMIN 2.8* 2.9*   BILITOT 0.4 0.3   ALKPHOS 101 96   AST 27 21   ALT 76* 60*   ANIONGAP 11 11       Significant Imaging: I have reviewed all pertinent imaging results/findings within the past 24 hours.  I have reviewed and interpreted all pertinent imaging results/findings within the past 24 hours.      Assessment/Plan:      * Pyelonephritis  Patient with recent diagnosis of pyelonephritis and nephrolithiasis recently discharged from hospital s/p stent placement for nephrolithiasis.  Was on PO abx, augmentin.  Previous urine cx resulted with sensitivities.  Fever, nausea, vomiting on admission.  Patient experienced spikes in fevers throughout stay while on IV ceftriaxone.  WBC elevated, lactic within normal range in ED.  CT Renal with stable stent and stones with concern for renal abscesses.  Retroperitoneal US Bilateral nonobstructing renal calculi.   CT urogram revealing stent in stable position with inflammation of right kidney and possible interval bleeding into existing cyst    Plan:  - Continue IV ceftriaxone day 9  - Continue maintenance IVF.  - Hold nephrotoxic medications .  - Renally-dose current meds if available  - Avoid  Hypotension.  - Strict I/O's.  - Monitor renal function carefully.  - Monitor WBC count.  - For pain, Oxycodone 10mg q4 scheduled, lidocaine patch, and Norco 0.5mg q3 PRN, Toradol 15mg q6 PRN  -Per urology recs, oxybutynin for bladder spasms and flomax for stent colic  -Muscle spasms: Robaxin PRN  -Follow fever  -Renal US ordered due to persistent right CVA pain to determine if size of remaining abscess has changed.  -Will consult IR if need for additional intervention.  -Patient will follow up outpatient with Urology on 7/31/23.   -Fluid cultures from aspiration revealed + Klebsiella and many WBCs  -Appreciate recommendations from ID regarding antibiotic regimen  -Appreciate recommendations from Urology        Iron deficiency anemia  Iron studies- elevated ferritin, low iron and TIBC indicating iron deficiency anemia    -ferritin administered        Physical deconditioning  Patient with prolonged hospital stay experiencing physical deconditoning  -continue PT inpatient  -refer PT outpatient    Hyperthyroidism, subclinical  Thyroid studies completed to determine etiology of high blood pressure. Patient has had systolic BP (140s-160s)  -TSH 0.310  -Follow up outpatient for repeat thyroid studies       Hypokalemia  K 4 on 7/8 AM labs.    Plan:  - Replete as needed.    Renal abscess  Patient has complained of constant right flank pain and RUQ pain.  CT Renal with stable stent and stones with concern for renal abscesses.  Per IR & Urology recs, CT urogram with multiple hypodense irregularities, concerning for abscesses.  Per IR recs, S/p aspiration of 3 ml of pus from right kidney on patient's 6th day on ceftriaxone- Gram stain and KOH fluid studies negative  Fluid culture + for Klebsiella    Plan  -Continue IV ceftriaxone  -ID recs for possible changes in coverage  -Renal US      Flank pain  Plan as in pyelonephritis.    Nephrolithiasis  Plan as in pyelonephritis.    Elevated BP without diagnosis of hypertension  BP  elevated on admission. Patient never been diagnosed with hypertension.  Potentially 2/2 to pain.    Plan:  - Will continue to monitor vitals.  - Add amlodipine 5mg daily, can consider titrating dose or can add additional agent if needed.  -Patient high blood pressure to be evaluated outpatient  - Avoid diuretics        VTE Risk Mitigation (From admission, onward)         Ordered     enoxaparin injection 40 mg  Daily         06/30/23 1744     IP VTE HIGH RISK PATIENT  Once         06/30/23 1744     Place sequential compression device  Until discontinued         06/30/23 1744                Discharge Planning   RUDOLPH: 7/7/2023     Code Status: Full Code   Is the patient medically ready for discharge?:     Reason for patient still in hospital (select all that apply): Patient trending condition  Discharge Plan A: Home, Home with family   Discharge Delays: None known at this time              ________________________  Gwendolyn ePralta MD  [facility] PGY-1

## 2023-07-08 NOTE — ASSESSMENT & PLAN NOTE
Patient with recent diagnosis of pyelonephritis and nephrolithiasis recently discharged from hospital s/p stent placement for nephrolithiasis.  Was on PO abx, augmentin.  Previous urine cx resulted with sensitivities.  Fever, nausea, vomiting on admission.  Patient experienced spikes in fevers throughout stay while on IV ceftriaxone.  WBC elevated, lactic within normal range in ED.  CT Renal with stable stent and stones with concern for renal abscesses.  Retroperitoneal US Bilateral nonobstructing renal calculi.   CT urogram revealing stent in stable position with inflammation of right kidney and possible interval bleeding into existing cyst  Renal US (7/8)- Heterogeneous collection R kidney 2.7 cm.  Overall extent has decreased in size when compared to prior exam from 07/05, consistent with abscess.  CT with contrast (7/8)- Persistent abnormal hypoattenuation with parenchymal heterogeneity along the midpole of the right kidney concerning for abscess formation, likely same abscess aspirated. Small to moderate right pleural effusion with adjacent passive atelectasis.   Per IR abscess seen on CT too small to drain    Plan:  - Continue IV ceftriaxone day 9  - Continue maintenance IVF.  - Hold nephrotoxic medications .  - Renally-dose current meds if available  - Avoid Hypotension.  - Strict I/O's.  - Monitor renal function carefully.  - Monitor WBC count.  - For pain, Oxycodone 10mg q4 scheduled, lidocaine patch, and Norco 0.5mg q3 PRN, Toradol 15mg q6 PRN  -Per urology recs, oxybutynin for bladder spasms and flomax for stent colic  -Muscle spasms: Robaxin PRN  -Follow fever  -Per radiology renal US report and recommendations, CT ordered for better interpretation of R kidney  -Will consult IR if need for additional intervention.  -Increased bowel regimen 2/2 constipation which is likely due to pain regimen.  -Patient will follow up outpatient with Urology on 7/31/23.   -Fluid cultures from aspiration revealed +  Klebsiella and many WBCs  -Per ID recs      continue IV ceftriaxone due to klebsiella cx sensitivity to current abx regimen.          No current need for PICC line at this time as patient can likely receive PO abx upon discharge.  -Appreciate recommendations from Urology

## 2023-07-08 NOTE — SUBJECTIVE & OBJECTIVE
"Interval History: No adverse events overnight. Today patient reports improved pain from the night prior, but still have severe R CVA pain described as "stabbing" exacerbated with movement and deep breathing. BM this AM. Although notes constipation likely secondary to pain regimen.Tolerating PO intake. Scheduled for renal US today. Remains afebrile and vital signs stable at this time.      Review of Systems   Constitutional:  Positive for activity change (decreased 2/2 pain level). Negative for appetite change, fatigue and fever.   HENT:  Negative for congestion, rhinorrhea, sinus pressure, sinus pain and sore throat.    Eyes:  Negative for redness.   Respiratory:  Negative for cough, chest tightness, shortness of breath and wheezing.    Cardiovascular:  Negative for chest pain, palpitations and leg swelling.   Gastrointestinal:  Positive for abdominal distention, constipation and nausea. Negative for abdominal pain, diarrhea and vomiting.   Genitourinary:  Positive for flank pain (right). Negative for decreased urine volume, difficulty urinating, dysuria, frequency and urgency.   Musculoskeletal:  Negative for arthralgias and gait problem.   Neurological:  Negative for dizziness, facial asymmetry, speech difficulty, weakness, light-headedness and headaches.   Psychiatric/Behavioral:  Negative for agitation and behavioral problems.    Objective:     Vital Signs (Most Recent):  Temp: 98.8 °F (37.1 °C) (07/08/23 1201)  Pulse: 95 (07/08/23 1201)  Resp: 18 (07/08/23 1201)  BP: 133/74 (07/08/23 1201)  SpO2: 96 % (07/08/23 1201) Vital Signs (24h Range):  Temp:  [98.5 °F (36.9 °C)-100.1 °F (37.8 °C)] 98.8 °F (37.1 °C)  Pulse:  [] 95  Resp:  [16-18] 18  SpO2:  [96 %-99 %] 96 %  BP: (120-179)/(74-85) 133/74     Weight: 87.3 kg (192 lb 7.4 oz)  Body mass index is 32.03 kg/m².    Intake/Output Summary (Last 24 hours) at 7/8/2023 1303  Last data filed at 7/8/2023 0512  Gross per 24 hour   Intake 620 ml   Output 600 ml "   Net 20 ml         Physical Exam  Constitutional:       General: She is in acute distress.      Appearance: Normal appearance. She is normal weight.   HENT:      Head: Normocephalic and atraumatic.      Mouth/Throat:      Mouth: Mucous membranes are moist.      Pharynx: Oropharynx is clear.   Eyes:      Extraocular Movements: Extraocular movements intact.      Pupils: Pupils are equal, round, and reactive to light.   Cardiovascular:      Rate and Rhythm: Normal rate and regular rhythm.      Pulses: Normal pulses.      Heart sounds: Normal heart sounds. No murmur heard.  Pulmonary:      Effort: Pulmonary effort is normal. No respiratory distress.      Breath sounds: Normal breath sounds. No wheezing.   Abdominal:      General: Abdomen is flat. Bowel sounds are normal.      Palpations: Abdomen is soft.      Tenderness: There is abdominal tenderness (right flank). There is right CVA tenderness.   Musculoskeletal:         General: No swelling or deformity. Normal range of motion.      Cervical back: Normal range of motion.   Lymphadenopathy:      Cervical: No cervical adenopathy.   Skin:     General: Skin is warm and dry.   Neurological:      General: No focal deficit present.      Mental Status: She is alert and oriented to person, place, and time. Mental status is at baseline.   Psychiatric:         Mood and Affect: Mood normal.         Behavior: Behavior normal.           Significant Labs: All pertinent labs within the past 24 hours have been reviewed.  CBC:   Recent Labs   Lab 07/07/23  0351 07/08/23  0407   WBC 12.74* 10.63   HGB 7.6* 7.8*   HCT 23.2* 23.3*   * 577*     CMP:   Recent Labs   Lab 07/07/23  0351 07/08/23  0407    136   K 4.0 4.0    101   CO2 24 24    107   BUN 9 7   CREATININE 0.9 0.9   CALCIUM 9.3 9.4   PROT 8.4 8.6*   ALBUMIN 2.8* 2.9*   BILITOT 0.4 0.3   ALKPHOS 101 96   AST 27 21   ALT 76* 60*   ANIONGAP 11 11       Significant Imaging: I have reviewed all pertinent  imaging results/findings within the past 24 hours.  I have reviewed and interpreted all pertinent imaging results/findings within the past 24 hours.

## 2023-07-08 NOTE — PROGRESS NOTES
[facility] - Telemetry  Infectious Disease  Progress Note     Patient Name: Sherri Machado  MRN: [id number]  Admission Date: 6/30/2023  Attending Physician: Zackary Gaspar MD    Assessment/Plan:      ID  * Pyelonephritis  43-year-old F w/ PMHx with history depression, opioid dependence who presents to the ED with reports of right flank pain. Patient reports she was diagnosed with a kidney stone and was seen by Dr. Blevins.  Patient had a urethral stent placement in addition to cystoscopy and is currently taking PO antibiotics.  Patient reports she has been running fever over the past few days in addition to nausea, vomiting and diarrhea.  Patient states she is having issues with eating and drinking. She was sent for possible admission per Dr. Blevins.     In the ED, initial vitals /85, HR 85, Temp 98.3F, SpO2 100% on room air. Labs include CBC with H/H 9.1/27.6 and WBC 16.35, CMP with K 3.3.  UA with 1+ protein, 2+ occult blood, 3+ leukocytes, 29 WBC, 5 RBC. Urine Pregnancy test negative. Lactic acid 1.3. CXR with no acute changes. [facility] consulted for evaluation for admission for UTI with concerns for pyelonephritis.      6-18-23 - ED visit - CT showed right stones for sure and possible left stones - pain management - no abx     6-21-23 thru 6-24 - admit - UTI kleb, stent placed reduced right hydro, ceftriaxone then PO augmentin     6-30-23 - fever to 101, vomiting qd, decreased PO intake admitted for pyelo - CT- renal - showed faint 2 small abscesses  Urology felt that these were not accessible by IR at this time  - attempt abx therapy  - ID  consulted     7/5/23: IR drainage of renal abscess found on repeat CT 7/5/23 7/8/23: CT showed some inflammation and possible bleed at site of abscess drainage      Rec:   1. Continue IV ceftriaxone, as fluid culturee also grew fairly sensitive Kleb  2. Monitor for improvement in pain  3. WBC improved today  4. Likely will not need picc line as after clinical  improvement she can complete antibiotic course on po cipro       Thank you for your consult. I will follow-up with patient. Please contact us if you have any additional questions.        Subjective:      No acute events overnight     Objective:     Last 24 Hour Vital Signs:  BP  Min: 120/76  Max: 179/84  Temp  Av °F (37.2 °C)  Min: 97.7 °F (36.5 °C)  Max: 100.1 °F (37.8 °C)  Pulse  Av.3  Min: 80  Max: 103  Resp  Av.7  Min: 16  Max: 18  SpO2  Av.9 %  Min: 96 %  Max: 100 %  I/O last 3 completed shifts:  In: 620 [P.O.:620]  Out: 600 [Urine:600]    Physical Examination:  Gen:  Well-developed, well-nourished, NAD  HEENT:  EOMI, conjunctiva clear, mucous membranes moist  Neck: supple, normal ROM  Resp: Clear to auscultation bilaterally without wheezes or crackles, normal WOB  CV: Regular rate, normal rhythm and S1S2 w/out murmur.   Abd: Soft, non-tender, non-distended, bowel sounds present; increased  R flank tenderness, drianage site without erythema or edema  Neuro: AAO x 4  Derm: Skin is warm and dry, no rashes or lesions appreciated  Psych: Normal mood and affect, normal behavior        Laboratory:  Laboratory Data Reviewed: yes  Pertinent Findings:      Microbiology Data Reviewed: yes  Pertinent Findings:      Other Results:  Radiology Data Reviewed: Yes  Pertinent Findings:  Microbiology Results (last 7 days)       Procedure Component Value Units Date/Time    Culture, Body Fluid (Aerobic) w/ GS [[id number]]  (Abnormal)  (Susceptibility) Collected: 23    Order Status: Completed Specimen: Body Fluid from Back Updated: 23 1005     AEROBIC CULTURE - FLUID KLEBSIELLA PNEUMONIAE  Few       Gram Stain Result Many WBC's      No organisms seen    AFB Culture & Smear [[id number]] Collected: 23    Order Status: Completed Specimen: Abscess from Kidney, Right Updated: 23 0927     AFB Culture & Smear Culture in progress     AFB CULTURE STAIN No acid fast bacilli seen.    KOH  prep [[id number]] Collected: 07/05/23 1810    Order Status: Completed Specimen: Abscess from Kidney, Right Updated: 07/06/23 0157     KOH Prep No yeast or fungal elements seen    Gram stain [[id number]] Collected: 07/05/23 1810    Order Status: Completed Specimen: Abscess from Kidney, Right Updated: 07/06/23 0157     Gram Stain Result Moderate WBC's      No organisms seen    Blood culture #1 **CANNOT BE ORDERED STAT** [[id number]] Collected: 06/30/23 1444    Order Status: Completed Specimen: Blood from Antecubital, Right Arm Updated: 07/05/23 2212     Blood Culture, Routine No growth after 5 days.    Blood culture #2 **CANNOT BE ORDERED STAT** [[id number]] Collected: 06/30/23 1444    Order Status: Completed Specimen: Blood from Antecubital, Left Arm Updated: 07/05/23 2212     Blood Culture, Routine No growth after 5 days.    Urine culture [[id number]] Collected: 06/30/23 1423    Order Status: Completed Specimen: Urine Updated: 07/02/23 0256     Urine Culture, Routine No growth    Narrative:      Specimen Source->Urine              Current Medications:     Infusions:       Scheduled:   amLODIPine  5 mg Oral Daily    cefTRIAXone (ROCEPHIN) IVPB  1 g Intravenous Q24H    enoxparin  40 mg Subcutaneous Daily    ferrous sulfate  1 tablet Oral Daily    LIDOcaine  1 patch Transdermal Q24H    magnesium sulfate IVPB  4 g Intravenous Once    multivitamin  1 tablet Oral Daily    oxyCODONE-acetaminophen  1 tablet Oral Q4H    tamsulosin  0.4 mg Oral Daily        PRN:  acetaminophen, HYDROmorphone, ketorolac, melatonin, methocarbamoL, naloxone, ondansetron, oxybutynin, senna-docusate 8.6-50 mg, sodium chloride 0.9%, sodium chloride 0.9%, sodium chloride 0.9%    Antibiotics and Day Number of Therapy:  Ceftriaxone 7/1-now

## 2023-07-09 LAB
ALBUMIN SERPL BCP-MCNC: 2.9 G/DL (ref 3.5–5.2)
ALP SERPL-CCNC: 93 U/L (ref 55–135)
ALT SERPL W/O P-5'-P-CCNC: 55 U/L (ref 10–44)
ANION GAP SERPL CALC-SCNC: 12 MMOL/L (ref 8–16)
AST SERPL-CCNC: 23 U/L (ref 10–40)
BASOPHILS # BLD AUTO: 0.08 K/UL (ref 0–0.2)
BASOPHILS NFR BLD: 0.7 % (ref 0–1.9)
BILIRUB SERPL-MCNC: 0.3 MG/DL (ref 0.1–1)
BUN SERPL-MCNC: 8 MG/DL (ref 6–20)
CALCIUM SERPL-MCNC: 9.5 MG/DL (ref 8.7–10.5)
CHLORIDE SERPL-SCNC: 100 MMOL/L (ref 95–110)
CO2 SERPL-SCNC: 21 MMOL/L (ref 23–29)
CREAT SERPL-MCNC: 1 MG/DL (ref 0.5–1.4)
DIFFERENTIAL METHOD: ABNORMAL
EOSINOPHIL # BLD AUTO: 0.1 K/UL (ref 0–0.5)
EOSINOPHIL NFR BLD: 0.6 % (ref 0–8)
ERYTHROCYTE [DISTWIDTH] IN BLOOD BY AUTOMATED COUNT: 13.9 % (ref 11.5–14.5)
EST. GFR  (NO RACE VARIABLE): >60 ML/MIN/1.73 M^2
GLUCOSE SERPL-MCNC: 112 MG/DL (ref 70–110)
HCT VFR BLD AUTO: 23.7 % (ref 37–48.5)
HGB BLD-MCNC: 7.8 G/DL (ref 12–16)
IMM GRANULOCYTES # BLD AUTO: 0.1 K/UL (ref 0–0.04)
IMM GRANULOCYTES NFR BLD AUTO: 0.8 % (ref 0–0.5)
LYMPHOCYTES # BLD AUTO: 1.9 K/UL (ref 1–4.8)
LYMPHOCYTES NFR BLD: 16.1 % (ref 18–48)
MAGNESIUM SERPL-MCNC: 2 MG/DL (ref 1.6–2.6)
MCH RBC QN AUTO: 26.4 PG (ref 27–31)
MCHC RBC AUTO-ENTMCNC: 32.9 G/DL (ref 32–36)
MCV RBC AUTO: 80 FL (ref 82–98)
MONOCYTES # BLD AUTO: 1.1 K/UL (ref 0.3–1)
MONOCYTES NFR BLD: 8.9 % (ref 4–15)
NEUTROPHILS # BLD AUTO: 8.6 K/UL (ref 1.8–7.7)
NEUTROPHILS NFR BLD: 72.9 % (ref 38–73)
NRBC BLD-RTO: 0 /100 WBC
PHOSPHATE SERPL-MCNC: 4.3 MG/DL (ref 2.7–4.5)
PLATELET # BLD AUTO: 636 K/UL (ref 150–450)
PMV BLD AUTO: 8.5 FL (ref 9.2–12.9)
POTASSIUM SERPL-SCNC: 4.1 MMOL/L (ref 3.5–5.1)
PROT SERPL-MCNC: 8.8 G/DL (ref 6–8.4)
RBC # BLD AUTO: 2.95 M/UL (ref 4–5.4)
SODIUM SERPL-SCNC: 133 MMOL/L (ref 136–145)
WBC # BLD AUTO: 11.79 K/UL (ref 3.9–12.7)

## 2023-07-09 PROCEDURE — 36415 COLL VENOUS BLD VENIPUNCTURE: CPT

## 2023-07-09 PROCEDURE — 63600175 PHARM REV CODE 636 W HCPCS

## 2023-07-09 PROCEDURE — 99232 PR SUBSEQUENT HOSPITAL CARE,LEVL II: ICD-10-PCS | Mod: ,,, | Performed by: INTERNAL MEDICINE

## 2023-07-09 PROCEDURE — 25000003 PHARM REV CODE 250: Performed by: STUDENT IN AN ORGANIZED HEALTH CARE EDUCATION/TRAINING PROGRAM

## 2023-07-09 PROCEDURE — 84100 ASSAY OF PHOSPHORUS: CPT

## 2023-07-09 PROCEDURE — 99232 SBSQ HOSP IP/OBS MODERATE 35: CPT | Mod: ,,, | Performed by: INTERNAL MEDICINE

## 2023-07-09 PROCEDURE — 11000001 HC ACUTE MED/SURG PRIVATE ROOM

## 2023-07-09 PROCEDURE — 80053 COMPREHEN METABOLIC PANEL: CPT

## 2023-07-09 PROCEDURE — 94761 N-INVAS EAR/PLS OXIMETRY MLT: CPT

## 2023-07-09 PROCEDURE — 85025 COMPLETE CBC W/AUTO DIFF WBC: CPT

## 2023-07-09 PROCEDURE — 25000003 PHARM REV CODE 250

## 2023-07-09 PROCEDURE — 83735 ASSAY OF MAGNESIUM: CPT

## 2023-07-09 RX ADMIN — TAMSULOSIN HYDROCHLORIDE 0.4 MG: 0.4 CAPSULE ORAL at 10:07

## 2023-07-09 RX ADMIN — OXYCODONE AND ACETAMINOPHEN 1 TABLET: 10; 325 TABLET ORAL at 10:07

## 2023-07-09 RX ADMIN — OXYCODONE AND ACETAMINOPHEN 1 TABLET: 10; 325 TABLET ORAL at 02:07

## 2023-07-09 RX ADMIN — OXYCODONE AND ACETAMINOPHEN 1 TABLET: 10; 325 TABLET ORAL at 06:07

## 2023-07-09 RX ADMIN — OXYCODONE AND ACETAMINOPHEN 1 TABLET: 10; 325 TABLET ORAL at 09:07

## 2023-07-09 RX ADMIN — OXYBUTYNIN CHLORIDE 5 MG: 5 TABLET ORAL at 09:07

## 2023-07-09 RX ADMIN — DOCUSATE SODIUM AND SENNOSIDES 1 TABLET: 8.6; 5 TABLET, FILM COATED ORAL at 09:07

## 2023-07-09 RX ADMIN — ENOXAPARIN SODIUM 40 MG: 40 INJECTION SUBCUTANEOUS at 06:07

## 2023-07-09 RX ADMIN — CEFTRIAXONE 1 G: 1 INJECTION, POWDER, FOR SOLUTION INTRAMUSCULAR; INTRAVENOUS at 02:07

## 2023-07-09 RX ADMIN — AMLODIPINE BESYLATE 5 MG: 5 TABLET ORAL at 10:07

## 2023-07-09 RX ADMIN — THERA TABS 1 TABLET: TAB at 10:07

## 2023-07-09 RX ADMIN — FERROUS SULFATE TAB 325 MG (65 MG ELEMENTAL FE) 1 EACH: 325 (65 FE) TAB at 10:07

## 2023-07-09 RX ADMIN — LIDOCAINE 1 PATCH: 50 PATCH CUTANEOUS at 09:07

## 2023-07-09 RX ADMIN — DOCUSATE SODIUM AND SENNOSIDES 1 TABLET: 8.6; 5 TABLET, FILM COATED ORAL at 10:07

## 2023-07-09 NOTE — PROGRESS NOTES
[facility] - Telemetry  Infectious Disease  Progress Note     Patient Name: Sherri Machado  MRN: [id number]  Admission Date: 6/30/2023  Attending Physician: Zackary Gaspar MD    Assessment/Plan:      ID  * Pyelonephritis  43-year-old F w/ PMHx with history depression, opioid dependence who presents to the ED with reports of right flank pain. Patient reports she was diagnosed with a kidney stone and was seen by Dr. Blevins.  Patient had a urethral stent placement in addition to cystoscopy and is currently taking PO antibiotics.  Patient reports she has been running fever over the past few days in addition to nausea, vomiting and diarrhea.  Patient states she is having issues with eating and drinking. She was sent for possible admission per Dr. Blevins.     In the ED, initial vitals /85, HR 85, Temp 98.3F, SpO2 100% on room air. Labs include CBC with H/H 9.1/27.6 and WBC 16.35, CMP with K 3.3.  UA with 1+ protein, 2+ occult blood, 3+ leukocytes, 29 WBC, 5 RBC. Urine Pregnancy test negative. Lactic acid 1.3. CXR with no acute changes. [facility] consulted for evaluation for admission for UTI with concerns for pyelonephritis.      6-18-23 - ED visit - CT showed right stones for sure and possible left stones - pain management - no abx     6-21-23 thru 6-24 - admit - UTI kleb, stent placed reduced right hydro, ceftriaxone then PO augmentin     6-30-23 - fever to 101, vomiting qd, decreased PO intake admitted for pyelo - CT- renal - showed faint 2 small abscesses  Urology felt that these were not accessible by IR at this time  - attempt abx therapy  - ID  consulted     7/5/23: IR drainage of renal abscess found on repeat CT 7/5/23 7/8/23: CT showed some inflammation and possible bleed at site of abscess drainage      Rec:   1. Continue IV ceftriaxone.  Can discharge with ciprofloxacin, will need 14 days (end 7/14)  3. Please contact us if patient's WBC uptrends or clinically worsens       Thank you for your consult. ID  will sign off. Please contact us if you have any additional questions.        Subjective:     Patient still with R flank pain, limiting her movement.  Pain is positional.  Per pt, she is waiting for urology to determine if she will have stone removal inpatient.     Objective:     Last 24 Hour Vital Signs:  BP  Min: 110/62  Max: 140/80  Temp  Av.4 °F (36.9 °C)  Min: 97.3 °F (36.3 °C)  Max: 99.3 °F (37.4 °C)  Pulse  Av.1  Min: 89  Max: 104  Resp  Av.7  Min: 16  Max: 20  SpO2  Av %  Min: 95 %  Max: 99 %  I/O last 3 completed shifts:  In: 200 [P.O.:200]  Out: 600 [Urine:600]    Physical Examination:  Gen:  Well-developed, well-nourished, NAD  HEENT:  EOMI, conjunctiva clear, mucous membranes moist  Neck: supple, normal ROM  Resp: Clear to auscultation bilaterally without wheezes or crackles, normal WOB  CV: Regular rate, normal rhythm and S1S2 w/out murmur.   Abd: Soft, non-tender, non-distended, bowel sounds present; increased  R flank tenderness, drianage site without erythema or edema  Neuro: AAO x 4  Derm: Skin is warm and dry, no rashes or lesions appreciated  Psych: Normal mood and affect, normal behavior        Laboratory:  Laboratory Data Reviewed: yes  Pertinent Findings:      Microbiology Data Reviewed: yes  Pertinent Findings:      Other Results:  Radiology Data Reviewed: Yes  Pertinent Findings:  Microbiology Results (last 7 days)       Procedure Component Value Units Date/Time    Culture, Body Fluid (Aerobic) w/ GS [[id number]]  (Abnormal)  (Susceptibility) Collected: 23    Order Status: Completed Specimen: Body Fluid from Back Updated: 23 1005     AEROBIC CULTURE - FLUID KLEBSIELLA PNEUMONIAE  Few       Gram Stain Result Many WBC's      No organisms seen    AFB Culture & Smear [[id number]] Collected: 23    Order Status: Completed Specimen: Abscess from Kidney, Right Updated: 23 0971     AFB Culture & Smear Culture in progress     AFB CULTURE STAIN No acid  fast bacilli seen.    CHAPARRO prep [[id number]] Collected: 07/05/23 1810    Order Status: Completed Specimen: Abscess from Kidney, Right Updated: 07/06/23 0157     KOH Prep No yeast or fungal elements seen    Gram stain [[id number]] Collected: 07/05/23 1810    Order Status: Completed Specimen: Abscess from Kidney, Right Updated: 07/06/23 0157     Gram Stain Result Moderate WBC's      No organisms seen    Blood culture #1 **CANNOT BE ORDERED STAT** [[id number]] Collected: 06/30/23 1444    Order Status: Completed Specimen: Blood from Antecubital, Right Arm Updated: 07/05/23 2212     Blood Culture, Routine No growth after 5 days.    Blood culture #2 **CANNOT BE ORDERED STAT** [[id number]] Collected: 06/30/23 1444    Order Status: Completed Specimen: Blood from Antecubital, Left Arm Updated: 07/05/23 2212     Blood Culture, Routine No growth after 5 days.              Current Medications:     Infusions:       Scheduled:   amLODIPine  5 mg Oral Daily    cefTRIAXone (ROCEPHIN) IVPB  1 g Intravenous Q24H    enoxparin  40 mg Subcutaneous Daily    ferrous sulfate  1 tablet Oral Daily    LIDOcaine  1 patch Transdermal Q24H    magnesium sulfate IVPB  4 g Intravenous Once    multivitamin  1 tablet Oral Daily    oxyCODONE-acetaminophen  1 tablet Oral Q4H    senna-docusate 8.6-50 mg  1 tablet Oral BID    tamsulosin  0.4 mg Oral Daily        PRN:  HYDROmorphone, ketorolac, melatonin, methocarbamoL, naloxone, ondansetron, oxybutynin, senna-docusate 8.6-50 mg, sodium chloride 0.9%, sodium chloride 0.9%, sodium chloride 0.9%    Antibiotics and Day Number of Therapy:  Ceftriaxone 6/30-now

## 2023-07-09 NOTE — PROGRESS NOTES
[facility]  Progress Note    Patient Name: Sherri Machado  MRN: [id number]  Patient Class: IP- Inpatient   Admission Date: 6/30/2023  Length of Stay: 9 days  Attending Physician: Tomasa Orantes MD  Primary Care Provider: Modesto Waldron MD        Subjective:     Principal Problem:Pyelonephritis        HPI:  43-year-old F w/ PMHx with history depression, opioid dependence who presents to the ED with reports of right flank pain. Patient reports she was diagnosed with a kidney stone and was seen by Dr. Blevins.  Patient had a urethral stent placement in addition to cystoscopy and is currently taking PO antibiotics.  Patient reports she has been running fever over the past few days in addition to nausea, vomiting and diarrhea.  Patient states she is having issues with eating and drinking. She was sent for possible admission per Dr. Blevins.    In the ED, initial vitals /85, HR 85, Temp 98.3F, SpO2 100% on room air. Labs include CBC with H/H 9.1/27.6 and WBC 16.35, CMP with K 3.3.  UA with 1+ protein, 2+ occult blood, 3+ leukocytes, 29 WBC, 5 RBC. Urine Pregnancy test negative. Lactic acid 1.3. CXR with no acute changes. [facility] consulted for evaluation for admission for UTI with concerns for pyelonephritis.       Overview/Hospital Course:  The patient has a known history of prior hospital admission with complicated pyelonephritis and nephrolithiasis diagnosis with renal stent placement and failed outpatient treatment with PO antibiotics, Augmentin. CT Renal stone with interval placement of right-sided double-J ureteral stent with resolution of previous right-sided hydronephrosis, stable 5 mm calculus within the distal right ureter near the UVJ with 2 subtle hypoattenuating parenchymal foci within the right kidney, concerning for renal abscesses. US Retroperitoneal with bilateral nonobstructing renal calculi and no hydronephrosis noted. Although patient was on IV ceftriaxone  throughout this admission, she had persistently elevated WBC (16.35 on admission) and intermittent fevers. Per urology and IR recommendations, patient had CT urogram completed revealing multiple hypodense irregularities with largest being 4.6 cm, suspicious for cyst or abscess. CT guided Aspiration of right kidney resulted in removal of 3mL of purulent fluid and improvement in RUQ and right flank pain. Patient's white blood cell count declined to normal range and remained afebrile after aspiration and on day 7 of IV abx, ceftriaxone. Patient required Visatril 50mg for anxiety. Patient persistent headache pain treated with Ketorolac 15mg. Patient also continued receiving IV diuresis during the course with appropriate UOP.  Per urology recommendations, patient will be discharged with oxybutynin PRN, Flomax PRN, and oral antibiotics. Close outpatient f/u was advised with Urology and PCP.  Low intensity therapy at home, per PT recommendations.       Interval History: NAEON. Pt reports pain is unchanged. Updated on imaging results from US and CT from the day prior. Pt still very anxious regarding diagnosis and disposition.      Review of Systems   Constitutional:  Negative for activity change, chills, fatigue and fever.   HENT:  Negative for sinus pressure, sinus pain, sore throat and trouble swallowing.    Eyes:  Negative for visual disturbance.   Respiratory:  Negative for cough, shortness of breath and wheezing.    Cardiovascular:  Negative for chest pain, palpitations and leg swelling.   Gastrointestinal:  Negative for abdominal pain, constipation, diarrhea, nausea and vomiting.   Genitourinary:  Positive for flank pain. Negative for dysuria, frequency and hematuria.   Musculoskeletal:  Positive for back pain. Negative for myalgias.   Skin:  Negative for rash and wound.   Neurological:  Negative for tremors, weakness, numbness and headaches.   Psychiatric/Behavioral:  Negative for confusion. The patient is not  nervous/anxious.    Objective:     Vital Signs (Most Recent):  Temp: 98.3 °F (36.8 °C) (07/09/23 0441)  Pulse: 95 (07/09/23 0441)  Resp: 16 (07/09/23 0642)  BP: 121/71 (07/09/23 0441)  SpO2: 98 % (07/09/23 0730) Vital Signs (24h Range):  Temp:  [98.2 °F (36.8 °C)-99.3 °F (37.4 °C)] 98.3 °F (36.8 °C)  Pulse:  [] 95  Resp:  [16-20] 16  SpO2:  [95 %-99 %] 98 %  BP: (110-142)/(62-87) 121/71     Weight: 87.3 kg (192 lb 7.4 oz)  Body mass index is 32.03 kg/m².  No intake or output data in the 24 hours ending 07/09/23 0746      Physical Exam  Vitals reviewed.   Constitutional:       General: She is not in acute distress.  HENT:      Head: Normocephalic and atraumatic.      Right Ear: External ear normal.      Left Ear: External ear normal.      Nose: Nose normal.   Eyes:      Conjunctiva/sclera: Conjunctivae normal.      Pupils: Pupils are equal, round, and reactive to light.   Cardiovascular:      Rate and Rhythm: Normal rate and regular rhythm.      Pulses: Normal pulses.      Heart sounds: Normal heart sounds. No murmur heard.    No friction rub. No gallop.   Pulmonary:      Effort: Pulmonary effort is normal.      Breath sounds: Normal breath sounds. No wheezing, rhonchi or rales.   Abdominal:      General: Bowel sounds are normal.      Palpations: There is no mass.      Tenderness: There is no abdominal tenderness. There is right CVA tenderness.   Musculoskeletal:         General: No swelling or tenderness. Normal range of motion.      Cervical back: Normal range of motion.      Right lower leg: No edema.      Left lower leg: No edema.   Lymphadenopathy:      Cervical: No cervical adenopathy.   Skin:     General: Skin is warm and dry.      Findings: No rash.   Neurological:      General: No focal deficit present.      Mental Status: She is alert.         Recent Labs   Lab 07/07/23  0351 07/08/23  0407 07/09/23  0350   WBC 12.74* 10.63 11.79   HGB 7.6* 7.8* 7.8*   HCT 23.2* 23.3* 23.7*   MCV 80* 80* 80*   RBC  2.89* 2.91* 2.95*   MCH 26.3* 26.8* 26.4*   MCHC 32.8 33.5 32.9   RDW 14.1 13.9 13.9   * 577* 636*   MPV 8.5* 8.3* 8.5*   GRAN 74.9*  9.5* 73.4*  7.8* 72.9  8.6*   LYMPH 15.9*  2.0 16.2*  1.7 16.1*  1.9   MONO 7.4  0.9 8.2  0.9 8.9  1.1*   EOSINOPHIL 0.5 0.6 0.6   BASOPHIL 0.5 0.8 0.7     Recent Labs   Lab 07/07/23  0351 07/08/23  0407 07/09/23  0351    136 133*   K 4.0 4.0 4.1    101 100   CO2 24 24 21*   ANIONGAP 11 11 12   BUN 9 7 8   CREATININE 0.9 0.9 1.0    107 112*   CALCIUM 9.3 9.4 9.5   PROT 8.4 8.6* 8.8*   ALBUMIN 2.8* 2.9* 2.9*   ALKPHOS 101 96 93   BILITOT 0.4 0.3 0.3   ALT 76* 60* 55*   AST 27 21 23   MG 1.9 2.0 2.0   PHOS 3.1 3.5 4.3     No results for input(s): COLORU, APPEARANCEUA, PHUR, SPECGRAV, PROTEINUA, GLUCUA, KETONESU, BILIRUBINUA, OCCULTUA, UROBILINOGEN, NITRITE, LEUKOCYTESUR, RBCUA, WBCUA, BACTERIA, SQUAMEPITHEL, HYALINECASTS, GRANULARCAST, MICROCMT in the last 168 hours.    Invalid input(s): SPECIMENU, AMORPHOUSU  No results for input(s): TROPONINI, CPK, CPKMB in the last 168 hours.  No results for input(s): PT, INR, APTT in the last 168 hours.  No results for input(s): TSH, U2ITORA, K3ZEHGR, THYROIDAB, FREET4 in the last 168 hours.  US Retroperitoneal Complete    Result Date: 7/8/2023  EXAMINATION: US RETROPERITONEAL COMPLETE CLINICAL HISTORY: pyelonephritis; TECHNIQUE: Ultrasound of the kidneys and urinary bladder was performed including color flow and Doppler evaluation of the kidneys. COMPARISON: Ultrasound from 06/30/2023 and CT abdomen pelvis from 07/07/2023 and CT urogram from 07/05/2023 FINDINGS: Right kidney: The right kidney measures 14.5 cm. No cortical thinning. No loss of corticomedullary distinction. Resistive index measures 0.61.  Heterogeneous collection within parenchyma measuring approximately 2.7 x 1.6 x 2 0 cm consistent previously abscess.  Overall extent is decreased when prior exam 07/05/2023 noting differences in modality.  4 mm and  6 mm nonobstructing stones.  Simple cyst measuring 1.0 cm.  No hydronephrosis. Left kidney: The left kidney measures 11.4 cm. No cortical thinning. No loss of corticomedullary distinction. Resistive index measures 0.71.  No mass. No renal stone. No hydronephrosis. The bladder is not distended and not well evaluated.     Heterogeneous collection within the parenchyma the right kidney measuring up to 2.7 cm.  Overall extent has decreased in size when compared to prior exam from 07/05/2023 when allowing for differences in technique.  Findings are consistent with abscess.  Further evaluation with CT with intravenous contrast as clinically indicated. Additional findings as described above. Electronically signed by: Giovany Farfan MD Date:    07/08/2023 Time:    14:15    US Retroperitoneal Complete    Result Date: 7/1/2023  EXAMINATION: US RETROPERITONEAL COMPLETE CLINICAL HISTORY: UTI; TECHNIQUE: Ultrasound of the kidneys and urinary bladder was performed including color flow and Doppler evaluation of the kidneys. COMPARISON: Ultrasound from 06/23/2023.  CT abdomen and pelvis from 06/30/2023 taken at 16:14. FINDINGS: Right kidney: The right kidney measures 14.7 cm. No cortical thinning. No loss of corticomedullary distinction. Resistive index measures 0.72.  No mass. There is a nonobstructing renal calculus measuring 0.5 x 0.7 x 0.7 cm in the mid upper pole and a 5 mm nonobstructing renal calculus in the lower pole.  No hydronephrosis. Left kidney: The left kidney measures 12.6 cm. No cortical thinning. No loss of corticomedullary distinction. Resistive index measures 0.76.  No mass. There are several 3 mm nonobstructing renal calculi in the mid to upper poles.  No hydronephrosis. The bladder is partially distended at the time of scanning and has an unremarkable appearance.     Bilateral nonobstructing renal calculi as above.  No hydronephrosis. Electronically signed by: Jaylon Rucker Date:    07/01/2023  Time:    00:02    CT Abdomen Pelvis With Contrast    Result Date: 7/8/2023  EXAMINATION: CT ABDOMEN PELVIS WITH CONTRAST CLINICAL HISTORY: Abdominal abscess/infection suspected;right CVA pain, known R renal abscess per radiology rec; TECHNIQUE: Low dose axial images, sagittal and coronal reformations were obtained from the lung bases to the pubic symphysis following the IV administration of 100 mL of Omnipaque 350 .  Oral contrast was not given. COMPARISON: 07/08/2023, 07/07/2023 FINDINGS: Small to moderate right pleural effusion with adjacent passive atelectasis.  Patchy opacity in the left lower lobe.  The base of the heart appears normal.  The aorta is of normal caliber and tapers appropriately. The gallbladder has been removed.  No intrahepatic or extrahepatic biliary ductal dilatation is identified.  The liver, spleen, pancreas, adrenal glands and left kidney are normal in size, shape and contour.  No left-sided nephrolithiasis, hydronephrosis or hydroureter.  Again noted is an abnormal appearance of the right kidney.  There is a simple appearing cyst of the upper pole of the right kidney anteriorly.  In the midpole of the right kidney, there is ill-defined hypoattenuation that measures up to 4.6 cm in greatest craniocaudal dimension, the overall appearance of which is unchanged as compared to the prior examination.  A double-J ureteral stent is in place.  There is a punctate nonobstructing stone at the lower pole of the right kidney.  There is a tiny stone adjacent to the ureteral stent at the level of the right UVJ that measures approximately 4 mm.  The urinary bladder is well distended and appears normal.  Uterus and adnexa are unremarkable. The bowel appears normal.  The appendix is normal.  No free air, free fluid or obstruction.  No pathologically enlarged abdominal or pelvic lymph nodes are seen. Age-appropriate degenerative changes affect the skeleton.     Right double-J ureteral stent remains in place.   There is a 4 mm stone adjacent to the ureteral stent at the level of the right UVJ, similar to before.  Persistent abnormal hypoattenuation with parenchymal heterogeneity along the midpole of the right kidney concerning for abscess formation. Small to moderate right pleural effusion with adjacent passive atelectasis. Electronically signed by: Sandi Khan MD Date:    07/08/2023 Time:    16:23    CT Abdomen Pelvis With Contrast    Result Date: 6/18/2023  EXAMINATION: CT ABDOMEN PELVIS WITH CONTRAST CLINICAL HISTORY: RLQ abdominal pain (Age >= 14y); TECHNIQUE: Axial CT images with sagittal and coronal reformats were obtained of the abdomen and pelvis from the hemidiaphragms through the symphysis pubis after the administration of 100mL Omnipaque 350. COMPARISON: CT of the abdomen and pelvis from 07/03/2018. FINDINGS: Lung Bases: Clear. Heart: Heart size is normal.  No pericardial effusion. Liver: The liver is enlarged.  No focal hepatic lesions are seen.  The portal vasculature is patent. Biliary tract: Stable dilation of the common bile duct, likely related to cholecystectomy changes.  There is no intrahepatic biliary ductal dilation. Gallbladder: Surgically absent. Pancreas: Normal. No pancreatic ductal dilatation. Spleen: Normal size without focal lesion. Adrenals: Unremarkable. Kidneys and urinary collecting systems: There is a 5 mm calculus at the right ureterovesicular junction resulting in moderate hydroureteronephrosis and right perinephric edema and fat stranding.  Additional right-sided nonobstructing renal calculus measures 4 mm.  The left kidney is unremarkable without hydronephrosis or nephrolithiasis. Lymph nodes: None enlarged. Stomach and bowel: The stomach is normal.  Loops of small and large bowel are normal in caliber without evidence for inflammation or obstruction.  Moderate volume of stool noted.  The appendix is normal. Peritoneum and mesentery: No ascites or free intraperitoneal air.  No  abdominal fluid collection. Vasculature: No aneurysm or significant atherosclerosis. Urinary bladder: No wall thickening. Reproductive organs: The uterus and adnexae are unremarkable.  There is trace free fluid in pelvis which may be physiologic or reactive. Body wall: No abnormality. Musculoskeletal: No aggressive osseous lesion.  There are multilevel degenerative changes of the visualized spine.     1. Moderate right hydroureteronephrosis secondary to a 5 mm calculus at the UVJ. 2. Additional nonobstructing right renal calculus. Electronically signed by: Jaylon Rucker Date:    06/18/2023 Time:    19:55    X-Ray Chest AP Portable    Result Date: 6/30/2023  EXAMINATION: XR CHEST AP PORTABLE CLINICAL HISTORY: Sepsis; FINDINGS: Chest one view portable. There is borderline cardiomegaly.  Lungs are clear.  There is DJD.     No acute process seen. Electronically signed by: Adonis Aldana MD Date:    06/30/2023 Time:    15:18    US Kidney    Result Date: 6/23/2023  EXAMINATION: US KIDNEY CLINICAL HISTORY: Non improving RICK, prerenal urine studies; TECHNIQUE: Ultrasound of the kidneys was performed including color flow and Doppler evaluation of the kidneys. COMPARISON: CT abdomen pelvis without contrast dated 06/21/2023 FINDINGS: Right kidney: Measures 15.1 cm.  Resistive index of 0.69.  Vague cortical heterogeneity.  Echogenic focus measuring up to 1.0 cm suggesting nonobstructing stone.  No hydronephrosis. Left kidney: Measures 12.3 cm.  Resistive index of 0.67.  Corticomedullary distinction is maintained.  No hydronephrosis. Urinary bladder is fluid-filled at time of scanning and otherwise unremarkable.     Nonobstructing right renal stone. Previous right-sided hydronephrosis by CT has essentially resolved. Electronically signed by: Adelso Roblero Date:    06/23/2023 Time:    13:33    CT Renal Stone Study ABD Pelvis WO    Result Date: 6/30/2023  EXAMINATION: CT RENAL STONE STUDY ABD PELVIS WO CLINICAL HISTORY: Flank  pain, kidney stone suspected; TECHNIQUE: Low dose axial images, sagittal and coronal reformations were obtained from the lung bases to the pubic symphysis.  Contrast was not administered. COMPARISON: Renal ultrasound 06/23/2023, right upper quadrant ultrasound 06/22/2023, CT abdomen and pelvis 06/21/2023 FINDINGS: Lack of IV contrast limits evaluation of soft tissue and vascular structures Imaged lung bases are clear.  Base of the heart is within normal limits. Remote cholecystectomy.  Stable prominence of the common bile duct.  No intrahepatic biliary ductal dilatation. Liver remains enlarged without focal process.  Noncontrast appearance of the pancreas, spleen, stomach, duodenum and bilateral adrenal glands are within normal limits. When compared to CT study of 06/21/2023, there has been interval placement of right-sided double-J ureteral stent with proximal loop in the right renal pelvis and distal loop within the midline and left paramedian dependent aspect of the urinary bladder.  The right kidney remains asymmetrically larger than the left with similar asymmetric mild right perinephric stranding, noting interval resolution of previous right-sided hydroureteronephrosis and previous contrast within the right renal collecting system and right ureter has resolved.  Stable appearance of a 5 mm calculus within the region of the right UVJ.  The stent is adjacent to the posterior left aspect of the distal ureteral calculus.  Similar right perinephric stranding.  Newly developed 2 subtle hypoattenuating parenchymal foci at the right renal mid to lower pole measuring up to 1.9 cm, noting no previous cyst or focal parenchymal abnormality seen in this region.  No left hydronephrosis.  Left ureter is normal in course and caliber.  Unchanged 2 mm calculus within the left kidney.  No radiodense calculus seen within the left ureter or urinary bladder.  Urinary bladder is well distended without wall thickening.  Uterus and  bilateral adnexa are within normal limits.  Trace volume nonspecific free pelvic fluid slightly increased from prior, likely reactive from previous right renal and ureter inflammatory process tracking to the pelvis.  Few scattered punctate pelvic phleboliths noted. Appendix and terminal ileum are within normal limits.  No evidence of bowel obstruction or acute inflammation.  No pneumatosis or portal venous gas. No abdominal ascites, free air or lymphadenopathy. Extraperitoneal soft tissues are unchanged.  Osseous structures are stable without acute process seen.     Interval placement of right-sided double-J ureteral stent with resolution of previous right-sided hydronephrosis, noting grossly stable configuration of a 5 mm calculus within the distal right ureter near the UVJ.  Unchanged asymmetric mild right perinephric and periureteral fat stranding presumably related to previous obstructive uropathy. More conspicuous 2 subtle hypoattenuating parenchymal foci within the right kidney, while nonspecific could indicate potential developing renal abscesses.  Clinical correlation for potential right-sided pyeloureteronephritis, and with urinalysis as warranted. Unchanged right renal 4 mm nonobstructing calculus and unchanged left renal 2 mm nonobstructing calculus. Hepatomegaly. Cholecystectomy. Additional findings as above. This report was flagged in Epic as abnormal. Electronically signed by: Morris Orantes MD Date:    06/30/2023 Time:    16:57    SURG FL Surgery Retrograde Pyelogram    Result Date: 6/22/2023  See OP Notes for results. IMPRESSION: See OP Notes for results. This procedure was auto-finalized by: Virtual Radiologist    CT Abscess Aspiration without Catheter    Result Date: 7/5/2023  EXAMINATION: Fluid collection aspiration Procedural Personnel Attending physician(s): Fred Leon MD Fellow physician(s): None Resident physician(s): None Advanced practice provider(s): None Pre-procedure diagnosis: Renal  abscess Post-procedure diagnosis: Same Indication: Fever associated with fluid collection Additional clinical history: None Complications: No immediate complications. TECHNIQUE: - Aspiration of renal abscess under CT guidance FINDINGS: Pre-procedure Consent: Informed consent for the procedure was obtained and time-out was performed prior to the procedure. Preparation: The site was prepared and draped using maximal sterile barrier technique including cutaneous antisepsis. Antibiotic administered: Prophylactic dose within 1 hour of procedure start time or 2 hours for vancomycin or fluoroquinolones Anesthesia/sedation Level of anesthesia/sedation: Moderate sedation (conscious sedation) Anesthesia/sedation administered by: Independent trained observer under attending supervision with continuous monitoring of the patient's level of consciousness and physiologic status Total intra-service sedation time (minutes): 30 Fluid collection aspiration The patient was positioned prone. Initial imaging was performed. Local anesthesia was administered. The fluid collection was accessed using an access needle. Position within the fluid collection was confirmed, and fluid aspiration was performed. All instruments were then removed. - Initial imaging findings: Hypodense collection in the right kidney - Aspiration needle/catheter: 17 gauge needle - Post-aspiration imaging findings: Complete resolution of the fluid collection Contrast Contrast agent: None Contrast volume (mL): 0 Radiation Dose CT dose length product ( mGy-cm): 263.43 Fluoroscopy time (  ): Reference air kerma (  ): Kerma area product (  ): Additional Details Additional description of procedure: None Equipment details: None Specimens removed: Aspirated fluid was sent for analysis. Estimated blood loss (mL): Less than 10 Standardized report: [id number] Attestation Signer name: Fred eLon MD I attest that I was present for the entire procedure. I  reviewed the stored images and agree with the report as written.     Percutaneous aspiration of right renal abscess, yielding 3 mL of purulent fluid. No drainage catheter was left in place. Plan: Resume care by primary team. ______________________________________________________________________ Electronically signed by: Fred Leon MD Date:    07/05/2023 Time:    17:17    US Abdomen Limited    Result Date: 6/22/2023  EXAMINATION: US ABDOMEN LIMITED CLINICAL HISTORY: [place]?; TECHNIQUE: Limited ultrasound of the right upper quadrant of the abdomen (including pancreas, liver, gallbladder, common bile duct, and spleen) was performed. COMPARISON: CT abdomen pelvis dated 06/21/2023 FINDINGS: Liver: Enlarged measuring 21.0 cm.  Homogeneous echotexture without discrete lesion. Gallbladder: Absent. Biliary system: The common duct measures 6-7 mm.  No intrahepatic ductal dilatation. Spleen: Normal in size and echotexture, measuring 10.4 cm. Miscellaneous: Visualized pancreas is unremarkable.  No upper abdominal ascites.  Suspected trace right pleural effusion.     Mild prominence of the extrahepatic duct at 6-7 mm, nonspecific in the setting of prior cholecystectomy.  Recommend initial correlation with biliary indices and subsequent assessment such as MRCP as warranted. Hepatomegaly. Electronically signed by: Adelso Roblero Date:    06/22/2023 Time:    10:31    CT Abdomen Pelvis  Without Contrast    Result Date: 7/7/2023  EXAMINATION: CT ABDOMEN PELVIS WITHOUT CONTRAST CLINICAL HISTORY: Flank pain, kidney stone suspected; TECHNIQUE: Low dose axial images, sagittal and coronal reformations were obtained from the lung bases to the pubic symphysis.  Oral contrast was not administered. COMPARISON: 07/05/2023 FINDINGS: Images of the lower thorax are remarkable for bilateral dependent atelectasis/scarring.  There is a small pericardial effusion, similar to the previous exam.  There is mild left pleural effusion. The liver,  spleen, pancreas and adrenal glands have a grossly unremarkable noncontrast appearance.  The gallbladder is surgically absent, no significant biliary dilation or ascites. There is left nonobstructive nephrolithiasis.  No left hydronephrosis.  The left ureter is unremarkable without calculi seen.  There is edematous change of the right kidney noting mild right hydronephrosis.  There is right nonobstructive nephrolithiasis.  There is right perinephric and periureteral inflammation.  There is a right ureteral stent in place.  There is a calculus adjacent to the distal aspect of the stent just proximal to the UVJ measuring 3 mm.  The urinary bladder is unremarkable.  There is a vague focus of high attenuation with upper pole of the right kidney, not seen on the previous exam, and measures 1.3 cm.  This could reflect sequela of interval hemorrhage given short-term development.  The uterus is unremarkable.  The adnexa is unremarkable. There is trace fluid in the pelvis.  There are a few scattered colonic diverticula without inflammation.  The terminal ileum is unremarkable.  The appendix is unremarkable.  The small bowel is grossly unremarkable.  There are a few scattered shotty periaortic, pericaval, and mesenteric lymph nodes.  No focal organized pelvic fluid collection. There are degenerative changes of the spine.  No significant inguinal lymphadenopathy.     This report was flagged in Epic as abnormal. 1. Right perinephric inflammation noting diffuse edematous change throughout the kidney, worsened since the previous exam.  Right ureteral stent catheter is in place noting stable 3 mm calculus at the level of the distal ureter.  Given worsening appearance of the kidney, developing infection is not excluded.  Correlation is advised. 2. High attenuating focus within the upper pole of the right kidney, not seen on the previous exam.  This may reflect interval hemorrhage into an existing cyst although not confirmed.   Correlation and follow-up is advised.  Prior low attenuating lesion within the interpolar region of the right kidney is not clearly identified on current exam. 3. Mild right pleural effusion, developed since the previous exam. 4. Please see above for several additional findings. Electronically signed by: Tony Rosado MD Date:    07/07/2023 Time:    12:21    CT Abdomen Pelvis  Without Contrast    Result Date: 6/21/2023  EXAMINATION: CT ABDOMEN PELVIS WITHOUT CONTRAST CLINICAL HISTORY: Flank pain, kidney stone suspected;UTI, recurrent/complicated (Female); TECHNIQUE: Low dose axial images, sagittal and coronal reformations were obtained from the lung bases to the pubic symphysis.  Oral contrast was not administered. COMPARISON: 06/18/2023 FINDINGS: There is persistent collimation of contrast from the previous CT in the dilated Pilar calyceal system and ureter down to the 5 mm stone appearing to be impacted at the UVJ.  The nonobstructing calculus in the lower pole of the left kidney is stable. Nonobstructing 2 mm calculus in the left kidney is noted.  No lower tract or obstructing stone on the left urinary tract is evident. The uterus, loops of bowel, liver, spleen, pancreas, adrenal glands appear stable. The aorta and vena cava appear stable.  There is atelectasis and small effusion developing in the right lung base     Moderate to high grade obstruction with collimation of contrast within the ureter and Pilar calyceal system of the swollen right kidney with 5 mm stone remaining impacted at the UVJ. This report was flagged in Epic as abnormal. Electronically signed by: Jose Christensen Date:    06/21/2023 Time:    17:21    CT Urogram Abd Pelvis W WO    Result Date: 7/5/2023  EXAMINATION: CT UROGRAM ABD PELVIS W WO CLINICAL HISTORY: abscess; TECHNIQUE: Low dose axial, sagittal and coronal reformations were obtained from the lung bases to the pubic symphysis before and following the IV administration of 125 mL of  Omnipaque 350.  Timing was optimized for parenchymal and excretory renal phases. COMPARISON: June 30, 2023 study, July 2016 contrast enhanced study FINDINGS: The lung bases demonstrate no significant abnormality. Noncontrast images demonstrate a single punctate nonobstructing left kidney calculus.  There is a lower pole 4 mm right kidney calculus.  Again there is a right sided double-J ureteral stent with a distal right ureteral calculus which measures 5 mm and is unchanged in position compared to prior exam. Minimal air within the subcutaneous fat of the abdominal wall likely relates to injection site. The liver is enlarged but demonstrates no focal abnormality.  The spleen is not enlarged.  The stomach, pancreas are unremarkable. The gallbladder has been removed.  The common duct is upper normal in caliber, without significant change compared to previous. There is no adrenal mass. The right kidney remains enlarged with adjacent fat stranding.  While the right kidney does concentrate and excrete contrast, there is decreased enhancement when compared to the left kidney and some parenchymal heterogeneity.  There are multifocal irregular hypodensities in the mid to lower right kidney, correlating to the finding on the prior noncontrast CT.  The largest measures up to 4.6 cm in maximal cranial caudal dimension.  Each kidney demonstrates a hypodensity which is at the upper pole and measures 1.7 cm on the left, subcentimeter on the right, possible cyst.  There is no hydronephrosis.  Bladder is partially distended and grossly unremarkable. The aorta tapers normally without adjacent lymphadenopathy.  There is no pelvic lymphadenopathy. Uterus is present.  Adnexal regions are unremarkable. Trace fluid present in the pelvis. The bowel demonstrates no distension or adjacent inflammatory change. The osseous structures show degenerative change.     Right sided double-J ureteral stent with relatively stable position of distal  right ureter 5 mm calculus.  No hydronephrosis. Enlarged right kidney with adjacent perinephric fat stranding, decreased concentration of contrast as compared to left kidney and mild parenchymal heterogeneity.  These findings are compatible with edema and suspicious for superimposed infection.  Additionally, there are irregular focal hypodensities at the mid to lower pole, correlating to the finding on the noncontrast CT, concerning for abscess.  Correlate clinically. Additional bilateral nonobstructing calculi This report was flagged in Epic as abnormal. Electronically signed by: Kaylie Butler MD Date:    07/05/2023 Time:    10:36    Microbiology Results (last 7 days)       Procedure Component Value Units Date/Time    Culture, Body Fluid (Aerobic) w/ GS [[id number]]  (Abnormal)  (Susceptibility) Collected: 07/05/23 1811    Order Status: Completed Specimen: Body Fluid from Back Updated: 07/08/23 1005     AEROBIC CULTURE - FLUID KLEBSIELLA PNEUMONIAE  Few       Gram Stain Result Many WBC's      No organisms seen    AFB Culture & Smear [[id number]] Collected: 07/05/23 1810    Order Status: Completed Specimen: Abscess from Kidney, Right Updated: 07/07/23 0927     AFB Culture & Smear Culture in progress     AFB CULTURE STAIN No acid fast bacilli seen.    KOH prep [[id number]] Collected: 07/05/23 1810    Order Status: Completed Specimen: Abscess from Kidney, Right Updated: 07/06/23 0157     KOH Prep No yeast or fungal elements seen    Gram stain [[id number]] Collected: 07/05/23 1810    Order Status: Completed Specimen: Abscess from Kidney, Right Updated: 07/06/23 0157     Gram Stain Result Moderate WBC's      No organisms seen    Blood culture #1 **CANNOT BE ORDERED STAT** [[id number]] Collected: 06/30/23 1444    Order Status: Completed Specimen: Blood from Antecubital, Right Arm Updated: 07/05/23 2212     Blood Culture, Routine No growth after 5 days.    Blood culture #2 **CANNOT BE ORDERED STAT** [[id number]] Collected:  06/30/23 1444    Order Status: Completed Specimen: Blood from Antecubital, Left Arm Updated: 07/05/23 2212     Blood Culture, Routine No growth after 5 days.                Assessment/Plan:      * Pyelonephritis  Patient with recent diagnosis of pyelonephritis and nephrolithiasis recently discharged from hospital s/p stent placement for nephrolithiasis.  Was on PO abx, augmentin.  Previous urine cx resulted with sensitivities.  Fever, nausea, vomiting on admission.  Patient experienced spikes in fevers throughout stay while on IV ceftriaxone.  WBC elevated, lactic within normal range in ED.  CT Renal with stable stent and stones with concern for renal abscesses.  Retroperitoneal US Bilateral nonobstructing renal calculi.   CT urogram revealing stent in stable position with inflammation of right kidney and possible interval bleeding into existing cyst  Renal US (7/8)- Heterogeneous collection R kidney 2.7 cm.  Overall extent has decreased in size when compared to prior exam from 07/05, consistent with abscess.  CT with contrast (7/8)- Persistent abnormal hypoattenuation with parenchymal heterogeneity along the midpole of the right kidney concerning for abscess formation, likely same abscess aspirated. Small to moderate right pleural effusion with adjacent passive atelectasis.   Per IR abscess seen on CT too small to drain    Plan:  - Continue IV ceftriaxone day 9  - Continue maintenance IVF.  - Hold nephrotoxic medications .  - Renally-dose current meds if available  - Avoid Hypotension.  - Strict I/O's.  - Monitor renal function carefully.  - Monitor WBC count.  - For pain, Oxycodone 10mg q4 scheduled, lidocaine patch, and Norco 0.5mg q3 PRN, Toradol 15mg q6 PRN  -Per urology recs, oxybutynin for bladder spasms and flomax for stent colic  -Muscle spasms: Robaxin PRN  -Follow fever  -Per radiology renal US report and recommendations, CT ordered for better interpretation of R kidney  -Will consult IR if need for  additional intervention.  -Increased bowel regimen 2/2 constipation which is likely due to pain regimen.  -Patient will follow up outpatient with Urology on 7/31/23.   -Fluid cultures from aspiration revealed + Klebsiella and many WBCs  -Per ID recs      continue IV ceftriaxone due to klebsiella cx sensitivity to current abx regimen.          No current need for PICC line at this time as patient can likely receive PO abx upon discharge.  -Appreciate recommendations from Urology            Iron deficiency anemia  Iron studies- elevated ferritin, low iron and TIBC indicating iron deficiency anemia    -ferritin administered        Physical deconditioning  Patient with prolonged hospital stay experiencing physical deconditoning  -continue PT inpatient  -refer PT outpatient    Hyperthyroidism, subclinical  Thyroid studies completed to determine etiology of high blood pressure. Patient has had systolic BP (140s-160s)  -TSH 0.310  -Follow up outpatient for repeat thyroid studies       Hypokalemia  K 4 on 7/8 AM labs.    Plan:  - Replete as needed.    Renal abscess  Patient has complained of constant right flank pain and RUQ pain.  CT Renal with stable stent and stones with concern for renal abscesses.  Per IR & Urology recs, CT urogram with multiple hypodense irregularities, concerning for abscesses.  Per IR recs, S/p aspiration of 3 ml of pus from right kidney on patient's 6th day on ceftriaxone- Gram stain and KOH fluid studies negative  Fluid culture + for Klebsiella    Plan  -Continue IV ceftriaxone  -ID recs for possible changes in coverage  -Renal US      Flank pain  Plan as in pyelonephritis.    Nephrolithiasis  Plan as in pyelonephritis.    Elevated BP without diagnosis of hypertension  BP elevated on admission. Patient never been diagnosed with hypertension.  Potentially 2/2 to pain.    Plan:  - Will continue to monitor vitals.  - Add amlodipine 5mg daily, can consider titrating dose or can add additional agent if  needed.  -Patient high blood pressure to be evaluated outpatient  - Avoid diuretics        VTE Risk Mitigation (From admission, onward)         Ordered     enoxaparin injection 40 mg  Daily         06/30/23 1744     IP VTE HIGH RISK PATIENT  Once         06/30/23 1744     Place sequential compression device  Until discontinued         06/30/23 1744                Discharge Planning   RUDOLPH: 7/7/2023     Code Status: Full Code   Is the patient medically ready for discharge?:     Reason for patient still in hospital (select all that apply): Patient trending condition  Discharge Plan A: Home, Home with family   Discharge Delays: None known at this time      Kong Avery MD  Department of Hospital Medicine   [facility]

## 2023-07-09 NOTE — SUBJECTIVE & OBJECTIVE
Interval History: NAEON. Pt reports pain is unchanged. Updated on imaging results from US and CT from the day prior. Pt still very anxious regarding diagnosis and disposition.      Review of Systems   Constitutional:  Negative for activity change, chills, fatigue and fever.   HENT:  Negative for sinus pressure, sinus pain, sore throat and trouble swallowing.    Eyes:  Negative for visual disturbance.   Respiratory:  Negative for cough, shortness of breath and wheezing.    Cardiovascular:  Negative for chest pain, palpitations and leg swelling.   Gastrointestinal:  Negative for abdominal pain, constipation, diarrhea, nausea and vomiting.   Genitourinary:  Positive for flank pain. Negative for dysuria, frequency and hematuria.   Musculoskeletal:  Positive for back pain. Negative for myalgias.   Skin:  Negative for rash and wound.   Neurological:  Negative for tremors, weakness, numbness and headaches.   Psychiatric/Behavioral:  Negative for confusion. The patient is not nervous/anxious.    Objective:     Vital Signs (Most Recent):  Temp: 98.3 °F (36.8 °C) (07/09/23 0441)  Pulse: 95 (07/09/23 0441)  Resp: 16 (07/09/23 0642)  BP: 121/71 (07/09/23 0441)  SpO2: 98 % (07/09/23 0730) Vital Signs (24h Range):  Temp:  [98.2 °F (36.8 °C)-99.3 °F (37.4 °C)] 98.3 °F (36.8 °C)  Pulse:  [] 95  Resp:  [16-20] 16  SpO2:  [95 %-99 %] 98 %  BP: (110-142)/(62-87) 121/71     Weight: 87.3 kg (192 lb 7.4 oz)  Body mass index is 32.03 kg/m².  No intake or output data in the 24 hours ending 07/09/23 0746      Physical Exam  Vitals reviewed.   Constitutional:       General: She is not in acute distress.  HENT:      Head: Normocephalic and atraumatic.      Right Ear: External ear normal.      Left Ear: External ear normal.      Nose: Nose normal.   Eyes:      Conjunctiva/sclera: Conjunctivae normal.      Pupils: Pupils are equal, round, and reactive to light.   Cardiovascular:      Rate and Rhythm: Normal rate and regular rhythm.       Pulses: Normal pulses.      Heart sounds: Normal heart sounds. No murmur heard.    No friction rub. No gallop.   Pulmonary:      Effort: Pulmonary effort is normal.      Breath sounds: Normal breath sounds. No wheezing, rhonchi or rales.   Abdominal:      General: Bowel sounds are normal.      Palpations: There is no mass.      Tenderness: There is no abdominal tenderness. There is right CVA tenderness.   Musculoskeletal:         General: No swelling or tenderness. Normal range of motion.      Cervical back: Normal range of motion.      Right lower leg: No edema.      Left lower leg: No edema.   Lymphadenopathy:      Cervical: No cervical adenopathy.   Skin:     General: Skin is warm and dry.      Findings: No rash.   Neurological:      General: No focal deficit present.      Mental Status: She is alert.         Recent Labs   Lab 07/07/23  0351 07/08/23 0407 07/09/23  0350   WBC 12.74* 10.63 11.79   HGB 7.6* 7.8* 7.8*   HCT 23.2* 23.3* 23.7*   MCV 80* 80* 80*   RBC 2.89* 2.91* 2.95*   MCH 26.3* 26.8* 26.4*   MCHC 32.8 33.5 32.9   RDW 14.1 13.9 13.9   * 577* 636*   MPV 8.5* 8.3* 8.5*   GRAN 74.9*  9.5* 73.4*  7.8* 72.9  8.6*   LYMPH 15.9*  2.0 16.2*  1.7 16.1*  1.9   MONO 7.4  0.9 8.2  0.9 8.9  1.1*   EOSINOPHIL 0.5 0.6 0.6   BASOPHIL 0.5 0.8 0.7     Recent Labs   Lab 07/07/23  0351 07/08/23  0407 07/09/23  0351    136 133*   K 4.0 4.0 4.1    101 100   CO2 24 24 21*   ANIONGAP 11 11 12   BUN 9 7 8   CREATININE 0.9 0.9 1.0    107 112*   CALCIUM 9.3 9.4 9.5   PROT 8.4 8.6* 8.8*   ALBUMIN 2.8* 2.9* 2.9*   ALKPHOS 101 96 93   BILITOT 0.4 0.3 0.3   ALT 76* 60* 55*   AST 27 21 23   MG 1.9 2.0 2.0   PHOS 3.1 3.5 4.3     No results for input(s): COLORU, APPEARANCEUA, PHUR, SPECGRAV, PROTEINUA, GLUCUA, KETONESU, BILIRUBINUA, OCCULTUA, UROBILINOGEN, NITRITE, LEUKOCYTESUR, RBCUA, WBCUA, BACTERIA, SQUAMEPITHEL, HYALINECASTS, GRANULARCAST, MICROCMT in the last 168 hours.    Invalid input(s):  SPECIMENU, AMORPHOUSU  No results for input(s): TROPONINI, CPK, CPKMB in the last 168 hours.  No results for input(s): PT, INR, APTT in the last 168 hours.  No results for input(s): TSH, A2WAQXL, R4QBXNQ, THYROIDAB, FREET4 in the last 168 hours.  US Retroperitoneal Complete    Result Date: 7/8/2023  EXAMINATION: US RETROPERITONEAL COMPLETE CLINICAL HISTORY: pyelonephritis; TECHNIQUE: Ultrasound of the kidneys and urinary bladder was performed including color flow and Doppler evaluation of the kidneys. COMPARISON: Ultrasound from 06/30/2023 and CT abdomen pelvis from 07/07/2023 and CT urogram from 07/05/2023 FINDINGS: Right kidney: The right kidney measures 14.5 cm. No cortical thinning. No loss of corticomedullary distinction. Resistive index measures 0.61.  Heterogeneous collection within parenchyma measuring approximately 2.7 x 1.6 x 2 0 cm consistent previously abscess.  Overall extent is decreased when prior exam 07/05/2023 noting differences in modality.  4 mm and 6 mm nonobstructing stones.  Simple cyst measuring 1.0 cm.  No hydronephrosis. Left kidney: The left kidney measures 11.4 cm. No cortical thinning. No loss of corticomedullary distinction. Resistive index measures 0.71.  No mass. No renal stone. No hydronephrosis. The bladder is not distended and not well evaluated.     Heterogeneous collection within the parenchyma the right kidney measuring up to 2.7 cm.  Overall extent has decreased in size when compared to prior exam from 07/05/2023 when allowing for differences in technique.  Findings are consistent with abscess.  Further evaluation with CT with intravenous contrast as clinically indicated. Additional findings as described above. Electronically signed by: Giovany Farfan MD Date:    07/08/2023 Time:    14:15    US Retroperitoneal Complete    Result Date: 7/1/2023  EXAMINATION: US RETROPERITONEAL COMPLETE CLINICAL HISTORY: UTI; TECHNIQUE: Ultrasound of the kidneys and urinary bladder was  performed including color flow and Doppler evaluation of the kidneys. COMPARISON: Ultrasound from 06/23/2023.  CT abdomen and pelvis from 06/30/2023 taken at 16:14. FINDINGS: Right kidney: The right kidney measures 14.7 cm. No cortical thinning. No loss of corticomedullary distinction. Resistive index measures 0.72.  No mass. There is a nonobstructing renal calculus measuring 0.5 x 0.7 x 0.7 cm in the mid upper pole and a 5 mm nonobstructing renal calculus in the lower pole.  No hydronephrosis. Left kidney: The left kidney measures 12.6 cm. No cortical thinning. No loss of corticomedullary distinction. Resistive index measures 0.76.  No mass. There are several 3 mm nonobstructing renal calculi in the mid to upper poles.  No hydronephrosis. The bladder is partially distended at the time of scanning and has an unremarkable appearance.     Bilateral nonobstructing renal calculi as above.  No hydronephrosis. Electronically signed by: Jaylon Rucker Date:    07/01/2023 Time:    00:02    CT Abdomen Pelvis With Contrast    Result Date: 7/8/2023  EXAMINATION: CT ABDOMEN PELVIS WITH CONTRAST CLINICAL HISTORY: Abdominal abscess/infection suspected;right CVA pain, known R renal abscess per radiology rec; TECHNIQUE: Low dose axial images, sagittal and coronal reformations were obtained from the lung bases to the pubic symphysis following the IV administration of 100 mL of Omnipaque 350 .  Oral contrast was not given. COMPARISON: 07/08/2023, 07/07/2023 FINDINGS: Small to moderate right pleural effusion with adjacent passive atelectasis.  Patchy opacity in the left lower lobe.  The base of the heart appears normal.  The aorta is of normal caliber and tapers appropriately. The gallbladder has been removed.  No intrahepatic or extrahepatic biliary ductal dilatation is identified.  The liver, spleen, pancreas, adrenal glands and left kidney are normal in size, shape and contour.  No left-sided nephrolithiasis, hydronephrosis or  hydroureter.  Again noted is an abnormal appearance of the right kidney.  There is a simple appearing cyst of the upper pole of the right kidney anteriorly.  In the midpole of the right kidney, there is ill-defined hypoattenuation that measures up to 4.6 cm in greatest craniocaudal dimension, the overall appearance of which is unchanged as compared to the prior examination.  A double-J ureteral stent is in place.  There is a punctate nonobstructing stone at the lower pole of the right kidney.  There is a tiny stone adjacent to the ureteral stent at the level of the right UVJ that measures approximately 4 mm.  The urinary bladder is well distended and appears normal.  Uterus and adnexa are unremarkable. The bowel appears normal.  The appendix is normal.  No free air, free fluid or obstruction.  No pathologically enlarged abdominal or pelvic lymph nodes are seen. Age-appropriate degenerative changes affect the skeleton.     Right double-J ureteral stent remains in place.  There is a 4 mm stone adjacent to the ureteral stent at the level of the right UVJ, similar to before.  Persistent abnormal hypoattenuation with parenchymal heterogeneity along the midpole of the right kidney concerning for abscess formation. Small to moderate right pleural effusion with adjacent passive atelectasis. Electronically signed by: Sandi Khan MD Date:    07/08/2023 Time:    16:23    CT Abdomen Pelvis With Contrast    Result Date: 6/18/2023  EXAMINATION: CT ABDOMEN PELVIS WITH CONTRAST CLINICAL HISTORY: RLQ abdominal pain (Age >= 14y); TECHNIQUE: Axial CT images with sagittal and coronal reformats were obtained of the abdomen and pelvis from the hemidiaphragms through the symphysis pubis after the administration of 100mL Omnipaque 350. COMPARISON: CT of the abdomen and pelvis from 07/03/2018. FINDINGS: Lung Bases: Clear. Heart: Heart size is normal.  No pericardial effusion. Liver: The liver is enlarged.  No focal hepatic lesions are  seen.  The portal vasculature is patent. Biliary tract: Stable dilation of the common bile duct, likely related to cholecystectomy changes.  There is no intrahepatic biliary ductal dilation. Gallbladder: Surgically absent. Pancreas: Normal. No pancreatic ductal dilatation. Spleen: Normal size without focal lesion. Adrenals: Unremarkable. Kidneys and urinary collecting systems: There is a 5 mm calculus at the right ureterovesicular junction resulting in moderate hydroureteronephrosis and right perinephric edema and fat stranding.  Additional right-sided nonobstructing renal calculus measures 4 mm.  The left kidney is unremarkable without hydronephrosis or nephrolithiasis. Lymph nodes: None enlarged. Stomach and bowel: The stomach is normal.  Loops of small and large bowel are normal in caliber without evidence for inflammation or obstruction.  Moderate volume of stool noted.  The appendix is normal. Peritoneum and mesentery: No ascites or free intraperitoneal air.  No abdominal fluid collection. Vasculature: No aneurysm or significant atherosclerosis. Urinary bladder: No wall thickening. Reproductive organs: The uterus and adnexae are unremarkable.  There is trace free fluid in pelvis which may be physiologic or reactive. Body wall: No abnormality. Musculoskeletal: No aggressive osseous lesion.  There are multilevel degenerative changes of the visualized spine.     1. Moderate right hydroureteronephrosis secondary to a 5 mm calculus at the UVJ. 2. Additional nonobstructing right renal calculus. Electronically signed by: Jaylon Rucker Date:    06/18/2023 Time:    19:55    X-Ray Chest AP Portable    Result Date: 6/30/2023  EXAMINATION: XR CHEST AP PORTABLE CLINICAL HISTORY: Sepsis; FINDINGS: Chest one view portable. There is borderline cardiomegaly.  Lungs are clear.  There is DJD.     No acute process seen. Electronically signed by: Adonis Aldana MD Date:    06/30/2023 Time:    15:18    US Kidney    Result Date:  6/23/2023  EXAMINATION: US KIDNEY CLINICAL HISTORY: Non improving RICK, prerenal urine studies; TECHNIQUE: Ultrasound of the kidneys was performed including color flow and Doppler evaluation of the kidneys. COMPARISON: CT abdomen pelvis without contrast dated 06/21/2023 FINDINGS: Right kidney: Measures 15.1 cm.  Resistive index of 0.69.  Vague cortical heterogeneity.  Echogenic focus measuring up to 1.0 cm suggesting nonobstructing stone.  No hydronephrosis. Left kidney: Measures 12.3 cm.  Resistive index of 0.67.  Corticomedullary distinction is maintained.  No hydronephrosis. Urinary bladder is fluid-filled at time of scanning and otherwise unremarkable.     Nonobstructing right renal stone. Previous right-sided hydronephrosis by CT has essentially resolved. Electronically signed by: Adelso Roblero Date:    06/23/2023 Time:    13:33    CT Renal Stone Study ABD Pelvis WO    Result Date: 6/30/2023  EXAMINATION: CT RENAL STONE STUDY ABD PELVIS WO CLINICAL HISTORY: Flank pain, kidney stone suspected; TECHNIQUE: Low dose axial images, sagittal and coronal reformations were obtained from the lung bases to the pubic symphysis.  Contrast was not administered. COMPARISON: Renal ultrasound 06/23/2023, right upper quadrant ultrasound 06/22/2023, CT abdomen and pelvis 06/21/2023 FINDINGS: Lack of IV contrast limits evaluation of soft tissue and vascular structures Imaged lung bases are clear.  Base of the heart is within normal limits. Remote cholecystectomy.  Stable prominence of the common bile duct.  No intrahepatic biliary ductal dilatation. Liver remains enlarged without focal process.  Noncontrast appearance of the pancreas, spleen, stomach, duodenum and bilateral adrenal glands are within normal limits. When compared to CT study of 06/21/2023, there has been interval placement of right-sided double-J ureteral stent with proximal loop in the right renal pelvis and distal loop within the midline and left paramedian  dependent aspect of the urinary bladder.  The right kidney remains asymmetrically larger than the left with similar asymmetric mild right perinephric stranding, noting interval resolution of previous right-sided hydroureteronephrosis and previous contrast within the right renal collecting system and right ureter has resolved.  Stable appearance of a 5 mm calculus within the region of the right UVJ.  The stent is adjacent to the posterior left aspect of the distal ureteral calculus.  Similar right perinephric stranding.  Newly developed 2 subtle hypoattenuating parenchymal foci at the right renal mid to lower pole measuring up to 1.9 cm, noting no previous cyst or focal parenchymal abnormality seen in this region.  No left hydronephrosis.  Left ureter is normal in course and caliber.  Unchanged 2 mm calculus within the left kidney.  No radiodense calculus seen within the left ureter or urinary bladder.  Urinary bladder is well distended without wall thickening.  Uterus and bilateral adnexa are within normal limits.  Trace volume nonspecific free pelvic fluid slightly increased from prior, likely reactive from previous right renal and ureter inflammatory process tracking to the pelvis.  Few scattered punctate pelvic phleboliths noted. Appendix and terminal ileum are within normal limits.  No evidence of bowel obstruction or acute inflammation.  No pneumatosis or portal venous gas. No abdominal ascites, free air or lymphadenopathy. Extraperitoneal soft tissues are unchanged.  Osseous structures are stable without acute process seen.     Interval placement of right-sided double-J ureteral stent with resolution of previous right-sided hydronephrosis, noting grossly stable configuration of a 5 mm calculus within the distal right ureter near the UVJ.  Unchanged asymmetric mild right perinephric and periureteral fat stranding presumably related to previous obstructive uropathy. More conspicuous 2 subtle hypoattenuating  parenchymal foci within the right kidney, while nonspecific could indicate potential developing renal abscesses.  Clinical correlation for potential right-sided pyeloureteronephritis, and with urinalysis as warranted. Unchanged right renal 4 mm nonobstructing calculus and unchanged left renal 2 mm nonobstructing calculus. Hepatomegaly. Cholecystectomy. Additional findings as above. This report was flagged in Epic as abnormal. Electronically signed by: Morris Orantes MD Date:    06/30/2023 Time:    16:57    SURG [place] Surgery Retrograde Pyelogram    Result Date: 6/22/2023  See OP Notes for results. IMPRESSION: See OP Notes for results. This procedure was auto-finalized by: Virtual Radiologist    CT Abscess Aspiration without Catheter    Result Date: 7/5/2023  EXAMINATION: Fluid collection aspiration Procedural Personnel Attending physician(s): Fred Leon MD Fellow physician(s): None Resident physician(s): None Advanced practice provider(s): None Pre-procedure diagnosis: Renal abscess Post-procedure diagnosis: Same Indication: Fever associated with fluid collection Additional clinical history: None Complications: No immediate complications. TECHNIQUE: - Aspiration of renal abscess under CT guidance FINDINGS: Pre-procedure Consent: Informed consent for the procedure was obtained and time-out was performed prior to the procedure. Preparation: The site was prepared and draped using maximal sterile barrier technique including cutaneous antisepsis. Antibiotic administered: Prophylactic dose within 1 hour of procedure start time or 2 hours for vancomycin or fluoroquinolones Anesthesia/sedation Level of anesthesia/sedation: Moderate sedation (conscious sedation) Anesthesia/sedation administered by: Independent trained observer under attending supervision with continuous monitoring of the patient's level of consciousness and physiologic status Total intra-service sedation time (minutes): 30 Fluid collection aspiration The  patient was positioned prone. Initial imaging was performed. Local anesthesia was administered. The fluid collection was accessed using an access needle. Position within the fluid collection was confirmed, and fluid aspiration was performed. All instruments were then removed. - Initial imaging findings: Hypodense collection in the right kidney - Aspiration needle/catheter: 17 gauge needle - Post-aspiration imaging findings: Complete resolution of the fluid collection Contrast Contrast agent: None Contrast volume (mL): 0 Radiation Dose CT dose length product ( mGy-cm): 263.43 Fluoroscopy time (  ): Reference air kerma (  ): Kerma area product (  ): Additional Details Additional description of procedure: None Equipment details: None Specimens removed: Aspirated fluid was sent for analysis. Estimated blood loss (mL): Less than 10 Standardized report: [id number] Attestation Signer name: Fred Leon MD I attest that I was present for the entire procedure. I reviewed the stored images and agree with the report as written.     Percutaneous aspiration of right renal abscess, yielding 3 mL of purulent fluid. No drainage catheter was left in place. Plan: Resume care by primary team. ______________________________________________________________________ Electronically signed by: Fred Leon MD Date:    07/05/2023 Time:    17:17    US Abdomen Limited    Result Date: 6/22/2023  EXAMINATION: US ABDOMEN LIMITED CLINICAL HISTORY: [place]?; TECHNIQUE: Limited ultrasound of the right upper quadrant of the abdomen (including pancreas, liver, gallbladder, common bile duct, and spleen) was performed. COMPARISON: CT abdomen pelvis dated 06/21/2023 FINDINGS: Liver: Enlarged measuring 21.0 cm.  Homogeneous echotexture without discrete lesion. Gallbladder: Absent. Biliary system: The common duct measures 6-7 mm.  No intrahepatic ductal dilatation. Spleen: Normal in size and echotexture, measuring 10.4 cm.  Miscellaneous: Visualized pancreas is unremarkable.  No upper abdominal ascites.  Suspected trace right pleural effusion.     Mild prominence of the extrahepatic duct at 6-7 mm, nonspecific in the setting of prior cholecystectomy.  Recommend initial correlation with biliary indices and subsequent assessment such as MRCP as warranted. Hepatomegaly. Electronically signed by: Adelso Roblero Date:    06/22/2023 Time:    10:31    CT Abdomen Pelvis  Without Contrast    Result Date: 7/7/2023  EXAMINATION: CT ABDOMEN PELVIS WITHOUT CONTRAST CLINICAL HISTORY: Flank pain, kidney stone suspected; TECHNIQUE: Low dose axial images, sagittal and coronal reformations were obtained from the lung bases to the pubic symphysis.  Oral contrast was not administered. COMPARISON: 07/05/2023 FINDINGS: Images of the lower thorax are remarkable for bilateral dependent atelectasis/scarring.  There is a small pericardial effusion, similar to the previous exam.  There is mild left pleural effusion. The liver, spleen, pancreas and adrenal glands have a grossly unremarkable noncontrast appearance.  The gallbladder is surgically absent, no significant biliary dilation or ascites. There is left nonobstructive nephrolithiasis.  No left hydronephrosis.  The left ureter is unremarkable without calculi seen.  There is edematous change of the right kidney noting mild right hydronephrosis.  There is right nonobstructive nephrolithiasis.  There is right perinephric and periureteral inflammation.  There is a right ureteral stent in place.  There is a calculus adjacent to the distal aspect of the stent just proximal to the UVJ measuring 3 mm.  The urinary bladder is unremarkable.  There is a vague focus of high attenuation with upper pole of the right kidney, not seen on the previous exam, and measures 1.3 cm.  This could reflect sequela of interval hemorrhage given short-term development.  The uterus is unremarkable.  The adnexa is unremarkable. There is  trace fluid in the pelvis.  There are a few scattered colonic diverticula without inflammation.  The terminal ileum is unremarkable.  The appendix is unremarkable.  The small bowel is grossly unremarkable.  There are a few scattered shotty periaortic, pericaval, and mesenteric lymph nodes.  No focal organized pelvic fluid collection. There are degenerative changes of the spine.  No significant inguinal lymphadenopathy.     This report was flagged in Epic as abnormal. 1. Right perinephric inflammation noting diffuse edematous change throughout the kidney, worsened since the previous exam.  Right ureteral stent catheter is in place noting stable 3 mm calculus at the level of the distal ureter.  Given worsening appearance of the kidney, developing infection is not excluded.  Correlation is advised. 2. High attenuating focus within the upper pole of the right kidney, not seen on the previous exam.  This may reflect interval hemorrhage into an existing cyst although not confirmed.  Correlation and follow-up is advised.  Prior low attenuating lesion within the interpolar region of the right kidney is not clearly identified on current exam. 3. Mild right pleural effusion, developed since the previous exam. 4. Please see above for several additional findings. Electronically signed by: Tony Rosado MD Date:    07/07/2023 Time:    12:21    CT Abdomen Pelvis  Without Contrast    Result Date: 6/21/2023  EXAMINATION: CT ABDOMEN PELVIS WITHOUT CONTRAST CLINICAL HISTORY: Flank pain, kidney stone suspected;UTI, recurrent/complicated (Female); TECHNIQUE: Low dose axial images, sagittal and coronal reformations were obtained from the lung bases to the pubic symphysis.  Oral contrast was not administered. COMPARISON: 06/18/2023 FINDINGS: There is persistent collimation of contrast from the previous CT in the dilated Pilar calyceal system and ureter down to the 5 mm stone appearing to be impacted at the UVJ.  The nonobstructing  calculus in the lower pole of the left kidney is stable. Nonobstructing 2 mm calculus in the left kidney is noted.  No lower tract or obstructing stone on the left urinary tract is evident. The uterus, loops of bowel, liver, spleen, pancreas, adrenal glands appear stable. The aorta and vena cava appear stable.  There is atelectasis and small effusion developing in the right lung base     Moderate to high grade obstruction with collimation of contrast within the ureter and Pilar calyceal system of the swollen right kidney with 5 mm stone remaining impacted at the UVJ. This report was flagged in Epic as abnormal. Electronically signed by: Jose Christensen Date:    06/21/2023 Time:    17:21    CT Urogram Abd Pelvis W WO    Result Date: 7/5/2023  EXAMINATION: CT UROGRAM ABD PELVIS W WO CLINICAL HISTORY: abscess; TECHNIQUE: Low dose axial, sagittal and coronal reformations were obtained from the lung bases to the pubic symphysis before and following the IV administration of 125 mL of Omnipaque 350.  Timing was optimized for parenchymal and excretory renal phases. COMPARISON: June 30, 2023 study, July 2016 contrast enhanced study FINDINGS: The lung bases demonstrate no significant abnormality. Noncontrast images demonstrate a single punctate nonobstructing left kidney calculus.  There is a lower pole 4 mm right kidney calculus.  Again there is a right sided double-J ureteral stent with a distal right ureteral calculus which measures 5 mm and is unchanged in position compared to prior exam. Minimal air within the subcutaneous fat of the abdominal wall likely relates to injection site. The liver is enlarged but demonstrates no focal abnormality.  The spleen is not enlarged.  The stomach, pancreas are unremarkable. The gallbladder has been removed.  The common duct is upper normal in caliber, without significant change compared to previous. There is no adrenal mass. The right kidney remains enlarged with adjacent fat  stranding.  While the right kidney does concentrate and excrete contrast, there is decreased enhancement when compared to the left kidney and some parenchymal heterogeneity.  There are multifocal irregular hypodensities in the mid to lower right kidney, correlating to the finding on the prior noncontrast CT.  The largest measures up to 4.6 cm in maximal cranial caudal dimension.  Each kidney demonstrates a hypodensity which is at the upper pole and measures 1.7 cm on the left, subcentimeter on the right, possible cyst.  There is no hydronephrosis.  Bladder is partially distended and grossly unremarkable. The aorta tapers normally without adjacent lymphadenopathy.  There is no pelvic lymphadenopathy. Uterus is present.  Adnexal regions are unremarkable. Trace fluid present in the pelvis. The bowel demonstrates no distension or adjacent inflammatory change. The osseous structures show degenerative change.     Right sided double-J ureteral stent with relatively stable position of distal right ureter 5 mm calculus.  No hydronephrosis. Enlarged right kidney with adjacent perinephric fat stranding, decreased concentration of contrast as compared to left kidney and mild parenchymal heterogeneity.  These findings are compatible with edema and suspicious for superimposed infection.  Additionally, there are irregular focal hypodensities at the mid to lower pole, correlating to the finding on the noncontrast CT, concerning for abscess.  Correlate clinically. Additional bilateral nonobstructing calculi This report was flagged in Epic as abnormal. Electronically signed by: Kaylie Butler MD Date:    07/05/2023 Time:    10:36    Microbiology Results (last 7 days)       Procedure Component Value Units Date/Time    Culture, Body Fluid (Aerobic) w/ GS [[id number]]  (Abnormal)  (Susceptibility) Collected: 07/05/23 1811    Order Status: Completed Specimen: Body Fluid from Back Updated: 07/08/23 1005     AEROBIC CULTURE - FLUID  KLEBSIELLA PNEUMONIAE  Few       Gram Stain Result Many WBC's      No organisms seen    AFB Culture & Smear [[id number]] Collected: 07/05/23 1810    Order Status: Completed Specimen: Abscess from Kidney, Right Updated: 07/07/23 0927     AFB Culture & Smear Culture in progress     AFB CULTURE STAIN No acid fast bacilli seen.    KOH prep [[id number]] Collected: 07/05/23 1810    Order Status: Completed Specimen: Abscess from Kidney, Right Updated: 07/06/23 0157     KOH Prep No yeast or fungal elements seen    Gram stain [[id number]] Collected: 07/05/23 1810    Order Status: Completed Specimen: Abscess from Kidney, Right Updated: 07/06/23 0157     Gram Stain Result Moderate WBC's      No organisms seen    Blood culture #1 **CANNOT BE ORDERED STAT** [[id number]] Collected: 06/30/23 1444    Order Status: Completed Specimen: Blood from Antecubital, Right Arm Updated: 07/05/23 2212     Blood Culture, Routine No growth after 5 days.    Blood culture #2 **CANNOT BE ORDERED STAT** [[id number]] Collected: 06/30/23 1444    Order Status: Completed Specimen: Blood from Antecubital, Left Arm Updated: 07/05/23 2212     Blood Culture, Routine No growth after 5 days.

## 2023-07-09 NOTE — PT/OT/SLP PROGRESS
Physical Therapy      Patient Name:  Sherri Machado   MRN:  [id number]    Patient not seen today secondary to Other (Comment) (Pt not seen per pt request due to patient in a lot of pain in her back where her stint is placed and is awaiting on consult from Urology.). Will follow-up when able.

## 2023-07-10 VITALS
TEMPERATURE: 99 F | DIASTOLIC BLOOD PRESSURE: 81 MMHG | HEART RATE: 92 BPM | WEIGHT: 192.44 LBS | BODY MASS INDEX: 32.06 KG/M2 | RESPIRATION RATE: 16 BRPM | SYSTOLIC BLOOD PRESSURE: 129 MMHG | HEIGHT: 65 IN | OXYGEN SATURATION: 97 %

## 2023-07-10 LAB
ALBUMIN SERPL BCP-MCNC: 2.9 G/DL (ref 3.5–5.2)
ALP SERPL-CCNC: 89 U/L (ref 55–135)
ALT SERPL W/O P-5'-P-CCNC: 47 U/L (ref 10–44)
ANION GAP SERPL CALC-SCNC: 11 MMOL/L (ref 8–16)
AST SERPL-CCNC: 19 U/L (ref 10–40)
BASOPHILS # BLD AUTO: 0.07 K/UL (ref 0–0.2)
BASOPHILS NFR BLD: 0.7 % (ref 0–1.9)
BILIRUB SERPL-MCNC: 0.3 MG/DL (ref 0.1–1)
BUN SERPL-MCNC: 8 MG/DL (ref 6–20)
CALCIUM SERPL-MCNC: 9.7 MG/DL (ref 8.7–10.5)
CHLORIDE SERPL-SCNC: 101 MMOL/L (ref 95–110)
CO2 SERPL-SCNC: 24 MMOL/L (ref 23–29)
CREAT SERPL-MCNC: 1 MG/DL (ref 0.5–1.4)
DIFFERENTIAL METHOD: ABNORMAL
EOSINOPHIL # BLD AUTO: 0.1 K/UL (ref 0–0.5)
EOSINOPHIL NFR BLD: 1 % (ref 0–8)
ERYTHROCYTE [DISTWIDTH] IN BLOOD BY AUTOMATED COUNT: 13.9 % (ref 11.5–14.5)
EST. GFR  (NO RACE VARIABLE): >60 ML/MIN/1.73 M^2
GLUCOSE SERPL-MCNC: 105 MG/DL (ref 70–110)
HCT VFR BLD AUTO: 24.1 % (ref 37–48.5)
HGB BLD-MCNC: 8 G/DL (ref 12–16)
IMM GRANULOCYTES # BLD AUTO: 0.07 K/UL (ref 0–0.04)
IMM GRANULOCYTES NFR BLD AUTO: 0.7 % (ref 0–0.5)
LYMPHOCYTES # BLD AUTO: 2 K/UL (ref 1–4.8)
LYMPHOCYTES NFR BLD: 19.9 % (ref 18–48)
MAGNESIUM SERPL-MCNC: 2.1 MG/DL (ref 1.6–2.6)
MCH RBC QN AUTO: 26.8 PG (ref 27–31)
MCHC RBC AUTO-ENTMCNC: 33.2 G/DL (ref 32–36)
MCV RBC AUTO: 81 FL (ref 82–98)
MONOCYTES # BLD AUTO: 0.9 K/UL (ref 0.3–1)
MONOCYTES NFR BLD: 9.1 % (ref 4–15)
NEUTROPHILS # BLD AUTO: 7 K/UL (ref 1.8–7.7)
NEUTROPHILS NFR BLD: 68.6 % (ref 38–73)
NRBC BLD-RTO: 0 /100 WBC
PHOSPHATE SERPL-MCNC: 4.1 MG/DL (ref 2.7–4.5)
PLATELET # BLD AUTO: 637 K/UL (ref 150–450)
PMV BLD AUTO: 8.3 FL (ref 9.2–12.9)
POTASSIUM SERPL-SCNC: 4.1 MMOL/L (ref 3.5–5.1)
PROT SERPL-MCNC: 8.8 G/DL (ref 6–8.4)
RBC # BLD AUTO: 2.99 M/UL (ref 4–5.4)
SODIUM SERPL-SCNC: 136 MMOL/L (ref 136–145)
WBC # BLD AUTO: 10.22 K/UL (ref 3.9–12.7)

## 2023-07-10 PROCEDURE — 25000003 PHARM REV CODE 250

## 2023-07-10 PROCEDURE — 97530 THERAPEUTIC ACTIVITIES: CPT | Mod: CQ

## 2023-07-10 PROCEDURE — 83735 ASSAY OF MAGNESIUM: CPT

## 2023-07-10 PROCEDURE — 25000003 PHARM REV CODE 250: Performed by: STUDENT IN AN ORGANIZED HEALTH CARE EDUCATION/TRAINING PROGRAM

## 2023-07-10 PROCEDURE — 36415 COLL VENOUS BLD VENIPUNCTURE: CPT

## 2023-07-10 PROCEDURE — 85025 COMPLETE CBC W/AUTO DIFF WBC: CPT

## 2023-07-10 PROCEDURE — 94761 N-INVAS EAR/PLS OXIMETRY MLT: CPT

## 2023-07-10 PROCEDURE — 63600175 PHARM REV CODE 636 W HCPCS

## 2023-07-10 PROCEDURE — 84100 ASSAY OF PHOSPHORUS: CPT

## 2023-07-10 PROCEDURE — 99233 PR SUBSEQUENT HOSPITAL CARE,LEVL III: ICD-10-PCS | Mod: ,,, | Performed by: STUDENT IN AN ORGANIZED HEALTH CARE EDUCATION/TRAINING PROGRAM

## 2023-07-10 PROCEDURE — 99233 SBSQ HOSP IP/OBS HIGH 50: CPT | Mod: ,,, | Performed by: STUDENT IN AN ORGANIZED HEALTH CARE EDUCATION/TRAINING PROGRAM

## 2023-07-10 PROCEDURE — 80053 COMPREHEN METABOLIC PANEL: CPT

## 2023-07-10 RX ORDER — POLYETHYLENE GLYCOL 3350 17 G/17G
17 POWDER, FOR SOLUTION ORAL DAILY
Qty: 510 G | Refills: 2 | Status: SHIPPED | OUTPATIENT
Start: 2023-07-10

## 2023-07-10 RX ORDER — FERROUS SULFATE 325(65) MG
325 TABLET, DELAYED RELEASE (ENTERIC COATED) ORAL DAILY
Qty: 30 TABLET | Refills: 1 | Status: SHIPPED | OUTPATIENT
Start: 2023-07-10 | End: 2023-09-08

## 2023-07-10 RX ORDER — CIPROFLOXACIN 500 MG/1
500 TABLET ORAL EVERY 12 HOURS
Qty: 18 TABLET | Refills: 0 | Status: SHIPPED | OUTPATIENT
Start: 2023-07-10 | End: 2023-07-19

## 2023-07-10 RX ORDER — OXYCODONE AND ACETAMINOPHEN 10; 325 MG/1; MG/1
TABLET ORAL
Qty: 38 TABLET | Refills: 0 | Status: SHIPPED | OUTPATIENT
Start: 2023-07-10 | End: 2023-07-17

## 2023-07-10 RX ORDER — OXYCODONE AND ACETAMINOPHEN 10; 325 MG/1; MG/1
TABLET ORAL
Qty: 38 TABLET | Refills: 0
Start: 2023-07-10 | End: 2023-07-10 | Stop reason: SDUPTHER

## 2023-07-10 RX ORDER — AMOXICILLIN 250 MG
1 CAPSULE ORAL 2 TIMES DAILY PRN
Qty: 30 TABLET | Refills: 2 | Status: SHIPPED | OUTPATIENT
Start: 2023-07-10 | End: 2023-08-24

## 2023-07-10 RX ORDER — METHOCARBAMOL 500 MG/1
500 TABLET, FILM COATED ORAL 4 TIMES DAILY PRN
Qty: 60 TABLET | Refills: 1 | Status: SHIPPED | OUTPATIENT
Start: 2023-07-10 | End: 2023-08-09

## 2023-07-10 RX ORDER — AMOXICILLIN 250 MG
1 CAPSULE ORAL 2 TIMES DAILY PRN
Qty: 30 TABLET | Refills: 2 | Status: SHIPPED | OUTPATIENT
Start: 2023-07-10 | End: 2023-07-10 | Stop reason: SDUPTHER

## 2023-07-10 RX ORDER — AMLODIPINE BESYLATE 5 MG/1
5 TABLET ORAL DAILY
Qty: 30 TABLET | Refills: 11 | Status: SHIPPED | OUTPATIENT
Start: 2023-07-11 | End: 2023-07-10 | Stop reason: SDUPTHER

## 2023-07-10 RX ORDER — POLYETHYLENE GLYCOL 3350 17 G/17G
17 POWDER, FOR SOLUTION ORAL DAILY
Status: DISCONTINUED | OUTPATIENT
Start: 2023-07-10 | End: 2023-07-10 | Stop reason: HOSPADM

## 2023-07-10 RX ORDER — AMLODIPINE BESYLATE 5 MG/1
5 TABLET ORAL DAILY
Qty: 30 TABLET | Refills: 11 | Status: SHIPPED | OUTPATIENT
Start: 2023-07-11 | End: 2024-07-10

## 2023-07-10 RX ORDER — FERROUS SULFATE 325(65) MG
325 TABLET, DELAYED RELEASE (ENTERIC COATED) ORAL DAILY
Qty: 30 TABLET | Refills: 1 | Status: SHIPPED | OUTPATIENT
Start: 2023-07-10 | End: 2023-07-10 | Stop reason: SDUPTHER

## 2023-07-10 RX ORDER — POLYETHYLENE GLYCOL 3350 17 G/17G
17 POWDER, FOR SOLUTION ORAL DAILY
Qty: 30 EACH | Refills: 2 | Status: SHIPPED | OUTPATIENT
Start: 2023-07-10 | End: 2023-07-10 | Stop reason: SDUPTHER

## 2023-07-10 RX ADMIN — FERROUS SULFATE TAB 325 MG (65 MG ELEMENTAL FE) 1 EACH: 325 (65 FE) TAB at 10:07

## 2023-07-10 RX ADMIN — OXYCODONE AND ACETAMINOPHEN 1 TABLET: 10; 325 TABLET ORAL at 10:07

## 2023-07-10 RX ADMIN — OXYCODONE AND ACETAMINOPHEN 1 TABLET: 10; 325 TABLET ORAL at 01:07

## 2023-07-10 RX ADMIN — THERA TABS 1 TABLET: TAB at 10:07

## 2023-07-10 RX ADMIN — AMLODIPINE BESYLATE 5 MG: 5 TABLET ORAL at 10:07

## 2023-07-10 RX ADMIN — CEFTRIAXONE 1 G: 1 INJECTION, POWDER, FOR SOLUTION INTRAMUSCULAR; INTRAVENOUS at 02:07

## 2023-07-10 RX ADMIN — OXYCODONE AND ACETAMINOPHEN 1 TABLET: 10; 325 TABLET ORAL at 06:07

## 2023-07-10 RX ADMIN — TAMSULOSIN HYDROCHLORIDE 0.4 MG: 0.4 CAPSULE ORAL at 10:07

## 2023-07-10 RX ADMIN — METHOCARBAMOL TABLETS 500 MG: 500 TABLET, COATED ORAL at 10:07

## 2023-07-10 RX ADMIN — DOCUSATE SODIUM AND SENNOSIDES 1 TABLET: 8.6; 5 TABLET, FILM COATED ORAL at 10:07

## 2023-07-10 NOTE — PROGRESS NOTES
[facility]  Urology Progress Note    Problems:   Patient Active Problem List   Diagnosis    Opioid dependence with withdrawal    Elevated BP without diagnosis of hypertension    Depression with suicidal ideation    Calculus of ureterovesical junction (UVJ)    RICK (acute kidney injury)    Pyelonephritis    Hyperbilirubinemia    UTI (urinary tract infection)    Nephrolithiasis    Flank pain    Renal abscess    Hypokalemia    Hyperthyroidism, subclinical    Headache on top of head    Physical deconditioning    Iron deficiency anemia       Subjective   Afebrile, voiding well. Her biggest complaint is still right flank discomfort.     Meds:   amLODIPine  5 mg Oral Daily    cefTRIAXone (ROCEPHIN) IVPB  1 g Intravenous Q24H    enoxparin  40 mg Subcutaneous Daily    ferrous sulfate  1 tablet Oral Daily    LIDOcaine  1 patch Transdermal Q24H    magnesium sulfate IVPB  4 g Intravenous Once    multivitamin  1 tablet Oral Daily    oxyCODONE-acetaminophen  1 tablet Oral Q4H    senna-docusate 8.6-50 mg  1 tablet Oral BID    tamsulosin  0.4 mg Oral Daily       HYDROmorphone, melatonin, methocarbamoL, naloxone, ondansetron, oxybutynin, senna-docusate 8.6-50 mg, sodium chloride 0.9%, sodium chloride 0.9%, sodium chloride 0.9%    Temp:  [97.5 °F (36.4 °C)-99 °F (37.2 °C)] 98.1 °F (36.7 °C)  Pulse:  [89-97] 93  Resp:  [16-20] 16  SpO2:  [95 %-98 %] 98 %  BP: (124-140)/(76-83) 124/76    No intake/output data recorded.    General:  Alert, cooperative, no distress, appears stated age   Head:  Normocephalic, without obvious abnormality, atraumatic   Eyes:  PERRL, conjunctiva/corneas clear   Lungs:   Respirations unlabored    Heart:  Warm and well perfused   Abdomen:   abdomen is soft without significant tenderness, masses, organomegaly or guarding; did not percuss her right flank but patient was clutching her right side occasional during rounds   : defer exam   Drains: none   Extremities: Extremities normal,  atraumatic, no cyanosis or edema   Skin: Skin color, texture, turgor normal, no rashes or lesions   Psych: Appropriate   Neurologic: Non-focal     Recent Results (from the past 24 hour(s))   CBC Auto Differential    Collection Time: 07/10/23  4:18 AM   Result Value Ref Range    WBC 10.22 3.90 - 12.70 K/uL    RBC 2.99 (L) 4.00 - 5.40 M/uL    Hemoglobin 8.0 (L) 12.0 - 16.0 g/dL    Hematocrit 24.1 (L) 37.0 - 48.5 %    MCV 81 (L) 82 - 98 fL    MCH 26.8 (L) 27.0 - 31.0 pg    MCHC 33.2 32.0 - 36.0 g/dL    RDW 13.9 11.5 - 14.5 %    Platelets 637 (H) 150 - 450 K/uL    MPV 8.3 (L) 9.2 - 12.9 fL    Immature Granulocytes 0.7 (H) 0.0 - 0.5 %    Gran # (ANC) 7.0 1.8 - 7.7 K/uL    Immature Grans (Abs) 0.07 (H) 0.00 - 0.04 K/uL    Lymph # 2.0 1.0 - 4.8 K/uL    Mono # 0.9 0.3 - 1.0 K/uL    Eos # 0.1 0.0 - 0.5 K/uL    Baso # 0.07 0.00 - 0.20 K/uL    nRBC 0 0 /100 WBC    Gran % 68.6 38.0 - 73.0 %    Lymph % 19.9 18.0 - 48.0 %    Mono % 9.1 4.0 - 15.0 %    Eosinophil % 1.0 0.0 - 8.0 %    Basophil % 0.7 0.0 - 1.9 %    Differential Method Automated    Comprehensive Metabolic Panel    Collection Time: 07/10/23  4:18 AM   Result Value Ref Range    Sodium 136 136 - 145 mmol/L    Potassium 4.1 3.5 - 5.1 mmol/L    Chloride 101 95 - 110 mmol/L    CO2 24 23 - 29 mmol/L    Glucose 105 70 - 110 mg/dL    BUN 8 6 - 20 mg/dL    Creatinine 1.0 0.5 - 1.4 mg/dL    Calcium 9.7 8.7 - 10.5 mg/dL    Total Protein 8.8 (H) 6.0 - 8.4 g/dL    Albumin 2.9 (L) 3.5 - 5.2 g/dL    Total Bilirubin 0.3 0.1 - 1.0 mg/dL    Alkaline Phosphatase 89 55 - 135 U/L    AST 19 10 - 40 U/L    ALT 47 (H) 10 - 44 U/L    eGFR >60 >60 mL/min/1.73 m^2    Anion Gap 11 8 - 16 mmol/L   Magnesium    Collection Time: 07/10/23  4:18 AM   Result Value Ref Range    Magnesium 2.1 1.6 - 2.6 mg/dL   Phosphorus    Collection Time: 07/10/23  4:18 AM   Result Value Ref Range    Phosphorus 4.1 2.7 - 4.5 mg/dL     43 y.o. female with right 5mm distal ureteral stone, s/p cysto right stent 7/5/23.  Was discharged with appropriate PO abx for UTI but had persistent fevers, nausea, vomiting, sent to ER from urology clinic     CT in ER demonstrated possibly 2 renal abscesses - largest is 1.9cm, ureteral stent in place, distal 5mm ureteral stone still present. She is s/p IR abscess aspiration on 7/5/23. Initially the first day after the procedure she was significantly improved (flank pain, lack of fevers, and WBCs improved) however on the evening of post-procedure day 1 she has developed severe flank pain again.      Plan:  S/p IR abscess drainage, patient is on culture appropriate abx based off of sensitivities. ID has been following.  Stent in good position, unfortunately stone is still located in the ureter, will need definitive stone surgery in the future, likely after imaging demonstrates resolution of renal abscesses.  Explained that her sources of pain are likely due to: pyelonephritis, renal abscess, IR procedure itself, and possibly a hematoma that developed after the procedure - a known possibility of the procedure. She had 1 day of significant flank pain improvement but it returned- unlikely to be abscess recurrence so quickly so my hypothesis is possible hematoma. Explained that the hematoma will heal/resolve over time and no intervention is needed for this.   Has f/u with urology in the future as I am departing [facility].

## 2023-07-10 NOTE — ASSESSMENT & PLAN NOTE
Patient with recent diagnosis of pyelonephritis and nephrolithiasis recently discharged from hospital s/p stent placement for nephrolithiasis.  Was on PO abx, augmentin.  Previous urine cx resulted with sensitivities.  Fever, nausea, vomiting on admission.  Patient experienced spikes in fevers throughout stay while on IV ceftriaxone.  WBC elevated, lactic within normal range in ED.  CT Renal with stable stent and stones with concern for renal abscesses.  Retroperitoneal US Bilateral nonobstructing renal calculi.   CT urogram revealing stent in stable position with inflammation of right kidney and possible interval bleeding into existing cyst  Renal US (7/8)- Heterogeneous collection R kidney 2.7 cm.  Overall extent has decreased in size when compared to prior exam from 07/05, consistent with abscess.  CT with contrast (7/8)- Persistent abnormal hypoattenuation with parenchymal heterogeneity along the midpole of the right kidney concerning for abscess formation, likely same abscess aspirated. Small to moderate right pleural effusion with adjacent passive atelectasis.   Per IR abscess seen on CT too small to drain    Plan:  - Continue IV ceftriaxone day 10  - Hold nephrotoxic medications .  - Renally-dose current meds if available  - Avoid Hypotension.  - Strict I/O's.  - Monitor renal function carefully.  - Monitor WBC count.  - For pain, Oxycodone 10mg q4 scheduled,and Norco 0.5mg q3 PRN  -Per urology recs, oxybutynin for bladder spasms and flomax for stent colic  -Muscle spasms: Robaxin PRN  -Increased bowel regimen to Senna docusate and miralax   -Follow fever  -Fluid cultures from aspiration revealed + Klebsiella and many WBCs  -Per ID recs      continue IV ceftriaxone due to klebsiella cx sensitivity to current abx regimen.          No current need for PICC line at this time as patient can likely receive PO abx upon discharge. Upon discharge, DC with cipro for abx use until 7/19 (10 days post  aspiration)  -Patient will follow up outpatient with Urology on 7/31/23, will need closer follow up for pain management.  -Monitor pain level   -Per urology, stent needs to remain due to stone. Alternative interventions would increase pain.

## 2023-07-10 NOTE — PLAN OF CARE
Problem: Adult Inpatient Plan of Care  Goal: Plan of Care Review  Outcome: Ongoing, Progressing  Goal: Patient-Specific Goal (Individualized)  Outcome: Ongoing, Progressing  Goal: Absence of Hospital-Acquired Illness or Injury  Outcome: Ongoing, Progressing  Goal: Optimal Comfort and Wellbeing  Outcome: Ongoing, Progressing  Goal: Readiness for Transition of Care  Outcome: Ongoing, Progressing     Problem: Fluid and Electrolyte Imbalance (Acute Kidney Injury/Impairment)  Goal: Fluid and Electrolyte Balance  Outcome: Ongoing, Progressing     Problem: Oral Intake Inadequate (Acute Kidney Injury/Impairment)  Goal: Optimal Nutrition Intake  Outcome: Ongoing, Progressing     Problem: Renal Function Impairment (Acute Kidney Injury/Impairment)  Goal: Effective Renal Function  Outcome: Ongoing, Progressing     Problem: UTI (Urinary Tract Infection)  Goal: Improved Infection Symptoms  Outcome: Ongoing, Progressing     Problem: Fall Injury Risk  Goal: Absence of Fall and Fall-Related Injury  Outcome: Ongoing, Progressing     Problem: Osteoarthritis Comorbidity  Goal: Maintenance of Osteoarthritis Symptom Control  Outcome: Ongoing, Progressing     Problem: Pain Acute  Goal: Acceptable Pain Control and Functional Ability  Outcome: Ongoing, Progressing     Problem: Hypertension Comorbidity  Goal: Blood Pressure in Desired Range  Outcome: Ongoing, Progressing     Problem: Constipation  Goal: Effective Bowel Elimination  Outcome: Ongoing, Progressing

## 2023-07-10 NOTE — SUBJECTIVE & OBJECTIVE
Interval History: NAEON. AFVSS. Labs improving. Patient with continued right flank pain over the weekend which was difficult to control. The pain is constant but worse with movement and breathing. Pain is unrelated to voiding. She denies difficulty voiding.     Objective:     Temp:  [97.3 °F (36.3 °C)-99 °F (37.2 °C)] 99 °F (37.2 °C)  Pulse:  [89-97] 97  Resp:  [16-20] 18  SpO2:  [95 %-99 %] 96 %  BP: (132-140)/(79-83) 138/81     Body mass index is 32.03 kg/m².             Drains       None                    Physical Exam  Vitals reviewed.   Constitutional:       General: She is not in acute distress.     Appearance: She is not diaphoretic.   HENT:      Head: Normocephalic and atraumatic.      Nose: Nose normal.   Eyes:      Conjunctiva/sclera: Conjunctivae normal.   Cardiovascular:      Rate and Rhythm: Normal rate.   Pulmonary:      Effort: Pulmonary effort is normal. No respiratory distress.   Abdominal:      General: There is no distension.      Palpations: Abdomen is soft.      Tenderness: There is right CVA tenderness. There is no left CVA tenderness.   Musculoskeletal:         General: Normal range of motion.      Cervical back: Normal range of motion.   Skin:     General: Skin is dry.   Neurological:      Mental Status: She is alert.   Psychiatric:         Behavior: Behavior normal.         Thought Content: Thought content normal.         Significant Labs:    BMP:  Recent Labs   Lab 07/08/23  0407 07/09/23  0351 07/10/23  0418    133* 136   K 4.0 4.1 4.1    100 101   CO2 24 21* 24   BUN 7 8 8   CREATININE 0.9 1.0 1.0   CALCIUM 9.4 9.5 9.7         CBC:   Recent Labs   Lab 07/08/23  0407 07/09/23  0350 07/10/23  0418   WBC 10.63 11.79 10.22   HGB 7.8* 7.8* 8.0*   HCT 23.3* 23.7* 24.1*   * 636* 637*         All pertinent labs results from the past 24 hours have been reviewed.    Significant Imaging:  All pertinent imaging results/findings from the past 24 hours have been reviewed.

## 2023-07-10 NOTE — PT/OT/SLP PROGRESS
Physical Therapy Treatment    Patient Name:  Sherri Machado   MRN:  [id number]    Recommendations:     Discharge Recommendations: outpatient PT (low intensity therapy)  Discharge Equipment Recommendations: none  Barriers to discharge:  decreased functional mobility    Assessment:     Sherri Machado is a 43 y.o. female admitted with a medical diagnosis of Pyelonephritis.  She presents with the following impairments/functional limitations: weakness, impaired endurance, gait instability, impaired balance, pain.  Pt would continue to benefit from P.T. To address impairments listed above.  .    Rehab Prognosis: Fair; patient would benefit from acute skilled PT services to address these deficits and reach maximum level of function.    Recent Surgery: * No surgery found *      Plan:     During this hospitalization, patient to be seen 3 x/week to address the identified rehab impairments via gait training, therapeutic activities, therapeutic exercises, neuromuscular re-education and progress toward the following goals:    Plan of Care Expires:  08/06/23    Subjective     Chief Complaint: increased right flank pain and right lower back pain.  Patient/Family Comments/goals: Pt declined tx at this time.  Pain/Comfort:  Pain Rating 1:  (did not rate, but stated pt significantly inceases with any movement.)  Location 1:  (right flank and right lower back.)      Objective:     Communicated with RN prior to session.  Patient found supine with telemetry upon PT entry to room.     General Precautions: Standard, fall  Orthopedic Precautions: N/A  Braces: N/A  Respiratory Status: Room air      AM-PAC 6 CLICK MOBILITY  Turning over in bed (including adjusting bedclothes, sheets and blankets)?: 4  Sitting down on and standing up from a chair with arms (e.g., wheelchair, bedside commode, etc.): 3  Moving from lying on back to sitting on the side of the bed?: 4  Moving to and from a bed to a chair (including a wheelchair)?: 3  Need to walk in  "hospital room?: 3  Climbing 3-5 steps with a railing?: 3  Basic Mobility Total Score: 20       Treatment & Education:  Pt declined OOB tx at this time secondary to going home soon and did want to move at this time because "moving causes the pain to increased a lot."  Pt stated that she has been getting up and down to go to the bathroom over the weekend without any balance issues.  Pt reports moving slowly and holding onto the door.  Pt instructed the pt to call for assist to go to the bathroom for increased safety secondary to pain increasing with movement.  Pt stated she will have a lot of family help at home.  PTA provided education on log roll technique to get into and out of bed to assist with decreased trunk rotation and assisting with decreased pain with movement.  PTA also spoke with CM after tx to give update on pt's status for discharge home.     Patient left supine with all lines intact, call button in reach, bed alarm on, and Rn notified..    GOALS:   Multidisciplinary Problems       Physical Therapy Goals          Problem: Physical Therapy    Goal Priority Disciplines Outcome Goal Variances Interventions   Physical Therapy Goal     PT, PT/OT Ongoing, Progressing     Description: Goals to be met by: 23    Patient will increase functional independence with mobility by performin. Sit to stand transfer with [place] and no AD.   2. Gait  x 150 feet with [place] using No Assistive Device.                          Time Tracking:     PT Received On: 07/10/23  PT Start Time: 1222     PT Stop Time: 1230  PT Total Time (min): 8 min     Billable Minutes: Therapeutic Activity 8    Treatment Type: Treatment  PT/PTA: PTA     Number of PTA visits since last PT visit: 1     07/10/2023  "

## 2023-07-10 NOTE — PLAN OF CARE
Pt for d/c to home today   Discharging nurse to review d/c meds/instructions   No dme, no hh   Future Appointments   Date Time Provider Department Center   7/12/2023  9:00 AM Colten Leon PA-C [facility]   7/31/2023  9:45 AM Kip Beckham MD [facility]        07/10/23 1403   Final Note   Assessment Type Final Discharge Note   Anticipated Discharge Disposition Home   What phone number can be called within the next 1-3 days to see how you are doing after discharge? [phone]   Hospital Resources/Appts/Education Provided Appointments scheduled and added to AVS   Post-Acute Status   Discharge Delays None known at this time

## 2023-07-10 NOTE — PROGRESS NOTES
[facility]                    Pharmacy       Discharge Medication Education    Patient ACCEPTED medication education. Pharmacy has provided education on the name, indication, and possible side effects of the medication(s) prescribed, using teach-back method.     The following medications have also been discussed, during this admission.        Medication List        START taking these medications      amLODIPine 5 MG tablet  Commonly known as: NORVASC  Take 1 tablet (5 mg total) by mouth once daily.  Start taking on: July 11, 2023     ciprofloxacin HCl 500 MG tablet  Commonly known as: CIPRO  Take 1 tablet (500 mg total) by mouth every 12 (twelve) hours. for 9 days     ferrous sulfate 325 (65 FE) MG EC tablet  Take 1 tablet (325 mg total) by mouth once daily.     methocarbamoL 500 MG Tab  Commonly known as: ROBAXIN  Take 1 tablet (500 mg total) by mouth 4 (four) times daily as needed (muscle spasms).     polyethylene glycol 17 gram/dose powder  Commonly known as: GLYCOLAX  Take 17 g by mouth once daily.     senna-docusate 8.6-50 mg 8.6-50 mg per tablet  Commonly known as: PERICOLACE  Take 1 tablet by mouth 2 (two) times daily as needed for Constipation.            CHANGE how you take these medications      oxyCODONE-acetaminophen  mg per tablet  Commonly known as: PERCOCET  Take 1 tablet by mouth every 4 (four) hours as needed for Pain for 5 days, THEN 1 tablet every 6 (six) hours as needed for Pain.  Start taking on: July 10, 2023  What changed: See the new instructions.            CONTINUE taking these medications      metoclopramide HCl 10 MG tablet  Commonly known as: REGLAN     ONE-A-DAY WOMEN'S COMPLETE 18 mg iron- 400 mcg Tab  Generic drug: multivit-min-iron fum-folic ac     tamsulosin 0.4 mg Cap  Commonly known as: FLOMAX  Take 1 capsule (0.4 mg total) by mouth once daily. for 15 days            STOP taking these medications      amoxicillin-clavulanate 875-125mg 875-125 mg per  tablet  Commonly known as: AUGMENTIN     HYDROcodone-acetaminophen 7.5-325 mg per tablet  Commonly known as: NORCO               Where to Get Your Medications        These medications were sent to [facility] [address], [place] [address]      Hours: Mon-Fri, 8a-5:30p Phone: [phone]   amLODIPine 5 MG tablet  ciprofloxacin HCl 500 MG tablet  ferrous sulfate 325 (65 FE) MG EC tablet  methocarbamoL 500 MG Tab  polyethylene glycol 17 gram/dose powder  senna-docusate 8.6-50 mg 8.6-50 mg per tablet       You can get these medications from any pharmacy    Bring a paper prescription for each of these medications  oxyCODONE-acetaminophen  mg per tablet          Thank you  Isadora Bhandari, PharmD  [phone]

## 2023-07-10 NOTE — DISCHARGE SUMMARY
[facility] Medicine  Discharge Summary      Patient Name: Sherri Machado  MRN: [id number]  OLESYA: [id number]  Patient Class: IP- Inpatient  Admission Date: 6/30/2023  Hospital Length of Stay: 10 days  Discharge Date and Time:  07/10/2023 12:15 PM  Attending Physician: Tomasa Orantes MD   Discharging Provider: Irene Peralta MD  Primary Care Provider: Modesto Waldron MD    Primary Care Team: Networked reference to record PCT     HPI:   43-year-old F w/ PMHx with history depression, opioid dependence who presents to the ED with reports of right flank pain. Patient reports she was diagnosed with a kidney stone and was seen by Dr. Blevins.  Patient had a urethral stent placement in addition to cystoscopy and is currently taking PO antibiotics.  Patient reports she has been running fever over the past few days in addition to nausea, vomiting and diarrhea.  Patient states she is having issues with eating and drinking. She was sent for possible admission per Dr. Blevins.    In the ED, initial vitals /85, HR 85, Temp 98.3F, SpO2 100% on room air. Labs include CBC with H/H 9.1/27.6 and WBC 16.35, CMP with K 3.3.  UA with 1+ protein, 2+ occult blood, 3+ leukocytes, 29 WBC, 5 RBC. Urine Pregnancy test negative. Lactic acid 1.3. CXR with no acute changes. [facility] consulted for evaluation for admission for UTI with concerns for pyelonephritis.       * No surgery found *      Hospital Course:   The patient has a known history of prior hospital admission with complicated pyelonephritis and nephrolithiasis diagnosis with renal stent placement and failed outpatient treatment with PO antibiotics, Augmentin. CT Renal stone with interval placement of right-sided double-J ureteral stent with resolution of previous right-sided hydronephrosis, stable 5 mm calculus within the distal right ureter near the UVJ with 2 subtle hypoattenuating parenchymal foci within the right kidney, concerning for renal abscesses.  US Retroperitoneal with bilateral nonobstructing renal calculi and no hydronephrosis noted. Although patient was on IV ceftriaxone throughout this admission, she had persistently elevated WBC (16.35 on admission) and intermittent fevers. Per urology and IR recommendations, patient had CT urogram completed revealing multiple hypodense irregularities with largest being 4.6 cm, suspicious for cyst or abscess. CT guided Aspiration of right kidney resulted in removal of 3mL of purulent fluid and improvement in RUQ and right flank pain. Patient's white blood cell count declined to normal range and remained afebrile after aspiration and on day 7 of IV abx, ceftriaxone. Patient required Visatril 50mg for anxiety. Patient persistent headache pain treated with Ketorolac 15mg. Patient also continued receiving IV diuresis during the course with appropriate UOP.    On the day of discharge patient was back to baseline and hemodynamically stable. Patient was sent home to resume home meds (as below). Percocet refilled and included tapered dose. Per urology recommendations, patient will be discharged with oxybutynin PRN, Flomax PRN, and Ciprofloxacin.  Close outpatient f/u was advised with Urology and PCP.  Low intensity therapy at home, per PT recommendations. The importance of medication adherence was again stressed to the patient. Patient agreeable to discharge plan, expressed understanding, all questions answered, return precautions were discussed.         Goals of Care Treatment Preferences:  Code Status: Full Code      Consults:   Consults (From admission, onward)        Status Ordering Provider     Inpatient consult to Interventional Radiology  Once        Provider:  (Not yet assigned)    Completed JUAN MILLAN     Inpatient consult to Infectious Diseases  Once        Provider:  (Not yet assigned)    Completed BENITA REY     Inpatient consult to Urology  Once        Provider:  Kaylie Blevins MD    Completed  ALTON SEWELL          No new Assessment & Plan notes have been filed under this hospital service since the last note was generated.  Service: Hospital Medicine    Final Active Diagnoses:    Diagnosis Date Noted POA    PRINCIPAL PROBLEM:  Pyelonephritis [N12] 06/21/2023 Yes     Chronic    Headache on top of head [R51.9] 07/06/2023 Unknown    Physical deconditioning [R53.81] 07/06/2023 Unknown    Iron deficiency anemia [D50.9] 07/06/2023 Unknown    Hyperthyroidism, subclinical [E05.90] 07/03/2023 Unknown    Hypokalemia [E87.6] 07/02/2023 Unknown    Renal abscess [N15.1] 06/30/2023 Unknown    Flank pain [R10.9] 06/23/2023 Yes    Nephrolithiasis [N20.0] 06/23/2023 Yes    Elevated BP without diagnosis of hypertension [R03.0] 05/15/2019 Yes      Problems Resolved During this Admission:       Discharged Condition: stable    Disposition:     Follow Up:   Follow-up Information     [facility] Follow up on 7/12/2023.    Specialty: Family Medicine  Why: 9:00 am  Contact information:  [address], [address]  [facility] [address] [phone]  Additional information:  Please park in [address] C or D and use [address]. [facility] Medical Office Bldg. elevators.           Kip Beckham MD Follow up on 7/31/2023.    Specialty: Urology  Why: 9:45 am  Contact information:  [address]  [facility] [address]  [phone]                       Patient Instructions:      Ambulatory referral/consult to Physical/Occupational Therapy   Standing Status: Future   Referral Priority: Routine Referral Type: Physical Medicine   Referral Reason: Specialty Services Required   Number of Visits Requested: 1       Significant Diagnostic Studies: Labs:   BMP:   Recent Labs   Lab 07/09/23  0351 07/10/23  0418   * 105   * 136   K 4.1 4.1    101   CO2 21* 24   BUN 8 8   CREATININE 1.0 1.0   CALCIUM 9.5 9.7   MG 2.0 2.1   , CMP   Recent Labs   Lab 07/09/23  0351  07/10/23  0418   * 136   K 4.1 4.1    101   CO2 21* 24   * 105   BUN 8 8   CREATININE 1.0 1.0   CALCIUM 9.5 9.7   PROT 8.8* 8.8*   ALBUMIN 2.9* 2.9*   BILITOT 0.3 0.3   ALKPHOS 93 89   AST 23 19   ALT 55* 47*   ANIONGAP 12 11    and CBC   Recent Labs   Lab 07/09/23  0350 07/10/23  0418   WBC 11.79 10.22   HGB 7.8* 8.0*   HCT 23.7* 24.1*   * 637*     Microbiology:   Blood Culture   Lab Results   Component Value Date    LABBLOO No growth after 5 days. 06/30/2023    LABBLOO No growth after 5 days. 06/30/2023     Radiology: X-Ray: CXR: X-Ray Chest 1 View (CXR): No results found for this visit on 06/30/23. and KUB: X-Ray Abdomen AP 1 View (KUB): No results found for this visit on 06/30/23.  CT scan: CT ABDOMEN PELVIS WITH CONTRAST:   Results for orders placed or performed during the hospital encounter of 06/30/23   CT Abdomen Pelvis With Contrast    Narrative    EXAMINATION:  CT ABDOMEN PELVIS WITH CONTRAST    CLINICAL HISTORY:  Abdominal abscess/infection suspected;right CVA pain, known R renal abscess per radiology rec;    TECHNIQUE:  Low dose axial images, sagittal and coronal reformations were obtained from the lung bases to the pubic symphysis following the IV administration of 100 mL of Omnipaque 350 .  Oral contrast was not given.    COMPARISON:  07/08/2023, 07/07/2023    FINDINGS:  Small to moderate right pleural effusion with adjacent passive atelectasis.  Patchy opacity in the left lower lobe.  The base of the heart appears normal.  The aorta is of normal caliber and tapers appropriately.    The gallbladder has been removed.  No intrahepatic or extrahepatic biliary ductal dilatation is identified.  The liver, spleen, pancreas, adrenal glands and left kidney are normal in size, shape and contour.  No left-sided nephrolithiasis, hydronephrosis or hydroureter.  Again noted is an abnormal appearance of the right kidney.  There is a simple appearing cyst of the upper pole of the right  kidney anteriorly.  In the midpole of the right kidney, there is ill-defined hypoattenuation that measures up to 4.6 cm in greatest craniocaudal dimension, the overall appearance of which is unchanged as compared to the prior examination.  A double-J ureteral stent is in place.  There is a punctate nonobstructing stone at the lower pole of the right kidney.  There is a tiny stone adjacent to the ureteral stent at the level of the right UVJ that measures approximately 4 mm.  The urinary bladder is well distended and appears normal.  Uterus and adnexa are unremarkable.    The bowel appears normal.  The appendix is normal.  No free air, free fluid or obstruction.  No pathologically enlarged abdominal or pelvic lymph nodes are seen.    Age-appropriate degenerative changes affect the skeleton.      Impression    Right double-J ureteral stent remains in place.  There is a 4 mm stone adjacent to the ureteral stent at the level of the right UVJ, similar to before.  Persistent abnormal hypoattenuation with parenchymal heterogeneity along the midpole of the right kidney concerning for abscess formation.    Small to moderate right pleural effusion with adjacent passive atelectasis.      Electronically signed by: Sandi Khan MD  Date:    07/08/2023  Time:    16:23       Pending Diagnostic Studies:     None         Medications:  Reconciled Home Medications:      Medication List      START taking these medications    amLODIPine 5 MG tablet  Commonly known as: NORVASC  Take 1 tablet (5 mg total) by mouth once daily.  Start taking on: July 11, 2023     ciprofloxacin HCl 500 MG tablet  Commonly known as: CIPRO  Take 1 tablet (500 mg total) by mouth every 12 (twelve) hours. for 9 days     ferrous sulfate 325 (65 FE) MG EC tablet  Take 1 tablet (325 mg total) by mouth once daily.     methocarbamoL 500 MG Tab  Commonly known as: ROBAXIN  Take 1 tablet (500 mg total) by mouth 4 (four) times daily as needed (muscle spasms).      polyethylene glycol 17 gram/dose powder  Commonly known as: GLYCOLAX  Take 17 g by mouth once daily.     senna-docusate 8.6-50 mg 8.6-50 mg per tablet  Commonly known as: PERICOLACE  Take 1 tablet by mouth 2 (two) times daily as needed for Constipation.        CHANGE how you take these medications    oxyCODONE-acetaminophen  mg per tablet  Commonly known as: PERCOCET  Take 1 tablet by mouth every 4 (four) hours as needed for Pain for 5 days, THEN 1 tablet every 6 (six) hours as needed for Pain.  Start taking on: July 10, 2023  What changed: See the new instructions.        CONTINUE taking these medications    metoclopramide HCl 10 MG tablet  Commonly known as: REGLAN  Take 10 mg by mouth 4 (four) times daily.     ONE-A-DAY WOMEN'S COMPLETE 18 mg iron- 400 mcg Tab  Generic drug: multivit-min-iron fum-folic ac  Take 1 tablet by mouth once daily.     tamsulosin 0.4 mg Cap  Commonly known as: FLOMAX  Take 1 capsule (0.4 mg total) by mouth once daily. for 15 days        STOP taking these medications    amoxicillin-clavulanate 875-125mg 875-125 mg per tablet  Commonly known as: AUGMENTIN     HYDROcodone-acetaminophen 7.5-325 mg per tablet  Commonly known as: NORCO            Indwelling Lines/Drains at time of discharge:   Lines/Drains/Airways     None                 Time spent on the discharge of patient: >30 minutes       ________________________  Gwendolyn Peralta MD  [facility] PGY-1

## 2023-07-10 NOTE — PROGRESS NOTES
[facility] Medicine  Progress Note    Patient Name: Sherri Machado  MRN: [id number]  Patient Class: IP- Inpatient   Admission Date: 6/30/2023  Length of Stay: 10 days  Attending Physician: Tomasa Orantes MD  Primary Care Provider: Modesto Waldron MD        Subjective:     Principal Problem:Pyelonephritis        HPI:  43-year-old F w/ PMHx with history depression, opioid dependence who presents to the ED with reports of right flank pain. Patient reports she was diagnosed with a kidney stone and was seen by Dr. Blevins.  Patient had a urethral stent placement in addition to cystoscopy and is currently taking PO antibiotics.  Patient reports she has been running fever over the past few days in addition to nausea, vomiting and diarrhea.  Patient states she is having issues with eating and drinking. She was sent for possible admission per Dr. Blevins.    In the ED, initial vitals /85, HR 85, Temp 98.3F, SpO2 100% on room air. Labs include CBC with H/H 9.1/27.6 and WBC 16.35, CMP with K 3.3.  UA with 1+ protein, 2+ occult blood, 3+ leukocytes, 29 WBC, 5 RBC. Urine Pregnancy test negative. Lactic acid 1.3. CXR with no acute changes. [facility] consulted for evaluation for admission for UTI with concerns for pyelonephritis.       Overview/Hospital Course:  The patient has a known history of prior hospital admission with complicated pyelonephritis and nephrolithiasis diagnosis with renal stent placement and failed outpatient treatment with PO antibiotics, Augmentin. CT Renal stone with interval placement of right-sided double-J ureteral stent with resolution of previous right-sided hydronephrosis, stable 5 mm calculus within the distal right ureter near the UVJ with 2 subtle hypoattenuating parenchymal foci within the right kidney, concerning for renal abscesses. US Retroperitoneal with bilateral nonobstructing renal calculi and no hydronephrosis noted. Although patient was on IV ceftriaxone  throughout this admission, she had persistently elevated WBC (16.35 on admission) and intermittent fevers. Per urology and IR recommendations, patient had CT urogram completed revealing multiple hypodense irregularities with largest being 4.6 cm, suspicious for cyst or abscess. CT guided Aspiration of right kidney resulted in removal of 3mL of purulent fluid and improvement in RUQ and right flank pain. Patient's white blood cell count declined to normal range and remained afebrile after aspiration and on day 7 of IV abx, ceftriaxone. Patient required Visatril 50mg for anxiety. Patient persistent headache pain treated with Ketorolac 15mg. Patient also continued receiving IV diuresis during the course with appropriate UOP.  Per urology recommendations, patient will be discharged with oxybutynin PRN, Flomax PRN, and oral antibiotics. Close outpatient f/u was advised with Urology and PCP.  Low intensity therapy at home, per PT recommendations.       Interval History: No adverse events overnight. Today patient reports right flank, mid epigastric, right CVA pain unchanged. Patient reports dysuria and vaginal pain resolved. She states that she is anxious over cause of pain. Last BM 2 days ago, although patient states she does feel need to go. Tolerating PO intake. Remains afebrile and vital signs stable at this time.      Review of Systems   Constitutional:  Negative for appetite change, fatigue and fever.   HENT:  Negative for congestion, rhinorrhea, sinus pressure, sinus pain and sore throat.    Eyes:  Negative for redness.   Respiratory:  Negative for cough, chest tightness, shortness of breath and wheezing.    Cardiovascular:  Negative for chest pain, palpitations and leg swelling.   Gastrointestinal:  Positive for abdominal pain (epigastric). Negative for abdominal distention, constipation, diarrhea, nausea and vomiting.   Genitourinary:  Positive for flank pain (right). Negative for decreased urine volume, difficulty  urinating, dysuria, frequency and urgency.   Musculoskeletal:  Positive for back pain (right CVA). Negative for arthralgias and gait problem.   Neurological:  Negative for dizziness, facial asymmetry, speech difficulty, weakness, light-headedness and headaches.   Psychiatric/Behavioral:  Negative for agitation and behavioral problems. The patient is nervous/anxious.    Objective:     Vital Signs (Most Recent):  Temp: 99 °F (37.2 °C) (07/10/23 0528)  Pulse: 97 (07/10/23 0528)  Resp: 18 (07/10/23 0608)  BP: 138/81 (07/10/23 0528)  SpO2: 96 % (07/10/23 0528) Vital Signs (24h Range):  Temp:  [97.3 °F (36.3 °C)-99 °F (37.2 °C)] 99 °F (37.2 °C)  Pulse:  [89-97] 97  Resp:  [18-20] 18  SpO2:  [95 %-99 %] 96 %  BP: (132-140)/(79-83) 138/81     Weight: 87.3 kg (192 lb 7.4 oz)  Body mass index is 32.03 kg/m².  No intake or output data in the 24 hours ending 07/10/23 0723      Physical Exam  Constitutional:       Appearance: Normal appearance. She is normal weight.   HENT:      Head: Normocephalic and atraumatic.      Mouth/Throat:      Mouth: Mucous membranes are moist.      Pharynx: Oropharynx is clear.   Eyes:      Extraocular Movements: Extraocular movements intact.      Pupils: Pupils are equal, round, and reactive to light.   Cardiovascular:      Rate and Rhythm: Normal rate and regular rhythm.      Pulses: Normal pulses.      Heart sounds: Normal heart sounds.   Pulmonary:      Effort: Pulmonary effort is normal.      Breath sounds: Normal breath sounds.   Abdominal:      General: Abdomen is flat. Bowel sounds are normal.      Palpations: Abdomen is soft.      Tenderness: There is abdominal tenderness (midepigastric, right flank). There is right CVA tenderness.   Musculoskeletal:         General: No tenderness or signs of injury. Normal range of motion.      Cervical back: Normal range of motion.      Right lower leg: No edema.      Left lower leg: No edema.   Lymphadenopathy:      Cervical: No cervical adenopathy.    Skin:     General: Skin is warm and dry.   Neurological:      General: No focal deficit present.      Mental Status: She is alert and oriented to person, place, and time. Mental status is at baseline.   Psychiatric:         Mood and Affect: Mood normal.         Behavior: Behavior normal.      Comments: anxious           Significant Labs: All pertinent labs within the past 24 hours have been reviewed.  CBC:   Recent Labs   Lab 07/09/23  0350 07/10/23  0418   WBC 11.79 10.22   HGB 7.8* 8.0*   HCT 23.7* 24.1*   * 637*     CMP:   Recent Labs   Lab 07/09/23  0351 07/10/23  0418   * 136   K 4.1 4.1    101   CO2 21* 24   * 105   BUN 8 8   CREATININE 1.0 1.0   CALCIUM 9.5 9.7   PROT 8.8* 8.8*   ALBUMIN 2.9* 2.9*   BILITOT 0.3 0.3   ALKPHOS 93 89   AST 23 19   ALT 55* 47*   ANIONGAP 12 11       Significant Imaging: I have reviewed all pertinent imaging results/findings within the past 24 hours.  I have reviewed and interpreted all pertinent imaging results/findings within the past 24 hours.      Assessment/Plan:      * Pyelonephritis  Patient with recent diagnosis of pyelonephritis and nephrolithiasis recently discharged from hospital s/p stent placement for nephrolithiasis.  Was on PO abx, augmentin.  Previous urine cx resulted with sensitivities.  Fever, nausea, vomiting on admission.  Patient experienced spikes in fevers throughout stay while on IV ceftriaxone.  WBC elevated, lactic within normal range in ED.  CT Renal with stable stent and stones with concern for renal abscesses.  Retroperitoneal US Bilateral nonobstructing renal calculi.   CT urogram revealing stent in stable position with inflammation of right kidney and possible interval bleeding into existing cyst  Renal US (7/8)- Heterogeneous collection R kidney 2.7 cm.  Overall extent has decreased in size when compared to prior exam from 07/05, consistent with abscess.  CT with contrast (7/8)- Persistent abnormal hypoattenuation with  parenchymal heterogeneity along the midpole of the right kidney concerning for abscess formation, likely same abscess aspirated. Small to moderate right pleural effusion with adjacent passive atelectasis.   Per IR abscess seen on CT too small to drain    Plan:  - Continue IV ceftriaxone day 10  - Hold nephrotoxic medications .  - Renally-dose current meds if available  - Avoid Hypotension.  - Strict I/O's.  - Monitor renal function carefully.  - Monitor WBC count.  - For pain, Oxycodone 10mg q4 scheduled,and Norco 0.5mg q3 PRN  -Per urology recs, oxybutynin for bladder spasms and flomax for stent colic  -Muscle spasms: Robaxin PRN  -Increased bowel regimen to Senna docusate and miralax   -Follow fever  -Fluid cultures from aspiration revealed + Klebsiella and many WBCs  -Per ID recs      continue IV ceftriaxone due to klebsiella cx sensitivity to current abx regimen.          No current need for PICC line at this time as patient can likely receive PO abx upon discharge. Upon discharge, DC with cipro for abx use until 7/19 (10 days post aspiration)  -Patient will follow up outpatient with Urology on 7/31/23, will need closer follow up for pain management.  -Monitor pain level   -Per urology, stent needs to remain due to stone. Alternative interventions would increase pain.            Iron deficiency anemia  Iron studies- elevated ferritin, low iron and TIBC indicating iron deficiency anemia    -ferritin administered        Physical deconditioning  Patient with prolonged hospital stay experiencing physical deconditoning  -continue PT inpatient  -refer PT outpatient    Hyperthyroidism, subclinical  Thyroid studies completed to determine etiology of high blood pressure. Patient has had systolic BP (140s-160s)  -TSH 0.310  -Follow up outpatient for repeat thyroid studies       Hypokalemia  K 4 on 7/8 AM labs.    Plan:  - Replete as needed.    Renal abscess  Patient has complained of constant right flank pain and RUQ  pain.  CT Renal with stable stent and stones with concern for renal abscesses.  Per IR & Urology recs, CT urogram with multiple hypodense irregularities, concerning for abscesses.  Per IR recs, S/p aspiration of 3 ml of pus from right kidney on patient's 6th day on ceftriaxone- Gram stain and KOH fluid studies negative  Fluid culture + for Klebsiella    Plan  -Continue IV ceftriaxone  -ID recs for possible changes in coverage  -Renal US      Flank pain  Plan as in pyelonephritis.    Nephrolithiasis  Plan as in pyelonephritis.    Elevated BP without diagnosis of hypertension  BP elevated on admission. Patient never been diagnosed with hypertension.  Potentially 2/2 to pain.    Plan:  - Will continue to monitor vitals.  - Add amlodipine 5mg daily, can consider titrating dose or can add additional agent if needed.  -Patient high blood pressure to be evaluated outpatient  - Avoid diuretics        VTE Risk Mitigation (From admission, onward)         Ordered     enoxaparin injection 40 mg  Daily         06/30/23 1744     IP VTE HIGH RISK PATIENT  Once         06/30/23 1744     Place sequential compression device  Until discontinued         06/30/23 1744                Discharge Planning   RUDOLHP: 7/7/2023     Code Status: Full Code   Is the patient medically ready for discharge?:     Reason for patient still in hospital (select all that apply): Patient trending condition  Discharge Plan A: Home, Home with family   Discharge Delays: None known at this time            ________________________  Gwendolyn Peralta MD  [facility] PGY-1

## 2023-07-10 NOTE — PROGRESS NOTES
[facility] - Telemetry  Adult Nutrition  Progress Note    SUMMARY       Recommendations    Recommendations:   1. Continue adult regular diet.   2. Monitor PO intake, weight changes, and labs.   3. RD to follow.    Goals:   Pt to meet 50-75% assessed needs through PO intake by RD follow up.  Nutrition Goal Status: new  Communication of RD Recs: other (comment) (Plan of care)    Assessment and Plan    No nutrition diagnosis at this time.       Malnutrition Assessment    [place] (Subcutaneous Fat Loss): well nourished   [place] (Muscle Loss): mild depletion     Reason for Assessment    Reason For Assessment: length of stay  Diagnosis: other (see comments) (Pyelonephritis)  Relevant Medical History: IBS, Depression, Opiod Dependence  Interdisciplinary Rounds: did not attend  General Information Comments: Pt admitted 6/30/2023 with pyelonephritis. Pt currently on adult regular diet with 0-50% intake recently reported. Current wt 87.3 kg - loss of 3.4 kg x 2 weeks. LBM 7/8/2023. Cristopher 20 - no altered skin integrity noted. NFPE completed 7/10/2023 - mild temporal depletion. Pt states her appetite is fine and she is getting d/c today.  Nutrition Discharge Planning: Pt to d/c on adult regular diet.    Patient Active Problem List   Diagnosis    Opioid dependence with withdrawal    Elevated BP without diagnosis of hypertension    Depression with suicidal ideation    Calculus of ureterovesical junction (UVJ)    RICK (acute kidney injury)    Pyelonephritis    Hyperbilirubinemia    UTI (urinary tract infection)    Nephrolithiasis    Flank pain    Renal abscess    Hypokalemia    Hyperthyroidism, subclinical    Headache on top of head    Physical deconditioning    Iron deficiency anemia     Past Medical History:   Diagnosis Date    Allergy     IBS (irritable bowel syndrome)     Osteoarthritis     Renal abscess 6/30/2023    Sickle cell trait     Urinary tract infection        Nutrition Risk Screen    Nutrition Risk Screen:  "no indicators present    Nutrition/Diet History    Food Preferences: No cultural or Confucianism preferences identified.  Spiritual, Cultural Beliefs, Yazdanism Practices, Values that Affect Care: no  Food Allergies: NKFA  Factors Affecting Nutritional Intake: None identified at this time    Anthropometrics    Temp: 98.5 °F (36.9 °C)  Height Method: Stated  Height: 5' 5" (165.1 cm)  Height (inches): 65 in  Weight Method: Bed Scale  Weight: 87.3 kg (192 lb 7.4 oz)  Weight (lb): 192.46 lb  Ideal Body Weight (IBW), Female: 125 lb  % Ideal Body Weight, Female (lb): 153.97 %  BMI (Calculated): 32  BMI Grade: 30 - 34.9- obesity - grade I  Usual Body Weight (UBW), k.7 kg  % Usual Body Weight: 96.45  % Weight Change From Usual Weight: -3.75 %     Wt Readings from Last 10 Encounters:   07/10/23 87.3 kg (192 lb 7.4 oz)   23 92.6 kg (204 lb 2.3 oz)   23 90.7 kg (200 lb)   21 88.5 kg (195 lb)   21 88.5 kg (195 lb)   21 88.5 kg (195 lb)   21 88.5 kg (195 lb)   20 90.7 kg (200 lb)   20 86.2 kg (190 lb)   10/23/18 86.6 kg (191 lb)       Lab/Procedures/Meds    Pertinent Labs Reviewed: reviewed  Pertinent Labs Comments: Iron 29, Albumin 2.9, A1c 5.7    Lab Results   Component Value Date/Time     07/10/2023 04:18 AM    K 4.1 07/10/2023 04:18 AM    EGFRNORACEVR >60 07/10/2023 04:18 AM    CALCIUM 9.7 07/10/2023 04:18 AM    PHOS 4.1 07/10/2023 04:18 AM    MG 2.1 07/10/2023 04:18 AM    ALBUMIN 2.9 (L) 07/10/2023 04:18 AM    TRIG 175 (H) 2023 03:20 AM    HGBA1C 5.7 (H) 2023 07:24 AM     BMP  Lab Results   Component Value Date     07/10/2023    K 4.1 07/10/2023     07/10/2023    CO2 24 07/10/2023    BUN 8 07/10/2023    CREATININE 1.0 07/10/2023    CALCIUM 9.7 07/10/2023    ANIONGAP 11 07/10/2023    EGFRNORACEVR >60 07/10/2023     Lab Results   Component Value Date    HGBA1C 5.7 (H) 2023     No results found for: POCTGLUCOSE    Recent Labs   Lab " 07/10/23  0418   WBC 10.22   RBC 2.99*   HGB 8.0*   HCT 24.1*   *   MCV 81*   MCH 26.8*   MCHC 33.2       Pertinent Medications Reviewed: reviewed  Pertinent Medications Comments: Ceftriaxone, Ferrous Sulfate, Magnesium Sulfate, MVI, Senna-Docusate, Ondansetron    Scheduled Meds:   amLODIPine  5 mg Oral Daily    cefTRIAXone (ROCEPHIN) IVPB  1 g Intravenous Q24H    enoxparin  40 mg Subcutaneous Daily    ferrous sulfate  1 tablet Oral Daily    LIDOcaine  1 patch Transdermal Q24H    magnesium sulfate IVPB  4 g Intravenous Once    multivitamin  1 tablet Oral Daily    oxyCODONE-acetaminophen  1 tablet Oral Q4H    polyethylene glycol  17 g Oral Daily    senna-docusate 8.6-50 mg  1 tablet Oral BID    tamsulosin  0.4 mg Oral Daily     Continuous Infusions:  PRN Meds:.HYDROmorphone, melatonin, methocarbamoL, naloxone, ondansetron, oxybutynin, senna-docusate 8.6-50 mg, sodium chloride 0.9%, sodium chloride 0.9%, sodium chloride 0.9%    Estimated/Assessed Needs    Weight Used For Calorie Calculations: 87.3 kg (192 lb 7.4 oz)  Energy Calorie Requirements (kcal): 1746 (20 kcal/kg)  Energy Need Method: Kcal/kg  Protein Requirements: 70 g (0.8 g/kg)  Weight Used For Protein Calculations: 87.3 kg (192 lb 7.4 oz)  Fluid Requirements (mL): 1746  Estimated Fluid Requirement Method: RDA Method  RDA Method (mL): 1746       Nutrition Prescription Ordered    Current Diet Order: Adult Regular Diet    Evaluation of Received Nutrient/Fluid Intake    I/O: 620/600  Energy Calories Required: not meeting needs  Protein Required: not meeting needs  Fluid Required: not meeting needs  % Intake of Estimated Energy Needs: 25 - 50 %  % Meal Intake: 25 - 50 %    Nutrition Risk    Level of Risk/Frequency of Follow-up:  (2x/week)     Monitor and Evaluation    Food and Nutrient Intake: food and beverage intake  Food and Nutrient Adminstration: diet order  Anthropometric Measurements: weight, weight change, body mass index  Biochemical Data, Medical  Tests and Procedures: gastrointestinal profile, electrolyte and renal panel, glucose/endocrine profile  Nutrition-Focused Physical Findings: overall appearance     Nutrition Follow-Up    RD Follow-up?: Yes

## 2023-07-10 NOTE — PLAN OF CARE
Recommendations    Recommendations:   1. Continue adult regular diet.   2. Monitor PO intake, weight changes, and labs.   3. RD to follow.    Goals:   Pt to meet 50-75% assessed needs through PO intake by RD follow up.  Nutrition Goal Status: new  Communication of RD Recs: other (comment) (Plan of care)

## 2023-07-10 NOTE — ASSESSMENT & PLAN NOTE
42 yo F with recent history of right ureteral stone s/p right ureteral stent placement (6/22/23) who developed a right renal abscess.    - S/p right renal abscess aspiration by IR on 7/5/23.   - Culture from aspiration growing Klebsiella.  - CT A/P with contrast obtained 7/8/23 shows persistent fluid collection in the kidney unchanged from CT Urogram which was obtained 7/5/23 prior to IR aspiration. Stent is in good position, stone remains at UVJ.  - WBC improving.  - Continued flank pain over the weekend. Do not expect this pain is due to her ureteral stent as it is unrelated to voiding. Typically stent colic is unrelated to movement, but patient's experience sharp flank pain which is short in nature and related to voiding/bladder spasms which patient does not report.   - Continue multimodal pain regimen  - Recommend consult to IR for drainage of renal fluid collection.    - Recommend discharge with culture appropriate antibiotics once sensitivities result.   - Discussed typical ureteral stent symptoms with the patient - urinary frequency, urgency, bladder spasms, occasional flank pain with voiding.  - Recommend discharge with Flomax 0.4mg qd for stent colic, Oxybutynin 5mg TID PRN for bladder spasms.  - F/u outpatient with Urology on 7/31/23.

## 2023-07-10 NOTE — SUBJECTIVE & OBJECTIVE
Interval History: No adverse events overnight. Today patient reports right flank, mid epigastric, right CVA pain unchanged. Patient reports dysuria and vaginal pain resolved. She states that she is anxious over cause of pain. Last BM 2 days ago, although patient states she does feel need to go. Tolerating PO intake. Remains afebrile and vital signs stable at this time.      Review of Systems   Constitutional:  Negative for appetite change, fatigue and fever.   HENT:  Negative for congestion, rhinorrhea, sinus pressure, sinus pain and sore throat.    Eyes:  Negative for redness.   Respiratory:  Negative for cough, chest tightness, shortness of breath and wheezing.    Cardiovascular:  Negative for chest pain, palpitations and leg swelling.   Gastrointestinal:  Positive for abdominal pain (epigastric). Negative for abdominal distention, constipation, diarrhea, nausea and vomiting.   Genitourinary:  Positive for flank pain (right). Negative for decreased urine volume, difficulty urinating, dysuria, frequency and urgency.   Musculoskeletal:  Positive for back pain (right CVA). Negative for arthralgias and gait problem.   Neurological:  Negative for dizziness, facial asymmetry, speech difficulty, weakness, light-headedness and headaches.   Psychiatric/Behavioral:  Negative for agitation and behavioral problems. The patient is nervous/anxious.    Objective:     Vital Signs (Most Recent):  Temp: 99 °F (37.2 °C) (07/10/23 0528)  Pulse: 97 (07/10/23 0528)  Resp: 18 (07/10/23 0608)  BP: 138/81 (07/10/23 0528)  SpO2: 96 % (07/10/23 0528) Vital Signs (24h Range):  Temp:  [97.3 °F (36.3 °C)-99 °F (37.2 °C)] 99 °F (37.2 °C)  Pulse:  [89-97] 97  Resp:  [18-20] 18  SpO2:  [95 %-99 %] 96 %  BP: (132-140)/(79-83) 138/81     Weight: 87.3 kg (192 lb 7.4 oz)  Body mass index is 32.03 kg/m².  No intake or output data in the 24 hours ending 07/10/23 0723      Physical Exam  Constitutional:       Appearance: Normal appearance. She is normal  weight.   HENT:      Head: Normocephalic and atraumatic.      Mouth/Throat:      Mouth: Mucous membranes are moist.      Pharynx: Oropharynx is clear.   Eyes:      Extraocular Movements: Extraocular movements intact.      Pupils: Pupils are equal, round, and reactive to light.   Cardiovascular:      Rate and Rhythm: Normal rate and regular rhythm.      Pulses: Normal pulses.      Heart sounds: Normal heart sounds.   Pulmonary:      Effort: Pulmonary effort is normal.      Breath sounds: Normal breath sounds.   Abdominal:      General: Abdomen is flat. Bowel sounds are normal.      Palpations: Abdomen is soft.      Tenderness: There is abdominal tenderness (midepigastric, right flank). There is right CVA tenderness.   Musculoskeletal:         General: No tenderness or signs of injury. Normal range of motion.      Cervical back: Normal range of motion.      Right lower leg: No edema.      Left lower leg: No edema.   Lymphadenopathy:      Cervical: No cervical adenopathy.   Skin:     General: Skin is warm and dry.   Neurological:      General: No focal deficit present.      Mental Status: She is alert and oriented to person, place, and time. Mental status is at baseline.   Psychiatric:         Mood and Affect: Mood normal.         Behavior: Behavior normal.      Comments: anxious           Significant Labs: All pertinent labs within the past 24 hours have been reviewed.  CBC:   Recent Labs   Lab 07/09/23  0350 07/10/23  0418   WBC 11.79 10.22   HGB 7.8* 8.0*   HCT 23.7* 24.1*   * 637*     CMP:   Recent Labs   Lab 07/09/23  0351 07/10/23  0418   * 136   K 4.1 4.1    101   CO2 21* 24   * 105   BUN 8 8   CREATININE 1.0 1.0   CALCIUM 9.5 9.7   PROT 8.8* 8.8*   ALBUMIN 2.9* 2.9*   BILITOT 0.3 0.3   ALKPHOS 93 89   AST 23 19   ALT 55* 47*   ANIONGAP 12 11       Significant Imaging: I have reviewed all pertinent imaging results/findings within the past 24 hours.  I have reviewed and interpreted all  pertinent imaging results/findings within the past 24 hours.

## 2023-07-10 NOTE — HPI
43 y.o.  female patient that is  new to me in the office, I met her on 6/22/23 as an inpatient consult for kidney stones.  She presented with right flank pain and was diagnosed with a 5mm right ureterovesical junction stone / UVU on 6/18/23. Patient was offered admission by the ER (does not seem that urology was contacted) but asked to be discharged and was provided meds for MET. She re-presented back to ER on 6/21/23 with worsening right flank pain and nausea. WBCs, Cr stable, urinalysis suggestive of a UTI. Was admitted from hospital on 6/21/2. I performed cysto, right stent, RGP on 6/22/23, over pus noted to drain. Cr ultimately trended down after a few more days in the hospital.      6/30/23  Here to f/u for consents, provide urine sample. Patient understands I am leaving [facility] in the upcoming future but would like me to perform elective surgery which I have scheduled for 7/5/23.      CT 6/18/23 and 6/21/23 with similar findings-  Right distal 5mm ureteral calculus, possible right nonobstructing 4mm renal calculus.   Left lower pole punctate calcification - possible kidney stone.     6/30/23  Still having sweats, fevers up to 101F at home, emesis at least once daily. She notes she is trying to hydrate but can only tolerate small sips of water at a time and her PO intake of food has been poor. She feels tired/weak.   She is here today with her significant other Mr. Torres.

## 2023-07-11 ENCOUNTER — PATIENT OUTREACH (OUTPATIENT)
Dept: ADMINISTRATIVE | Facility: CLINIC | Age: 43
End: 2023-07-11
Payer: MEDICAID

## 2023-07-11 ENCOUNTER — PATIENT MESSAGE (OUTPATIENT)
Dept: ADMINISTRATIVE | Facility: CLINIC | Age: 43
End: 2023-07-11
Payer: MEDICAID

## 2023-07-11 NOTE — PROGRESS NOTES
C3 nurse attempted to contact Sherri Machado for a TCC post hospital discharge follow up call. No answer. Left voicemail with callback information. The patient has a scheduled HOSFU appointment with Colten Leon on 07/12/2023 @ 0900.

## 2023-07-12 NOTE — PROGRESS NOTES
C3 nurse spoke with Sherri Machado for a TCC post hospital discharge follow up call. The patient has a scheduled HOSFU appointment with Colten Leon on 07/12/2023 @ 0900, however, the patient expresses concerns she was unaware of this HOSFU appointment and did not make it to the scheduled appointment.     C3 nurse was able to schedule a TCC at home appointment with Liana Khan NP on 07/19/2023 @ 0800.

## 2023-07-24 ENCOUNTER — TELEPHONE (OUTPATIENT)
Dept: UROLOGY | Facility: CLINIC | Age: 43
End: 2023-07-24
Payer: MEDICAID

## 2023-07-24 NOTE — TELEPHONE ENCOUNTER
Spoke with pt and she advised that she was okay with keeping her upcoming appointment she has. I voiced my understanding

## 2023-07-24 NOTE — TELEPHONE ENCOUNTER
----- Message from Latrice Rushing sent at 7/21/2023  2:15 PM CDT -----  .Type:  Sooner Apoointment Request    Caller is requesting a sooner appointment.  Caller declined first available appointment listed below.  Caller will not accept being placed on the waitlist and is requesting a message be sent to doctor.  Name of Caller: pt  When is the first available appointment? Has appt for 7/31/23  Symptoms:  Would the patient rather a call back or a response via [facility]? call  Best Call Back Number:[phone]  Additional Information: prev pt oF Dr. Blevins. Next to r/s sooner.

## 2023-07-25 ENCOUNTER — OFFICE VISIT (OUTPATIENT)
Dept: FAMILY MEDICINE | Facility: HOSPITAL | Age: 43
End: 2023-07-25
Payer: MEDICAID

## 2023-07-25 VITALS
BODY MASS INDEX: 31.22 KG/M2 | HEIGHT: 65 IN | WEIGHT: 187.38 LBS | HEART RATE: 92 BPM | SYSTOLIC BLOOD PRESSURE: 143 MMHG | DIASTOLIC BLOOD PRESSURE: 83 MMHG

## 2023-07-25 DIAGNOSIS — B37.31 VAGINAL YEAST INFECTION: ICD-10-CM

## 2023-07-25 DIAGNOSIS — M62.830 BACK SPASM: ICD-10-CM

## 2023-07-25 DIAGNOSIS — N32.89 BLADDER SPASMS: Primary | ICD-10-CM

## 2023-07-25 PROCEDURE — 99213 OFFICE O/P EST LOW 20 MIN: CPT

## 2023-07-25 RX ORDER — TIZANIDINE 2 MG/1
4 TABLET ORAL EVERY 12 HOURS PRN
Qty: 20 TABLET | Refills: 0 | Status: SHIPPED | OUTPATIENT
Start: 2023-07-25 | End: 2023-08-04

## 2023-07-25 RX ORDER — TIZANIDINE HYDROCHLORIDE 2 MG/1
1 CAPSULE, GELATIN COATED ORAL 2 TIMES DAILY PRN
Qty: 40 CAPSULE | Refills: 0 | Status: SHIPPED | OUTPATIENT
Start: 2023-07-25 | End: 2023-07-25

## 2023-07-25 RX ORDER — FLUCONAZOLE 150 MG/1
150 TABLET ORAL DAILY
Qty: 1 TABLET | Refills: 0 | Status: SHIPPED | OUTPATIENT
Start: 2023-07-25 | End: 2023-07-26

## 2023-07-25 NOTE — PROGRESS NOTES
"[facility]  History & Physical    SUBJECTIVE:     Chief Complaint:   Chief Complaint   Patient presents with    Hospital Follow Up       History of Present Illness:  43 y.o. female who  has a past medical history of Allergy, IBS (irritable bowel syndrome), Osteoarthritis, Renal abscess (6/30/2023), Sickle cell trait, and Urinary tract infection. presents to clinic today for Hospital Follow Up    #Hospital Follow Up  Patient was admitted to [facility]  for renal abscess and need for potential IV antibiotics. Patient was placed on IV ceftriaxone while inpatient. Urology and IR were also consulted  and patient had CT urogram completed revealing multiple hypodense irregularities with largest being 4.6 cm, suspicious for cyst or abscess. IR was able to complete CT guided Aspiration of right kidney resulted in removal of 3mL of purulent fluid and improvement in RUQ and right flank pain. Patient was sent home to resume home meds, as well as oxybutynin prn, flomax prn, and to complete course of ciprofloxacin.    Today, feels like she is getting closer to her normal strength. Urination closing back to normal but patient does feel intermittent episodes of "bladder spasms" where she can feel pain around her bladder area. Patient has been doing home exercises which does seem to help her lower abdominal/bladder area. Patient is awaiting her follow up visit with urology in mid-August where they may plan to fully remove the kidney stone which is still present. Patient denies recent fevers, chills, nausea, vomiting, hematuria since discharge. Patient however does note that she may be developing a yeast infection as she is having episodes of whitish/thick discharge. Patient states she usually develops a yeast infection after a long course of antibiotics.     Allergies:  Review of patient's allergies indicates:   Allergen Reactions    Aspirin Other (See Comments)     Abdominal cramping       Home Medications:  Current Outpatient " Medications on File Prior to Visit   Medication Sig    amLODIPine (NORVASC) 5 MG tablet Take 1 tablet (5 mg total) by mouth once daily.    ferrous sulfate 325 (65 FE) MG EC tablet Take 1 tablet (325 mg total) by mouth once daily.    multivit-min-iron fum-folic ac (ONE-A-DAY WOMEN'S COMPLETE) 18 mg iron- 400 mcg Tab Take 1 tablet by mouth once daily.    polyethylene glycol (GLYCOLAX) 17 gram/dose powder Take 17 g by mouth once daily. (Patient not taking: Reported on 2023)    senna-docusate 8.6-50 mg (PERICOLACE) 8.6-50 mg per tablet Take 1 tablet by mouth 2 (two) times daily as needed for Constipation. (Patient not taking: Reported on 2023)     No current facility-administered medications on file prior to visit.       Past Medical History:   Diagnosis Date    Allergy     IBS (irritable bowel syndrome)     Osteoarthritis     Renal abscess 2023    Sickle cell trait     Urinary tract infection      Past Surgical History:   Procedure Laterality Date    ANKLE SURGERY      left     SECTION, CLASSIC      x3    CHOLECYSTECTOMY      CYSTOSCOPY Right 2023    Procedure: CYSTOSCOPY;  Surgeon: Kaylie Blevins MD;  Location: [facility] OR;  Service: Urology;  Laterality: Right;    RETROGRADE PYELOGRAPHY Right 2023    Procedure: PYELOGRAM, RETROGRADE;  Surgeon: Kaylie Blevins MD;  Location: [facility] OR;  Service: Urology;  Laterality: Right;    URETERAL STENT PLACEMENT Right 2023    Procedure: INSERTION, STENT, URETER;  Surgeon: Kaylie Blevins MD;  Location: [facility];  Service: Urology;  Laterality: Right;     No family history on file.  Social History     Tobacco Use    Smoking status: Never    Smokeless tobacco: Never   Substance Use Topics    Alcohol use: No    Drug use: No          OBJECTIVE:     Vital Signs:  Pulse: 92 (23 0854)  BP: (!) 143/83 (23 0854)    Physical Exam  Constitutional:       General: She is not in acute distress.     Appearance: Normal appearance. She is not  toxic-appearing.   HENT:      Head: Normocephalic and atraumatic.      Right Ear: External ear normal.      Left Ear: External ear normal.      Nose: Nose normal.      Mouth/Throat:      Mouth: Mucous membranes are moist.   Eyes:      Extraocular Movements: Extraocular movements intact.   Cardiovascular:      Rate and Rhythm: Normal rate and regular rhythm.      Pulses: Normal pulses.      Heart sounds: Normal heart sounds.   Pulmonary:      Effort: Pulmonary effort is normal. No respiratory distress.   Abdominal:      General: Abdomen is flat.      Palpations: Abdomen is soft.      Tenderness: There is no abdominal tenderness.   Musculoskeletal:      Cervical back: Normal range of motion and neck supple.   Skin:     General: Skin is warm.      Findings: No bruising or erythema.   Neurological:      General: No focal deficit present.      Mental Status: She is alert.   Psychiatric:         Mood and Affect: Mood normal.         Laboratory:  Hemoglobin A1C   Date Value Ref Range Status   06/22/2023 5.7 (H) 4.0 - 5.6 % Final     Comment:     ADA Screening Guidelines:  5.7-6.4%  Consistent with prediabetes  >or=6.5%  Consistent with diabetes    High levels of fetal hemoglobin interfere with the HbA1C  assay. Heterozygous hemoglobin variants (HbS, HgC, etc)do  not significantly interfere with this assay.   However, presence of multiple variants may affect accuracy.     05/15/2019 5.6 4.0 - 5.6 % Final     Comment:     ADA Screening Guidelines:  5.7-6.4%  Consistent with prediabetes  >or=6.5%  Consistent with diabetes  High levels of fetal hemoglobin interfere with the HbA1C  assay. Heterozygous hemoglobin variants (HbS, HgC, etc)do  not significantly interfere with this assay.   However, presence of multiple variants may affect accuracy.         A/P:  Sherri MORRISON was seen today for hospital follow up.    Diagnoses and all orders for this visit:    Bladder spasms  -     Discontinue: tiZANidine 2 mg Cap; Take 1 capsule (2 mg  total) by mouth 2 (two) times daily as needed (back spasms).  -     tiZANidine (ZANAFLEX) 2 MG tablet; Take 2 tablets (4 mg total) by mouth every 12 (twelve) hours as needed (Bladder spasms). (Patient not taking: Reported on 7/31/2023)    Vaginal yeast infection  -     fluconazole (DIFLUCAN) 150 MG Tab; Take 1 tablet (150 mg total) by mouth once daily. for 1 day    Back spasm  -     Discontinue: tiZANidine 2 mg Cap; Take 1 capsule (2 mg total) by mouth 2 (two) times daily as needed (back spasms).  -     tiZANidine (ZANAFLEX) 2 MG tablet; Take 2 tablets (4 mg total) by mouth every 12 (twelve) hours as needed (Bladder spasms). (Patient not taking: Reported on 7/31/2023)    Patient overall doing well s/p hospitalization. Will trial tizanidine for spasm-type pain likely 2/2 to underlying kidney stone. Patient to follow up with urology soon for potential removal. Will also treat likely yeast infection with fluconazole today.    Follow up in 2-3 months    Adam Nogueira MD PGY-3  [facility]

## 2023-07-26 ENCOUNTER — PATIENT MESSAGE (OUTPATIENT)
Dept: UROLOGY | Facility: CLINIC | Age: 43
End: 2023-07-26
Payer: MEDICAID

## 2023-07-31 ENCOUNTER — LAB VISIT (OUTPATIENT)
Dept: LAB | Facility: HOSPITAL | Age: 43
End: 2023-07-31
Attending: UROLOGY
Payer: MEDICAID

## 2023-07-31 ENCOUNTER — OFFICE VISIT (OUTPATIENT)
Dept: UROLOGY | Facility: CLINIC | Age: 43
End: 2023-07-31
Payer: MEDICAID

## 2023-07-31 VITALS
HEIGHT: 65 IN | SYSTOLIC BLOOD PRESSURE: 127 MMHG | HEART RATE: 83 BPM | BODY MASS INDEX: 30.25 KG/M2 | WEIGHT: 181.56 LBS | DIASTOLIC BLOOD PRESSURE: 80 MMHG

## 2023-07-31 DIAGNOSIS — N20.0 KIDNEY STONE ON RIGHT SIDE: ICD-10-CM

## 2023-07-31 PROCEDURE — 3044F HG A1C LEVEL LT 7.0%: CPT | Mod: CPTII,,, | Performed by: UROLOGY

## 2023-07-31 PROCEDURE — 3074F SYST BP LT 130 MM HG: CPT | Mod: CPTII,,, | Performed by: UROLOGY

## 2023-07-31 PROCEDURE — 99999 PR PBB SHADOW E&M-EST. PATIENT-LVL IV: ICD-10-PCS | Mod: PBBFAC,,, | Performed by: UROLOGY

## 2023-07-31 PROCEDURE — 1160F RVW MEDS BY RX/DR IN RCRD: CPT | Mod: CPTII,,, | Performed by: UROLOGY

## 2023-07-31 PROCEDURE — 87086 URINE CULTURE/COLONY COUNT: CPT | Performed by: UROLOGY

## 2023-07-31 PROCEDURE — 1111F PR DISCHARGE MEDS RECONCILED W/ CURRENT OUTPATIENT MED LIST: ICD-10-PCS | Mod: CPTII,,, | Performed by: UROLOGY

## 2023-07-31 PROCEDURE — 1159F MED LIST DOCD IN RCRD: CPT | Mod: CPTII,,, | Performed by: UROLOGY

## 2023-07-31 PROCEDURE — 3008F PR BODY MASS INDEX (BMI) DOCUMENTED: ICD-10-PCS | Mod: CPTII,,, | Performed by: UROLOGY

## 2023-07-31 PROCEDURE — 1111F DSCHRG MED/CURRENT MED MERGE: CPT | Mod: CPTII,,, | Performed by: UROLOGY

## 2023-07-31 PROCEDURE — 3074F PR MOST RECENT SYSTOLIC BLOOD PRESSURE < 130 MM HG: ICD-10-PCS | Mod: CPTII,,, | Performed by: UROLOGY

## 2023-07-31 PROCEDURE — 99214 PR OFFICE/OUTPT VISIT, EST, LEVL IV, 30-39 MIN: ICD-10-PCS | Mod: S$PBB,,, | Performed by: UROLOGY

## 2023-07-31 PROCEDURE — 3008F BODY MASS INDEX DOCD: CPT | Mod: CPTII,,, | Performed by: UROLOGY

## 2023-07-31 PROCEDURE — 99214 OFFICE O/P EST MOD 30 MIN: CPT | Mod: S$PBB,,, | Performed by: UROLOGY

## 2023-07-31 PROCEDURE — 1160F PR REVIEW ALL MEDS BY PRESCRIBER/CLIN PHARMACIST DOCUMENTED: ICD-10-PCS | Mod: CPTII,,, | Performed by: UROLOGY

## 2023-07-31 PROCEDURE — 99214 OFFICE O/P EST MOD 30 MIN: CPT | Mod: PBBFAC,PO | Performed by: UROLOGY

## 2023-07-31 PROCEDURE — 99999 PR PBB SHADOW E&M-EST. PATIENT-LVL IV: CPT | Mod: PBBFAC,,, | Performed by: UROLOGY

## 2023-07-31 PROCEDURE — 3079F PR MOST RECENT DIASTOLIC BLOOD PRESSURE 80-89 MM HG: ICD-10-PCS | Mod: CPTII,,, | Performed by: UROLOGY

## 2023-07-31 PROCEDURE — 1159F PR MEDICATION LIST DOCUMENTED IN MEDICAL RECORD: ICD-10-PCS | Mod: CPTII,,, | Performed by: UROLOGY

## 2023-07-31 PROCEDURE — 3044F PR MOST RECENT HEMOGLOBIN A1C LEVEL <7.0%: ICD-10-PCS | Mod: CPTII,,, | Performed by: UROLOGY

## 2023-07-31 PROCEDURE — 3079F DIAST BP 80-89 MM HG: CPT | Mod: CPTII,,, | Performed by: UROLOGY

## 2023-07-31 RX ORDER — CEFAZOLIN SODIUM 2 G/50ML
2 SOLUTION INTRAVENOUS
Status: CANCELLED | OUTPATIENT
Start: 2023-07-31

## 2023-07-31 NOTE — H&P (VIEW-ONLY)
[place] - Urology   Clinic Note    SUBJECTIVE:     Chief Complaint   Patient presents with    Other     HFU/Stone       Referral from: Jania Garcse P*.    History of Present Illness:  Sherri Machado is a 43 y.o. female who presents to clinic for right ureteral stone.    Patient referred by Dr. Blevins with a right ureteral stone.  She underwent right ureteral stent placement for a stone episode last month.  Subsequently she had a renal abscess and required aspiration.  The stone remained in place on the most recent imaging study.  He is a 4 millimeter distal right ureteral stone.  She endorses bothersome stent discomfort and hematuria.      Patient endorses no additional complaints at this time.    Past Medical History:   Diagnosis Date    Allergy     IBS (irritable bowel syndrome)     Osteoarthritis     Renal abscess 2023    Sickle cell trait     Urinary tract infection        Past Surgical History:   Procedure Laterality Date    ANKLE SURGERY      left     SECTION, CLASSIC      x3    CHOLECYSTECTOMY      CYSTOSCOPY Right 2023    Procedure: CYSTOSCOPY;  Surgeon: Kaylie Blevins MD;  Location: [facility] OR;  Service: Urology;  Laterality: Right;    RETROGRADE PYELOGRAPHY Right 2023    Procedure: PYELOGRAM, RETROGRADE;  Surgeon: Kaylie Blevins MD;  Location: [facility] OR;  Service: Urology;  Laterality: Right;    URETERAL STENT PLACEMENT Right 2023    Procedure: INSERTION, STENT, URETER;  Surgeon: Kaylie Blevins MD;  Location: [facility] OR;  Service: Urology;  Laterality: Right;       History reviewed. No pertinent family history.    Social History     Tobacco Use    Smoking status: Never    Smokeless tobacco: Never   Substance Use Topics    Alcohol use: No    Drug use: No       Current Outpatient Medications on File Prior to Visit   Medication Sig Dispense Refill    amLODIPine (NORVASC) 5 MG tablet Take 1 tablet (5 mg total) by mouth once daily. (Patient not taking: Reported on 2023) 30  "tablet 11    ferrous sulfate 325 (65 FE) MG EC tablet Take 1 tablet (325 mg total) by mouth once daily. (Patient not taking: Reported on 7/31/2023) 30 tablet 1    methocarbamoL (ROBAXIN) 500 MG Tab Take 1 tablet (500 mg total) by mouth 4 (four) times daily as needed (muscle spasms). (Patient not taking: Reported on 7/31/2023) 60 tablet 1    multivit-min-iron fum-folic ac (ONE-A-DAY WOMEN'S COMPLETE) 18 mg iron- 400 mcg Tab Take 1 tablet by mouth once daily.      polyethylene glycol (GLYCOLAX) 17 gram/dose powder Take 17 g by mouth once daily. (Patient not taking: Reported on 7/25/2023) 510 g 2    senna-docusate 8.6-50 mg (PERICOLACE) 8.6-50 mg per tablet Take 1 tablet by mouth 2 (two) times daily as needed for Constipation. (Patient not taking: Reported on 7/25/2023) 30 tablet 2    tamsulosin (FLOMAX) 0.4 mg Cap Take 1 capsule (0.4 mg total) by mouth once daily. for 15 days 15 capsule 0    tiZANidine (ZANAFLEX) 2 MG tablet Take 2 tablets (4 mg total) by mouth every 12 (twelve) hours as needed (Bladder spasms). (Patient not taking: Reported on 7/31/2023) 20 tablet 0     No current facility-administered medications on file prior to visit.       Review of patient's allergies indicates:   Allergen Reactions    Aspirin Other (See Comments)     Abdominal cramping       Review of Systems:  A review of 10+ systems was conducted with pertinent positive and negative findings documented in HPI with all other systems reviewed and negative.    OBJECTIVE:     Estimated body mass index is 30.21 kg/m² as calculated from the following:    Height as of this encounter: 5' 5" (1.651 m).    Weight as of this encounter: 82.3 kg (181 lb 8.8 oz).    Vital Signs (Most Recent)  Vitals:    07/31/23 0959   BP: 127/80   Pulse: 83       Physical Exam:  GENERAL: patient sitting comfortably  HEENT: normocephalic  NECK: supple, no JVD  PULM: normal chest rise, no increased WOB  HEART: non-diaphoretic  ABDO: soft, nondistended, nontender  BACK: no " CVA tenderness bilaterally  SKIN: warm, dry, well perfused  EXT: no bruising or edema  NEURO: grossly normal with no focal deficits  PSYCH: appropriate mood and affect    Genitourinary Exam:  deferred    Lab Results   Component Value Date    BUN 8 07/10/2023    CREATININE 1.0 07/10/2023    WBC 10.22 07/10/2023    HGB 8.0 (L) 07/10/2023    HCT 24.1 (L) 07/10/2023     (H) 07/10/2023    AST 19 07/10/2023    ALT 47 (H) 07/10/2023    ALKPHOS 89 07/10/2023    ALBUMIN 2.9 (L) 07/10/2023    HGBA1C 5.7 (H) 06/22/2023    INR 1.1 02/26/2014        Imaging:  I have personally reviewed all relevant imaging studies.    Results for orders placed or performed during the hospital encounter of 06/30/23 (from the past 2160 hour(s))   CT Urogram Abd Pelvis W WO    Narrative    EXAMINATION:  CT UROGRAM ABD PELVIS W WO    CLINICAL HISTORY:  abscess;    TECHNIQUE:  Low dose axial, sagittal and coronal reformations were obtained from the lung bases to the pubic symphysis before and following the IV administration of 125 mL of Omnipaque 350.  Timing was optimized for parenchymal and excretory renal phases.    COMPARISON:  June 30, 2023 study, July 2016 contrast enhanced study    FINDINGS:  The lung bases demonstrate no significant abnormality.    Noncontrast images demonstrate a single punctate nonobstructing left kidney calculus.  There is a lower pole 4 mm right kidney calculus.  Again there is a right sided double-J ureteral stent with a distal right ureteral calculus which measures 5 mm and is unchanged in position compared to prior exam.    Minimal air within the subcutaneous fat of the abdominal wall likely relates to injection site.    The liver is enlarged but demonstrates no focal abnormality.  The spleen is not enlarged.  The stomach, pancreas are unremarkable.    The gallbladder has been removed.  The common duct is upper normal in caliber, without significant change compared to previous.    There is no adrenal  mass.    The right kidney remains enlarged with adjacent fat stranding.  While the right kidney does concentrate and excrete contrast, there is decreased enhancement when compared to the left kidney and some parenchymal heterogeneity.  There are multifocal irregular hypodensities in the mid to lower right kidney, correlating to the finding on the prior noncontrast CT.  The largest measures up to 4.6 cm in maximal cranial caudal dimension.  Each kidney demonstrates a hypodensity which is at the upper pole and measures 1.7 cm on the left, subcentimeter on the right, possible cyst.  There is no hydronephrosis.  Bladder is partially distended and grossly unremarkable.    The aorta tapers normally without adjacent lymphadenopathy.  There is no pelvic lymphadenopathy.    Uterus is present.  Adnexal regions are unremarkable.    Trace fluid present in the pelvis.    The bowel demonstrates no distension or adjacent inflammatory change.    The osseous structures show degenerative change.      Impression    Right sided double-J ureteral stent with relatively stable position of distal right ureter 5 mm calculus.  No hydronephrosis.    Enlarged right kidney with adjacent perinephric fat stranding, decreased concentration of contrast as compared to left kidney and mild parenchymal heterogeneity.  These findings are compatible with edema and suspicious for superimposed infection.  Additionally, there are irregular focal hypodensities at the mid to lower pole, correlating to the finding on the noncontrast CT, concerning for abscess.  Correlate clinically.    Additional bilateral nonobstructing calculi    This report was flagged in Epic as abnormal.      Electronically signed by: Kaylie Butler MD  Date:    07/05/2023  Time:    10:36   CT Abdomen Pelvis  Without Contrast    Narrative    EXAMINATION:  CT ABDOMEN PELVIS WITHOUT CONTRAST    CLINICAL HISTORY:  Flank pain, kidney stone suspected;    TECHNIQUE:  Low dose axial images,  sagittal and coronal reformations were obtained from the lung bases to the pubic symphysis.  Oral contrast was not administered.    COMPARISON:  07/05/2023    FINDINGS:  Images of the lower thorax are remarkable for bilateral dependent atelectasis/scarring.  There is a small pericardial effusion, similar to the previous exam.  There is mild left pleural effusion.    The liver, spleen, pancreas and adrenal glands have a grossly unremarkable noncontrast appearance.  The gallbladder is surgically absent, no significant biliary dilation or ascites.    There is left nonobstructive nephrolithiasis.  No left hydronephrosis.  The left ureter is unremarkable without calculi seen.  There is edematous change of the right kidney noting mild right hydronephrosis.  There is right nonobstructive nephrolithiasis.  There is right perinephric and periureteral inflammation.  There is a right ureteral stent in place.  There is a calculus adjacent to the distal aspect of the stent just proximal to the UVJ measuring 3 mm.  The urinary bladder is unremarkable.  There is a vague focus of high attenuation with upper pole of the right kidney, not seen on the previous exam, and measures 1.3 cm.  This could reflect sequela of interval hemorrhage given short-term development.  The uterus is unremarkable.  The adnexa is unremarkable.    There is trace fluid in the pelvis.  There are a few scattered colonic diverticula without inflammation.  The terminal ileum is unremarkable.  The appendix is unremarkable.  The small bowel is grossly unremarkable.  There are a few scattered shotty periaortic, pericaval, and mesenteric lymph nodes.  No focal organized pelvic fluid collection.    There are degenerative changes of the spine.  No significant inguinal lymphadenopathy.      Impression    This report was flagged in Epic as abnormal.    1. Right perinephric inflammation noting diffuse edematous change throughout the kidney, worsened since the previous  exam.  Right ureteral stent catheter is in place noting stable 3 mm calculus at the level of the distal ureter.  Given worsening appearance of the kidney, developing infection is not excluded.  Correlation is advised.  2. High attenuating focus within the upper pole of the right kidney, not seen on the previous exam.  This may reflect interval hemorrhage into an existing cyst although not confirmed.  Correlation and follow-up is advised.  Prior low attenuating lesion within the interpolar region of the right kidney is not clearly identified on current exam.  3. Mild right pleural effusion, developed since the previous exam.  4. Please see above for several additional findings.      Electronically signed by: Tony Rosado MD  Date:    07/07/2023  Time:    12:21   CT Abscess Aspiration without Catheter    Narrative    EXAMINATION:  Fluid collection aspiration    Procedural Personnel    Attending physician(s): Fred Leon MD    Fellow physician(s): None    Resident physician(s): None    Advanced practice provider(s): None    Pre-procedure diagnosis: Renal abscess    Post-procedure diagnosis: Same    Indication: Fever associated with fluid collection    Additional clinical history: None    Complications: No immediate complications.    TECHNIQUE:  - Aspiration of renal abscess under CT guidance    FINDINGS:  Pre-procedure    Consent: Informed consent for the procedure was obtained and time-out was performed prior to the procedure.    Preparation: The site was prepared and draped using maximal sterile barrier technique including cutaneous antisepsis.    Antibiotic administered: Prophylactic dose within 1 hour of procedure start time or 2 hours for vancomycin or fluoroquinolones    Anesthesia/sedation    Level of anesthesia/sedation: Moderate sedation (conscious sedation)    Anesthesia/sedation administered by: Independent trained observer under attending supervision with continuous monitoring of the patient's level  of consciousness and physiologic status    Total intra-service sedation time (minutes): 30    Fluid collection aspiration    The patient was positioned prone. Initial imaging was performed. Local anesthesia was administered. The fluid collection was accessed using an access needle. Position within the fluid collection was confirmed, and fluid aspiration was performed. All instruments were then removed.    - Initial imaging findings: Hypodense collection in the right kidney    - Aspiration needle/catheter: 17 gauge needle    - Post-aspiration imaging findings: Complete resolution of the fluid collection    Contrast    Contrast agent: None    Contrast volume (mL): 0    Radiation Dose    CT dose length product ( mGy-cm): 263.43    Fluoroscopy time (  ):    Reference air kerma (  ):    Kerma area product (  ):    Additional Details    Additional description of procedure: None    Equipment details: None    Specimens removed: Aspirated fluid was sent for analysis.    Estimated blood loss (mL): Less than 10    Standardized report: [email or web address]    Attestation    Signer name: Fred Leon MD    I attest that I was present for the entire procedure. I reviewed the stored images and agree with the report as written.      Impression    Percutaneous aspiration of right renal abscess, yielding 3 mL of purulent fluid. No drainage catheter was left in place.    Plan:    Resume care by primary team.    ______________________________________________________________________      Electronically signed by: Fred Leon MD  Date:    07/05/2023  Time:    17:17   CT Renal Stone Study ABD Pelvis WO    Narrative    EXAMINATION:  CT RENAL STONE STUDY ABD PELVIS WO    CLINICAL HISTORY:  Flank pain, kidney stone suspected;    TECHNIQUE:  Low dose axial images, sagittal and coronal reformations were obtained from the lung bases to the pubic symphysis.  Contrast was not administered.    COMPARISON:  Renal ultrasound 06/23/2023,  right upper quadrant ultrasound 06/22/2023, CT abdomen and pelvis 06/21/2023    FINDINGS:  Lack of IV contrast limits evaluation of soft tissue and vascular structures    Imaged lung bases are clear.  Base of the heart is within normal limits.    Remote cholecystectomy.  Stable prominence of the common bile duct.  No intrahepatic biliary ductal dilatation.    Liver remains enlarged without focal process.  Noncontrast appearance of the pancreas, spleen, stomach, duodenum and bilateral adrenal glands are within normal limits.    When compared to CT study of 06/21/2023, there has been interval placement of right-sided double-J ureteral stent with proximal loop in the right renal pelvis and distal loop within the midline and left paramedian dependent aspect of the urinary bladder.  The right kidney remains asymmetrically larger than the left with similar asymmetric mild right perinephric stranding, noting interval resolution of previous right-sided hydroureteronephrosis and previous contrast within the right renal collecting system and right ureter has resolved.  Stable appearance of a 5 mm calculus within the region of the right UVJ.  The stent is adjacent to the posterior left aspect of the distal ureteral calculus.  Similar right perinephric stranding.  Newly developed 2 subtle hypoattenuating parenchymal foci at the right renal mid to lower pole measuring up to 1.9 cm, noting no previous cyst or focal parenchymal abnormality seen in this region.  No left hydronephrosis.  Left ureter is normal in course and caliber.  Unchanged 2 mm calculus within the left kidney.  No radiodense calculus seen within the left ureter or urinary bladder.  Urinary bladder is well distended without wall thickening.  Uterus and bilateral adnexa are within normal limits.  Trace volume nonspecific free pelvic fluid slightly increased from prior, likely reactive from previous right renal and ureter inflammatory process tracking to the pelvis.   Few scattered punctate pelvic phleboliths noted.    Appendix and terminal ileum are within normal limits.  No evidence of bowel obstruction or acute inflammation.  No pneumatosis or portal venous gas.    No abdominal ascites, free air or lymphadenopathy.    Extraperitoneal soft tissues are unchanged.  Osseous structures are stable without acute process seen.      Impression    Interval placement of right-sided double-J ureteral stent with resolution of previous right-sided hydronephrosis, noting grossly stable configuration of a 5 mm calculus within the distal right ureter near the UVJ.  Unchanged asymmetric mild right perinephric and periureteral fat stranding presumably related to previous obstructive uropathy.    More conspicuous 2 subtle hypoattenuating parenchymal foci within the right kidney, while nonspecific could indicate potential developing renal abscesses.  Clinical correlation for potential right-sided pyeloureteronephritis, and with urinalysis as warranted.    Unchanged right renal 4 mm nonobstructing calculus and unchanged left renal 2 mm nonobstructing calculus.    Hepatomegaly.    Cholecystectomy.    Additional findings as above.    This report was flagged in Epic as abnormal.      Electronically signed by: Morris Orantes MD  Date:    06/30/2023  Time:    16:57   CT Abdomen Pelvis With Contrast    Narrative    EXAMINATION:  CT ABDOMEN PELVIS WITH CONTRAST    CLINICAL HISTORY:  Abdominal abscess/infection suspected;right CVA pain, known R renal abscess per radiology rec;    TECHNIQUE:  Low dose axial images, sagittal and coronal reformations were obtained from the lung bases to the pubic symphysis following the IV administration of 100 mL of Omnipaque 350 .  Oral contrast was not given.    COMPARISON:  07/08/2023, 07/07/2023    FINDINGS:  Small to moderate right pleural effusion with adjacent passive atelectasis.  Patchy opacity in the left lower lobe.  The base of the heart appears normal.  The aorta  is of normal caliber and tapers appropriately.    The gallbladder has been removed.  No intrahepatic or extrahepatic biliary ductal dilatation is identified.  The liver, spleen, pancreas, adrenal glands and left kidney are normal in size, shape and contour.  No left-sided nephrolithiasis, hydronephrosis or hydroureter.  Again noted is an abnormal appearance of the right kidney.  There is a simple appearing cyst of the upper pole of the right kidney anteriorly.  In the midpole of the right kidney, there is ill-defined hypoattenuation that measures up to 4.6 cm in greatest craniocaudal dimension, the overall appearance of which is unchanged as compared to the prior examination.  A double-J ureteral stent is in place.  There is a punctate nonobstructing stone at the lower pole of the right kidney.  There is a tiny stone adjacent to the ureteral stent at the level of the right UVJ that measures approximately 4 mm.  The urinary bladder is well distended and appears normal.  Uterus and adnexa are unremarkable.    The bowel appears normal.  The appendix is normal.  No free air, free fluid or obstruction.  No pathologically enlarged abdominal or pelvic lymph nodes are seen.    Age-appropriate degenerative changes affect the skeleton.      Impression    Right double-J ureteral stent remains in place.  There is a 4 mm stone adjacent to the ureteral stent at the level of the right UVJ, similar to before.  Persistent abnormal hypoattenuation with parenchymal heterogeneity along the midpole of the right kidney concerning for abscess formation.    Small to moderate right pleural effusion with adjacent passive atelectasis.      Electronically signed by: Sandi Khan MD  Date:    07/08/2023  Time:    16:23     No results found for this or any previous visit (from the past 2160 hour(s)).  CT Abdomen Pelvis With Contrast  Narrative: EXAMINATION:  CT ABDOMEN PELVIS WITH CONTRAST    CLINICAL HISTORY:  Abdominal abscess/infection  suspected;right CVA pain, known R renal abscess per radiology rec;    TECHNIQUE:  Low dose axial images, sagittal and coronal reformations were obtained from the lung bases to the pubic symphysis following the IV administration of 100 mL of Omnipaque 350 .  Oral contrast was not given.    COMPARISON:  07/08/2023, 07/07/2023    FINDINGS:  Small to moderate right pleural effusion with adjacent passive atelectasis.  Patchy opacity in the left lower lobe.  The base of the heart appears normal.  The aorta is of normal caliber and tapers appropriately.    The gallbladder has been removed.  No intrahepatic or extrahepatic biliary ductal dilatation is identified.  The liver, spleen, pancreas, adrenal glands and left kidney are normal in size, shape and contour.  No left-sided nephrolithiasis, hydronephrosis or hydroureter.  Again noted is an abnormal appearance of the right kidney.  There is a simple appearing cyst of the upper pole of the right kidney anteriorly.  In the midpole of the right kidney, there is ill-defined hypoattenuation that measures up to 4.6 cm in greatest craniocaudal dimension, the overall appearance of which is unchanged as compared to the prior examination.  A double-J ureteral stent is in place.  There is a punctate nonobstructing stone at the lower pole of the right kidney.  There is a tiny stone adjacent to the ureteral stent at the level of the right UVJ that measures approximately 4 mm.  The urinary bladder is well distended and appears normal.  Uterus and adnexa are unremarkable.    The bowel appears normal.  The appendix is normal.  No free air, free fluid or obstruction.  No pathologically enlarged abdominal or pelvic lymph nodes are seen.    Age-appropriate degenerative changes affect the skeleton.  Impression: Right double-J ureteral stent remains in place.  There is a 4 mm stone adjacent to the ureteral stent at the level of the right UVJ, similar to before.  Persistent abnormal  "hypoattenuation with parenchymal heterogeneity along the midpole of the right kidney concerning for abscess formation.    Small to moderate right pleural effusion with adjacent passive atelectasis.    Electronically signed by: Sandi Khan MD  Date:    07/08/2023  Time:    16:23  US Retroperitoneal Complete  Narrative: EXAMINATION:  US RETROPERITONEAL COMPLETE    CLINICAL HISTORY:  pyelonephritis;    TECHNIQUE:  Ultrasound of the kidneys and urinary bladder was performed including color flow and Doppler evaluation of the kidneys.    COMPARISON:  Ultrasound from 06/30/2023 and CT abdomen pelvis from 07/07/2023 and CT urogram from 07/05/2023    FINDINGS:  Right kidney: The right kidney measures 14.5 cm. No cortical thinning. No loss of corticomedullary distinction. Resistive index measures 0.61.  Heterogeneous collection within parenchyma measuring approximately 2.7 x 1.6 x 2 0 cm consistent previously abscess.  Overall extent is decreased when prior exam 07/05/2023 noting differences in modality.  4 mm and 6 mm nonobstructing stones.  Simple cyst measuring 1.0 cm.  No hydronephrosis.    Left kidney: The left kidney measures 11.4 cm. No cortical thinning. No loss of corticomedullary distinction. Resistive index measures 0.71.  No mass. No renal stone. No hydronephrosis.    The bladder is not distended and not well evaluated.  Impression: Heterogeneous collection within the parenchyma the right kidney measuring up to 2.7 cm.  Overall extent has decreased in size when compared to prior exam from 07/05/2023 when allowing for differences in technique.  Findings are consistent with abscess.  Further evaluation with CT with intravenous contrast as clinically indicated.    Additional findings as described above.    Electronically signed by: Giovany Farfan MD  Date:    07/08/2023  Time:    14:15       PSA:  No results found for: "PSA", "PSADIAG", "PSATOTAL", "PSAFREE"    Testosterone:  No results found for: " ""TOTALTESTOST", "TESTOSTERONE"     ASSESSMENT     1. Kidney stone on right side        PLAN:     Right ureteral stone     - We discussed the options for management of ureteral and renal stones, including non-surgical management with observation and/or trial of passage. We also discussed less invasive surgical options, such as ESWL, as well as flexible ureteroscopy and/or renoscopy with laser lithotripsy, and more invasive options like percutaneous nephrolithotomy, or PCNL, and robotic nephrolithotomy. I also explained that sometimes a single procedure is not adequate, and multiple procedures are required to render the patient stone free.   We discussed ureteral stents and nephrostomy tubes, including the risks, benefits, and potential side effects of the placement of each.     - The risks and benefits of all surgical and non-surgical treatment options, as well as all alternative options, were discussed and questions were answered.    - After our discussion, the patient has agreed to proceed with right URS with laser litho on 8/18/23.     Kip Beckham MD  Urology  [facility] [place]    Disclaimer: This note has been generated using voice-recognition software. There may be typographical errors that have been missed during proof-reading.     "

## 2023-07-31 NOTE — PROGRESS NOTES
[facility]   Clinic Note    SUBJECTIVE:     Chief Complaint   Patient presents with    Other     HFU/Stone       Referral from: Jania Garces P*.    History of Present Illness:  Sherri Machado is a 43 y.o. female who presents to clinic for right ureteral stone.    Patient referred by Dr. Blevins with a right ureteral stone.  She underwent right ureteral stent placement for a stone episode last month.  Subsequently she had a renal abscess and required aspiration.  The stone remained in place on the most recent imaging study.  He is a 4 millimeter distal right ureteral stone.  She endorses bothersome stent discomfort and hematuria.      Patient endorses no additional complaints at this time.    Past Medical History:   Diagnosis Date    Allergy     IBS (irritable bowel syndrome)     Osteoarthritis     Renal abscess 2023    Sickle cell trait     Urinary tract infection        Past Surgical History:   Procedure Laterality Date    ANKLE SURGERY      left     SECTION, CLASSIC      x3    CHOLECYSTECTOMY      CYSTOSCOPY Right 2023    Procedure: CYSTOSCOPY;  Surgeon: Kaylie Blevins MD;  Location: [facility] OR;  Service: Urology;  Laterality: Right;    RETROGRADE PYELOGRAPHY Right 2023    Procedure: PYELOGRAM, RETROGRADE;  Surgeon: Kaylie Blevins MD;  Location: [facility] OR;  Service: Urology;  Laterality: Right;    URETERAL STENT PLACEMENT Right 2023    Procedure: INSERTION, STENT, URETER;  Surgeon: Kaylie Blevins MD;  Location: [facility] OR;  Service: Urology;  Laterality: Right;       History reviewed. No pertinent family history.    Social History     Tobacco Use    Smoking status: Never    Smokeless tobacco: Never   Substance Use Topics    Alcohol use: No    Drug use: No       Current Outpatient Medications on File Prior to Visit   Medication Sig Dispense Refill    amLODIPine (NORVASC) 5 MG tablet Take 1 tablet (5 mg total) by mouth once daily. (Patient not taking: Reported on 2023) 30  "tablet 11    ferrous sulfate 325 (65 FE) MG EC tablet Take 1 tablet (325 mg total) by mouth once daily. (Patient not taking: Reported on 7/31/2023) 30 tablet 1    methocarbamoL (ROBAXIN) 500 MG Tab Take 1 tablet (500 mg total) by mouth 4 (four) times daily as needed (muscle spasms). (Patient not taking: Reported on 7/31/2023) 60 tablet 1    multivit-min-iron fum-folic ac (ONE-A-DAY WOMEN'S COMPLETE) 18 mg iron- 400 mcg Tab Take 1 tablet by mouth once daily.      polyethylene glycol (GLYCOLAX) 17 gram/dose powder Take 17 g by mouth once daily. (Patient not taking: Reported on 7/25/2023) 510 g 2    senna-docusate 8.6-50 mg (PERICOLACE) 8.6-50 mg per tablet Take 1 tablet by mouth 2 (two) times daily as needed for Constipation. (Patient not taking: Reported on 7/25/2023) 30 tablet 2    tamsulosin (FLOMAX) 0.4 mg Cap Take 1 capsule (0.4 mg total) by mouth once daily. for 15 days 15 capsule 0    tiZANidine (ZANAFLEX) 2 MG tablet Take 2 tablets (4 mg total) by mouth every 12 (twelve) hours as needed (Bladder spasms). (Patient not taking: Reported on 7/31/2023) 20 tablet 0     No current facility-administered medications on file prior to visit.       Review of patient's allergies indicates:   Allergen Reactions    Aspirin Other (See Comments)     Abdominal cramping       Review of Systems:  A review of 10+ systems was conducted with pertinent positive and negative findings documented in HPI with all other systems reviewed and negative.    OBJECTIVE:     Estimated body mass index is 30.21 kg/m² as calculated from the following:    Height as of this encounter: 5' 5" (1.651 m).    Weight as of this encounter: 82.3 kg (181 lb 8.8 oz).    Vital Signs (Most Recent)  Vitals:    07/31/23 0959   BP: 127/80   Pulse: 83       Physical Exam:  GENERAL: patient sitting comfortably  HEENT: normocephalic  NECK: supple, no JVD  PULM: normal chest rise, no increased WOB  HEART: non-diaphoretic  ABDO: soft, nondistended, nontender  BACK: no " CVA tenderness bilaterally  SKIN: warm, dry, well perfused  EXT: no bruising or edema  NEURO: grossly normal with no focal deficits  PSYCH: appropriate mood and affect    Genitourinary Exam:  deferred    Lab Results   Component Value Date    BUN 8 07/10/2023    CREATININE 1.0 07/10/2023    WBC 10.22 07/10/2023    HGB 8.0 (L) 07/10/2023    HCT 24.1 (L) 07/10/2023     (H) 07/10/2023    AST 19 07/10/2023    ALT 47 (H) 07/10/2023    ALKPHOS 89 07/10/2023    ALBUMIN 2.9 (L) 07/10/2023    HGBA1C 5.7 (H) 06/22/2023    INR 1.1 02/26/2014        Imaging:  I have personally reviewed all relevant imaging studies.    Results for orders placed or performed during the hospital encounter of 06/30/23 (from the past 2160 hour(s))   CT Urogram Abd Pelvis W WO    Narrative    EXAMINATION:  CT UROGRAM ABD PELVIS W WO    CLINICAL HISTORY:  abscess;    TECHNIQUE:  Low dose axial, sagittal and coronal reformations were obtained from the lung bases to the pubic symphysis before and following the IV administration of 125 mL of Omnipaque 350.  Timing was optimized for parenchymal and excretory renal phases.    COMPARISON:  June 30, 2023 study, July 2016 contrast enhanced study    FINDINGS:  The lung bases demonstrate no significant abnormality.    Noncontrast images demonstrate a single punctate nonobstructing left kidney calculus.  There is a lower pole 4 mm right kidney calculus.  Again there is a right sided double-J ureteral stent with a distal right ureteral calculus which measures 5 mm and is unchanged in position compared to prior exam.    Minimal air within the subcutaneous fat of the abdominal wall likely relates to injection site.    The liver is enlarged but demonstrates no focal abnormality.  The spleen is not enlarged.  The stomach, pancreas are unremarkable.    The gallbladder has been removed.  The common duct is upper normal in caliber, without significant change compared to previous.    There is no adrenal  mass.    The right kidney remains enlarged with adjacent fat stranding.  While the right kidney does concentrate and excrete contrast, there is decreased enhancement when compared to the left kidney and some parenchymal heterogeneity.  There are multifocal irregular hypodensities in the mid to lower right kidney, correlating to the finding on the prior noncontrast CT.  The largest measures up to 4.6 cm in maximal cranial caudal dimension.  Each kidney demonstrates a hypodensity which is at the upper pole and measures 1.7 cm on the left, subcentimeter on the right, possible cyst.  There is no hydronephrosis.  Bladder is partially distended and grossly unremarkable.    The aorta tapers normally without adjacent lymphadenopathy.  There is no pelvic lymphadenopathy.    Uterus is present.  Adnexal regions are unremarkable.    Trace fluid present in the pelvis.    The bowel demonstrates no distension or adjacent inflammatory change.    The osseous structures show degenerative change.      Impression    Right sided double-J ureteral stent with relatively stable position of distal right ureter 5 mm calculus.  No hydronephrosis.    Enlarged right kidney with adjacent perinephric fat stranding, decreased concentration of contrast as compared to left kidney and mild parenchymal heterogeneity.  These findings are compatible with edema and suspicious for superimposed infection.  Additionally, there are irregular focal hypodensities at the mid to lower pole, correlating to the finding on the noncontrast CT, concerning for abscess.  Correlate clinically.    Additional bilateral nonobstructing calculi    This report was flagged in Epic as abnormal.      Electronically signed by: Kaylie Butler MD  Date:    07/05/2023  Time:    10:36   CT Abdomen Pelvis  Without Contrast    Narrative    EXAMINATION:  CT ABDOMEN PELVIS WITHOUT CONTRAST    CLINICAL HISTORY:  Flank pain, kidney stone suspected;    TECHNIQUE:  Low dose axial images,  sagittal and coronal reformations were obtained from the lung bases to the pubic symphysis.  Oral contrast was not administered.    COMPARISON:  07/05/2023    FINDINGS:  Images of the lower thorax are remarkable for bilateral dependent atelectasis/scarring.  There is a small pericardial effusion, similar to the previous exam.  There is mild left pleural effusion.    The liver, spleen, pancreas and adrenal glands have a grossly unremarkable noncontrast appearance.  The gallbladder is surgically absent, no significant biliary dilation or ascites.    There is left nonobstructive nephrolithiasis.  No left hydronephrosis.  The left ureter is unremarkable without calculi seen.  There is edematous change of the right kidney noting mild right hydronephrosis.  There is right nonobstructive nephrolithiasis.  There is right perinephric and periureteral inflammation.  There is a right ureteral stent in place.  There is a calculus adjacent to the distal aspect of the stent just proximal to the UVJ measuring 3 mm.  The urinary bladder is unremarkable.  There is a vague focus of high attenuation with upper pole of the right kidney, not seen on the previous exam, and measures 1.3 cm.  This could reflect sequela of interval hemorrhage given short-term development.  The uterus is unremarkable.  The adnexa is unremarkable.    There is trace fluid in the pelvis.  There are a few scattered colonic diverticula without inflammation.  The terminal ileum is unremarkable.  The appendix is unremarkable.  The small bowel is grossly unremarkable.  There are a few scattered shotty periaortic, pericaval, and mesenteric lymph nodes.  No focal organized pelvic fluid collection.    There are degenerative changes of the spine.  No significant inguinal lymphadenopathy.      Impression    This report was flagged in Epic as abnormal.    1. Right perinephric inflammation noting diffuse edematous change throughout the kidney, worsened since the previous  exam.  Right ureteral stent catheter is in place noting stable 3 mm calculus at the level of the distal ureter.  Given worsening appearance of the kidney, developing infection is not excluded.  Correlation is advised.  2. High attenuating focus within the upper pole of the right kidney, not seen on the previous exam.  This may reflect interval hemorrhage into an existing cyst although not confirmed.  Correlation and follow-up is advised.  Prior low attenuating lesion within the interpolar region of the right kidney is not clearly identified on current exam.  3. Mild right pleural effusion, developed since the previous exam.  4. Please see above for several additional findings.      Electronically signed by: Tony Rosado MD  Date:    07/07/2023  Time:    12:21   CT Abscess Aspiration without Catheter    Narrative    EXAMINATION:  Fluid collection aspiration    Procedural Personnel    Attending physician(s): Fred Leon MD    Fellow physician(s): None    Resident physician(s): None    Advanced practice provider(s): None    Pre-procedure diagnosis: Renal abscess    Post-procedure diagnosis: Same    Indication: Fever associated with fluid collection    Additional clinical history: None    Complications: No immediate complications.    TECHNIQUE:  - Aspiration of renal abscess under CT guidance    FINDINGS:  Pre-procedure    Consent: Informed consent for the procedure was obtained and time-out was performed prior to the procedure.    Preparation: The site was prepared and draped using maximal sterile barrier technique including cutaneous antisepsis.    Antibiotic administered: Prophylactic dose within 1 hour of procedure start time or 2 hours for vancomycin or fluoroquinolones    Anesthesia/sedation    Level of anesthesia/sedation: Moderate sedation (conscious sedation)    Anesthesia/sedation administered by: Independent trained observer under attending supervision with continuous monitoring of the patient's level  of consciousness and physiologic status    Total intra-service sedation time (minutes): 30    Fluid collection aspiration    The patient was positioned prone. Initial imaging was performed. Local anesthesia was administered. The fluid collection was accessed using an access needle. Position within the fluid collection was confirmed, and fluid aspiration was performed. All instruments were then removed.    - Initial imaging findings: Hypodense collection in the right kidney    - Aspiration needle/catheter: 17 gauge needle    - Post-aspiration imaging findings: Complete resolution of the fluid collection    Contrast    Contrast agent: None    Contrast volume (mL): 0    Radiation Dose    CT dose length product ( mGy-cm): 263.43    Fluoroscopy time (  ):    Reference air kerma (  ):    Kerma area product (  ):    Additional Details    Additional description of procedure: None    Equipment details: None    Specimens removed: Aspirated fluid was sent for analysis.    Estimated blood loss (mL): Less than 10    Standardized report: [email or web address]    Attestation    Signer name: Fred Leon MD    I attest that I was present for the entire procedure. I reviewed the stored images and agree with the report as written.      Impression    Percutaneous aspiration of right renal abscess, yielding 3 mL of purulent fluid. No drainage catheter was left in place.    Plan:    Resume care by primary team.    ______________________________________________________________________      Electronically signed by: Fred Leon MD  Date:    07/05/2023  Time:    17:17   CT Renal Stone Study ABD Pelvis WO    Narrative    EXAMINATION:  CT RENAL STONE STUDY ABD PELVIS WO    CLINICAL HISTORY:  Flank pain, kidney stone suspected;    TECHNIQUE:  Low dose axial images, sagittal and coronal reformations were obtained from the lung bases to the pubic symphysis.  Contrast was not administered.    COMPARISON:  Renal ultrasound 06/23/2023,  right upper quadrant ultrasound 06/22/2023, CT abdomen and pelvis 06/21/2023    FINDINGS:  Lack of IV contrast limits evaluation of soft tissue and vascular structures    Imaged lung bases are clear.  Base of the heart is within normal limits.    Remote cholecystectomy.  Stable prominence of the common bile duct.  No intrahepatic biliary ductal dilatation.    Liver remains enlarged without focal process.  Noncontrast appearance of the pancreas, spleen, stomach, duodenum and bilateral adrenal glands are within normal limits.    When compared to CT study of 06/21/2023, there has been interval placement of right-sided double-J ureteral stent with proximal loop in the right renal pelvis and distal loop within the midline and left paramedian dependent aspect of the urinary bladder.  The right kidney remains asymmetrically larger than the left with similar asymmetric mild right perinephric stranding, noting interval resolution of previous right-sided hydroureteronephrosis and previous contrast within the right renal collecting system and right ureter has resolved.  Stable appearance of a 5 mm calculus within the region of the right UVJ.  The stent is adjacent to the posterior left aspect of the distal ureteral calculus.  Similar right perinephric stranding.  Newly developed 2 subtle hypoattenuating parenchymal foci at the right renal mid to lower pole measuring up to 1.9 cm, noting no previous cyst or focal parenchymal abnormality seen in this region.  No left hydronephrosis.  Left ureter is normal in course and caliber.  Unchanged 2 mm calculus within the left kidney.  No radiodense calculus seen within the left ureter or urinary bladder.  Urinary bladder is well distended without wall thickening.  Uterus and bilateral adnexa are within normal limits.  Trace volume nonspecific free pelvic fluid slightly increased from prior, likely reactive from previous right renal and ureter inflammatory process tracking to the pelvis.   Few scattered punctate pelvic phleboliths noted.    Appendix and terminal ileum are within normal limits.  No evidence of bowel obstruction or acute inflammation.  No pneumatosis or portal venous gas.    No abdominal ascites, free air or lymphadenopathy.    Extraperitoneal soft tissues are unchanged.  Osseous structures are stable without acute process seen.      Impression    Interval placement of right-sided double-J ureteral stent with resolution of previous right-sided hydronephrosis, noting grossly stable configuration of a 5 mm calculus within the distal right ureter near the UVJ.  Unchanged asymmetric mild right perinephric and periureteral fat stranding presumably related to previous obstructive uropathy.    More conspicuous 2 subtle hypoattenuating parenchymal foci within the right kidney, while nonspecific could indicate potential developing renal abscesses.  Clinical correlation for potential right-sided pyeloureteronephritis, and with urinalysis as warranted.    Unchanged right renal 4 mm nonobstructing calculus and unchanged left renal 2 mm nonobstructing calculus.    Hepatomegaly.    Cholecystectomy.    Additional findings as above.    This report was flagged in Epic as abnormal.      Electronically signed by: Morris Orantes MD  Date:    06/30/2023  Time:    16:57   CT Abdomen Pelvis With Contrast    Narrative    EXAMINATION:  CT ABDOMEN PELVIS WITH CONTRAST    CLINICAL HISTORY:  Abdominal abscess/infection suspected;right CVA pain, known R renal abscess per radiology rec;    TECHNIQUE:  Low dose axial images, sagittal and coronal reformations were obtained from the lung bases to the pubic symphysis following the IV administration of 100 mL of Omnipaque 350 .  Oral contrast was not given.    COMPARISON:  07/08/2023, 07/07/2023    FINDINGS:  Small to moderate right pleural effusion with adjacent passive atelectasis.  Patchy opacity in the left lower lobe.  The base of the heart appears normal.  The aorta  is of normal caliber and tapers appropriately.    The gallbladder has been removed.  No intrahepatic or extrahepatic biliary ductal dilatation is identified.  The liver, spleen, pancreas, adrenal glands and left kidney are normal in size, shape and contour.  No left-sided nephrolithiasis, hydronephrosis or hydroureter.  Again noted is an abnormal appearance of the right kidney.  There is a simple appearing cyst of the upper pole of the right kidney anteriorly.  In the midpole of the right kidney, there is ill-defined hypoattenuation that measures up to 4.6 cm in greatest craniocaudal dimension, the overall appearance of which is unchanged as compared to the prior examination.  A double-J ureteral stent is in place.  There is a punctate nonobstructing stone at the lower pole of the right kidney.  There is a tiny stone adjacent to the ureteral stent at the level of the right UVJ that measures approximately 4 mm.  The urinary bladder is well distended and appears normal.  Uterus and adnexa are unremarkable.    The bowel appears normal.  The appendix is normal.  No free air, free fluid or obstruction.  No pathologically enlarged abdominal or pelvic lymph nodes are seen.    Age-appropriate degenerative changes affect the skeleton.      Impression    Right double-J ureteral stent remains in place.  There is a 4 mm stone adjacent to the ureteral stent at the level of the right UVJ, similar to before.  Persistent abnormal hypoattenuation with parenchymal heterogeneity along the midpole of the right kidney concerning for abscess formation.    Small to moderate right pleural effusion with adjacent passive atelectasis.      Electronically signed by: Sandi Khan MD  Date:    07/08/2023  Time:    16:23     No results found for this or any previous visit (from the past 2160 hour(s)).  CT Abdomen Pelvis With Contrast  Narrative: EXAMINATION:  CT ABDOMEN PELVIS WITH CONTRAST    CLINICAL HISTORY:  Abdominal abscess/infection  suspected;right CVA pain, known R renal abscess per radiology rec;    TECHNIQUE:  Low dose axial images, sagittal and coronal reformations were obtained from the lung bases to the pubic symphysis following the IV administration of 100 mL of Omnipaque 350 .  Oral contrast was not given.    COMPARISON:  07/08/2023, 07/07/2023    FINDINGS:  Small to moderate right pleural effusion with adjacent passive atelectasis.  Patchy opacity in the left lower lobe.  The base of the heart appears normal.  The aorta is of normal caliber and tapers appropriately.    The gallbladder has been removed.  No intrahepatic or extrahepatic biliary ductal dilatation is identified.  The liver, spleen, pancreas, adrenal glands and left kidney are normal in size, shape and contour.  No left-sided nephrolithiasis, hydronephrosis or hydroureter.  Again noted is an abnormal appearance of the right kidney.  There is a simple appearing cyst of the upper pole of the right kidney anteriorly.  In the midpole of the right kidney, there is ill-defined hypoattenuation that measures up to 4.6 cm in greatest craniocaudal dimension, the overall appearance of which is unchanged as compared to the prior examination.  A double-J ureteral stent is in place.  There is a punctate nonobstructing stone at the lower pole of the right kidney.  There is a tiny stone adjacent to the ureteral stent at the level of the right UVJ that measures approximately 4 mm.  The urinary bladder is well distended and appears normal.  Uterus and adnexa are unremarkable.    The bowel appears normal.  The appendix is normal.  No free air, free fluid or obstruction.  No pathologically enlarged abdominal or pelvic lymph nodes are seen.    Age-appropriate degenerative changes affect the skeleton.  Impression: Right double-J ureteral stent remains in place.  There is a 4 mm stone adjacent to the ureteral stent at the level of the right UVJ, similar to before.  Persistent abnormal  "hypoattenuation with parenchymal heterogeneity along the midpole of the right kidney concerning for abscess formation.    Small to moderate right pleural effusion with adjacent passive atelectasis.    Electronically signed by: Sandi Khan MD  Date:    07/08/2023  Time:    16:23  US Retroperitoneal Complete  Narrative: EXAMINATION:  US RETROPERITONEAL COMPLETE    CLINICAL HISTORY:  pyelonephritis;    TECHNIQUE:  Ultrasound of the kidneys and urinary bladder was performed including color flow and Doppler evaluation of the kidneys.    COMPARISON:  Ultrasound from 06/30/2023 and CT abdomen pelvis from 07/07/2023 and CT urogram from 07/05/2023    FINDINGS:  Right kidney: The right kidney measures 14.5 cm. No cortical thinning. No loss of corticomedullary distinction. Resistive index measures 0.61.  Heterogeneous collection within parenchyma measuring approximately 2.7 x 1.6 x 2 0 cm consistent previously abscess.  Overall extent is decreased when prior exam 07/05/2023 noting differences in modality.  4 mm and 6 mm nonobstructing stones.  Simple cyst measuring 1.0 cm.  No hydronephrosis.    Left kidney: The left kidney measures 11.4 cm. No cortical thinning. No loss of corticomedullary distinction. Resistive index measures 0.71.  No mass. No renal stone. No hydronephrosis.    The bladder is not distended and not well evaluated.  Impression: Heterogeneous collection within the parenchyma the right kidney measuring up to 2.7 cm.  Overall extent has decreased in size when compared to prior exam from 07/05/2023 when allowing for differences in technique.  Findings are consistent with abscess.  Further evaluation with CT with intravenous contrast as clinically indicated.    Additional findings as described above.    Electronically signed by: Giovany Farfan MD  Date:    07/08/2023  Time:    14:15       PSA:  No results found for: "PSA", "PSADIAG", "PSATOTAL", "PSAFREE"    Testosterone:  No results found for: " ""TOTALTESTOST", "TESTOSTERONE"     ASSESSMENT     1. Kidney stone on right side        PLAN:     Right ureteral stone     - We discussed the options for management of ureteral and renal stones, including non-surgical management with observation and/or trial of passage. We also discussed less invasive surgical options, such as ESWL, as well as flexible ureteroscopy and/or renoscopy with laser lithotripsy, and more invasive options like percutaneous nephrolithotomy, or PCNL, and robotic nephrolithotomy. I also explained that sometimes a single procedure is not adequate, and multiple procedures are required to render the patient stone free.   We discussed ureteral stents and nephrostomy tubes, including the risks, benefits, and potential side effects of the placement of each.     - The risks and benefits of all surgical and non-surgical treatment options, as well as all alternative options, were discussed and questions were answered.    - After our discussion, the patient has agreed to proceed with right URS with laser litho on 8/18/23.     Kip Beckham MD  Urology  [facility]    Disclaimer: This note has been generated using voice-recognition software. There may be typographical errors that have been missed during proof-reading.     "

## 2023-08-01 LAB
BACTERIA UR CULT: NORMAL
BACTERIA UR CULT: NORMAL

## 2023-08-09 ENCOUNTER — TELEPHONE (OUTPATIENT)
Dept: UROLOGY | Facility: CLINIC | Age: 43
End: 2023-08-09
Payer: MEDICAID

## 2023-08-09 RX ORDER — OXYCODONE AND ACETAMINOPHEN 5; 325 MG/1; MG/1
1 TABLET ORAL EVERY 4 HOURS PRN
Qty: 30 TABLET | Refills: 0 | Status: SHIPPED | OUTPATIENT
Start: 2023-08-09

## 2023-08-09 RX ORDER — KETOROLAC TROMETHAMINE 10 MG/1
10 TABLET, FILM COATED ORAL EVERY 6 HOURS PRN
Qty: 20 TABLET | Refills: 0 | Status: SHIPPED | OUTPATIENT
Start: 2023-08-09 | End: 2023-08-14

## 2023-08-09 NOTE — TELEPHONE ENCOUNTER
----- Message from Bella Tavera sent at 8/8/2023  4:02 PM CDT -----  Regarding: pt advice  Contact: [phone]  Sherri Machado calling regarding Patient Advice (message) for #requesting a call back regarding her medication has not been filled yet. She is very upset and is requesting a call back asap. Please call

## 2023-08-15 ENCOUNTER — TELEPHONE (OUTPATIENT)
Dept: UROLOGY | Facility: CLINIC | Age: 43
End: 2023-08-15
Payer: MEDICAID

## 2023-08-15 NOTE — TELEPHONE ENCOUNTER
Spoke with pt and advised to her that pre admit normally calls the day before with the instructions prior to surgery. Pt voiced her understanding

## 2023-08-15 NOTE — TELEPHONE ENCOUNTER
----- Message from Alka Orellana sent at 8/15/2023  3:15 PM CDT -----  Type: Procedure Info    Who Called: pt  Would the patient rather a call back or a response via [facility]? call  Best Call Back Number: [phone]  Additional Information: pt stated she never received surgery time or where she needs to go would like a call

## 2023-08-17 ENCOUNTER — ANESTHESIA EVENT (OUTPATIENT)
Dept: SURGERY | Facility: HOSPITAL | Age: 43
End: 2023-08-17
Payer: MEDICAID

## 2023-08-18 ENCOUNTER — HOSPITAL ENCOUNTER (OUTPATIENT)
Facility: HOSPITAL | Age: 43
Discharge: HOME | End: 2023-08-18
Attending: UROLOGY | Admitting: UROLOGY
Payer: MEDICAID

## 2023-08-18 ENCOUNTER — ANESTHESIA (OUTPATIENT)
Dept: SURGERY | Facility: HOSPITAL | Age: 43
End: 2023-08-18
Payer: MEDICAID

## 2023-08-18 VITALS
TEMPERATURE: 98 F | BODY MASS INDEX: 30.79 KG/M2 | DIASTOLIC BLOOD PRESSURE: 73 MMHG | SYSTOLIC BLOOD PRESSURE: 125 MMHG | OXYGEN SATURATION: 96 % | RESPIRATION RATE: 18 BRPM | HEART RATE: 73 BPM | WEIGHT: 185 LBS

## 2023-08-18 DIAGNOSIS — N20.0 NEPHROLITHIASIS: ICD-10-CM

## 2023-08-18 DIAGNOSIS — N20.0 KIDNEY STONE ON RIGHT SIDE: Primary | ICD-10-CM

## 2023-08-18 PROCEDURE — 25000003 PHARM REV CODE 250: Performed by: NURSE ANESTHETIST, CERTIFIED REGISTERED

## 2023-08-18 PROCEDURE — C1769 GUIDE WIRE: HCPCS | Performed by: UROLOGY

## 2023-08-18 PROCEDURE — 27201423 OPTIME MED/SURG SUP & DEVICES STERILE SUPPLY: Performed by: UROLOGY

## 2023-08-18 PROCEDURE — 63600175 PHARM REV CODE 636 W HCPCS: Performed by: UROLOGY

## 2023-08-18 PROCEDURE — 63600175 PHARM REV CODE 636 W HCPCS: Performed by: STUDENT IN AN ORGANIZED HEALTH CARE EDUCATION/TRAINING PROGRAM

## 2023-08-18 PROCEDURE — 74420 PR  X-RAY RETROGRADE PYELOGRAM: ICD-10-PCS | Mod: 26,,, | Performed by: UROLOGY

## 2023-08-18 PROCEDURE — 52353 PR CYSTO/URETERO/PYELOSCOPY W/LITHOTRIPSY: ICD-10-PCS | Mod: RT,,, | Performed by: UROLOGY

## 2023-08-18 PROCEDURE — 71000015 HC POSTOP RECOV 1ST HR: Performed by: UROLOGY

## 2023-08-18 PROCEDURE — 36000707: Performed by: UROLOGY

## 2023-08-18 PROCEDURE — 37000008 HC ANESTHESIA 1ST 15 MINUTES: Performed by: UROLOGY

## 2023-08-18 PROCEDURE — D9220A PRA ANESTHESIA: ICD-10-PCS | Mod: CRNA,,, | Performed by: NURSE ANESTHETIST, CERTIFIED REGISTERED

## 2023-08-18 PROCEDURE — D9220A PRA ANESTHESIA: ICD-10-PCS | Mod: ANES,,, | Performed by: STUDENT IN AN ORGANIZED HEALTH CARE EDUCATION/TRAINING PROGRAM

## 2023-08-18 PROCEDURE — 71000033 HC RECOVERY, INTIAL HOUR: Performed by: UROLOGY

## 2023-08-18 PROCEDURE — C1758 CATHETER, URETERAL: HCPCS | Performed by: UROLOGY

## 2023-08-18 PROCEDURE — 63600175 PHARM REV CODE 636 W HCPCS: Performed by: NURSE ANESTHETIST, CERTIFIED REGISTERED

## 2023-08-18 PROCEDURE — 36000706: Performed by: UROLOGY

## 2023-08-18 PROCEDURE — D9220A PRA ANESTHESIA: Mod: CRNA,,, | Performed by: NURSE ANESTHETIST, CERTIFIED REGISTERED

## 2023-08-18 PROCEDURE — D9220A PRA ANESTHESIA: Mod: ANES,,, | Performed by: STUDENT IN AN ORGANIZED HEALTH CARE EDUCATION/TRAINING PROGRAM

## 2023-08-18 PROCEDURE — 00918 ANES TRURL PX URTRL CAL RMVL: CPT | Performed by: UROLOGY

## 2023-08-18 PROCEDURE — 25000003 PHARM REV CODE 250: Performed by: UROLOGY

## 2023-08-18 PROCEDURE — 37000009 HC ANESTHESIA EA ADD 15 MINS: Performed by: UROLOGY

## 2023-08-18 PROCEDURE — 52353 CYSTOURETERO W/LITHOTRIPSY: CPT | Mod: RT,,, | Performed by: UROLOGY

## 2023-08-18 PROCEDURE — 71000016 HC POSTOP RECOV ADDL HR: Performed by: UROLOGY

## 2023-08-18 PROCEDURE — 25500020 PHARM REV CODE 255: Performed by: UROLOGY

## 2023-08-18 PROCEDURE — 74420 UROGRAPHY RTRGR +-KUB: CPT | Mod: 26,,, | Performed by: UROLOGY

## 2023-08-18 PROCEDURE — 82365 CALCULUS SPECTROSCOPY: CPT | Performed by: UROLOGY

## 2023-08-18 RX ORDER — DEXAMETHASONE SODIUM PHOSPHATE 4 MG/ML
INJECTION, SOLUTION INTRA-ARTICULAR; INTRALESIONAL; INTRAMUSCULAR; INTRAVENOUS; SOFT TISSUE
Status: DISCONTINUED | OUTPATIENT
Start: 2023-08-18 | End: 2023-08-18

## 2023-08-18 RX ORDER — OXYCODONE HYDROCHLORIDE 5 MG/1
5 TABLET ORAL EVERY 6 HOURS PRN
Qty: 6 TABLET | Refills: 0 | Status: SHIPPED | OUTPATIENT
Start: 2023-08-18

## 2023-08-18 RX ORDER — KETOROLAC TROMETHAMINE 30 MG/ML
INJECTION, SOLUTION INTRAMUSCULAR; INTRAVENOUS
Status: DISCONTINUED | OUTPATIENT
Start: 2023-08-18 | End: 2023-08-18

## 2023-08-18 RX ORDER — HYDROMORPHONE HYDROCHLORIDE 2 MG/ML
0.2 INJECTION, SOLUTION INTRAMUSCULAR; INTRAVENOUS; SUBCUTANEOUS EVERY 5 MIN PRN
Status: DISCONTINUED | OUTPATIENT
Start: 2023-08-18 | End: 2023-08-18 | Stop reason: HOSPADM

## 2023-08-18 RX ORDER — ONDANSETRON 2 MG/ML
INJECTION INTRAMUSCULAR; INTRAVENOUS
Status: DISCONTINUED | OUTPATIENT
Start: 2023-08-18 | End: 2023-08-18

## 2023-08-18 RX ORDER — CEFAZOLIN SODIUM 2 G/50ML
2 SOLUTION INTRAVENOUS
Status: COMPLETED | OUTPATIENT
Start: 2023-08-18 | End: 2023-08-18

## 2023-08-18 RX ORDER — OXYBUTYNIN CHLORIDE 5 MG/1
5 TABLET ORAL ONCE
Status: COMPLETED | OUTPATIENT
Start: 2023-08-18 | End: 2023-08-18

## 2023-08-18 RX ORDER — TAMSULOSIN HYDROCHLORIDE 0.4 MG/1
0.4 CAPSULE ORAL NIGHTLY
Qty: 7 CAPSULE | Refills: 0 | Status: SHIPPED | OUTPATIENT
Start: 2023-08-18 | End: 2023-08-25

## 2023-08-18 RX ORDER — LIDOCAINE HYDROCHLORIDE 20 MG/ML
INJECTION INTRAVENOUS
Status: DISCONTINUED | OUTPATIENT
Start: 2023-08-18 | End: 2023-08-18

## 2023-08-18 RX ORDER — PROPOFOL 10 MG/ML
INJECTION, EMULSION INTRAVENOUS
Status: DISCONTINUED | OUTPATIENT
Start: 2023-08-18 | End: 2023-08-18

## 2023-08-18 RX ORDER — ACETAMINOPHEN 10 MG/ML
INJECTION, SOLUTION INTRAVENOUS
Status: DISCONTINUED | OUTPATIENT
Start: 2023-08-18 | End: 2023-08-18

## 2023-08-18 RX ORDER — PHENAZOPYRIDINE HYDROCHLORIDE 200 MG/1
200 TABLET, FILM COATED ORAL 3 TIMES DAILY PRN
Qty: 9 TABLET | Refills: 0 | Status: SHIPPED | OUTPATIENT
Start: 2023-08-18 | End: 2023-08-21

## 2023-08-18 RX ORDER — PHENAZOPYRIDINE HYDROCHLORIDE 100 MG/1
200 TABLET, FILM COATED ORAL ONCE
Status: COMPLETED | OUTPATIENT
Start: 2023-08-18 | End: 2023-08-18

## 2023-08-18 RX ORDER — MIDAZOLAM HYDROCHLORIDE 1 MG/ML
INJECTION INTRAMUSCULAR; INTRAVENOUS
Status: DISCONTINUED | OUTPATIENT
Start: 2023-08-18 | End: 2023-08-18

## 2023-08-18 RX ORDER — FENTANYL CITRATE 50 UG/ML
INJECTION, SOLUTION INTRAMUSCULAR; INTRAVENOUS
Status: DISCONTINUED | OUTPATIENT
Start: 2023-08-18 | End: 2023-08-18

## 2023-08-18 RX ORDER — OXYBUTYNIN CHLORIDE 5 MG/1
5 TABLET ORAL 3 TIMES DAILY PRN
Qty: 9 TABLET | Refills: 0 | Status: SHIPPED | OUTPATIENT
Start: 2023-08-18 | End: 2023-08-21

## 2023-08-18 RX ORDER — PROCHLORPERAZINE EDISYLATE 5 MG/ML
5 INJECTION INTRAMUSCULAR; INTRAVENOUS EVERY 30 MIN PRN
Status: DISCONTINUED | OUTPATIENT
Start: 2023-08-18 | End: 2023-08-18 | Stop reason: HOSPADM

## 2023-08-18 RX ADMIN — ONDANSETRON 8 MG: 2 INJECTION, SOLUTION INTRAMUSCULAR; INTRAVENOUS at 07:08

## 2023-08-18 RX ADMIN — KETOROLAC TROMETHAMINE 30 MG: 30 INJECTION, SOLUTION INTRAMUSCULAR; INTRAVENOUS at 07:08

## 2023-08-18 RX ADMIN — ACETAMINOPHEN 1000 MG: 10 INJECTION, SOLUTION INTRAVENOUS at 07:08

## 2023-08-18 RX ADMIN — FENTANYL CITRATE 50 MCG: 50 INJECTION, SOLUTION INTRAMUSCULAR; INTRAVENOUS at 07:08

## 2023-08-18 RX ADMIN — SODIUM CHLORIDE, SODIUM LACTATE, POTASSIUM CHLORIDE, AND CALCIUM CHLORIDE: .6; .31; .03; .02 INJECTION, SOLUTION INTRAVENOUS at 07:08

## 2023-08-18 RX ADMIN — CEFAZOLIN SODIUM 2 G: 2 SOLUTION INTRAVENOUS at 07:08

## 2023-08-18 RX ADMIN — MIDAZOLAM HYDROCHLORIDE 2 MG: 1 INJECTION, SOLUTION INTRAMUSCULAR; INTRAVENOUS at 07:08

## 2023-08-18 RX ADMIN — HYDROMORPHONE HYDROCHLORIDE 0.2 MG: 2 INJECTION, SOLUTION INTRAMUSCULAR; INTRAVENOUS; SUBCUTANEOUS at 09:08

## 2023-08-18 RX ADMIN — DEXAMETHASONE SODIUM PHOSPHATE 8 MG: 4 INJECTION, SOLUTION INTRA-ARTICULAR; INTRALESIONAL; INTRAMUSCULAR; INTRAVENOUS; SOFT TISSUE at 07:08

## 2023-08-18 RX ADMIN — PHENAZOPYRIDINE HYDROCHLORIDE 200 MG: 100 TABLET ORAL at 09:08

## 2023-08-18 RX ADMIN — PROPOFOL 50 MG: 10 INJECTION, EMULSION INTRAVENOUS at 07:08

## 2023-08-18 RX ADMIN — PROPOFOL 150 MG: 10 INJECTION, EMULSION INTRAVENOUS at 07:08

## 2023-08-18 RX ADMIN — OXYBUTYNIN CHLORIDE 5 MG: 5 TABLET ORAL at 09:08

## 2023-08-18 RX ADMIN — LIDOCAINE HYDROCHLORIDE 50 MG: 20 INJECTION, SOLUTION INTRAVENOUS at 07:08

## 2023-08-18 NOTE — PATIENT INSTRUCTIONS
Post Ureteroscopy Instructions  Do not strain to have a bowel movement  No strenuous exercise x 7 days  No driving while you are on narcotic pain medications or if you have a Townsend catheter    You can expect:  To pass stone fragments if you had a stone procedure  Have pain when you void from your stent if you have a stent in place  See blood in your urine if you have a stent in place    If you have a catheter, please return to the ER if your catheter stops draining or you are having abdominal pain.    Call the doctor if:  Temperature is greater than 101F  Persistent vomiting and inability to keep food down  Inability to void if you do not have a catheter    You will follow up with Dr. Beckham  in 1 month with a renal ultrasound and KUB prior.    Take Flomax (Tamsulosin) 0.4 mg once per day while your stent is in place.  You may take pyridium 200 mg up to 3 times per day for bladder irritation from the stent.  You may take Ditropan 5 mg up to 3 times per day for bladder spasms with stent.    If you have any questions or concerns during normal business hours, please call the urology clinic of your surgery.  If you are having an emergency after 5 pm or weekends, you may call [phone] and ask them to page the urology resident on call.

## 2023-08-18 NOTE — ANESTHESIA POSTPROCEDURE EVALUATION
Anesthesia Post Evaluation    Patient: Sherri Machado    Procedure(s) Performed: Procedure(s) (LRB):  CYSTOURETEROSCOPY, WITH HOLMIUM LASER LITHOTRIPSY OF URETERAL CALCULUS AND STENT INSERTION (Right)    Final Anesthesia Type: general      Patient location during evaluation: PACU  Patient participation: Yes- Able to Participate  Level of consciousness: awake and alert  Post-procedure vital signs: reviewed and stable  Pain management: adequate  Airway patency: patent  SHAHAB mitigation strategies: Multimodal analgesia  PONV status at discharge: No PONV  Anesthetic complications: no      Cardiovascular status: hemodynamically stable  Respiratory status: spontaneous ventilation and room air  Hydration status: euvolemic  Follow-up not needed.          Vitals Value Taken Time   /73 08/18/23 1040   Temp 36.7 °C (98 °F) 08/18/23 1040   Pulse 73 08/18/23 1040   Resp 18 08/18/23 1040   SpO2 96 % 08/18/23 1040         Event Time   Out of Recovery 08:42:00         Pain/Tita Score: Pain Rating Prior to Med Admin: 7 (8/18/2023  9:05 AM)  Tita Score: 10 (8/18/2023 10:40 AM)

## 2023-08-18 NOTE — INTERVAL H&P NOTE
The patient has been examined and the H&P has been reviewed:    I concur with the findings and no changes have occurred since H&P was written.    Surgery risks, benefits and alternative options discussed and understood by patient/family.    No changes since clinic. Not currently on antibiotics. Persistent lower abd pain. Consent to be obtained by Dr. Beckham. Patient aware she will require stent replacement.    Active Hospital Problems    Diagnosis  POA    *Nephrolithiasis [N20.0]  Yes      Resolved Hospital Problems   No resolved problems to display.

## 2023-08-18 NOTE — OP NOTE
[place] - Surgery ([facility]  [facility]  Operative Note    Date: 08/18/2023    Pre-Op Diagnosis: Right distal ureteral stone    Patient Active Problem List    Diagnosis Date Noted    Headache on top of head 07/06/2023    Physical deconditioning 07/06/2023    Iron deficiency anemia 07/06/2023    Hyperthyroidism, subclinical 07/03/2023    Hypokalemia 07/02/2023    Renal abscess 06/30/2023    Nephrolithiasis 06/23/2023    Flank pain 06/23/2023    Calculus of ureterovesical junction (UVJ) 06/21/2023    RICK (acute kidney injury) 06/21/2023    Pyelonephritis 06/21/2023    Hyperbilirubinemia 06/21/2023    UTI (urinary tract infection) 06/21/2023    Elevated BP without diagnosis of hypertension 05/15/2019    Depression with suicidal ideation     Opioid dependence with withdrawal 05/14/2019       Post-Op Diagnosis: Same    Procedure(s) Performed:   1. Right ureteroscopy  2. Laser lithotripsy  3. Stone basket extraction  4. Cystoscopy  5. Right retrograde pyelogram  6. Right ureteral stent removal    Specimen(s):  Right ureteral stone    Staff Surgeon: Kip Beckham MD    Assistant Surgeon: Andrés Phipps MD     Circulator: Milagros Sage RN  Scrub Person: Angel Luis Lehman  Technician: Atilio Kelley RT     Anesthesia: General LMA anesthesia    Indications: Sherri Machado is a 43 y.o. female with a right distal ureteral stone presenting for definitive stone management. She is pre-stented.    Findings:  1. Stone radiolucent on  film.  2. Distal right ureteral stone fragmented with 273 laser and removed for analysis  3. Right stent removal after good contrast emptying on RPG; no filling defects or extrav    Estimated Blood Loss: Minimal    Drains:   1. None    Procedure in detail:  After risks, benefits, and possible complications were explained, the patient elected to undergo the procedure and informed consent was obtained. All questions were answered in the bayron-operative area. The patient was  transferred to the cystoscopy suite and placed in the supine position. SCDs were applied and working. Anesthesia was administered. The patient was then placed in the dorsal lithotomy position and prepped and draped in the usual sterile fashion. Time out was performed, and bayron-procedural antibiotics were confirmed.     The stone was radiolucent on  film alongside the right ureteral stent. A rigid cystoscope in a 22 Fr sheath was introduced into the patient's urethra. This passed easily. The entire urethra was visualized which showed no strictures or masses. Cystoscopy revealed the ureteral orifices in the normal anatomic location bilaterally. There were no bladder tumors, no bladder stones and no diverticula seen.     The patient's right ureteral stent was grasped with alligator graspers and brought to the urethral meatus. A motion wire was passed through the stent and into the kidney with fluoroscopic confirmation. The stent was was removed keeping the wire in place.    An 8 Fr semirigid ureteroscope was passed into the patient's bladder alongside the wire under direct vision. It was then passed through the right ureteral orifice alongside the wire. A stone was encountered at the level of the distal ureter as expected.  A 273 micron laser fiber was passed through the ureteroscope. The stone was fragmented using the laser. The laser fiber was removed and an NCircle basket was introduced through the ureteroscope. Stone fragments were removed and placed in the bladder. The ureteroscope was reinserted and the entire length of the ureter was surveyed and no additional stone fragments were visualized.    Diluted contrast was then inserted via the working channel of the scope. Using fluoroscopy, a retrograde pyelogram (RPG) was performed which showed no filling defects within the renal pelvis, minimal hydronephrosis and no extravasation of contrast. Repeat fluoroscopy revealed adequate drainage of contrast.     The  semirigid ureteroscope and motion wire were then removed from the patient. The cystoscope was reinserted and the bladder was irrigated to remove the stone fragments. The bladder was drained and the cystoscope removed. The patient tolerated the procedure well and was transferred to the recovery room in stable condition.    Disposition:  The patient will be discharged home from PACU. She follow up with Dr. Beckham  in 1 month with a KUB and renal ultrasound prior.     Andrés Phipps MD

## 2023-08-18 NOTE — DISCHARGE SUMMARY
[place] - Surgery (Hospital)  Discharge Note  Short Stay    Procedure(s) (LRB):  CYSTOURETEROSCOPY, WITH HOLMIUM LASER LITHOTRIPSY OF URETERAL CALCULUS AND STENT INSERTION (Right)      OUTCOME: Patient tolerated treatment/procedure well without complication and is now ready for discharge.    DISPOSITION: Home or Self Care    FINAL DIAGNOSIS:  Nephrolithiasis    FOLLOWUP: In clinic in 1 month with ultrasound and xray prior    DISCHARGE INSTRUCTIONS:    Discharge Procedure Orders   X-Ray KUB   Standing Status: Future Standing Exp. Date: 08/18/24     Order Specific Question Answer Comments   May the Radiologist modify the order per protocol to meet the clinical needs of the patient? Yes    Release to patient Immediate      US Kidney   Standing Status: Future Standing Exp. Date: 08/18/24     Order Specific Question Answer Comments   May the Radiologist modify the order per protocol to meet the clinical needs of the patient? Yes    Release to patient Immediate      Notify your health care provider if you experience any of the following:  temperature >100.4     Notify your health care provider if you experience any of the following:  persistent nausea and vomiting or diarrhea     Notify your health care provider if you experience any of the following:  severe uncontrolled pain     Notify your health care provider if you experience any of the following:  redness, tenderness, or signs of infection (pain, swelling, redness, odor or green/yellow discharge around incision site)     Notify your health care provider if you experience any of the following:  worsening rash     Notify your health care provider if you experience any of the following:  persistent dizziness, light-headedness, or visual disturbances        TIME SPENT ON DISCHARGE: 10 minutes

## 2023-08-18 NOTE — TRANSFER OF CARE
Anesthesia Transfer of Care Note    Patient: Sherri Machado    Procedure(s) Performed: Procedure(s) (LRB):  CYSTOURETEROSCOPY, WITH HOLMIUM LASER LITHOTRIPSY OF URETERAL CALCULUS AND STENT INSERTION (Right)    Patient location: PACU    Anesthesia Type: general    Transport from OR: Transported from OR on 6-10 L/min O2 by face mask with adequate spontaneous ventilation    Post pain: adequate analgesia    Post assessment: no apparent anesthetic complications    Post vital signs: stable    Level of consciousness: awake and alert    Nausea/Vomiting: no nausea/vomiting    Complications: none    Transfer of care protocol was followed      Last vitals:   Visit Vitals  /77 (BP Location: Right arm, Patient Position: Lying)   Pulse 71   Temp 36.7 °C (98.1 °F) (Skin)   Resp 18   Wt 83.9 kg (185 lb)   LMP 08/04/2023 (Approximate)   SpO2 100%   Breastfeeding No   BMI 30.79 kg/m²

## 2023-08-18 NOTE — ANESTHESIA PREPROCEDURE EVALUATION
[facility]  Anesthesia Pre-Operative Evaluation         Patient Name: Sherri Machado  YOB: 1980  MRN: [id number]    SUBJECTIVE:     Pre-operative evaluation for Procedure(s) (LRB):  CYSTOURETEROSCOPY, WITH HOLMIUM LASER LITHOTRIPSY OF URETERAL CALCULUS AND STENT INSERTION (Right)     08/18/2023    Sherri Machado is a 43 y.o. female w/ a significant PMHx of kidney stones, MDD, iron deficiency anemia who presents for the above procedure.      LDA: None documented.    Prev airway: None documented.     Drips: None documented.    Patient Active Problem List   Diagnosis    Opioid dependence with withdrawal    Elevated BP without diagnosis of hypertension    Depression with suicidal ideation    Calculus of ureterovesical junction (UVJ)    RICK (acute kidney injury)    Pyelonephritis    Hyperbilirubinemia    UTI (urinary tract infection)    Nephrolithiasis    Flank pain    Renal abscess    Hypokalemia    Hyperthyroidism, subclinical    Headache on top of head    Physical deconditioning    Iron deficiency anemia       Review of patient's allergies indicates:   Allergen Reactions    Aspirin Other (See Comments)     Abdominal cramping       Current Inpatient Medications:      No current facility-administered medications on file prior to encounter.     Current Outpatient Medications on File Prior to Encounter   Medication Sig Dispense Refill    amLODIPine (NORVASC) 5 MG tablet Take 1 tablet (5 mg total) by mouth once daily. 30 tablet 11    ferrous sulfate 325 (65 FE) MG EC tablet Take 1 tablet (325 mg total) by mouth once daily. 30 tablet 1    multivit-min-iron fum-folic ac (ONE-A-DAY WOMEN'S COMPLETE) 18 mg iron- 400 mcg Tab Take 1 tablet by mouth once daily.      polyethylene glycol (GLYCOLAX) 17 gram/dose powder Take 17 g by mouth once daily. (Patient not taking: Reported on 7/25/2023) 510 g 2    senna-docusate 8.6-50 mg (PERICOLACE) 8.6-50 mg  per tablet Take 1 tablet by mouth 2 (two) times daily as needed for Constipation. (Patient not taking: Reported on 2023) 30 tablet 2    tamsulosin (FLOMAX) 0.4 mg Cap Take 1 capsule (0.4 mg total) by mouth once daily. for 15 days 15 capsule 0       Past Surgical History:   Procedure Laterality Date    ANKLE SURGERY      left     SECTION, CLASSIC      x3    CHOLECYSTECTOMY      CYSTOSCOPY Right 2023    Procedure: CYSTOSCOPY;  Surgeon: Kaylie Blevins MD;  Location: [facility] OR;  Service: Urology;  Laterality: Right;    RETROGRADE PYELOGRAPHY Right 2023    Procedure: PYELOGRAM, RETROGRADE;  Surgeon: Kaylie Blevins MD;  Location: [facility] OR;  Service: Urology;  Laterality: Right;    URETERAL STENT PLACEMENT Right 2023    Procedure: INSERTION, STENT, URETER;  Surgeon: Kaylie Blevins MD;  Location: [facility] OR;  Service: Urology;  Laterality: Right;       Social History     Socioeconomic History    Marital status:    Tobacco Use    Smoking status: Never    Smokeless tobacco: Never   Substance and Sexual Activity    Alcohol use: No    Drug use: No     Social Determinants of Health     Financial Resource Strain: Low Risk  (2023)    Overall Financial Resource Strain (CARDIA)     Difficulty of Paying Living Expenses: Not hard at all   Food Insecurity: No Food Insecurity (2023)    Hunger Vital Sign     Worried About Running Out of Food in the Last Year: Never true     Ran Out of Food in the Last Year: Never true   Transportation Needs: No Transportation Needs (2023)    PRAPARE - Transportation     Lack of Transportation (Medical): No     Lack of Transportation (Non-Medical): No   Housing Stability: Unknown (2023)    Housing Stability Vital Sign     Unable to Pay for Housing in the Last Year: No     Unstable Housing in the Last Year: No       OBJECTIVE:     Vital Signs Range (Last 24H):  Temp:  [36.7 °C (98.1 °F)]   Pulse:  [71]   Resp:  [18]   BP: (138)/(77)  "  SpO2:  [100 %]       CBC:   No results for input(s): "WBC", "RBC", "HGB", "HCT", "PLT", "MCV", "MCH", "MCHC" in the last 72 hours.    CMP: No results for input(s): "NA", "K", "CL", "CO2", "BUN", "CREATININE", "GLU", "MG", "PHOS", "CALCIUM", "ALBUMIN", "PROT", "ALKPHOS", "ALT", "AST", "BILITOT" in the last 72 hours.    INR:  No results for input(s): "PT", "INR", "PROTIME", "APTT" in the last 72 hours.    Diagnostic Studies: No relevant studies.    EKG: No recent studies available.    2D ECHO:  No results found for this or any previous visit.      ASSESSMENT/PLAN:           Pre-op Assessment    I have reviewed the Patient Summary Reports.    I have reviewed the NPO Status.   I have reviewed the Medications.     Review of Systems  Anesthesia Hx:  No problems with previous Anesthesia    Hematology/Oncology:  Hematology Normal   Oncology Normal     EENT/Dental:EENT/Dental Normal   Cardiovascular:  Cardiovascular Normal     Pulmonary:  Pulmonary Normal    Renal/:   Chronic Renal Disease    Hepatic/GI:  Hepatic/GI Normal    Musculoskeletal:   Arthritis     Neurological:   Headaches    Endocrine:   Hyperthyroidism    Psych:   Psychiatric History          Physical Exam  General: Well nourished, Alert and Cooperative    Airway:  Mallampati: III / II  Mouth Opening: Normal  TM Distance: Normal  Tongue: Normal  Neck ROM: Normal ROM    Chest/Lungs:  Normal Respiratory Rate    Heart:  Rate: Normal        Anesthesia Plan  Type of Anesthesia, risks & benefits discussed:    Anesthesia Type: Gen ETT, Gen Supraglottic Airway, Gen Natural Airway  Intra-op Monitoring Plan: Standard ASA Monitors  Post Op Pain Control Plan: multimodal analgesia  Induction:  IV  Airway Plan: Direct, Post-Induction  Informed Consent: Informed consent signed with the Patient and all parties understand the risks and agree with anesthesia plan.  All questions answered.   ASA Score: 3  Day of Surgery Review of History & Physical: H&P Update referred to the " surgeon/provider.    Ready For Surgery From Anesthesia Perspective.     .

## 2023-08-22 NOTE — PROGRESS NOTES
I assume primary medical responsibility for this patient. I have reviewed the history, physical, and assessement & treatment plan with the resident and agree that the care is reasonable and necessary. This service has been performed by a resident without the presence of a teaching physician under the primary care exception. If necessary, an addendum of additional findings or evaluation beyond the resident documentation will be noted below.      Tomasa Orantes MD    [facility]

## 2023-08-24 LAB
ACID FAST MOD KINY STN SPEC: NORMAL
COMPN STONE: NORMAL
LABORATORY COMMENT REPORT: NORMAL
MYCOBACTERIUM SPEC QL CULT: NORMAL
SPECIMEN SOURCE: NORMAL
STONE ANALYSIS IR-IMP: NORMAL

## 2023-08-26 ENCOUNTER — HOSPITAL ENCOUNTER (EMERGENCY)
Facility: HOSPITAL | Age: 43
Discharge: HOME | End: 2023-08-26
Attending: STUDENT IN AN ORGANIZED HEALTH CARE EDUCATION/TRAINING PROGRAM
Payer: MEDICAID

## 2023-08-26 VITALS
SYSTOLIC BLOOD PRESSURE: 146 MMHG | WEIGHT: 185 LBS | BODY MASS INDEX: 30.82 KG/M2 | HEART RATE: 70 BPM | DIASTOLIC BLOOD PRESSURE: 70 MMHG | TEMPERATURE: 99 F | HEIGHT: 65 IN | OXYGEN SATURATION: 100 % | RESPIRATION RATE: 16 BRPM

## 2023-08-26 DIAGNOSIS — S61.512A LACERATION OF LEFT WRIST, INITIAL ENCOUNTER: Primary | ICD-10-CM

## 2023-08-26 PROCEDURE — 63600175 PHARM REV CODE 636 W HCPCS: Performed by: STUDENT IN AN ORGANIZED HEALTH CARE EDUCATION/TRAINING PROGRAM

## 2023-08-26 PROCEDURE — 90471 IMMUNIZATION ADMIN: CPT | Performed by: STUDENT IN AN ORGANIZED HEALTH CARE EDUCATION/TRAINING PROGRAM

## 2023-08-26 PROCEDURE — 99284 EMERGENCY DEPT VISIT MOD MDM: CPT | Mod: 25

## 2023-08-26 PROCEDURE — 90715 TDAP VACCINE 7 YRS/> IM: CPT | Performed by: STUDENT IN AN ORGANIZED HEALTH CARE EDUCATION/TRAINING PROGRAM

## 2023-08-26 PROCEDURE — 12001 RPR S/N/AX/GEN/TRNK 2.5CM/<: CPT | Mod: LT

## 2023-08-26 PROCEDURE — 25000003 PHARM REV CODE 250: Performed by: STUDENT IN AN ORGANIZED HEALTH CARE EDUCATION/TRAINING PROGRAM

## 2023-08-26 RX ORDER — LIDOCAINE HYDROCHLORIDE 10 MG/ML
10 INJECTION, SOLUTION EPIDURAL; INFILTRATION; INTRACAUDAL; PERINEURAL
Status: COMPLETED | OUTPATIENT
Start: 2023-08-26 | End: 2023-08-26

## 2023-08-26 RX ADMIN — LIDOCAINE HYDROCHLORIDE 100 MG: 10 INJECTION, SOLUTION EPIDURAL; INFILTRATION; INTRACAUDAL at 02:08

## 2023-08-26 RX ADMIN — TETANUS TOXOID, REDUCED DIPHTHERIA TOXOID AND ACELLULAR PERTUSSIS VACCINE, ADSORBED 0.5 ML: 5; 2.5; 8; 8; 2.5 SUSPENSION INTRAMUSCULAR at 02:08

## 2023-08-26 NOTE — ED NOTES
MD performed procedure and inserted 1 stitch to left wrist  Pt tolerated with minimal difficulty.    Reviewed wound care with pt, monitoring s/s of infection, when to follow up, and when to have sutures removed.     Pt verbalized understanding

## 2023-08-26 NOTE — DISCHARGE INSTRUCTIONS

## 2023-08-26 NOTE — ED PROVIDER NOTES
Encounter Date: 2023       History     Chief Complaint   Patient presents with    Laceration     Laceration to anterior medial aspect of left wrist after accidentally cutting herself while trying to open a package of sausage.  CSM with skin blanching < 3 seconds to the distal of LUE.  Last tetnus shot unknown.  No active bleeding at this time.  Covered with 4x4's and coban.     43 year old female presents with a laceration to the left wrist. She says she cut it while opening a sausage container. Tetanus up to date. No numbness/paresthesia.       Review of patient's allergies indicates:   Allergen Reactions    Aspirin Other (See Comments)     Abdominal cramping     Past Medical History:   Diagnosis Date    Allergy     IBS (irritable bowel syndrome)     Osteoarthritis     Renal abscess 2023    Sickle cell trait     Urinary tract infection      Past Surgical History:   Procedure Laterality Date    ANKLE SURGERY      left     SECTION, CLASSIC      x3    CHOLECYSTECTOMY      CYSTOSCOPY Right 2023    Procedure: CYSTOSCOPY;  Surgeon: Kaylie Blevins MD;  Location: [facility] OR;  Service: Urology;  Laterality: Right;    CYSTOSCOPY WITH URETEROSCOPY, RETROGRADE PYELOGRAPHY, AND INSERTION OF STENT Right 2023    Procedure: CYSTOSCOPY, WITH RETROGRADE PYELOGRAM AND URETERAL STENT INSERTION;  Surgeon: Kip Beckham MD;  Location: [facility] OR;  Service: Urology;  Laterality: Right;    CYSTOURETEROSCOPY, WITH HOLMIUM LASER LITHOTRIPSY OF URETERAL CALCULUS AND STENT INSERTION Right 2023    Procedure: CYSTOURETEROSCOPY, WITH HOLMIUM LASER LITHOTRIPSY OF URETERAL CALCULUS AND STENT INSERTION;  Surgeon: Kip Beckham MD;  Location: [facility] OR;  Service: Urology;  Laterality: Right;  Please have semirigid ureteroscope, digital flexible ureteroscope, major laser, and tech (Alexis) if available.    CYSTOURETEROSCOPY,WITH HOLMIUM LASER LITHOTRIPSY OF URETERAL CALCULUS Right 2023    Procedure:  CYSTOURETEROSCOPY,WITH HOLMIUM LASER LITHOTRIPSY OF URETERAL CALCULUS;  Surgeon: Kip Beckham MD;  Location: [facility] OR;  Service: Urology;  Laterality: Right;    EXTRACTION - STONE Right 8/18/2023    Procedure: EXTRACTION - STONE;  Surgeon: Kip Beckham MD;  Location: [facility] OR;  Service: Urology;  Laterality: Right;    RETROGRADE PYELOGRAPHY Right 6/22/2023    Procedure: PYELOGRAM, RETROGRADE;  Surgeon: Kaylie Blevins MD;  Location: [facility] OR;  Service: Urology;  Laterality: Right;    URETERAL STENT PLACEMENT Right 6/22/2023    Procedure: INSERTION, STENT, URETER;  Surgeon: Kaylie Blevins MD;  Location: [facility] OR;  Service: Urology;  Laterality: Right;     No family history on file.  Social History     Tobacco Use    Smoking status: Never    Smokeless tobacco: Never   Substance Use Topics    Alcohol use: No    Drug use: No     Review of Systems   All other systems reviewed and are negative.      Physical Exam     Initial Vitals [08/26/23 0221]   BP Pulse Resp Temp SpO2   (!) 146/70 70 16 98.6 °F (37 °C) 100 %      MAP       --         Physical Exam    Nursing note and vitals reviewed.  Constitutional: She appears well-developed and well-nourished. She is not diaphoretic. No distress.   HENT:   Head: Normocephalic and atraumatic.   Eyes: EOM are normal. Pupils are equal, round, and reactive to light.   Neck: No tracheal deviation present.   Normal range of motion.  Cardiovascular:  Normal rate, regular rhythm, normal heart sounds and intact distal pulses.     Exam reveals no gallop and no friction rub.       No murmur heard.  Pulmonary/Chest: Breath sounds normal. No stridor. No respiratory distress. She has no wheezes. She has no rhonchi. She has no rales.   Abdominal: Abdomen is soft. Bowel sounds are normal. She exhibits no distension and no mass. There is no abdominal tenderness. There is no rebound and no guarding.   Musculoskeletal:         General: No edema. Normal range of motion.      Cervical back: Normal  range of motion.     Neurological: She is alert and oriented to person, place, and time. GCS score is 15. GCS eye subscore is 4. GCS verbal subscore is 5. GCS motor subscore is 6.   Skin: Skin is warm and dry. Capillary refill takes less than 2 seconds.   0.5cm linear laceration to the ventral aspect of the distal wrist. Not near the artery. Hemostasis achieved. Neurovascularly intact distally.   Psychiatric: She has a normal mood and affect. Thought content normal.         ED Course   Lac Repair    Date/Time: 8/26/2023 3:03 AM    Performed by: Gabriel Flores MD  Authorized by: Gabriel Flores MD    Consent:     Consent obtained:  Verbal    Consent given by:  Patient    Risks discussed:  Infection, need for additional repair, nerve damage, poor wound healing, poor cosmetic result, pain, retained foreign body, tendon damage and vascular damage  Laceration details:     Location:  Hand    Hand location:  L wrist    Length (cm):  0.5    Depth (mm):  0.5  Skin repair:     Repair method:  Sutures    Suture size:  3-0    Suture material:  Nylon    Suture technique:  Simple interrupted    Number of sutures:  1  Approximation:     Approximation:  Close  Repair type:     Repair type:  Simple  Post-procedure details:     Dressing:  Non-adherent dressing    Procedure completion:  Tolerated well, no immediate complications  Comments:      Wound care instructions given. Tetanus updated.    Labs Reviewed - No data to display       Imaging Results    None          Medications   LIDOcaine (PF) 10 mg/ml (1%) injection 100 mg (100 mg Infiltration Given 8/26/23 0256)   Tdap (BOOSTRIX) vaccine injection 0.5 mL (0.5 mLs Intramuscular Given 8/26/23 0256)     Medical Decision Making  Risk  Prescription drug management.                               Clinical Impression:   Final diagnoses:  [S61.512A] Laceration of left wrist, initial encounter (Primary)        ED Disposition Condition    Discharge Stable          ED Prescriptions     None       Follow-up Information       Follow up With Specialties Details Why Contact Info    [facility] - Emergency Dept Emergency Medicine Go in 7 days For suture removal [address] [facility] [id number] [phone]             Gabriel Flores MD  08/26/23 3691

## 2023-08-26 NOTE — ED NOTES
Physical Assessment    LOC: The patient is awake, alert and aware of environment with an appropriate affect, the patient is oriented x 3 and speaking appropriately.     Psych: Patient is calm and cooperative with good eye contact.    APPEARANCE: Patient is clean and non toxic appearing    NEUROLOGIC:  TAYLER, Follows commands without difficulty. Speech is clear. No neuro deficits observed.    HEENT: Denies HEENT complaint or injury, moist mucus membranes     RESPIRATORY: Airway is open and patent, respirations are spontaneous; patient has a normal effort and rate. Bilateral breath sounds are clear. Pink nailbeds.     CARDIAC: Patient has a normal rate and rhythm, no peripheral edema noted, capillary refill < 2 seconds. PULSES are symmetrical in all extremities    GI/ : Soft and non tender to palpation, no distention noted.     MUSCULOSKELETAL:  Normal range of motion noted. Moves all extremities well, no c/o pain, no deformity noted.    SKIN: The skin is warm, dry and intact. Patient has normal skin turgor. No rashes or lesions. No Breakdown noted. Puncture wound to left wrist, pressure dressing D/I. Pt with ppp2+. Moves all finger. Cap refill <2sec. Sensation intact.

## 2023-08-26 NOTE — ED NOTES
Reviewed discharge instructions with pt and spouse.  Verbalized understanding.  Ambulatory and stable at time of discharge.

## 2023-09-06 NOTE — PROGRESS NOTES
Approving, but needs appt for additional refills.    [facility] - Telemetry  Infectious Disease  Consult Note     Patient Name: Sherri Machado  MRN: [id number]  Admission Date: 6/30/2023  Attending Physician: Zackary Gaspar MD    Assessment/Plan:      ID  * Pyelonephritis  43-year-old F w/ PMHx with history depression, opioid dependence who presents to the ED with reports of right flank pain. Patient reports she was diagnosed with a kidney stone and was seen by Dr. Blevins.  Patient had a urethral stent placement in addition to cystoscopy and is currently taking PO antibiotics.  Patient reports she has been running fever over the past few days in addition to nausea, vomiting and diarrhea.  Patient states she is having issues with eating and drinking. She was sent for possible admission per Dr. Blevins.     In the ED, initial vitals /85, HR 85, Temp 98.3F, SpO2 100% on room air. Labs include CBC with H/H 9.1/27.6 and WBC 16.35, CMP with K 3.3.  UA with 1+ protein, 2+ occult blood, 3+ leukocytes, 29 WBC, 5 RBC. Urine Pregnancy test negative. Lactic acid 1.3. CXR with no acute changes. [facility] consulted for evaluation for admission for UTI with concerns for pyelonephritis.      6-18-23 - ED visit - CT showed right stones for sure and possible left stones - pain management - no abx     6-21-23 thru 6-24 - admit - UTI kleb, stent placed reduced right hydro, ceftriaxone then PO augmentin     6-30-23 - fever to 101, vomiting qd, decreased PO intake admitted for pyelo - CT- renal - showed faint 2 small abscesses  Urology felt that these were not accessible by IR at this time  - attempt abx therapy  - ID  consulted      Patient with continued flank pain and subjective fevers, however notes improvement in overall pain.  Patient continues to have elevated WBC count and repeat CT contrast abd pelvis  7/5 with renal fluid collections.     Rec:   1. Continue IV ceftriaxone   2. IR consulted for drainage of fluid collection, hope to have improvement with source control         Thank you for your consult. I will follow-up with patient. Please contact us if you have any additional questions.     Jemal Lujan MD  [facility]      Subjective:      Patient continues to have flank pain which is well controlled and headaches which last hours, are pulsatile, with sound and light sensitivity.     Objective:     Last 24 Hour Vital Signs:  BP  Min: 127/86  Max: 150/85  Temp  Av.7 °F (37.1 °C)  Min: 97.5 °F (36.4 °C)  Max: 100.3 °F (37.9 °C)  Pulse  Av.3  Min: 83  Max: 104  Resp  Av  Min: 18  Max: 20  SpO2  Av.4 %  Min: 97 %  Max: 100 %  I/O last 3 completed shifts:  In: -   Out: 2500 [Urine:2500]    Physical Examination:  Gen:  Well-developed, well-nourished, NAD  HEENT:  EOMI, conjunctiva clear, mucous membranes moist  Neck: supple, normal ROM  Resp: Clear to auscultation bilaterally without wheezes or crackles, normal WOB  CV: Regular rate, normal rhythm and S1S2 w/out murmur.   Abd: Soft, non-tender, non-distended, bowel sounds present; R flank tenderness  Neuro: AAO x 4  Derm: Skin is warm and dry, no rashes or lesions appreciated  Psych: Normal mood and affect, normal behavior        Laboratory:  Laboratory Data Reviewed: yes  Pertinent Findings:      Microbiology Data Reviewed: yes  Pertinent Findings:      Other Results:  Radiology Data Reviewed: Yes  Pertinent Findings:  Microbiology Results (last 7 days)       Procedure Component Value Units Date/Time    Gram stain [[id number]]     Order Status: No result Specimen: Body Fluid     AFB Culture & Smear [[id number]]     Order Status: No result Specimen: Body Fluid     KOH prep [[id number]]     Order Status: No result Specimen: Body Fluid     Culture, Body Fluid (Aerobic) w/ GS [[id number]]     Order Status: No result Specimen: Body Fluid     Blood culture #1 **CANNOT BE ORDERED STAT** [[id number]] Collected: 23 5802    Order Status: Completed Specimen: Blood from Antecubital, Right Arm Updated: 23 2811     Blood  Culture, Routine No Growth to date      No Growth to date      No Growth to date      No Growth to date      No Growth to date    Blood culture #2 **CANNOT BE ORDERED STAT** [[id number]] Collected: 06/30/23 1444    Order Status: Completed Specimen: Blood from Antecubital, Left Arm Updated: 07/04/23 2212     Blood Culture, Routine No Growth to date      No Growth to date      No Growth to date      No Growth to date      No Growth to date    Urine culture [[id number]] Collected: 06/30/23 1423    Order Status: Completed Specimen: Urine Updated: 07/02/23 0256     Urine Culture, Routine No growth    Narrative:      Specimen Source->Urine              Current Medications:     Infusions:       Scheduled:   amLODIPine  5 mg Oral Daily    cefTRIAXone (ROCEPHIN) IVPB  1 g Intravenous Q24H    enoxparin  40 mg Subcutaneous Daily    magnesium sulfate IVPB  4 g Intravenous Once    multivitamin  1 tablet Oral Daily    tamsulosin  0.4 mg Oral Daily        PRN:  acetaminophen, HYDROcodone-acetaminophen, ketorolac, melatonin, naloxone, ondansetron, senna-docusate 8.6-50 mg, sodium chloride 0.9%, sodium chloride 0.9%, sodium chloride 0.9%    Antibiotics and Day Number of Therapy:  Ceftriaxone 7/1-now

## 2023-09-25 PROBLEM — N17.9 AKI (ACUTE KIDNEY INJURY): Status: RESOLVED | Noted: 2023-06-21 | Resolved: 2023-09-25

## 2023-09-25 PROBLEM — N39.0 UTI (URINARY TRACT INFECTION): Status: RESOLVED | Noted: 2023-06-21 | Resolved: 2023-09-25

## 2023-10-09 PROBLEM — N12 PYELONEPHRITIS: Chronic | Status: RESOLVED | Noted: 2023-06-21 | Resolved: 2023-10-09

## 2024-04-04 NOTE — PLAN OF CARE
"Chart reviewed / case discussed in am MDR    dx:  Pyelonephritis, abscess     per initial assessment:  "Demographics/Ins/NextofKin/PCP verified with patient/family and updated as needed. Denies any DME needs at present from pt/family. Patient/family plans to return home with family. "  Pt has transportation to home at d/c       Future Appointments   Date Time Provider Department Center   7/12/2023  9:00 AM Colten Leon PA-C [facility]   7/31/2023  9:45 AM Kip Beckham MD [facility] UROLOGY [facility]          07/07/23 1840   Post-Acute Status   Post-Acute Authorization Other   Other Status No Post-Acute Service Needs   Discharge Plan   Discharge Plan A Home;Home with family       " No

## 2025-01-08 ENCOUNTER — HOSPITAL ENCOUNTER (EMERGENCY)
Facility: HOSPITAL | Age: 45
Discharge: HOME | End: 2025-01-09
Attending: EMERGENCY MEDICINE

## 2025-01-08 DIAGNOSIS — S61.257A OPEN WOUND OF LEFT LITTLE FINGER DUE TO DOG BITE: Primary | ICD-10-CM
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
## 2025-01-08 DIAGNOSIS — S61.307A AVULSION OF NAIL OF LEFT LITTLE FINGER: ICD-10-CM

## 2025-01-08 LAB
B-HCG UR QL: NEGATIVE
CTP QC/QA: YES

## 2025-01-08 PROCEDURE — 81025 URINE PREGNANCY TEST: CPT | Mod: ER | Performed by: EMERGENCY MEDICINE

## 2025-01-08 PROCEDURE — 63600175 PHARM REV CODE 636 W HCPCS: Mod: ER | Performed by: EMERGENCY MEDICINE

## 2025-01-08 PROCEDURE — 12041 INTMD RPR N-HF/GENIT 2.5CM/<: CPT | Mod: ER

## 2025-01-08 PROCEDURE — 99284 EMERGENCY DEPT VISIT MOD MDM: CPT | Mod: 25,ER

## 2025-01-08 PROCEDURE — 25000003 PHARM REV CODE 250: Mod: ER | Performed by: EMERGENCY MEDICINE

## 2025-01-08 RX ORDER — AMOXICILLIN AND CLAVULANATE POTASSIUM 875; 125 MG/1; MG/1
1 TABLET, FILM COATED ORAL
Status: COMPLETED | OUTPATIENT
Start: 2025-01-08 | End: 2025-01-08

## 2025-01-08 RX ORDER — LIDOCAINE HYDROCHLORIDE 10 MG/ML
10 INJECTION, SOLUTION EPIDURAL; INFILTRATION; INTRACAUDAL; PERINEURAL
Status: COMPLETED | OUTPATIENT
Start: 2025-01-08 | End: 2025-01-08

## 2025-01-08 RX ADMIN — AMOXICILLIN AND CLAVULANATE POTASSIUM 1 TABLET: 875; 125 TABLET, FILM COATED ORAL at 11:01

## 2025-01-08 RX ADMIN — LIDOCAINE HYDROCHLORIDE 100 MG: 10 INJECTION, SOLUTION EPIDURAL; INFILTRATION; INTRACAUDAL at 11:01

## 2025-01-09 ENCOUNTER — HOSPITAL ENCOUNTER (EMERGENCY)
Facility: HOSPITAL | Age: 45
Discharge: HOME | End: 2025-01-09
Attending: STUDENT IN AN ORGANIZED HEALTH CARE EDUCATION/TRAINING PROGRAM

## 2025-01-09 VITALS
RESPIRATION RATE: 17 BRPM | SYSTOLIC BLOOD PRESSURE: 177 MMHG | DIASTOLIC BLOOD PRESSURE: 91 MMHG | HEIGHT: 65 IN | HEART RATE: 98 BPM | TEMPERATURE: 98 F | OXYGEN SATURATION: 99 % | BODY MASS INDEX: 33.32 KG/M2 | WEIGHT: 200 LBS

## 2025-01-09 VITALS
DIASTOLIC BLOOD PRESSURE: 72 MMHG | WEIGHT: 200 LBS | HEART RATE: 66 BPM | TEMPERATURE: 98 F | RESPIRATION RATE: 20 BRPM | OXYGEN SATURATION: 98 % | SYSTOLIC BLOOD PRESSURE: 148 MMHG | BODY MASS INDEX: 33.28 KG/M2
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
## 2025-01-09 DIAGNOSIS — S61.307A AVULSION OF NAIL OF LEFT LITTLE FINGER: Primary | ICD-10-CM

## 2025-01-09 LAB
B-HCG UR QL: NEGATIVE
CTP QC/QA: YES

## 2025-01-09 PROCEDURE — 81025 URINE PREGNANCY TEST: CPT | Mod: ER | Performed by: NURSE PRACTITIONER

## 2025-01-09 PROCEDURE — 99283 EMERGENCY DEPT VISIT LOW MDM: CPT | Mod: 25,ER

## 2025-01-09 PROCEDURE — 25000003 PHARM REV CODE 250: Mod: ER

## 2025-01-09 RX ORDER — KETOROLAC TROMETHAMINE 10 MG/1
10 TABLET, FILM COATED ORAL EVERY 8 HOURS PRN
Qty: 15 TABLET | Refills: 0 | Status: SHIPPED | OUTPATIENT
Start: 2025-01-09

## 2025-01-09 RX ORDER — AMOXICILLIN AND CLAVULANATE POTASSIUM 875; 125 MG/1; MG/1
1 TABLET, FILM COATED ORAL 2 TIMES DAILY
Qty: 14 TABLET | Refills: 0 | Status: SHIPPED | OUTPATIENT
Start: 2025-01-09

## 2025-01-09 RX ORDER — HYDROCODONE BITARTRATE AND ACETAMINOPHEN 10; 325 MG/1; MG/1
1 TABLET ORAL EVERY 6 HOURS PRN
Qty: 12 TABLET | Refills: 0 | Status: SHIPPED | OUTPATIENT
Start: 2025-01-09 | End: 2025-01-12

## 2025-01-09 RX ORDER — ACETAMINOPHEN 325 MG/1
650 TABLET ORAL
Status: COMPLETED | OUTPATIENT
Start: 2025-01-09 | End: 2025-01-09

## 2025-01-09 RX ORDER — DEXTROMETHORPHAN HYDROBROMIDE, GUAIFENESIN 5; 100 MG/5ML; MG/5ML
650 LIQUID ORAL EVERY 8 HOURS
Qty: 42 TABLET | Refills: 0 | Status: SHIPPED | OUTPATIENT
Start: 2025-01-09 | End: 2025-01-23

## 2025-01-09 RX ORDER — HYDROCODONE BITARTRATE AND ACETAMINOPHEN 10; 325 MG/1; MG/1
1 TABLET ORAL
Status: COMPLETED | OUTPATIENT
Start: 2025-01-09 | End: 2025-01-09

## 2025-01-09 RX ADMIN — HYDROCODONE BITARTRATE AND ACETAMINOPHEN 1 TABLET: 10; 325 TABLET ORAL at 07:01

## 2025-01-09 RX ADMIN — ACETAMINOPHEN 650 MG: 325 TABLET ORAL at 07:01

## 2025-01-09 NOTE — Clinical Note
"Ihsia N "Ihsibraxton Machado was seen and treated in our emergency department on 1/9/2025.  She may return to work on 01/16/2025.       If you have any questions or concerns, please don't hesitate to call.      Harleen Summers PA-C"

## 2025-01-09 NOTE — ED NOTES
Discharge education provided. Pt verbalized understanding. Finger split applied. AVS provided. Pt discharged.

## 2025-01-09 NOTE — FIRST PROVIDER EVALUATION
Emergency Department TeleTriage Encounter Note      CHIEF COMPLAINT    Chief Complaint   Patient presents with    Hand Pain     Pt seen last night for dog bite on left hand and would like something stronger for her pain than toradol. No xrays were taken       VITAL SIGNS   Initial Vitals [01/09/25 1643]   BP Pulse Resp Temp SpO2   (!) 148/72 66 18 98.3 °F (36.8 °C) 98 %      MAP       --            ALLERGIES    Review of patient's allergies indicates:   Allergen Reactions    Aspirin Other (See Comments)     Abdominal cramping       PROVIDER TRIAGE NOTE  Verbal consent for the teletriage evaluation was given by the patient at the start of the evaluation.  All efforts will be made to maintain patient's privacy during the evaluation.      This is a teletriage evaluation of a 44 y.o. female presenting to the ED with c/o dog bite to left pinky; stitches placed.  RX pain meds not helping. Limited physical exam via telehealth: The patient is awake, alert, answering questions appropriately and is not in respiratory distress.  As the Teletriage provider, I performed an initial assessment and ordered appropriate labs and imaging studies, if any, to facilitate the patient's care once placed in the ED. Once a room is available, care and a full evaluation will be completed by an alternate ED provider.  Any additional orders and the final disposition will be determined by that provider.  All imaging and labs will not be followed-up by the Teletriage Team, including myself.         ORDERS  Labs Reviewed   POCT URINE PREGNANCY       ED Orders (720h ago, onward)      Start Ordered     Status Ordering Provider    01/09/25 1652 01/09/25 1651  POCT urine pregnancy  Once         Ordered MYCHAL QUEZADA    01/09/25 1652 01/09/25 1651  Weigh patient  Once         Ordered MYCHAL QUEZADA              Virtual Visit Note: The provider triage portion of this emergency department evaluation and documentation was performed via [employer], a  HIPAA-compliant telemedicine application, in concert with a tele-presenter in the room. A face to face patient evaluation with one of my colleagues will occur once the patient is placed in an emergency department room.      DISCLAIMER: This note was prepared with [employer] voice recognition transcription software. Garbled syntax, mangled pronouns, and other bizarre constructions may be attributed to that software system.

## 2025-01-10 ENCOUNTER — HOSPITAL ENCOUNTER (EMERGENCY)
Facility: HOSPITAL | Age: 45
Discharge: HOME | End: 2025-01-10
Attending: STUDENT IN AN ORGANIZED HEALTH CARE EDUCATION/TRAINING PROGRAM

## 2025-01-10 VITALS
WEIGHT: 200 LBS | TEMPERATURE: 98 F | HEIGHT: 65 IN | HEART RATE: 66 BPM | BODY MASS INDEX: 33.32 KG/M2 | DIASTOLIC BLOOD PRESSURE: 74 MMHG | OXYGEN SATURATION: 95 % | RESPIRATION RATE: 13 BRPM | SYSTOLIC BLOOD PRESSURE: 122 MMHG

## 2025-01-10 DIAGNOSIS — S61.317A LACERATION OF LEFT LITTLE FINGER WITH DAMAGE TO NAIL, FOREIGN BODY PRESENCE UNSPECIFIED, INITIAL ENCOUNTER: ICD-10-CM

## 2025-01-10 DIAGNOSIS — Z51.89 VISIT FOR WOUND CHECK: Primary | ICD-10-CM

## 2025-01-10 PROCEDURE — 99282 EMERGENCY DEPT VISIT SF MDM: CPT | Mod: ER

## 2025-01-10 NOTE — DISCHARGE INSTRUCTIONS
Your pain will be better managed with a multimodal approach.  Please continue taking the Toradol as prescribed.  Please take the Tylenol as scheduled.  Take the Norco for breakthrough pain.    You have been prescribed Toradol for pain. This is an Non-Steroidal Anti-Inflammatory (NSAID) Medication. Please do not take any additional NSAIDs while you are taking this medication including (Advil, Aleve, Motrin, Ibuprofen, Mobic\meloxicam, Naprosyn, Toradol, ketoralac, etc.). Please stop taking this medication if you experience: weakness, itching, yellow skin or eyes, joint pains, vomiting blood, blood or black stools, unusual weight gain, or swelling in your arms, legs, hands, or feet.     You have been prescribed Norco (Hydrocodone) for pain. Please do not take this medication while working, drinking alcohol, swimming, or while driving/operating heavy machinery. This medication may cause drowsiness, dizziness, impair judgment, and reduce physical capabilities.You should not drive, operate heavy machinery, or make life changing decisions while taking this medication.    This medication contains Tylenol. Please do not take any additional Tylenol than what was prescribed while you are taking this medication.     While in the Emergency Department you received medication that may cause drowsiness, dizziness, impaired judgment, and reduced physical capabilities. You should not drive, operate heavy machinery, swim, or make life changing decisions within 24 hours of receiving this medication.

## 2025-01-10 NOTE — ED PROVIDER NOTES
Encounter Date: 2025       History     Chief Complaint   Patient presents with    Hand Pain     Pt seen last night for dog bite on left hand and would like something stronger for her pain than toradol. No xrays were taken     Sherri Machado is a 44 y.o. female  has a past medical history of Allergy, IBS (irritable bowel syndrome), Osteoarthritis, Renal abscess, Sickle cell trait, and Urinary tract infection. presenting to the Emergency Department for persistent pain following a dog bite yesterday.  Patient was seen in the emergency room yesterday.  Patient reporting pain in her hand.  Patient has picked up antibiotic today and started taking today.  Patient's hand wrapped and splinted with Coban.  No other complaints at this time        The history is provided by the patient.     Review of patient's allergies indicates:   Allergen Reactions    Aspirin Other (See Comments)     Abdominal cramping     Past Medical History:   Diagnosis Date    Allergy     IBS (irritable bowel syndrome)     Osteoarthritis     Renal abscess 2023    Sickle cell trait     Urinary tract infection      Past Surgical History:   Procedure Laterality Date    ANKLE SURGERY      left     SECTION, CLASSIC      x3    CHOLECYSTECTOMY      CYSTOSCOPY Right 2023    Procedure: CYSTOSCOPY;  Surgeon: Kaylie Blevins MD;  Location: [facility] OR;  Service: Urology;  Laterality: Right;    CYSTOSCOPY WITH URETEROSCOPY, RETROGRADE PYELOGRAPHY, AND INSERTION OF STENT Right 2023    Procedure: CYSTOSCOPY, WITH RETROGRADE PYELOGRAM AND URETERAL STENT INSERTION;  Surgeon: Kip Beckham MD;  Location: [facility] OR;  Service: Urology;  Laterality: Right;    CYSTOURETEROSCOPY, WITH HOLMIUM LASER LITHOTRIPSY OF URETERAL CALCULUS AND STENT INSERTION Right 2023    Procedure: CYSTOURETEROSCOPY, WITH HOLMIUM LASER LITHOTRIPSY OF URETERAL CALCULUS AND STENT INSERTION;  Surgeon: Kip Beckham MD;  Location: [facility] OR;  Service: Urology;   Laterality: Right;  Please have semirigid ureteroscope, digital flexible ureteroscope, major laser, and tech (Alexis) if available.    CYSTOURETEROSCOPY,WITH HOLMIUM LASER LITHOTRIPSY OF URETERAL CALCULUS Right 8/18/2023    Procedure: CYSTOURETEROSCOPY,WITH HOLMIUM LASER LITHOTRIPSY OF URETERAL CALCULUS;  Surgeon: Kip Beckham MD;  Location: [facility];  Service: Urology;  Laterality: Right;    EXTRACTION - STONE Right 8/18/2023    Procedure: EXTRACTION - STONE;  Surgeon: Kip Beckham MD;  Location: [facility] OR;  Service: Urology;  Laterality: Right;    RETROGRADE PYELOGRAPHY Right 6/22/2023    Procedure: PYELOGRAM, RETROGRADE;  Surgeon: Kaylie Blevins MD;  Location: [facility] OR;  Service: Urology;  Laterality: Right;    URETERAL STENT PLACEMENT Right 6/22/2023    Procedure: INSERTION, STENT, URETER;  Surgeon: Kaylie Blevins MD;  Location: [facility] OR;  Service: Urology;  Laterality: Right;     No family history on file.  Social History     Tobacco Use    Smoking status: Never    Smokeless tobacco: Never   Substance Use Topics    Alcohol use: No    Drug use: No     Review of Systems   Musculoskeletal:  Positive for arthralgias.   Skin:  Positive for wound.   All other systems reviewed and are negative.      Physical Exam     Initial Vitals [01/09/25 1643]   BP Pulse Resp Temp SpO2   (!) 148/72 66 18 98.3 °F (36.8 °C) 98 %      MAP       --         Physical Exam    Nursing note and vitals reviewed.  Constitutional: She appears well-developed and well-nourished. She is not diaphoretic.  Non-toxic appearance. No distress.   HENT:   Head: Normocephalic and atraumatic.   Right Ear: Hearing and external ear normal.   Left Ear: Hearing and external ear normal.   Eyes: EOM are normal.   Neck: Neck supple.   Normal range of motion.  Cardiovascular:  Normal rate.           Pulmonary/Chest: No respiratory distress.   Abdominal: She exhibits no distension.   Musculoskeletal:         General: Normal range of motion.        Hands:        Cervical back: Normal range of motion and neck supple. Normal range of motion.     Neurological: She is alert and oriented to person, place, and time. GCS score is 15. GCS eye subscore is 4. GCS verbal subscore is 5. GCS motor subscore is 6.   Skin: Skin is warm and dry.   Psychiatric: She has a normal mood and affect. Her behavior is normal. Judgment and thought content normal.         ED Course   Procedures  Labs Reviewed   POCT URINE PREGNANCY       Result Value    POC Preg Test, Ur Negative       Acceptable Yes            Imaging Results              X-Ray Finger 2 or More Views Left (Final result)  Result time 01/09/25 18:27:04      Final result by Karl Mera MD (01/09/25 18:27:04)                   Impression:      No acute abnormality.  Recommend follow-up if symptoms persist.      Electronically signed by: Karl Mera  Date:    01/09/2025  Time:    18:27               Narrative:    EXAMINATION:  XR FINGER 2 OR MORE VIEWS LEFT    CLINICAL HISTORY:  XR FINGER 2 OR MORE VIEWS LEFT    COMPARISON:  None    FINDINGS:  Multiple radiographic views  were obtained.    No evidence of acute fracture or dislocation.  Bony mineralization is normal.  Soft tissues are unremarkable.                                       Medications   HYDROcodone-acetaminophen  mg per tablet 1 tablet (1 tablet Oral Given 1/9/25 1905)   acetaminophen tablet 650 mg (650 mg Oral Given 1/9/25 1906)     Medical Decision Making  This is an emergent evaluation of 44 y.o. female in the ED presenting for orthopedic complaint. Physical exam reveals a non-toxic, afebrile, and well-appearing female in no apparent respiratory distress. Pertinent physical exam findings above. Vital signs reveal hypertension. If available, previous records reviewed.    Offered patient digital block for finger pain.  Patient has declined.     My overall impression is :  Avulsion of nail of left little finger (Primary)  Dog bite, subsequent  encounter  . Differential Diagnoses: Including but not limited to Sprain/strain, fracture, contusion, dislocation    Discharge Meds/Instructions: RICE method.  Supportive.  PCP follow up.    There does not appear to be any indication for further emergent testing, observation, or hospitalization at this time. A mutual shared decision making discussion was had with the patient. Patient appears stable for and is comfortable with discharge home. The diagnosis, treatment plan, instructions for follow-up as well as ED return precautions were discussed. Advised to follow-up with PCP for outpatient follow-up in 2-3 days. Signs and symptoms that would warrant immediate return to ED were reviewed prior to discharge. All questions and concerns were asked, answered, and addressed. Patient expressed understanding and agreement with the plan.     Amount and/or Complexity of Data Reviewed  Labs: ordered. Decision-making details documented in ED Course.  Radiology: ordered and independent interpretation performed. Decision-making details documented in ED Course.    Risk  OTC drugs.  Prescription drug management.               ED Course as of 01/09/25 1912   Thu Jan 09, 2025   1713 hCG Qualitative, Urine: Negative [LH]   1829 X-Ray Finger 2 or More Views Left  Independent interpretation by me: no acute fracture. I have read the radiologist's report and agree with their findings.   [LH]      ED Course User Index  [LH] Harleen Summers PA-C                           Clinical Impression:  Final diagnoses:  [S61.307A] Avulsion of nail of left little finger (Primary)  [W54.0XXD] Dog bite, subsequent encounter          ED Disposition Condition    Discharge Stable          ED Prescriptions       Medication Sig Dispense Start Date End Date Auth. Provider    HYDROcodone-acetaminophen (NORCO)  mg per tablet Take 1 tablet by mouth every 6 (six) hours as needed for Pain. 12 tablet 1/9/2025 1/12/2025 Harleen Summers PA-C    acetaminophen (TYLENOL)  650 MG TbSR Take 1 tablet (650 mg total) by mouth every 8 (eight) hours. for 14 days 42 tablet 1/9/2025 1/23/2025 Harleen Summers PA-C          Follow-up Information    None          Harleen Summers PA-C  01/09/25 1912

## 2025-01-10 NOTE — ED PROVIDER NOTES
ED Provider Note - 2025    History     Chief Complaint   Patient presents with    Animal Bite     Pt bit by cousin's dog (maximo). Laceration to left hand pinky finger.      Patient currently presents with concern regarding dog bite.  This is localized to the left 5th digit with the patient demonstrates a laceration to the volar aspect of the digit as well as complete avulsion of the fingernail.  This occurred just PTA.  Dog belongs to a friend of the patient.   Dept has been notified at this point.  Tetanus prophylaxis  is known to be UTD ().          Review of patient's allergies indicates:   Allergen Reactions    Aspirin Other (See Comments)     Abdominal cramping     Past Medical History:   Diagnosis Date    Allergy     IBS (irritable bowel syndrome)     Osteoarthritis     Renal abscess 2023    Sickle cell trait     Urinary tract infection      Past Surgical History:   Procedure Laterality Date    ANKLE SURGERY      left     SECTION, CLASSIC      x3    CHOLECYSTECTOMY      CYSTOSCOPY Right 2023    Procedure: CYSTOSCOPY;  Surgeon: Kaylie Blevins MD;  Location: [facility] OR;  Service: Urology;  Laterality: Right;    CYSTOSCOPY WITH URETEROSCOPY, RETROGRADE PYELOGRAPHY, AND INSERTION OF STENT Right 2023    Procedure: CYSTOSCOPY, WITH RETROGRADE PYELOGRAM AND URETERAL STENT INSERTION;  Surgeon: Kip Beckham MD;  Location: [facility] OR;  Service: Urology;  Laterality: Right;    CYSTOURETEROSCOPY, WITH HOLMIUM LASER LITHOTRIPSY OF URETERAL CALCULUS AND STENT INSERTION Right 2023    Procedure: CYSTOURETEROSCOPY, WITH HOLMIUM LASER LITHOTRIPSY OF URETERAL CALCULUS AND STENT INSERTION;  Surgeon: Kip Beckham MD;  Location: [facility] OR;  Service: Urology;  Laterality: Right;  Please have semirigid ureteroscope, digital flexible ureteroscope, major laser, and tech (Alexis) if available.    CYSTOURETEROSCOPY,WITH HOLMIUM LASER LITHOTRIPSY OF URETERAL CALCULUS Right 2023     "Procedure: CYSTOURETEROSCOPY,WITH HOLMIUM LASER LITHOTRIPSY OF URETERAL CALCULUS;  Surgeon: Kip Beckham MD;  Location: [facility] OR;  Service: Urology;  Laterality: Right;    EXTRACTION - STONE Right 8/18/2023    Procedure: EXTRACTION - STONE;  Surgeon: Kip Beckham MD;  Location: [facility] OR;  Service: Urology;  Laterality: Right;    RETROGRADE PYELOGRAPHY Right 6/22/2023    Procedure: PYELOGRAM, RETROGRADE;  Surgeon: Kaylie Blevins MD;  Location: [facility] OR;  Service: Urology;  Laterality: Right;    URETERAL STENT PLACEMENT Right 6/22/2023    Procedure: INSERTION, STENT, URETER;  Surgeon: Kaylie Blevins MD;  Location: [facility] OR;  Service: Urology;  Laterality: Right;     No family history on file.  Social History     Tobacco Use    Smoking status: Never    Smokeless tobacco: Never   Substance Use Topics    Alcohol use: No    Drug use: No     Review of Systems   Constitutional:  Negative for chills and fever.   HENT:  Negative for congestion and rhinorrhea.    Respiratory:  Negative for cough and shortness of breath.    Cardiovascular:  Negative for chest pain and palpitations.   Gastrointestinal:  Negative for abdominal pain, diarrhea and vomiting.   Genitourinary:  Negative for difficulty urinating and dysuria.   Skin:  Negative for color change and rash.   Neurological:  Negative for dizziness and light-headedness.   Hematological:  Negative for adenopathy. Does not bruise/bleed easily.       Physical Exam     Initial Vitals [01/08/25 2231]   BP Pulse Resp Temp SpO2   (!) 194/93 99 18 98.7 °F (37.1 °C) 98 %      MAP       --         Vitals:    01/08/25 2231 01/09/25 0116   BP: (!) 194/93 (!) 177/91   Pulse: 99 98   Resp: 18 17   Temp: 98.7 °F (37.1 °C) 98.2 °F (36.8 °C)   SpO2: 98% 99%   Weight: 90.7 kg (200 lb)    Height: 5' 5" (1.651 m)      Physical Exam    Nursing note and vitals reviewed.  Constitutional: She appears well-developed and well-nourished. She is not diaphoretic. No distress.   HENT:   Head: " Normocephalic and atraumatic.   Nose: Nose normal. Mouth/Throat: Oropharynx is clear and moist.   Eyes: Conjunctivae are normal. No scleral icterus.   Cardiovascular:  Normal rate, regular rhythm and intact distal pulses.           Pulmonary/Chest: No respiratory distress.   Musculoskeletal:         General: No edema. Normal range of motion.        Hands:       Comments: Patient is able to demonstrate flexion and abduction/adduction as well as extension of the affected digit.  Tissue is pink and warm with the appropriate capillary refill.  Pulses strongly palpable at the wrist.  Sensation appears to be intact distally as well.     Neurological: She is alert and oriented to person, place, and time.   Skin: Skin is warm and dry.         ED Course   Lac Repair    Date/Time: 1/9/2025 1:20 AM    Performed by: Dano Velazquez MD  Authorized by: Dano Velazquez MD    Consent:     Consent obtained:  Verbal    Consent given by:  Patient    Risks, benefits, and alternatives were discussed: yes      Risks discussed:  Poor wound healing, poor cosmetic result and infection    Alternatives discussed:  No treatment  Universal protocol:     Patient identity confirmed:  Verbally with patient  Anesthesia:     Anesthesia method:  Nerve block    Block location:  Digital block of the left 5th finger    Block needle gauge:  27 G    Block anesthetic:  Lidocaine 1% w/o epi    Block injection procedure:  Anatomic landmarks identified, introduced needle, incremental injection and negative aspiration for blood    Block outcome:  Anesthesia achieved  Laceration details:     Location:  Finger    Finger location:  L small finger    Length (cm):  2.5  Pre-procedure details:     Preparation:  Patient was prepped and draped in usual sterile fashion  Exploration:     Hemostasis achieved with:  Direct pressure    Imaging outcome: foreign body not noted      Wound exploration: wound explored through full range of motion and entire depth of  wound visualized      Wound extent: no fascia violation noted and no foreign bodies/material noted      Contaminated: no    Treatment:     Area cleansed with:  Betadine    Amount of cleaning:  Extensive  Skin repair:     Repair method:  Sutures    Suture size:  4-0    Suture material:  Prolene    Suture technique:  Simple interrupted    Number of sutures:  7  Approximation:     Approximation:  Close  Repair type:     Repair type:  Simple  Post-procedure details:     Dressing:  Sterile dressing and splint for protection    Procedure completion:  Tolerated well, no immediate complications  Comments:      Foam metal splint was applied to the digit per nursing staff.  Digit remains pink and warm with sensation and perfusion grossly intact                     MDM        LABS     Labs Reviewed   POCT URINE PREGNANCY       Result Value    POC Preg Test, Ur Negative       Acceptable Yes                  Imaging     Imaging Results    None                EKG        ED Management/Discussion     Medications   LIDOcaine (PF) 10 mg/ml (1%) injection 100 mg (100 mg Intradermal Given by Provider 1/8/25 2318)   amoxicillin-clavulanate 875-125mg per tablet 1 tablet (1 tablet Oral Given 1/8/25 2318)                 The patient's list of active medical problems, social history, medications, and allergies as documented per RN staff has been reviewed.               On final assessment, the patient appears suitable for discharge.  Patient provided with an initial dose of antibiotics as well as a prescription over the next week given the extensive nature of the wound and proximity to the volar tendons.  Patient also provided with analgesia.  We have placed a request for outpatient follow-up with hand specialist Dr. High.    I see no indication of an emergent process beyond that addressed during our encounter but have duly counseled the patient/family regarding the need for prompt follow-up as well as the indications that  should prompt immediate return to the emergency room.  The patient/family has been provided with language -specific verbal and printed direction regarding our final diagnosis(es) as well as instructions regarding use of OTC and/or Rx medications intended to manage the patient's aforementioned conditions including:  ED Prescriptions       Medication Sig Dispense Start Date End Date Auth. Provider    amoxicillin-clavulanate 875-125mg (AUGMENTIN) 875-125 mg per tablet Take 1 tablet by mouth 2 (two) times daily. 14 tablet 1/9/2025 -- Dano Velazquez MD    ketorolac (TORADOL) 10 mg tablet Take 1 tablet (10 mg total) by mouth every 8 (eight) hours as needed for Pain. 15 tablet 1/9/2025 -- Dano Velazquez MD              Patient has been advised of the following recommended follow-up instructions:  Follow-up Information       Follow up With Specialties Details Why Contact Info    PCP  Schedule an appointment as soon as possible for a visit  for reassessment     [facility] Emergency Dept Emergency Medicine Go to  As needed, If symptoms worsen [address] [facility]  Emergency Department  [facility] [address] [phone]          The patient/family communicates understanding of all this information and all remaining questions and concerns were addressed at this time.      Referrals:  Orders Placed This Encounter   Procedures    Ambulatory referral/consult to Hand Surgery     Standing Status:   Future     Standing Expiration Date:   2/9/2026     Referral Priority:   Routine     Referral Type:   Surgical     Referral Reason:   Specialty Services Required     Referred to Provider:   Christoph High Jr., MD     Requested Specialty:   Orthopedic Surgery     Number of Visits Requested:   1       CLINICAL IMPRESSION    ICD-10-CM ICD-9-CM   1. Open wound of left little finger due to dog bite  S61.257A 883.0    W54.0XXA E906.0   2. Avulsion of nail of left little finger  S61.307A 883.0          ED Disposition  Condition    Discharge Stable                 Dano Velazquez MD  01/09/25 6505

## 2025-01-11 NOTE — ED NOTES
Pt c/o popped suture to sutures on L pinkie finger, she rec'd yesterday for a dog bite. Adipose tissure protruding from sutures. No drainage noted. Pt keeps falling asleep, states she took a percocet 10 at 5 pm. Pt is A & O x 3, denies SOB, fever, chills and N/V/D. No obvious respiratory distress noted. Respirations are even and unlabored. Skin is warm and dry w/ pink mucosa. TAYLER x 3mm. BBS- CTA . Abd- SNT. PSM x 4 exts. Bed is locked, in the low position and locked for safety. Call bell @ BS. Will continue to monitor closely.

## 2025-01-11 NOTE — ED PROVIDER NOTES
Encounter Date: 1/10/2025       History     Chief Complaint   Patient presents with    Wound Check     Pt reports coming to the ER yesterday after a dog bite to left hand (5th digit). Pt reports being concerned that one stitch was missing and wound was not closed all the way.      This is a 44-year-old  female that presents to the ED for the 2nd time in 24 hours were wound check status post dog bite 2 days ago.  Yesterday she had sutures placed but was concerned that the wound remained to open and wanted it to be evaluated.  She has been compliant with her Augmentin and has been wearing her finger splint as directed.  She denies any fever or purulent drainage from the site.  She really denies no deterioration or new symptoms.  She simply wanted a wound check to make sure everything was healing appropriately.      Review of patient's allergies indicates:   Allergen Reactions    Aspirin Other (See Comments)     Abdominal cramping     Past Medical History:   Diagnosis Date    Allergy     IBS (irritable bowel syndrome)     Osteoarthritis     Renal abscess 2023    Sickle cell trait     Urinary tract infection      Past Surgical History:   Procedure Laterality Date    ANKLE SURGERY      left     SECTION, CLASSIC      x3    CHOLECYSTECTOMY      CYSTOSCOPY Right 2023    Procedure: CYSTOSCOPY;  Surgeon: Kaylie Blevins MD;  Location: [facility] OR;  Service: Urology;  Laterality: Right;    CYSTOSCOPY WITH URETEROSCOPY, RETROGRADE PYELOGRAPHY, AND INSERTION OF STENT Right 2023    Procedure: CYSTOSCOPY, WITH RETROGRADE PYELOGRAM AND URETERAL STENT INSERTION;  Surgeon: Kip Beckham MD;  Location: [facility] OR;  Service: Urology;  Laterality: Right;    CYSTOURETEROSCOPY, WITH HOLMIUM LASER LITHOTRIPSY OF URETERAL CALCULUS AND STENT INSERTION Right 2023    Procedure: CYSTOURETEROSCOPY, WITH HOLMIUM LASER LITHOTRIPSY OF URETERAL CALCULUS AND STENT INSERTION;  Surgeon: Kip Beckham MD;   Location: [facility] OR;  Service: Urology;  Laterality: Right;  Please have semirigid ureteroscope, digital flexible ureteroscope, major laser, and tech (Alexis) if available.    CYSTOURETEROSCOPY,WITH HOLMIUM LASER LITHOTRIPSY OF URETERAL CALCULUS Right 8/18/2023    Procedure: CYSTOURETEROSCOPY,WITH HOLMIUM LASER LITHOTRIPSY OF URETERAL CALCULUS;  Surgeon: Kip Beckham MD;  Location: [facility] OR;  Service: Urology;  Laterality: Right;    EXTRACTION - STONE Right 8/18/2023    Procedure: EXTRACTION - STONE;  Surgeon: Kip Beckham MD;  Location: [facility] OR;  Service: Urology;  Laterality: Right;    RETROGRADE PYELOGRAPHY Right 6/22/2023    Procedure: PYELOGRAM, RETROGRADE;  Surgeon: Kaylie Blevins MD;  Location: [facility] OR;  Service: Urology;  Laterality: Right;    URETERAL STENT PLACEMENT Right 6/22/2023    Procedure: INSERTION, STENT, URETER;  Surgeon: Kaylie Blevins MD;  Location: [facility] OR;  Service: Urology;  Laterality: Right;     No family history on file.  Social History     Tobacco Use    Smoking status: Never    Smokeless tobacco: Never   Substance Use Topics    Alcohol use: No    Drug use: No     Review of Systems   Constitutional:  Negative for fever.   Respiratory:  Negative for cough and shortness of breath.    Cardiovascular:  Negative for chest pain and palpitations.   Gastrointestinal:  Negative for nausea and vomiting.   Skin:  Positive for wound.   Psychiatric/Behavioral:  The patient is nervous/anxious.        Physical Exam     Initial Vitals [01/10/25 1909]   BP Pulse Resp Temp SpO2   125/80 65 18 97.7 °F (36.5 °C) 100 %      MAP       --         Physical Exam    Constitutional: She appears well-developed and well-nourished.   HENT:   Head: Normocephalic and atraumatic.   Cardiovascular:  Normal rate, regular rhythm and normal heart sounds.           Pulmonary/Chest: Breath sounds normal. She has no wheezes.   Musculoskeletal:      Right hand: Normal.      Left hand: Swelling, laceration and tenderness present.  No bony tenderness. Decreased range of motion. Normal strength. Normal sensation. There is no disruption of two-point discrimination. Normal capillary refill. Normal pulse.      Comments: Well healing laceration noted to the lateral aspect of the left 5th phalanx with 7 sutures in place.  There is no active bleeding or drainage.  The patient has slightly decreased range of motion secondary to the sutures and swelling.  Motor and sensory intact.  2+ radial pulse.     Neurological: She is alert and oriented to person, place, and time.   Psychiatric: She has a normal mood and affect. Thought content normal.         ED Course   Procedures  Labs Reviewed - No data to display       Imaging Results    None          Medications - No data to display  Medical Decision Making  This is a 44-year-old  female that presents the ED for a wound check to a laceration to the left little finger that occurred 2 days ago after being bitten by a dog.  On arrival the patient is afebrile and nontoxic-appearing with stable vital signs.  Differential diagnoses include but are not limited to:  Visit for wound check, wound dehiscence, cellulitis, abscess, suspected condition not found, normal healing.  Please see physical exam for further details.  I believe the patient's wound is healing appropriately and does not need any emergent intervention today.  I have instructed the patient to continue wearing her finger splint and to keep the wound clean, dry, and open to air when possible.  I have discouraged her from covering the area unless she is really concerned that it may get dirty or get something in it.  She is to complete the entire course of her Augmentin even if she begins to feel better.  She should return to the ED for suture removal in the next 5-7 days.  Otherwise she should have PCP follow up as needed.  She can also return to the ED sooner for any concern or worsening.  She verbalized understanding and was agreeable  to the treatment plan.                                      Clinical Impression:  Final diagnoses:  [Z51.89] Visit for wound check (Primary)  [U22.753P] Laceration of left little finger with damage to nail, foreign body presence unspecified, initial encounter          ED Disposition Condition    Discharge Stable          ED Prescriptions    None       Follow-up Information       Follow up With Specialties Details Why Contact Info    [facility] Emergency Dept Emergency Medicine  If symptoms worsen [facility] [facility]  Emergency Department  [facility] [id number] [phone]    Your doctor  Schedule an appointment as soon as possible for a visit  As needed     [facility] Emergency Dept Emergency Medicine In 1 week For suture removal [address] [facility]  Emergency Department  [facility] [id number] [phone]             Linwood Kirkpatrick PA-C  01/10/25 1937

## 2025-01-19 ENCOUNTER — HOSPITAL ENCOUNTER (EMERGENCY)
Facility: HOSPITAL | Age: 45
Discharge: HOME | End: 2025-01-19
Attending: EMERGENCY MEDICINE

## 2025-01-19 VITALS
BODY MASS INDEX: 33.32 KG/M2 | HEART RATE: 56 BPM | HEIGHT: 65 IN | DIASTOLIC BLOOD PRESSURE: 77 MMHG | TEMPERATURE: 98 F | OXYGEN SATURATION: 100 % | WEIGHT: 200 LBS | SYSTOLIC BLOOD PRESSURE: 169 MMHG | RESPIRATION RATE: 18 BRPM

## 2025-01-19 DIAGNOSIS — Z51.89 VISIT FOR WOUND CHECK: Primary | ICD-10-CM

## 2025-01-19 PROCEDURE — 25000003 PHARM REV CODE 250: Mod: ER | Performed by: EMERGENCY MEDICINE

## 2025-01-19 PROCEDURE — 99283 EMERGENCY DEPT VISIT LOW MDM: CPT | Mod: ER

## 2025-01-19 RX ORDER — FLUCONAZOLE 150 MG/1
150 TABLET ORAL
Status: COMPLETED | OUTPATIENT
Start: 2025-01-19 | End: 2025-01-19

## 2025-01-19 RX ADMIN — FLUCONAZOLE 150 MG: 150 TABLET ORAL at 03:01

## 2025-01-19 NOTE — ED PROVIDER NOTES
Encounter Date: 2025       History     Chief Complaint   Patient presents with    Suture / Staple Removal     Pt had stitches placed on left pinky placed on . Denies fever, chills. Pt is also asking for diflucan for yeast infection from antibiotics she was given     44-year-old female presenting for wound check of sutured laceration to L little finger.  Patient says she was concerned that the wound was opening.  She says she was initially given a finger splint but she has been not wearing it all the time because she can not wear to work.  When she goes to work she wears gloves and just wraps it.  Patient also requesting Diflucan because she says she has a yeast infection because of the antibiotics that were prescribed previously      Review of patient's allergies indicates:   Allergen Reactions    Aspirin Other (See Comments)     Abdominal cramping     Past Medical History:   Diagnosis Date    Allergy     IBS (irritable bowel syndrome)     Osteoarthritis     Renal abscess 2023    Sickle cell trait     Urinary tract infection      Past Surgical History:   Procedure Laterality Date    ANKLE SURGERY      left     SECTION, CLASSIC      x3    CHOLECYSTECTOMY      CYSTOSCOPY Right 2023    Procedure: CYSTOSCOPY;  Surgeon: Kaylie Blevins MD;  Location: [facility] OR;  Service: Urology;  Laterality: Right;    CYSTOSCOPY WITH URETEROSCOPY, RETROGRADE PYELOGRAPHY, AND INSERTION OF STENT Right 2023    Procedure: CYSTOSCOPY, WITH RETROGRADE PYELOGRAM AND URETERAL STENT INSERTION;  Surgeon: Kip Beckham MD;  Location: [facility] OR;  Service: Urology;  Laterality: Right;    CYSTOURETEROSCOPY, WITH HOLMIUM LASER LITHOTRIPSY OF URETERAL CALCULUS AND STENT INSERTION Right 2023    Procedure: CYSTOURETEROSCOPY, WITH HOLMIUM LASER LITHOTRIPSY OF URETERAL CALCULUS AND STENT INSERTION;  Surgeon: Kip Beckham MD;  Location: [facility] OR;  Service: Urology;  Laterality: Right;  Please have semirigid  ureteroscope, digital flexible ureteroscope, major laser, and tech (Alexis) if available.    CYSTOURETEROSCOPY,WITH HOLMIUM LASER LITHOTRIPSY OF URETERAL CALCULUS Right 8/18/2023    Procedure: CYSTOURETEROSCOPY,WITH HOLMIUM LASER LITHOTRIPSY OF URETERAL CALCULUS;  Surgeon: Kip Beckham MD;  Location: [facility] OR;  Service: Urology;  Laterality: Right;    EXTRACTION - STONE Right 8/18/2023    Procedure: EXTRACTION - STONE;  Surgeon: Kip Beckham MD;  Location: [facility] OR;  Service: Urology;  Laterality: Right;    RETROGRADE PYELOGRAPHY Right 6/22/2023    Procedure: PYELOGRAM, RETROGRADE;  Surgeon: Kaylie Blevins MD;  Location: [facility] OR;  Service: Urology;  Laterality: Right;    URETERAL STENT PLACEMENT Right 6/22/2023    Procedure: INSERTION, STENT, URETER;  Surgeon: Kaylie Blevins MD;  Location: [facility] OR;  Service: Urology;  Laterality: Right;     No family history on file.  Social History     Tobacco Use    Smoking status: Never    Smokeless tobacco: Never   Substance Use Topics    Alcohol use: No    Drug use: No     Review of Systems   Genitourinary:  Positive for vaginal discharge.   Skin:  Positive for wound.       Physical Exam     Initial Vitals [01/19/25 1515]   BP Pulse Resp Temp SpO2   (!) 169/77 (!) 56 18 98.1 °F (36.7 °C) 100 %      MAP       --         Physical Exam    Nursing note and vitals reviewed.  HENT:   Head: Atraumatic.     Neurological: She is alert and oriented to person, place, and time.   Skin:   Laceration to the palmar aspect of the left little finger.  Sutures in place.  Mild dehiscence noted.  One suture had become dislodge and was sticking to neighboring suture.  Fell out during exam.         ED Course   Procedures  Labs Reviewed - No data to display       Imaging Results    None          Medications   fluconazole tablet 150 mg (150 mg Oral Given 1/19/25 1540)     Medical Decision Making  44-year-old female presenting for wound check of laceration that was sutured on 1/8.  It appears that  because patient has not been wearing her splint, he has had some dehiscence therefore not fully healing appropriately.  Recommended she return in 1 week for suture removal.  She should wear splint at all times.  Will give Diflucan for yeast infection her request    Risk  Prescription drug management.                                      Clinical Impression:  Final diagnoses:  [Z51.89] Visit for wound check (Primary)          ED Disposition Condition    Discharge Stable          ED Prescriptions    None       Follow-up Information       Follow up With Specialties Details Why Contact Info    [facility] - Emergency Dept Emergency Medicine Go in 1 week For suture removal [address] [facility]  Emergency Department  [facility] [id number] [phone]             Luis Garcia MD  01/19/25 2719

## (undated) DEVICE — CONTAINER MULTIPURPOSE/SPECIME

## (undated) DEVICE — DRAPE T CYSTOSCOPY STERILE

## (undated) DEVICE — GAUZE SPONGE 4X4 12PLY

## (undated) DEVICE — JELLY LUBRICANT STERILE 4 OZ

## (undated) DEVICE — SOL IRR NACL .9% 3000ML

## (undated) DEVICE — SET IRR URLGY 2LINE UNIV SPIKE

## (undated) DEVICE — TOWEL OR DISP STRL BLUE 4/PK

## (undated) DEVICE — SEE MEDLINE ITEM 154981

## (undated) DEVICE — GUIDEWIRE NITINOL HYBRID 150CM

## (undated) DEVICE — TRAY CATH FOL SIL URIMTR 16FR

## (undated) DEVICE — EXTRACTOR STONE 4WR NIT 2.2F 1

## (undated) DEVICE — CATH URTRL OPEN END STR TIP 5F

## (undated) DEVICE — GLOVE BIOGEL SKINSENSE PI 7.0

## (undated) DEVICE — BAG PRESSURE INFUSER 3000CC

## (undated) DEVICE — GUIDE WIRE MOTION .035 X 150CM

## (undated) DEVICE — SYR ONLY LUER LOCK 20CC

## (undated) DEVICE — BAG LINGEMAN DRAIN UROLOGY

## (undated) DEVICE — GLOVE SURGICAL LATEX SZ 6.5

## (undated) DEVICE — FIBER MOSES 200 DFL

## (undated) DEVICE — GOWN POLY REINF BRTH SLV XL

## (undated) DEVICE — GOWN POLY REINF X-LONG 2XL

## (undated) DEVICE — SCRUB DYNA-HEX LIQ 4% CHG 4OZ